# Patient Record
Sex: MALE | Race: WHITE | NOT HISPANIC OR LATINO | Employment: OTHER | URBAN - METROPOLITAN AREA
[De-identification: names, ages, dates, MRNs, and addresses within clinical notes are randomized per-mention and may not be internally consistent; named-entity substitution may affect disease eponyms.]

---

## 2017-03-17 ENCOUNTER — TELEPHONE (OUTPATIENT)
Dept: NEUROLOGY | Facility: CLINIC | Age: 82
End: 2017-03-17

## 2017-03-17 NOTE — TELEPHONE ENCOUNTER
----- Message from Herminio Moreno sent at 3/17/2017  1:33 PM CDT -----  Contact: Deirdre estrada'sammie estrada's called regarding with questions regarding how many milligrams of Aricept should that patient be taking daily? Please call back to advise at [phone]. Thanks,

## 2017-03-17 NOTE — TELEPHONE ENCOUNTER
Spoke with pharmacist. She wanted to clarify which mg the patient was taking on the Aricept. Informed her that it is Aricept 10mg PO QD.

## 2017-04-03 ENCOUNTER — OFFICE VISIT (OUTPATIENT)
Dept: PODIATRY | Facility: CLINIC | Age: 82
End: 2017-04-03
Payer: MEDICARE

## 2017-04-03 VITALS — BODY MASS INDEX: 21.68 KG/M2 | WEIGHT: 160.06 LBS | HEIGHT: 72 IN

## 2017-04-03 DIAGNOSIS — G60.9 IDIOPATHIC PERIPHERAL NEUROPATHY: Primary | ICD-10-CM

## 2017-04-03 DIAGNOSIS — G89.29 OTHER CHRONIC PAIN: ICD-10-CM

## 2017-04-03 PROCEDURE — 1157F ADVNC CARE PLAN IN RCRD: CPT | Mod: S$GLB,,, | Performed by: PODIATRIST

## 2017-04-03 PROCEDURE — 1159F MED LIST DOCD IN RCRD: CPT | Mod: S$GLB,,, | Performed by: PODIATRIST

## 2017-04-03 PROCEDURE — 99203 OFFICE O/P NEW LOW 30 MIN: CPT | Mod: S$GLB,,, | Performed by: PODIATRIST

## 2017-04-03 PROCEDURE — 99999 PR PBB SHADOW E&M-EST. PATIENT-LVL II: CPT | Mod: PBBFAC,,, | Performed by: PODIATRIST

## 2017-04-03 PROCEDURE — 1160F RVW MEDS BY RX/DR IN RCRD: CPT | Mod: S$GLB,,, | Performed by: PODIATRIST

## 2017-04-03 PROCEDURE — 1125F AMNT PAIN NOTED PAIN PRSNT: CPT | Mod: S$GLB,,, | Performed by: PODIATRIST

## 2017-04-03 RX ORDER — LISINOPRIL 10 MG/1
TABLET ORAL
Refills: 3 | Status: ON HOLD | COMMUNITY
Start: 2016-12-26 | End: 2017-05-09

## 2017-04-03 RX ORDER — LIDOCAINE HYDROCHLORIDE 20 MG/ML
JELLY TOPICAL
Qty: 30 ML | Refills: 2 | Status: ON HOLD | OUTPATIENT
Start: 2017-04-03 | End: 2017-05-09

## 2017-04-03 NOTE — MR AVS SNAPSHOT
[facility]  [address] [place] [address]  Phone: [phone]                  Halley Rodrigues   4/3/2017 10:00 AM   Office Visit    Description:  Male : 1930   Provider:  Hernandez Hanks DPM   Department:  [facility]           Reason for Visit     Foot Pain           Diagnoses this Visit        Comments    Idiopathic peripheral neuropathy    -  Primary     Other chronic pain                To Do List           Future Appointments        Provider Department Dept Phone    2017 9:20 AM Shawn Elizabeth MD [facility] [phone]      Goals (5 Years of Data)     None      Follow-Up and Disposition     Return if symptoms worsen or fail to improve.    Follow-up and Disposition History       These Medications        Disp Refills Start End    lidocaine HCL 2% (XYLOCAINE) 2 % jelly 30 mL 2 4/3/2017     Apply topically as needed. Apply topically to affected portion right and left foot once nightly. - Topical (Top)    Pharmacy: [facility] [address] [place] [address] [address] AT [facility] Ph #: [phone]         [facility] On Call     [facility] On Call Nurse Care Line -  Assistance  Unless otherwise directed by your provider, please contact [facility], our nurse care line that is available for  assistance.     Registered nurses in the [facility] provide: appointment scheduling, clinical advisement, health education, and other advisory services.  Call: [phone] (toll free)               Medications           Message regarding Medications     Verify the changes and/or additions to your medication regime listed below are the same as discussed with your clinician today.  If any of these changes or additions are incorrect, please notify your healthcare provider.        START taking these NEW medications        Refills    lidocaine HCL 2% (XYLOCAINE) 2 % jelly 2    Sig: Apply topically as needed.  Apply topically to affected portion right and left foot once nightly.    Class: Normal    Route: Topical (Top)           Verify that the below list of medications is an accurate representation of the medications you are currently taking.  If none reported, the list may be blank. If incorrect, please contact your healthcare provider. Carry this list with you in case of emergency.           Current Medications     amlodipine (NORVASC) 10 MG tablet Take 10 mg by mouth once daily.    aspirin (ECOTRIN) 81 MG EC tablet Take 81 mg by mouth once daily.    atorvastatin (LIPITOR) 20 MG tablet Take 20 mg by mouth once daily.    clopidogrel (PLAVIX) 75 mg tablet Take 75 mg by mouth once daily.    donepezil (ARICEPT) 10 MG tablet Take 1 tablet (10 mg total) by mouth once daily.    furosemide (LASIX) 20 MG tablet Take 20 mg by mouth once daily.    hydrochlorothiazide (HYDRODIURIL) 25 MG tablet 25 mg.    hydrochlorothiazide (MICROZIDE) 12.5 mg capsule Take 12.5 mg by mouth once daily.    lisinopril 10 MG tablet TK 1 T PO D    metoprolol tartrate (LOPRESSOR) 25 MG tablet 25 mg.    nicotine (NICODERM CQ) 21 mg/24 hr Place 1 patch onto the skin every 24 hours.    omeprazole (PRILOSEC) 20 MG capsule 20 mg.    trazodone (DESYREL) 50 MG tablet 50 mg.    lidocaine HCL 2% (XYLOCAINE) 2 % jelly Apply topically as needed. Apply topically to affected portion right and left foot once nightly.           Clinical Reference Information           Your Vitals Were     Height Weight BMI          6' (1.829 m) 72.6 kg (160 lb 0.9 oz) 21.71 kg/m2        Allergies as of 4/3/2017     Eucalyptus Containing Products      Immunizations Administered on Date of Encounter - 4/3/2017     None      [facility] Sign-Up     Activating your [facility] account is as easy as 1-2-3!     1) Visit [email or web address], select Sign Up Now, enter this activation code and your date of birth, then select Next.  [email or web address]  Expires: 5/13/2017  2:20 PM      2) Create a  username and password to use when you visit [facility] in the future and select a security question in case you lose your password and select Next.    3) Enter your e-mail address and click Sign Up!    Additional Information  If you have questions, please e-mail [email or web address] or call [phone] to talk to our [facility] staff. Remember, [facility] is NOT to be used for urgent needs. For medical emergencies, dial 911.         Smoking Cessation     If you would like to quit smoking:   You may be eligible for free services if you are a [place] resident and started smoking cigarettes before September 1, 1988.  Call the Smoking Cessation Trust ([facility]) toll free at [phone] or [phone].   Call 8-355-QUIT-NOW if you do not meet the above criteria.   Contact us via email: [email or web address]   View our website for more information: [email or web address]        Language Assistance Services     ATTENTION: Language assistance services are available, free of charge. Please call [phone].      ATENCIÓN: Si habla español, tiene a valdez disposición servicios gratuitos de asistencia lingüística. Llame al [phone].     CHÚ Ý: N?u b?n nói Ti?ng Vi?t, có các d?ch v? h? tr? ngôn ng? mi?n phí dành cho b?n. G?i s? [phone].         [facility] complies with applicable Federal civil rights laws and does not discriminate on the basis of race, color, national origin, age, disability, or sex.

## 2017-04-03 NOTE — PROGRESS NOTES
Subjective:      Patient ID: Halley Rodrigues is a 86 y.o. male.    Chief Complaint: Foot Pain (bilateral)    Burning tingling pain both feet.  Gradual onset, worsening over past several months - year, aggravated by increased weight bearing, shoe gear, pressure.  Several medicines tried without relief - doesn't remember names.  OTC pain med not helping.      Review of Systems   Constitution: Negative for chills, diaphoresis, fever, malaise/fatigue and night sweats.   Cardiovascular: Negative for claudication, cyanosis, leg swelling and syncope.   Skin: Negative for color change, dry skin, rash, suspicious lesions and unusual hair distribution.   Musculoskeletal: Negative for falls, joint pain, joint swelling, muscle cramps, muscle weakness and stiffness.   Gastrointestinal: Negative for constipation, diarrhea, nausea and vomiting.   Neurological: Positive for paresthesias and sensory change. Negative for brief paralysis, disturbances in coordination, focal weakness, numbness and tremors.           Objective:      Physical Exam   Constitutional: He is oriented to person, place, and time. He appears well-developed and well-nourished. He is cooperative.   Oriented to time, place, and person.   Cardiovascular:   Pulses:       Dorsalis pedis pulses are 1+ on the right side, and 1+ on the left side.        Posterior tibial pulses are 1+ on the right side, and 1+ on the left side.   Capillary fill time 3-5 seconds.  All toes warm to touch.      Negative lower extremity edema bilateral.    Negative elevational pallor and dependent rubor bilateral.     Musculoskeletal:   Normal angle, base, station of gait. Decreased stride length, early heel off, moderately propulsive toe off bilateral.    All ten toes without clubbing, cyanosis, or signs of ischemia.      No pain to palpation bilateral lower extremities.      Range of motion, stability, muscle strength, and muscle tone are age and health appropriate normal  bilateral feet and legs.       Lymphadenopathy:   Negative lymphadenopathy bilateral popliteal fossa and tarsal tunnel.     Neurological: He is alert and oriented to person, place, and time. He has normal strength. He is not disoriented. He displays no atrophy and no tremor. A sensory deficit is present. He exhibits normal muscle tone.   Reflex Scores:       Patellar reflexes are 2+ on the right side and 2+ on the left side.       Achilles reflexes are 2+ on the right side and 2+ on the left side.  Paresthesias, and burning bilateral feet with no clearly identified trigger or source.       Skin: Skin is warm, dry and intact. No abrasion, no bruising, no burn, no ecchymosis, no laceration, no lesion, no petechiae and no rash noted. He is not diaphoretic. No cyanosis or erythema. No pallor. Nails show no clubbing.   Skin is normal age and health appropriate color, turgor, texture, and temperature bilateral lower extremities without ulceration, hyperpigmentation, discoloration, masses nodules or cords palpated.  No ecchymosis, erythema, edema, or cardinal signs of infection bilateral lower extremities.                     Assessment:       Encounter Diagnoses   Name Primary?    Idiopathic peripheral neuropathy Yes    Other chronic pain          Plan:       Halley was seen today for foot pain.    Diagnoses and all orders for this visit:    Idiopathic peripheral neuropathy    Other chronic pain    Other orders  -     lidocaine HCL 2% (XYLOCAINE) 2 % jelly; Apply topically as needed. Apply topically to affected portion right and left foot once nightly.      I counseled the patient on his conditions, their implications and medical management.        Rx lidocaine gel.    Consult pain management    Discussed conservative treatment with shoes of adequate dimensions, material, and style to alleviate symptoms and delay or prevent surgical intervention.           Return if symptoms worsen or fail to improve.

## 2017-04-26 ENCOUNTER — OFFICE VISIT (OUTPATIENT)
Dept: PAIN MEDICINE | Facility: CLINIC | Age: 82
End: 2017-04-26
Payer: MEDICARE

## 2017-04-26 VITALS
SYSTOLIC BLOOD PRESSURE: 102 MMHG | DIASTOLIC BLOOD PRESSURE: 54 MMHG | HEIGHT: 72 IN | BODY MASS INDEX: 22.21 KG/M2 | HEART RATE: 59 BPM | WEIGHT: 164 LBS

## 2017-04-26 DIAGNOSIS — M53.3 SACROILIAC JOINT PAIN: ICD-10-CM

## 2017-04-26 DIAGNOSIS — G62.9 PERIPHERAL POLYNEUROPATHY: Primary | ICD-10-CM

## 2017-04-26 DIAGNOSIS — M47.819 FACET ARTHROPATHY: ICD-10-CM

## 2017-04-26 PROCEDURE — 99204 OFFICE O/P NEW MOD 45 MIN: CPT | Mod: S$GLB,,, | Performed by: ANESTHESIOLOGY

## 2017-04-26 PROCEDURE — 1125F AMNT PAIN NOTED PAIN PRSNT: CPT | Mod: S$GLB,,, | Performed by: ANESTHESIOLOGY

## 2017-04-26 PROCEDURE — 1160F RVW MEDS BY RX/DR IN RCRD: CPT | Mod: S$GLB,,, | Performed by: ANESTHESIOLOGY

## 2017-04-26 PROCEDURE — 99999 PR PBB SHADOW E&M-EST. PATIENT-LVL III: CPT | Mod: PBBFAC,,, | Performed by: ANESTHESIOLOGY

## 2017-04-26 PROCEDURE — 1159F MED LIST DOCD IN RCRD: CPT | Mod: S$GLB,,, | Performed by: ANESTHESIOLOGY

## 2017-04-26 RX ORDER — IPRATROPIUM BROMIDE 42 UG/1
SPRAY, METERED NASAL
Status: ON HOLD | COMMUNITY
Start: 2017-04-25 | End: 2017-12-05

## 2017-04-26 RX ORDER — METOPROLOL SUCCINATE 25 MG/1
TABLET, EXTENDED RELEASE ORAL
Status: ON HOLD | COMMUNITY
Start: 2017-03-09 | End: 2017-05-09

## 2017-04-26 RX ORDER — HYDROCODONE BITARTRATE AND ACETAMINOPHEN 7.5; 325 MG/1; MG/1
TABLET ORAL
COMMUNITY
Start: 2017-02-01 | End: 2017-04-26

## 2017-04-26 RX ORDER — ATORVASTATIN CALCIUM 40 MG/1
TABLET, FILM COATED ORAL
COMMUNITY
Start: 2017-04-15 | End: 2019-08-26 | Stop reason: ALTCHOICE

## 2017-04-26 RX ORDER — TRAMADOL HYDROCHLORIDE 50 MG/1
TABLET ORAL
Status: ON HOLD | COMMUNITY
Start: 2017-01-29 | End: 2017-05-09

## 2017-04-26 RX ORDER — HYDROCODONE BITARTRATE AND ACETAMINOPHEN 10; 325 MG/1; MG/1
1 TABLET ORAL EVERY 12 HOURS PRN
Qty: 60 TABLET | Refills: 0 | Status: SHIPPED | OUTPATIENT
Start: 2017-04-26 | End: 2017-05-31 | Stop reason: SDUPTHER

## 2017-04-26 RX ORDER — PREGABALIN 75 MG/1
CAPSULE ORAL
COMMUNITY
Start: 2017-04-10 | End: 2018-07-12

## 2017-04-26 RX ORDER — MIRTAZAPINE 7.5 MG/1
TABLET, FILM COATED ORAL
Status: ON HOLD | COMMUNITY
Start: 2017-02-17 | End: 2017-05-09

## 2017-04-26 RX ORDER — MUPIROCIN CALCIUM 20 MG/G
CREAM TOPICAL
Status: ON HOLD | COMMUNITY
Start: 2017-02-01 | End: 2019-12-03 | Stop reason: HOSPADM

## 2017-04-26 RX ORDER — MIRTAZAPINE 15 MG/1
TABLET, FILM COATED ORAL
Status: ON HOLD | COMMUNITY
Start: 2017-04-05 | End: 2017-05-09

## 2017-04-26 RX ORDER — LISINOPRIL 5 MG/1
TABLET ORAL
Status: ON HOLD | COMMUNITY
Start: 2017-04-16 | End: 2017-12-05

## 2017-04-26 RX ORDER — SIMVASTATIN 40 MG/1
40 TABLET, FILM COATED ORAL NIGHTLY
COMMUNITY
Start: 2017-02-01

## 2017-04-26 RX ORDER — FUROSEMIDE 40 MG/1
TABLET ORAL
COMMUNITY
Start: 2017-02-07 | End: 2019-08-26 | Stop reason: ALTCHOICE

## 2017-04-26 RX ORDER — SERTRALINE HYDROCHLORIDE 25 MG/1
TABLET, FILM COATED ORAL
Status: ON HOLD | COMMUNITY
Start: 2017-04-25 | End: 2017-05-09

## 2017-04-26 RX ORDER — PANTOPRAZOLE SODIUM 40 MG/1
40 TABLET, DELAYED RELEASE ORAL DAILY
COMMUNITY
Start: 2017-03-30

## 2017-04-26 RX ORDER — SERTRALINE HYDROCHLORIDE 50 MG/1
TABLET, FILM COATED ORAL
Status: ON HOLD | COMMUNITY
Start: 2017-04-10 | End: 2017-12-05

## 2017-04-26 RX ORDER — DONEPEZIL HYDROCHLORIDE 5 MG/1
TABLET, FILM COATED ORAL
Status: ON HOLD | COMMUNITY
Start: 2017-04-19 | End: 2017-12-05

## 2017-04-26 NOTE — PROGRESS NOTES
"This note was completed with dictation software and grammatical errors may exist.    Referring Physician: Hernandez Hanks DPM    PCP: Scott Payne MD      CC: bilateral foot pain and lower back pain    HPI:   Halley Rodrigues is a 86 y.o.  male with PMHx significant for HLD, HTN, CAD on Plavix, peripheral neuropathy who presents with CC: painful burning sensation in B feet x 5y+ duration. The pain is constant and awakes him in his sleep and makes the evenings the most difficult, pain- wise. It is worse with lying flat and improved with walking. He has taken Lyrica x 5y without any appreciable pain relief. He has tried 2%lidocaine cream, which completely "numbs his feet" and inhibits him from ambulation. He has taken Hydrocodone 7.5 mg 2-3 times QD which helps with his pain significantly. He also reports cLBP without preceding injury/trauma, specifically in the area of B SI joints (R worse than L), aching and stabbing in nature, without radiation. His back pain is worsened with prolonged standing and walking, which limits his activity and ultimately makes his foot pain worse 2/2 being sedentary. He has been having intermittent falls, which he attributes to generalized weakness versus foot numbness or back pain. He denies saddle anesthesia or B/B changes.     ROS:  CONSTITUTIONAL: No fevers, chills, night sweats, wt. loss, appetite changes  SKIN: no rashes or itching  ENT: No headaches, head trauma, vision changes, or eye pain  LYMPH NODES: None noticed   CV: No chest pain, palpitations.   RESP: No shortness of breath, dyspnea on exertion, cough, wheezing, or hemoptysis  GI: No nausea, emesis, diarrhea, constipation, melena, hematochezia, pain.    : No dysuria, hematuria, urgency, or frequency   HEME: No easy bruising, bleeding problems  PSYCHIATRIC: No depression, anxiety, psychosis, hallucinations.  NEURO: No seizures, memory loss, dizziness or difficulty sleeping  MSK back pain, foot " pain      Past Medical History:   Diagnosis Date    GERD (gastroesophageal reflux disease)     Heart disease     Hypertension     Neuropathy      Past Surgical History:   Procedure Laterality Date    CORONARY ARTERY BYPASS GRAFT       Family History   Problem Relation Age of Onset    Dementia Father      Social History     Social History    Marital status:      Spouse name: N/A    Number of children: N/A    Years of education: N/A     Social History Main Topics    Smoking status: Current Every Day Smoker     Packs/day: 1.00     Years: 70.00    Smokeless tobacco: None    Alcohol use No    Drug use: No    Sexual activity: No     Other Topics Concern    None     Social History Narrative         Medications/Allergies: See med card    Vitals:    04/26/17 0930   BP: (!) 102/54   Pulse: (!) 59   Weight: 74.4 kg (164 lb)   Height: 6' (1.829 m)   PainSc: 10-Worst pain ever         Physical exam:    GENERAL: A and O x3, the patient appears well groomed and is in no acute distress.  Skin: No rashes or obvious lesions  HEENT: normocephalic, atraumatic  CARDIOVASCULAR:  Palpable peripheral pulses  LUNGS: easy work of breathing  ABDOMEN: soft, nontender   UPPER EXTREMITIES: Normal alignment, normal range of motion, no atrophy, no skin changes,  hair growth and nail growth normal and equal bilaterally. No swelling, no tenderness.    LOWER EXTREMITIES:  Normal alignment, normal range of motion, no atrophy, dry skin, onchyomycosis in B feet. No swelling, no tenderness.    LUMBAR SPINE  Lumbar spine: ROM is moderately decreased l with flexion extension and oblique extension   Michel's test causes increased pain on both sides (R>>L)   Supine straight leg raise is negative bilaterally.    Internal and external rotation of the hip causes no increased pain on either side.  Myofascial exam:SI joint tenderness B      MENTAL STATUS: normal orientation, speech, language, and fund of knowledge for social situation.   Emotional state appropriate.    CRANIAL NERVES:  II:  PERRL bilaterally,   III,IV,VI: EOMI.    V:  Facial sensation equal bilaterally  VII:  Facial motor function normal.  VIII:  Hearing equal to finger rub bilaterally  IX/X: Gag normal, palate symmetric  XI:  Shoulder shrug equal, head turn equal  XII:  Tongue midline without fasciculations      MOTOR: Tone and bulk: normal bilateral upper and lower Strength: normal   Delt Bi Tri WE WF     R 5 5 5 5 5 5   L 5 5 5 5 5 5     IP ADD ABD Quad TA Gas HAM  R 5 5 5 5 5 5 5  L 5 5 5 5 5 5 5    SENSATION: Light touch and pinprick intact bilaterally  REFLEXES: normal, symmetric, nonbrisk.  Toes down, no clonus. No hoffmans.  GAIT: normal rise, base, steps, and arm swing.              Assessment   1. Peripheral polyneuropathy    2. Sacroiliac joint pain    3. Facet arthropathy        Plan:  - I have stressed the importance of physical activity and exercise to improve overall health. Order given for formal PT  -We have discussed the difficulty in treating peripheral neuropathy and the underlying contributory pathology. As the 2% lidocaine cream left his feet overly numb, we will try a compound cream with anti-neuropathic and anti-inflammatory components to see if this better helps. Instructed to apply up to QID PRN.  -We have dicussed the judicious use of oral Hydrocodone, especially 2/2 pt's age and to be aware of the potential SE: constipation, nausea, itching, respiratory depression and death. Will increase his dose to 10 mg, and not to exceed two doses QD maximum. Given quantity #60. The patient's son and daughter-in-law have been counseled on the risks and proper use of opioid medications.   -Scheduled a B lumbar sympathetic block to see if this helps with his peripheral neuropathy. We have discussed realistic expectations for this nerve block.   - We have discussed addressing his SI arthritis in the future to see if we can obtain some relief from B SI joint  injections  -Pt can continue Lyrica 75 TID  - Follow up in one month       Thank you for referring this interesting patient, and I look forward to continuing to collaborate in his care.

## 2017-05-02 DIAGNOSIS — G60.9 IDIOPATHIC PERIPHERAL NEUROPATHY: Primary | ICD-10-CM

## 2017-05-09 ENCOUNTER — HOSPITAL ENCOUNTER (OUTPATIENT)
Facility: AMBULARY SURGERY CENTER | Age: 82
Discharge: HOME | End: 2017-05-09
Attending: ANESTHESIOLOGY | Admitting: ANESTHESIOLOGY
Payer: MEDICARE

## 2017-05-09 ENCOUNTER — SURGERY (OUTPATIENT)
Age: 82
End: 2017-05-09

## 2017-05-09 DIAGNOSIS — M79.606 LEG PAIN: ICD-10-CM

## 2017-05-09 DIAGNOSIS — M79.606 PAIN OF LOWER EXTREMITY, UNSPECIFIED LATERALITY: Primary | ICD-10-CM

## 2017-05-09 PROCEDURE — 99152 MOD SED SAME PHYS/QHP 5/>YRS: CPT | Mod: ,,, | Performed by: ANESTHESIOLOGY

## 2017-05-09 PROCEDURE — 77003 FLUOROGUIDE FOR SPINE INJECT: CPT | Performed by: ANESTHESIOLOGY

## 2017-05-09 PROCEDURE — 64520 N BLOCK LUMBAR/THORACIC: CPT | Mod: RT | Performed by: ANESTHESIOLOGY

## 2017-05-09 PROCEDURE — 64520 N BLOCK LUMBAR/THORACIC: CPT | Mod: 50,,, | Performed by: ANESTHESIOLOGY

## 2017-05-09 RX ORDER — LIDOCAINE HYDROCHLORIDE 10 MG/ML
INJECTION, SOLUTION EPIDURAL; INFILTRATION; INTRACAUDAL; PERINEURAL
Status: COMPLETED
Start: 2017-05-09 | End: 2017-05-09

## 2017-05-09 RX ORDER — FENTANYL CITRATE 50 UG/ML
INJECTION, SOLUTION INTRAMUSCULAR; INTRAVENOUS
Status: DISCONTINUED
Start: 2017-05-09 | End: 2017-05-09 | Stop reason: HOSPADM

## 2017-05-09 RX ORDER — BUPIVACAINE HYDROCHLORIDE AND EPINEPHRINE 2.5; 5 MG/ML; UG/ML
INJECTION, SOLUTION EPIDURAL; INFILTRATION; INTRACAUDAL; PERINEURAL
Status: DISCONTINUED | OUTPATIENT
Start: 2017-05-09 | End: 2017-05-09 | Stop reason: HOSPADM

## 2017-05-09 RX ORDER — LIDOCAINE HYDROCHLORIDE 10 MG/ML
0.2 INJECTION, SOLUTION EPIDURAL; INFILTRATION; INTRACAUDAL; PERINEURAL ONCE AS NEEDED
Status: COMPLETED | OUTPATIENT
Start: 2017-05-09 | End: 2017-05-09

## 2017-05-09 RX ORDER — DEXAMETHASONE SODIUM PHOSPHATE 10 MG/ML
INJECTION INTRAMUSCULAR; INTRAVENOUS
Status: DISCONTINUED | OUTPATIENT
Start: 2017-05-09 | End: 2017-05-09 | Stop reason: HOSPADM

## 2017-05-09 RX ORDER — MIDAZOLAM HYDROCHLORIDE 1 MG/ML
INJECTION INTRAMUSCULAR; INTRAVENOUS
Status: DISCONTINUED | OUTPATIENT
Start: 2017-05-09 | End: 2017-05-09 | Stop reason: HOSPADM

## 2017-05-09 RX ORDER — ALPRAZOLAM 1 MG/1
1 TABLET, ORALLY DISINTEGRATING ORAL
Status: DISCONTINUED | OUTPATIENT
Start: 2017-05-09 | End: 2017-05-09 | Stop reason: HOSPADM

## 2017-05-09 RX ORDER — MIDAZOLAM HYDROCHLORIDE 1 MG/ML
INJECTION INTRAMUSCULAR; INTRAVENOUS
Status: DISCONTINUED
Start: 2017-05-09 | End: 2017-05-09 | Stop reason: HOSPADM

## 2017-05-09 RX ORDER — FENTANYL CITRATE 50 UG/ML
INJECTION, SOLUTION INTRAMUSCULAR; INTRAVENOUS
Status: DISCONTINUED | OUTPATIENT
Start: 2017-05-09 | End: 2017-05-09 | Stop reason: HOSPADM

## 2017-05-09 RX ORDER — SODIUM CHLORIDE, SODIUM LACTATE, POTASSIUM CHLORIDE, CALCIUM CHLORIDE 600; 310; 30; 20 MG/100ML; MG/100ML; MG/100ML; MG/100ML
INJECTION, SOLUTION INTRAVENOUS ONCE AS NEEDED
Status: COMPLETED | OUTPATIENT
Start: 2017-05-09 | End: 2017-05-09

## 2017-05-09 RX ORDER — LIDOCAINE HYDROCHLORIDE 10 MG/ML
INJECTION, SOLUTION EPIDURAL; INFILTRATION; INTRACAUDAL; PERINEURAL
Status: DISCONTINUED | OUTPATIENT
Start: 2017-05-09 | End: 2017-05-09 | Stop reason: HOSPADM

## 2017-05-09 RX ORDER — BUPIVACAINE HYDROCHLORIDE AND EPINEPHRINE 2.5; 5 MG/ML; UG/ML
INJECTION, SOLUTION EPIDURAL; INFILTRATION; INTRACAUDAL; PERINEURAL
Status: DISCONTINUED
Start: 2017-05-09 | End: 2017-05-09 | Stop reason: HOSPADM

## 2017-05-09 RX ORDER — DEXAMETHASONE SODIUM PHOSPHATE 10 MG/ML
INJECTION INTRAMUSCULAR; INTRAVENOUS
Status: DISCONTINUED
Start: 2017-05-09 | End: 2017-05-09 | Stop reason: HOSPADM

## 2017-05-09 RX ADMIN — BUPIVACAINE HYDROCHLORIDE AND EPINEPHRINE 19 ML: 2.5; 5 INJECTION, SOLUTION EPIDURAL; INFILTRATION; INTRACAUDAL; PERINEURAL at 01:05

## 2017-05-09 RX ADMIN — FENTANYL CITRATE 25 MCG: 50 INJECTION, SOLUTION INTRAMUSCULAR; INTRAVENOUS at 01:05

## 2017-05-09 RX ADMIN — FENTANYL CITRATE 50 MCG: 50 INJECTION, SOLUTION INTRAMUSCULAR; INTRAVENOUS at 01:05

## 2017-05-09 RX ADMIN — MIDAZOLAM HYDROCHLORIDE 2 MG: 1 INJECTION INTRAMUSCULAR; INTRAVENOUS at 01:05

## 2017-05-09 RX ADMIN — DEXAMETHASONE SODIUM PHOSPHATE 10 MG: 10 INJECTION INTRAMUSCULAR; INTRAVENOUS at 01:05

## 2017-05-09 RX ADMIN — SODIUM CHLORIDE, SODIUM LACTATE, POTASSIUM CHLORIDE, CALCIUM CHLORIDE: 600; 310; 30; 20 INJECTION, SOLUTION INTRAVENOUS at 12:05

## 2017-05-09 RX ADMIN — LIDOCAINE HYDROCHLORIDE 10 ML: 10 INJECTION, SOLUTION EPIDURAL; INFILTRATION; INTRACAUDAL; PERINEURAL at 01:05

## 2017-05-09 RX ADMIN — LIDOCAINE HYDROCHLORIDE 0.2 ML: 10 INJECTION, SOLUTION EPIDURAL; INFILTRATION; INTRACAUDAL; PERINEURAL at 12:05

## 2017-05-09 NOTE — DISCHARGE INSTRUCTIONS
Procedural Sedation  Procedural sedation is medicine to ease discomfort, pain, and anxiety during a procedure. The medicine is often given through an intravenous (IV) line in your arm or hand. In some cases, the medicine may be taken by mouth or inhaled. While you are under sedation, you will likely be awake. But you may not remember anything afterward.  Why procedural sedation is used  Sedation is used for many types of procedures. The goal is to reduce pain, anxiety, and stressful memories of a procedure. It can also help your health care provider treat you. For example, having a broken bone fixed may be easier if you feel relaxed.  Procedural sedation is used only for short, basic procedures. It is not used for complex surgeries. Some procedures that use this type of sedation include:  · Dental surgery  · Breast biopsy, to take a sample of breast tissue  · Endoscopy, to look at gastrointestinal problems  · Bronchoscopy, to check for lung problems  · Bone or joint realignment, to fix a broken bone or dislocated joint  · Minor foot or skin surgery  · Electrical cardioversion, to restore a normal heart rhythm  · Lumbar puncture, to assess neurological disease  Risks of procedural sedation  Procedural sedation has some risks and possible side effects, such as:  · Headache  · Nausea and vomiting  · Unpleasant memory of the procedure  · Lowered rate of breathing  · Changes in heart rate and blood pressure (rare)  · Inhalation of stomach contents into your lungs (rare)  Side effects will likely go away shortly after the procedure. Your health care team will watch your heart rate and breathing during your sedation. This is to help prevent problems.  Your own risks may vary based on your age and your overall health. They also depend on the type of sedation you are given. Talk with your health care provider about the risks that apply most to you.  Getting ready for procedural sedation  Talk with your health care provider  how to get ready for your procedure. Tell him or her about all the medicines you take. This includes over-the-counter medicines such as ibuprofen. It also includes vitamins, herbs, and other supplements. You may need to stop taking some medicines before the procedure, such as blood thinners and aspirin. If you smoke, you may need to stop. Talk with your health care provider if you need help to stop smoking.  Tell your health care provider if you:  · Have had any problems in the past with sedation or anesthesia  · Have had any recent changes in your health, such as an infection or fever  · Are pregnant or think you may be pregnant  Also, make sure to:  · Ask a family member or friend to take you home after the procedure. You cannot drive on the day you receive sedation.  · Not eat or drink after midnight the night before your procedure, if advised.  · Follow all other instructions from your health care provider.  During your procedural sedation  You may have your procedure in a hospital or a medical clinic. Sedation is done by a trained health care provider. In general, you can expect the following:  · You will be given medicine through an IV line in your arm or hand. Or you may receive a shot. The medicine may also be given by mouth. Or you may inhale it through a mask.  · If you receive medicine through an IV, you may feel the effects very quickly. You will start to feel relaxed and drowsy.  · During the procedure, your heart rate, breathing, and blood pressure will be closely watched. Your breathing and blood pressure may decrease a little. But you will likely not need help with your breathing. You may receive a little extra oxygen through a mask.  · You will probably be awake the entire time. If you do fall asleep, you should be easy to wake up, if needed. You should feel little or no pain.  · When your procedure is over, the sedative medicine will be stopped.  After your procedural sedation  You will begin to  feel more awake and aware. But you will likely be drowsy for a while afterward. You will be closely watched as you become more alert. You may have a faint memory of the procedure. Or you may not remember it at all.  You should be able to return home within an hour or two after your procedure. Plan to have someone stay with you for a few hours. Side effects such as headache and nausea may go away quickly. Tell your health care provider if they continue.  Dont drive or make any important decisions for at least 24 hours. Be sure to follow all after-care instructions.     When to call your health care provider  Have someone call your health care provider right away if you have any of these:  · Drowsiness that gets worse  · Weakness or dizziness that gets worse  · Repeated vomiting  · You cant be awakened   Date Last Reviewed: 2/6/2015  © 3754-3447 [employer]. [address] [place] [address]. All rights reserved. This information is not intended as a substitute for professional medical care. Always follow your healthcare professional's instructions.        Sympathetic Nerve Block    A sympathetic nerve block helps your doctor find the cause of the burning, pain, or tingling in your arms and hands or legs and feet. During the test, medication is injected near your spine. This blocks the sympathetic nerves in that region. If these nerves are causing your problem, the injection will relieve your symptoms for an hour up to a few days, or more permanently. It is used for both diagnosis and treatment.    What are the sympathetic nerves?  The sympathetic nervous system is a network of nerves throughout your body. The nerves branch from your spine. They control some body functions, such as the closing of blood vessels and opening the sweat glands. A problem with these nerves can affect blood flow. Symptoms are often felt in the hands or feet. They may hurt, burn, feel cold, or be tender to the  touch.  Sympathetic ganglions  The sympathetic nervous system is controlled by bunches of nerves called ganglions. One large ganglion, called the stellate ganglion, helps control nerves in the upper body. In the lower body, nerves are controlled by several ganglions that make up the sympathetic chain.  Risks and complications  Risks and complications are rare, but can include:  · Bleeding or fluid leakage in the spinal cord  · Puncture of a blood vessel  · Infection  · Lung puncture (pneumothorax)  · Nerve injury   Date Last Reviewed: 10/20/2015  © 3720-9390 [employer]. [address] [place], [place] [id number]. All rights reserved. This information is not intended as a substitute for professional medical care. Always follow your healthcare professional's instructions.

## 2017-05-09 NOTE — PLAN OF CARE
Patient stable says ready to go home. Patient's son Cali chairside and says ready to take patient home. Both verbalize understanding of all discharge instructions. Patient discharged via ambulatory accompanied by nurse and patient's son Cali who is driving patient home.

## 2017-05-09 NOTE — IP AVS SNAPSHOT
[facility] [place] Location  [address] [address]           Patient Discharge Instructions   Our goal is to set you up for success. This packet includes information on your condition, medications, and your home care.  It will help you care for yourself to prevent having to return to the hospital.     Please ask your nurse if you have any questions.      There are many details to remember when preparing to leave the hospital. Here is what you will need to do:    1. Take your medicine. If you are prescribed medications, review your Medication List on the following pages. You may have new medications to  at the pharmacy and others that you'll need to stop taking. Review the instructions for how and when to take your medications. Talk with your doctor or nurses if you are unsure of what to do.     2. Go to your follow-up appointments. Specific follow-up information is listed in the following pages. Your may be contacted by a nurse or clinical provider about future appointments. Be sure we have all of the phone numbers to reach you. Please contact your provider's office if you are unable to make an appointment.     3. Watch for warning signs. Your doctor or nurse will give you detailed warning signs to watch for and when to call for assistance. These instructions may also include educational information about your condition. If you experience any of warning signs to your health, call your doctor.           [facility]  Unless otherwise directed by your provider, please   contact [facility], our nurse care line   that is available for 24/7 assistance.     [phone] (toll-free)     Registered nurses in the [facility]   provide: appointment scheduling, clinical advisement, health education, and other advisory services.                  ** Verify the list of medication(s) below is accurate and up to date. Carry this with you in case of  emergency. If your medications have changed, please notify your healthcare provider.             Medication List      CHANGE how you take these medications        Additional Info                      atorvastatin 40 MG tablet   Commonly known as:  LIPITOR   Refills:  0   What changed:  Another medication with the same name was removed. Continue taking this medication, and follow the directions you see here.      Begin Date    AM    Noon    PM    Bedtime       donepezil 5 MG tablet   Commonly known as:  ARICEPT   Refills:  0   What changed:  Another medication with the same name was removed. Continue taking this medication, and follow the directions you see here.      Begin Date    AM    Noon    PM    Bedtime       furosemide 40 MG tablet   Commonly known as:  LASIX   Refills:  0   What changed:  Another medication with the same name was removed. Continue taking this medication, and follow the directions you see here.      Begin Date    AM    Noon    PM    Bedtime       hydrochlorothiazide 12.5 mg capsule   Commonly known as:  MICROZIDE   Refills:  6   Dose:  12.5 mg   What changed:  Another medication with the same name was removed. Continue taking this medication, and follow the directions you see here.    Instructions:  Take 12.5 mg by mouth once daily.     Begin Date    AM    Noon    PM    Bedtime       lisinopril 5 MG tablet   Commonly known as:  PRINIVIL,ZESTRIL   Refills:  0   What changed:  Another medication with the same name was removed. Continue taking this medication, and follow the directions you see here.      Begin Date    AM    Noon    PM    Bedtime       sertraline 50 MG tablet   Commonly known as:  ZOLOFT   Refills:  0   What changed:  Another medication with the same name was removed. Continue taking this medication, and follow the directions you see here.      Begin Date    AM    Noon    PM    Bedtime         CONTINUE taking these medications        Additional Info                      amlodipine  10 MG tablet   Commonly known as:  NORVASC   Refills:  2   Dose:  10 mg    Instructions:  Take 10 mg by mouth once daily.     Begin Date    AM    Noon    PM    Bedtime       aspirin 81 MG EC tablet   Commonly known as:  ECOTRIN   Refills:  0   Dose:  81 mg    Instructions:  Take 81 mg by mouth once daily.     Begin Date    AM    Noon    PM    Bedtime       clopidogrel 75 mg tablet   Commonly known as:  PLAVIX   Refills:  0   Dose:  75 mg    Instructions:  Take 75 mg by mouth once daily.     Begin Date    AM    Noon    PM    Bedtime       hydrocodone-acetaminophen 10-325mg  mg Tab   Commonly known as:  NORCO   Quantity:  60 tablet   Refills:  0   Dose:  1 tablet    Instructions:  Take 1 tablet by mouth every 12 (twelve) hours as needed (pain).     Begin Date    AM    Noon    PM    Bedtime       ipratropium 0.06 % nasal spray   Commonly known as:  ATROVENT   Refills:  0      Begin Date    AM    Noon    PM    Bedtime       LYRICA 75 MG capsule   Refills:  0   Generic drug:  pregabalin      Begin Date    AM    Noon    PM    Bedtime       metoprolol tartrate 25 MG tablet   Commonly known as:  LOPRESSOR   Refills:  0   Dose:  25 mg    Instructions:  25 mg.     Begin Date    AM    Noon    PM    Bedtime       mupirocin calcium 2% 2 % cream   Commonly known as:  BACTROBAN   Refills:  0      Begin Date    AM    Noon    PM    Bedtime       omeprazole 20 MG capsule   Commonly known as:  PRILOSEC   Refills:  0   Dose:  20 mg    Instructions:  20 mg.     Begin Date    AM    Noon    PM    Bedtime       pantoprazole 40 MG tablet   Commonly known as:  PROTONIX   Refills:  0      Begin Date    AM    Noon    PM    Bedtime       simvastatin 40 MG tablet   Commonly known as:  ZOCOR   Refills:  0      Begin Date    AM    Noon    PM    Bedtime       trazodone 50 MG tablet   Commonly known as:  DESYREL   Refills:  3   Dose:  50 mg    Instructions:  50 mg.     Begin Date    AM    Noon    PM    Bedtime         STOP taking these  medications     lidocaine HCL 2% 2 % jelly   Commonly known as:  XYLOCAINE       metoprolol succinate 25 MG 24 hr tablet   Commonly known as:  TOPROL-XL       mirtazapine 15 MG tablet   Commonly known as:  REMERON       mirtazapine 7.5 MG Tab   Commonly known as:  REMERON       nicotine 21 mg/24 hr   Commonly known as:  NICODERM CQ       tramadol 50 mg tablet   Commonly known as:  ULTRAM                  Please bring to all follow up appointments:    1. A copy of your discharge instructions.  2. All medicines you are currently taking in their original bottles.  3. Identification and insurance card.    Please arrive 15 minutes ahead of scheduled appointment time.    Please call 24 hours in advance if you must reschedule your appointment and/or time.        Your Scheduled Appointments     May 31, 2017  3:00 PM CDT   Established Patient Visit with COLEEN Villanueva - Pain Management ([facility])    [address] [place] [address] [address]  [place] [id number] [phone]              Follow-up Information     Schedule an appointment as soon as possible for a visit with Shawn Elizabeth MD.    Specialties:  Pain Medicine, Anesthesiology    Why:  Call as soon as possible for follow up appt to koby MAURER in 3 weeks    Contact information:    [address] [place] [address] [address]  [place] [address]  [phone]          Discharge Instructions     Future Orders    Activity as tolerated     Call MD for:  difficulty breathing or increased cough     Call MD for:  persistent nausea and vomiting or diarrhea     Call MD for:  redness, tenderness, or signs of infection (pain, swelling, redness, odor or green/yellow discharge around incision site)     Call MD for:  severe persistent headache     Call MD for:  severe uncontrolled pain     Call MD for:  temperature >100.4     Diet general     Questions:    Total calories:      Fat restriction, if any:      Protein restriction, if any:      Na restriction, if  any:      Fluid restriction:      Additional restrictions:          Discharge Instructions         Procedural Sedation  Procedural sedation is medicine to ease discomfort, pain, and anxiety during a procedure. The medicine is often given through an intravenous (IV) line in your arm or hand. In some cases, the medicine may be taken by mouth or inhaled. While you are under sedation, you will likely be awake. But you may not remember anything afterward.  Why procedural sedation is used  Sedation is used for many types of procedures. The goal is to reduce pain, anxiety, and stressful memories of a procedure. It can also help your health care provider treat you. For example, having a broken bone fixed may be easier if you feel relaxed.  Procedural sedation is used only for short, basic procedures. It is not used for complex surgeries. Some procedures that use this type of sedation include:  · Dental surgery  · Breast biopsy, to take a sample of breast tissue  · Endoscopy, to look at gastrointestinal problems  · Bronchoscopy, to check for lung problems  · Bone or joint realignment, to fix a broken bone or dislocated joint  · Minor foot or skin surgery  · Electrical cardioversion, to restore a normal heart rhythm  · Lumbar puncture, to assess neurological disease  Risks of procedural sedation  Procedural sedation has some risks and possible side effects, such as:  · Headache  · Nausea and vomiting  · Unpleasant memory of the procedure  · Lowered rate of breathing  · Changes in heart rate and blood pressure (rare)  · Inhalation of stomach contents into your lungs (rare)  Side effects will likely go away shortly after the procedure. Your health care team will watch your heart rate and breathing during your sedation. This is to help prevent problems.  Your own risks may vary based on your age and your overall health. They also depend on the type of sedation you are given. Talk with your health care provider about the risks  that apply most to you.  Getting ready for procedural sedation  Talk with your health care provider how to get ready for your procedure. Tell him or her about all the medicines you take. This includes over-the-counter medicines such as ibuprofen. It also includes vitamins, herbs, and other supplements. You may need to stop taking some medicines before the procedure, such as blood thinners and aspirin. If you smoke, you may need to stop. Talk with your health care provider if you need help to stop smoking.  Tell your health care provider if you:  · Have had any problems in the past with sedation or anesthesia  · Have had any recent changes in your health, such as an infection or fever  · Are pregnant or think you may be pregnant  Also, make sure to:  · Ask a family member or friend to take you home after the procedure. You cannot drive on the day you receive sedation.  · Not eat or drink after midnight the night before your procedure, if advised.  · Follow all other instructions from your health care provider.  During your procedural sedation  You may have your procedure in a hospital or a medical clinic. Sedation is done by a trained health care provider. In general, you can expect the following:  · You will be given medicine through an IV line in your arm or hand. Or you may receive a shot. The medicine may also be given by mouth. Or you may inhale it through a mask.  · If you receive medicine through an IV, you may feel the effects very quickly. You will start to feel relaxed and drowsy.  · During the procedure, your heart rate, breathing, and blood pressure will be closely watched. Your breathing and blood pressure may decrease a little. But you will likely not need help with your breathing. You may receive a little extra oxygen through a mask.  · You will probably be awake the entire time. If you do fall asleep, you should be easy to wake up, if needed. You should feel little or no pain.  · When your procedure  is over, the sedative medicine will be stopped.  After your procedural sedation  You will begin to feel more awake and aware. But you will likely be drowsy for a while afterward. You will be closely watched as you become more alert. You may have a faint memory of the procedure. Or you may not remember it at all.  You should be able to return home within an hour or two after your procedure. Plan to have someone stay with you for a few hours. Side effects such as headache and nausea may go away quickly. Tell your health care provider if they continue.  Dont drive or make any important decisions for at least 24 hours. Be sure to follow all after-care instructions.     When to call your health care provider  Have someone call your health care provider right away if you have any of these:  · Drowsiness that gets worse  · Weakness or dizziness that gets worse  · Repeated vomiting  · You cant be awakened   Date Last Reviewed: 2/6/2015  © 6214-2394 [employer]. [address] [place] [address]. All rights reserved. This information is not intended as a substitute for professional medical care. Always follow your healthcare professional's instructions.        Sympathetic Nerve Block    A sympathetic nerve block helps your doctor find the cause of the burning, pain, or tingling in your arms and hands or legs and feet. During the test, medication is injected near your spine. This blocks the sympathetic nerves in that region. If these nerves are causing your problem, the injection will relieve your symptoms for an hour up to a few days, or more permanently. It is used for both diagnosis and treatment.    What are the sympathetic nerves?  The sympathetic nervous system is a network of nerves throughout your body. The nerves branch from your spine. They control some body functions, such as the closing of blood vessels and opening the sweat glands. A problem with these nerves can affect blood flow.  Symptoms are often felt in the hands or feet. They may hurt, burn, feel cold, or be tender to the touch.  Sympathetic ganglions  The sympathetic nervous system is controlled by bunches of nerves called ganglions. One large ganglion, called the stellate ganglion, helps control nerves in the upper body. In the lower body, nerves are controlled by several ganglions that make up the sympathetic chain.  Risks and complications  Risks and complications are rare, but can include:  · Bleeding or fluid leakage in the spinal cord  · Puncture of a blood vessel  · Infection  · Lung puncture (pneumothorax)  · Nerve injury   Date Last Reviewed: 10/20/2015  © 6433-8468 [employer]. [address] [place] [address]. All rights reserved. This information is not intended as a substitute for professional medical care. Always follow your healthcare professional's instructions.            Primary Diagnosis     Your primary diagnosis was:  Leg Pain      Admission Information     Date & Time Provider Department CSN    5/9/2017 12:09 PM Shawn Elizabeth MD [facility] [id number]      Care Providers     Provider Role Specialty Primary office phone    Shawn Elizabeth MD Attending Provider Pain Medicine [phone]    Shawn Elizabeth MD Surgeon  Pain Medicine [phone]      Your Vitals Were     BP Pulse Temp Resp Height Weight    108/50 (BP Location: Right arm, Patient Position: Lying, BP Method: Automatic) 66 97.6 °F (36.4 °C) (Oral) 16 6' (1.829 m) 74.4 kg (164 lb)    SpO2 BMI             99% 22.24 kg/m2         Recent Lab Values     No lab values to display.      Allergies as of 5/9/2017        Reactions    Eucalyptus Containing Products Palpitations      Smoking Cessation     If you would like to quit smoking:   You may be eligible for free services if you are a [place] resident and started smoking cigarettes before September 1, 1988.  Call the Smoking Cessation Trust (SCT) toll free at (517) 352-6983 or  (938) 860-8042.   Call 1-800-QUIT-NOW if you do not meet the above criteria.   Contact us via email: [email or web address]   View our website for more information: [email or web address]        Language Assistance Services     ATTENTION: Language assistance services are available, free of charge. Please call [phone].      ATENCIÓN: Si habla español, tiene a valdez disposición servicios gratuitos de asistencia lingüística. Llame al [phone].     [facility] Ý: N?u b?n nói Ti?ng Vi?t, có các d?ch v? h? tr? ngôn ng? mi?n phí dành cho b?n. G?i s? [phone].        [facility] Sign-Up     Activating your [facility] account is as easy as 1-2-3!     1) Visit [email or web address], select Sign Up Now, enter this activation code and your date of birth, then select Next.  [email or web address]  Expires: 5/13/2017  2:20 PM      2) Create a username and password to use when you visit [facility] in the future and select a security question in case you lose your password and select Next.    3) Enter your e-mail address and click Sign Up!    Additional Information  If you have questions, please e-mail [email or web address] or call [phone] to talk to our [facility] staff. Remember, [facility] is NOT to be used for urgent needs. For medical emergencies, dial 911.          [facility] complies with applicable Federal civil rights laws and does not discriminate on the basis of race, color, national origin, age, disability, or sex.

## 2017-05-09 NOTE — DISCHARGE SUMMARY
[facility]  Discharge Note  Short Stay    Admit Date: 5/9/2017    Discharge Date and Time: 5/9/2017    Attending Physician: Shawn Elizabeth MD     Discharge Provider: Shawn Elizabeth    Diagnoses:  Active Hospital Problems    Diagnosis  POA    *Leg pain [M79.606]  Yes      Resolved Hospital Problems    Diagnosis Date Resolved POA   No resolved problems to display.       Hospital Course: LSB  Discharged Condition: Good    Final Diagnoses:   Active Hospital Problems    Diagnosis  POA    *Leg pain [M79.606]  Yes      Resolved Hospital Problems    Diagnosis Date Resolved POA   No resolved problems to display.       Disposition: Home or Self Care    Follow up/Patient Instructions:    Medications:  Reconciled Home Medications:   Current Discharge Medication List      CONTINUE these medications which have NOT CHANGED    Details   metoprolol tartrate (LOPRESSOR) 25 MG tablet 25 mg.  Refills: 0      amlodipine (NORVASC) 10 MG tablet Take 10 mg by mouth once daily.  Refills: 2      aspirin (ECOTRIN) 81 MG EC tablet Take 81 mg by mouth once daily.      atorvastatin (LIPITOR) 40 MG tablet       clopidogrel (PLAVIX) 75 mg tablet Take 75 mg by mouth once daily.      donepezil (ARICEPT) 5 MG tablet       furosemide (LASIX) 40 MG tablet       hydrochlorothiazide (MICROZIDE) 12.5 mg capsule Take 12.5 mg by mouth once daily.  Refills: 6      hydrocodone-acetaminophen 10-325mg (NORCO)  mg Tab Take 1 tablet by mouth every 12 (twelve) hours as needed (pain).  Qty: 60 tablet, Refills: 0      ipratropium (ATROVENT) 0.06 % nasal spray       lisinopril (PRINIVIL,ZESTRIL) 5 MG tablet       LYRICA 75 mg capsule       mupirocin calcium 2% (BACTROBAN) 2 % cream       omeprazole (PRILOSEC) 20 MG capsule 20 mg.  Refills: 0      pantoprazole (PROTONIX) 40 MG tablet       sertraline (ZOLOFT) 50 MG tablet       simvastatin (ZOCOR) 40 MG tablet       trazodone (DESYREL) 50 MG tablet 50 mg.  Refills: 3         STOP taking these medications        hydrochlorothiazide (HYDRODIURIL) 25 MG tablet Comments:   Reason for Stopping:         lidocaine HCL 2% (XYLOCAINE) 2 % jelly Comments:   Reason for Stopping:         metoprolol succinate (TOPROL-XL) 25 MG 24 hr tablet Comments:   Reason for Stopping:         mirtazapine (REMERON) 15 MG tablet Comments:   Reason for Stopping:         mirtazapine (REMERON) 7.5 MG Tab Comments:   Reason for Stopping:         nicotine (NICODERM CQ) 21 mg/24 hr Comments:   Reason for Stopping:         tramadol (ULTRAM) 50 mg tablet Comments:   Reason for Stopping:               Discharge Procedure Orders  Diet general     Activity as tolerated     Call MD for:  temperature >100.4     Call MD for:  persistent nausea and vomiting or diarrhea     Call MD for:  severe uncontrolled pain     Call MD for:  redness, tenderness, or signs of infection (pain, swelling, redness, odor or green/yellow discharge around incision site)     Call MD for:  difficulty breathing or increased cough     Call MD for:  severe persistent headache          Follow up with MD in 2-3 weeks    Discharge Procedure Orders (must include Diet, Follow-up, Activity):    Discharge Procedure Orders (must include Diet, Follow-up, Activity)  Diet general     Activity as tolerated     Call MD for:  temperature >100.4     Call MD for:  persistent nausea and vomiting or diarrhea     Call MD for:  severe uncontrolled pain     Call MD for:  redness, tenderness, or signs of infection (pain, swelling, redness, odor or green/yellow discharge around incision site)     Call MD for:  difficulty breathing or increased cough     Call MD for:  severe persistent headache

## 2017-05-09 NOTE — OP NOTE
PROCEDURE DATE: 5/9/2017    PROCEDURE: Bilateral side L3 lumbar sympathetic block utilizing fluoroscopy    DIAGNOSIS: Leg pain  Post Op diagnosis: Same    PHYSICIAN: Shawn Elizabeth MD    MEDICATIONS INJECTED:      Bupivicaine with 1:200,000 epinephrine, 9.5 ml total with 5mg of methylprednisolone at each site    LOCAL ANESTHETIC INJECTED:  Lidocaine 1%. 2ml per site.    SEDATION MEDICATIONS: RN IV Sedation    ESTIMATED BLOOD LOSS:  None    COMPLICATIONS:  None    TECHNIQUE:   A time-out taken to identify patient and procedure side prior to starting the procedure. The patient was placed in a prone position, prepped and draped in the usual sterile fashion using ChloraPrep and sterile towels.  The area to be injected was determined under fluoroscopic guidance in AP and oblique view. The fluoroscope was rotated to a right-side oblique view at which point the tip of the L3 transverse process was noted to be parallel to the L3 vertebral body.  Local anesthetic was given by raising a wheel and going down to the hub of a 25-gauge 1.5 inch needle.  In oblique view, a 5 inch 22-gauge bent-tip spinal needle was introduced and followed just anterior to the transverse process until it made contact with the vertebral body. After negative aspiration for blood or air, 1ml contrast dye was injected to confirm appropriate placement and that there was no vascular uptake, and this was also performed under live digital subtraction. The test dose as noted above was given over 4 minutes and there were no signs of HR or BP changes, no tinnitus or circumoral numbness. Again, aspiration was negative for blood or air and the treatment medication was then given slowly over 5 minutes. Procedure was then repeated on left side.  The patient tolerated the procedure well.    The patient was monitored after the procedure. The patient was given post procedure and discharge instructions to follow at home. The patient was discharged in a stable  condition.

## 2017-05-11 VITALS
TEMPERATURE: 98 F | HEART RATE: 60 BPM | HEIGHT: 72 IN | RESPIRATION RATE: 12 BRPM | OXYGEN SATURATION: 96 % | WEIGHT: 164 LBS | BODY MASS INDEX: 22.21 KG/M2 | SYSTOLIC BLOOD PRESSURE: 141 MMHG | DIASTOLIC BLOOD PRESSURE: 63 MMHG

## 2017-05-31 ENCOUNTER — OFFICE VISIT (OUTPATIENT)
Dept: PAIN MEDICINE | Facility: CLINIC | Age: 82
End: 2017-05-31
Payer: MEDICARE

## 2017-05-31 VITALS
HEIGHT: 73 IN | BODY MASS INDEX: 21.74 KG/M2 | WEIGHT: 164 LBS | SYSTOLIC BLOOD PRESSURE: 148 MMHG | DIASTOLIC BLOOD PRESSURE: 66 MMHG

## 2017-05-31 DIAGNOSIS — G60.9 IDIOPATHIC PERIPHERAL NEUROPATHY: Primary | ICD-10-CM

## 2017-05-31 DIAGNOSIS — G62.9 PERIPHERAL POLYNEUROPATHY: ICD-10-CM

## 2017-05-31 PROCEDURE — 1159F MED LIST DOCD IN RCRD: CPT | Mod: S$GLB,,, | Performed by: PHYSICIAN ASSISTANT

## 2017-05-31 PROCEDURE — 99999 PR PBB SHADOW E&M-EST. PATIENT-LVL IV: CPT | Mod: PBBFAC,,, | Performed by: PHYSICIAN ASSISTANT

## 2017-05-31 PROCEDURE — 99214 OFFICE O/P EST MOD 30 MIN: CPT | Mod: S$GLB,,, | Performed by: PHYSICIAN ASSISTANT

## 2017-05-31 PROCEDURE — 1125F AMNT PAIN NOTED PAIN PRSNT: CPT | Mod: S$GLB,,, | Performed by: PHYSICIAN ASSISTANT

## 2017-05-31 RX ORDER — SERTRALINE HYDROCHLORIDE 25 MG/1
TABLET, FILM COATED ORAL
Status: ON HOLD | COMMUNITY
Start: 2017-05-22 | End: 2017-12-05

## 2017-05-31 RX ORDER — HYDROCODONE BITARTRATE AND ACETAMINOPHEN 10; 325 MG/1; MG/1
1 TABLET ORAL EVERY 12 HOURS PRN
Qty: 60 TABLET | Refills: 0 | Status: SHIPPED | OUTPATIENT
Start: 2017-06-30 | End: 2017-07-27 | Stop reason: SDUPTHER

## 2017-05-31 RX ORDER — HYDROCODONE BITARTRATE AND ACETAMINOPHEN 10; 325 MG/1; MG/1
1 TABLET ORAL EVERY 12 HOURS PRN
Qty: 60 TABLET | Refills: 0 | Status: SHIPPED | OUTPATIENT
Start: 2017-05-31 | End: 2017-06-30

## 2017-05-31 RX ORDER — FUROSEMIDE 20 MG/1
TABLET ORAL
COMMUNITY
Start: 2017-05-15 | End: 2017-12-14

## 2017-05-31 RX ORDER — DONEPEZIL HYDROCHLORIDE 10 MG/1
TABLET, FILM COATED ORAL
Status: ON HOLD | COMMUNITY
Start: 2017-05-15 | End: 2017-12-05

## 2017-05-31 NOTE — PROGRESS NOTES
"Referring Physician: No ref. provider found    PCP: Scott Payne MD      CC: bilateral foot pain and lower back pain  Interval History:  Mr. Rodrigues is a 86 y.o. male with peripheral neuropathy who presents today for f/u s/p bilateral sympathetic nerve block. Reports no improvement in pain. Pain is unchanged in quality or location. He has not tried compounding cream or physical therapy ordered last clinic visit. Hydrocodone 10 mg BID is helpful for his pain. He continues to take Lyrica with mild benefits without SEs. Pain today is rated 6/10.  Pt has been seen in the clinic before, however pt is new to me.     History below per Dr. Elizabeth    HPI:   Halley Rodrigues is a 86 y.o.  male with PMHx significant for HLD, HTN, CAD on Plavix, peripheral neuropathy who presents with CC: painful burning sensation in B feet x 5y+ duration. The pain is constant and awakes him in his sleep and makes the evenings the most difficult, pain- wise. It is worse with lying flat and improved with walking. He has taken Lyrica x 5y without any appreciable pain relief. He has tried 2%lidocaine cream, which completely "numbs his feet" and inhibits him from ambulation. He has taken Hydrocodone 7.5 mg 2-3 times QD which helps with his pain significantly. He also reports cLBP without preceding injury/trauma, specifically in the area of B SI joints (R worse than L), aching and stabbing in nature, without radiation. His back pain is worsened with prolonged standing and walking, which limits his activity and ultimately makes his foot pain worse 2/2 being sedentary. He has been having intermittent falls, which he attributes to generalized weakness versus foot numbness or back pain. He denies saddle anesthesia or B/B changes.     ROS:  CONSTITUTIONAL: No fevers, chills, night sweats, wt. loss, appetite changes  SKIN: no rashes or itching  ENT: No headaches, head trauma, vision changes, or eye pain  LYMPH NODES: None noticed   CV: " "No chest pain, palpitations.   RESP: No shortness of breath, dyspnea on exertion, cough, wheezing, or hemoptysis  GI: No nausea, emesis, diarrhea, constipation, melena, hematochezia, pain.    : No dysuria, hematuria, urgency, or frequency   HEME: No easy bruising, bleeding problems  PSYCHIATRIC: No depression, anxiety, psychosis, hallucinations.  NEURO: No seizures, memory loss, dizziness or difficulty sleeping  MSK back pain, foot pain      Past Medical History:   Diagnosis Date    Anemia     GERD (gastroesophageal reflux disease)     Heart disease     Hypertension     Neuropathy      Past Surgical History:   Procedure Laterality Date    CORONARY ARTERY BYPASS GRAFT       Family History   Problem Relation Age of Onset    Dementia Father      Social History     Social History    Marital status:      Spouse name: N/A    Number of children: N/A    Years of education: N/A     Social History Main Topics    Smoking status: None    Smokeless tobacco: None    Alcohol use None    Drug use: Unknown    Sexual activity: Not Asked     Other Topics Concern    None     Social History Narrative    None         Medications/Allergies: See med card    Vitals:    05/31/17 1447   BP: (!) 148/66   Weight: 74.4 kg (164 lb)   Height: 6' 1" (1.854 m)   PainSc:   6         Physical exam:    GENERAL: A and O x3, the patient appears well groomed and is in no acute distress.  Skin: No rashes or obvious lesions  HEENT: normocephalic, atraumatic  CARDIOVASCULAR:  Palpable peripheral pulses  LUNGS: easy work of breathing  ABDOMEN: soft, nontender   UPPER EXTREMITIES: Normal alignment, normal range of motion, no atrophy, no skin changes,  hair growth and nail growth normal and equal bilaterally. No swelling, no tenderness.    LOWER EXTREMITIES:  Normal alignment, normal range of motion, no atrophy, dry skin, onchyomycosis in B feet. No swelling, no tenderness.    LUMBAR SPINE  Lumbar spine: ROM is moderately decreased l " with flexion extension and oblique extension   Michel's test causes increased pain on both sides (R>>L)   Supine straight leg raise is negative bilaterally.    Internal and external rotation of the hip causes no increased pain on either side.  Myofascial exam:SI joint tenderness B      MENTAL STATUS: normal orientation, speech, language, and fund of knowledge for social situation.  Emotional state appropriate.    CRANIAL NERVES:  II:  PERRL bilaterally,   III,IV,VI: EOMI.    V:  Facial sensation equal bilaterally  VII:  Facial motor function normal.  VIII:  Hearing equal to finger rub bilaterally  IX/X: Gag normal, palate symmetric  XI:  Shoulder shrug equal, head turn equal  XII:  Tongue midline without fasciculations      MOTOR: Tone and bulk: normal bilateral upper and lower Strength: normal   Delt Bi Tri WE WF     R 5 5 5 5 5 5   L 5 5 5 5 5 5     IP ADD ABD Quad TA Gas HAM  R 5 5 5 5 5 5 5  L 5 5 5 5 5 5 5    SENSATION: Light touch and pinprick intact bilaterally  REFLEXES: normal, symmetric, nonbrisk.  Toes down, no clonus. No hoffmans.  GAIT: normal rise, base, steps, and arm swing.              Assessment   Mr. Rodrigues is a 86 y.o. male with   1. Idiopathic peripheral neuropathy    2. Peripheral polyneuropathy        Plan:  1.  I have stressed the importance of physical activity and exercise to improve overall health. Recommend PT. He will call for orders when ready.   2. Try compound cream with anti-neuropathic and anti-inflammatory components to see if this better helps. Instructed to apply up to QID PRN.  3. Hydrocodone 10 mg BID prn  reviewed. 2 months  4. May benefit from bilateral SI joint injections in the future  5. ontinue Lyrica 75 TID  6.  Follow up in 2 months    All medication management was performed by Dr. Shawn Elizabeth

## 2017-06-26 ENCOUNTER — TELEPHONE (OUTPATIENT)
Dept: PAIN MEDICINE | Facility: CLINIC | Age: 82
End: 2017-06-26

## 2017-06-26 NOTE — TELEPHONE ENCOUNTER
----- Message from Ciera Marie sent at 6/26/2017 11:48 AM CDT -----  Contact: jose a robles   daughter in law    Regarding PT    Call back

## 2017-06-28 ENCOUNTER — TELEPHONE (OUTPATIENT)
Dept: PAIN MEDICINE | Facility: CLINIC | Age: 82
End: 2017-06-28

## 2017-06-28 NOTE — TELEPHONE ENCOUNTER
Called and LVM with pt daughter regarding PT. [facility] is not in network. They will need to call [phone] and ask for Aleida. She will give patient a list of servicing providers.

## 2017-07-05 ENCOUNTER — TELEPHONE (OUTPATIENT)
Dept: PAIN MEDICINE | Facility: CLINIC | Age: 82
End: 2017-07-05

## 2017-07-05 NOTE — TELEPHONE ENCOUNTER
----- Message from Suzan Parsons sent at 7/5/2017 12:03 PM CDT -----  Contact: daughter in law Erica  [phone]  Patient's daughter in Law Erica called and asked for the status of the Physical therapy for [place] please call back [phone]

## 2017-07-07 ENCOUNTER — TELEPHONE (OUTPATIENT)
Dept: PAIN MEDICINE | Facility: CLINIC | Age: 82
End: 2017-07-07

## 2017-07-07 NOTE — TELEPHONE ENCOUNTER
----- Message from Cuca Davis sent at 7/7/2017 10:52 AM CDT -----  Patients son, Cali, [facility] patient missed a call from office please call [phone] (home)

## 2017-07-10 ENCOUNTER — TELEPHONE (OUTPATIENT)
Dept: PAIN MEDICINE | Facility: CLINIC | Age: 82
End: 2017-07-10

## 2017-07-10 NOTE — TELEPHONE ENCOUNTER
----- Message from Cuca Rubin sent at 7/7/2017  3:53 PM CDT -----  Contact: self  Patient called regarding missed call about physical therapy. Please contact [phone] (rzrj)

## 2017-07-10 NOTE — TELEPHONE ENCOUNTER
----- Message from Polly Pride sent at 7/10/2017 10:54 AM CDT -----  Contact: Patient's Son, Moises  Patient's Son, Moises, called advising that he is returning Mandie's call in Dr. Elizabeth's office. Please call son back at [phone].

## 2017-07-13 ENCOUNTER — CLINICAL SUPPORT (OUTPATIENT)
Dept: REHABILITATION | Facility: HOSPITAL | Age: 82
End: 2017-07-13
Attending: ANESTHESIOLOGY
Payer: MEDICARE

## 2017-07-13 DIAGNOSIS — M53.3 SACROILIAC JOINT PAIN: ICD-10-CM

## 2017-07-13 DIAGNOSIS — M47.819 FACET ARTHROPATHY: ICD-10-CM

## 2017-07-13 PROCEDURE — 97110 THERAPEUTIC EXERCISES: CPT | Mod: PN

## 2017-07-13 PROCEDURE — G8978 MOBILITY CURRENT STATUS: HCPCS | Mod: CL,PN

## 2017-07-13 PROCEDURE — G8979 MOBILITY GOAL STATUS: HCPCS | Mod: CK,PN

## 2017-07-13 PROCEDURE — 97161 PT EVAL LOW COMPLEX 20 MIN: CPT | Mod: PN

## 2017-07-13 NOTE — PLAN OF CARE
"  TIME RECORD    Date: 07/13/2017    Start Time:  1410  Stop Time:  1500    PROCEDURES:    TIMED  Procedure Time Min.   There ex Start:1445  Stop:1500 15    Start:  Stop:     Start:  Stop:     Start:  Stop:          UNTIMED  Procedure Time Min.   assessment Start:1410  Stop:1444 34    Start:  Stop:      Total Timed Minutes:  15  Total Timed Units:  1  Total Untimed Units:  1  Charges Billed/# of units:  2    OUTPATIENT PHYSICAL THERAPY   PATIENT EVALUATION  Onset Date: over the past several months  Primary Diagnosis:   1. Sacroiliac joint pain     2. Facet arthropathy       Treatment Diagnosis: LE weakness, back pain  Past Medical History:   Diagnosis Date    Anemia     GERD (gastroesophageal reflux disease)     Heart disease     Hypertension     Neuropathy      Precautions: neuropathy  Prior Therapy: about 2 years ago  Medications: Halley Rodrigues has a current medication list which includes the following prescription(s): amlodipine, aspirin, atorvastatin, clopidogrel, donepezil, donepezil, furosemide, furosemide, hydrochlorothiazide, hydrocodone-acetaminophen 10-325mg, ipratropium, lisinopril, lyrica, metoprolol tartrate, mupirocin calcium 2%, omeprazole, pantoprazole, sertraline, sertraline, simvastatin, and trazodone.  Nutrition:  Thin  History of Present Illness: PMHx significant for HLD, HTN, CAD on Plavix, peripheral neuropathy, LBP with R SI joint pain more than L . Hx of intermittent falls, which he attributes to generalized weakness versus foot numbness or back pain. Lumbar nerve block 5/9/17  Prior Level of Function: Independent  Social History: retired from the [employer] business, enjoys bowling on Mondays  Place of Residence (Steps/Adaptations): house trailer w 3 steps to entry, family lives across the street  Functional Deficits Leading to Referral/Nature of Injury: has difficulty bowling (ball wt 13.5#)due to legs being weak  Patient Therapy Goals:  "Get my legs stronger"    Subjective " "    Halley Rodrigues states he has B Foot pain at night.He is aware of swelling in B ankles.He would really like to get his legs stronger and doesn't want to do "the other kinds of exercises he did in the past".    Pain:  Location: low back discomfort, B foot pain  Description: stiffness, pins and needle sensation in feet  Activities Which Increase Pain: reposition, meds for foot pain  Activities Which Decrease Pain: reposition  Pain Scale: 1/10 at best 3/10 now  8/10 at worst    Objective     Posture: forward head, rounded shoudlers, fwd flexion at hips when standing, edema B ankles  Palpation: tightness B HS's HC's  Sensation: decreased dorsal surface B feet    Strength:     right left    MMT MMT   Hip flexion 4+ 4+   Hip ext 5 5   Hip IR/  Hip ER 4+ 4+   Knee flex 4+ 4+   Knee ext 5 5   Ankle DF 5 5   Ankle PF 4+ 4+            Flexibility: popliteal angle R:-45 L:-50  Passive ankle DF R:17* L:15*  Gait: Without AD  Bed Mobility: independent but guarded  Transfers: Assistance - seeks supports  Special Tests:    Lower Extremity Functional scale:29/80=63.75% impairment  G code modifier current:CL  G code modifier goal:CK  Other:   5 x sit to stand no hands 14 sec seat ht at 23"  5 x sit to stand no hands 18.3 sec seat ht at 17"  Treatment: Educated pt on importance of stretching and proper body mechanics. Pt in agreement w proposed goals and POC. NuStep x 6' w UE portion w resistance of 3,  10 reps B knee ext/flexion 22#, R/L flexion/ext w 11#    Assessment       Initial Assessment (Pertinent finding, problem list and factors affecting outcome): Pt presents with decreased LE ROM and strength. He would benefit from skilled PT to address concerns related to posture, decreased functional mobility and endurance. Concerns with B ankle swelling. Suggested he talk w PCP or cardiologist re: this. Will include monitoring vitals as part of PT treatment sessions.   Rehab Potiential: good    Short Term Goals (3 " "Weeks):   1. Instruct in HEP  2. Pt will have improved B HS length by 2*  3. 22# resistance easily managed w R/L knee flexion and extension  4. Pt performs 10 lunges R/L in // bars w 1 HHA w good control  Long Term Goals (6 Weeks):   1. Pt I w HEP  2. 5 x sit to stand from 17" ht chair in 14 sec or less  3. LEFS score 38/80 or better    Plan     Certification Period: 7/13/17 to 8/30/17  Recommended Treatment Plan: 2 times per week for 6 weeks: Cervical/Lumbar Traction, Electrical Stimulation prn, Gait Training, Manual Therapy, Moist Heat/ Ice, Neuromuscular Re-ed, Patient Education, Therapeutic Activites, Therapeutic Exercise and Ultrasound/Phonophoresis      Therapist: Alejandrina Eastman, PT    I CERTIFY THE NEED FOR THESE SERVICES FURNISHED UNDER THIS PLAN OF TREATMENT AND WHILE UNDER MY CARE    Physician's comments: ________________________________________________________________________________________________________________________________________________      Physician's Name: ___________________________________  "

## 2017-07-19 ENCOUNTER — CLINICAL SUPPORT (OUTPATIENT)
Dept: REHABILITATION | Facility: HOSPITAL | Age: 82
End: 2017-07-19
Attending: ANESTHESIOLOGY
Payer: MEDICARE

## 2017-07-19 DIAGNOSIS — M53.3 SACROILIAC JOINT PAIN: ICD-10-CM

## 2017-07-19 DIAGNOSIS — M47.819 FACET ARTHROPATHY: ICD-10-CM

## 2017-07-19 PROCEDURE — 97110 THERAPEUTIC EXERCISES: CPT | Mod: PN

## 2017-07-19 NOTE — PROGRESS NOTES
"Name: Halley Rodrigues  [facility] Number: [id number]  Date of Treatment: 07/19/2017   Diagnosis:   Encounter Diagnoses   Name Primary?    Sacroiliac joint pain     Facet arthropathy        Time in: 1605  Time Out: 1700  Total Treatment Time: 53 (2 min BR break)    Subjective:    Halley reports sacral to coccygeal discomfort.  Patient reports their pain to be 7 or an 8 or a 6, "I don't know"/10 on a 0-10 scale with 0 being no pain and 10 being the worst pain imaginable.    Objective:    BP before session 135/59, HR 66  BP after NuStep 124/66, HR 58    Patient received individual therapy to increase strength, endurance, ROM, flexibility, posture and core stabilization with activities as follows:     Halley received therapeutic exercises to develop strength, endurance, ROM, flexibility, posture and core stabilization for 53 minutes including:     NuStep L2 10'  LE's only  Seated hamstring stretches 3/30s L/R  Supine PPT 10/2  Supine hooklying hip aBd with strap isometric 10/2  Supine hooklying hip aDD with ball 10/2  Supine manual stretches SIJ, gastroc, soleus  Seated LAQ 10/2 2# L/R    Continue HEP daily.    Pt demo good understanding of the education provided. Halley demonstrated good return demonstration of activities. Good response to NuStep.     Assessment:     Mm fasciculations R>L anterior lower legs and VMO areas, shown to Alejandrina, PT. Discomfort reported LB/post hips after seated hamstring stretches.     Pt will continue to benefit from skilled PT intervention. Medical Necessity is demonstrated by:  Requires skilled supervision to complete and progress HEP and Weakness.    Patient is making good progress towards established goals.    Plan:    Continue with established Plan of Care towards PT goals.   "

## 2017-07-21 ENCOUNTER — CLINICAL SUPPORT (OUTPATIENT)
Dept: REHABILITATION | Facility: HOSPITAL | Age: 82
End: 2017-07-21
Attending: ANESTHESIOLOGY
Payer: MEDICARE

## 2017-07-21 DIAGNOSIS — M47.819 FACET ARTHROPATHY: ICD-10-CM

## 2017-07-21 DIAGNOSIS — M53.3 SACROILIAC JOINT PAIN: ICD-10-CM

## 2017-07-21 PROCEDURE — 97110 THERAPEUTIC EXERCISES: CPT | Mod: PN

## 2017-07-21 NOTE — PATIENT INSTRUCTIONS
Pelvic Tilt        Flatten back by tightening stomach muscles and buttocks.  Repeat __10_ times per set. Do _2___ sets per session. Do _1-2___ sessions per day.     https://[employer].Fnbox/134     Copyright © [employer]. All rights reserved.   Strengthening: Hip Abductor - Resisted        With band looped around both legs above knees, push thighs apart.  Repeat _10___ times per set. Do _2__ sets per session. Do _1-2___ sessions per day.     https://[employer].Fnbox/688     Copyright © [employer]. All rights reserved.   Strengthening: Hip Adduction - Isometric        With ball or folded pillow between knees, squeeze knees together. Hold __5__ seconds.  Repeat __10__ times per set. Do __2__ sets per session. Do __1-2__ sessions per day.     https://[employer].Fnbox/612     Copyright © [employer]. All rights reserved.

## 2017-07-21 NOTE — PROGRESS NOTES
Name: Halley Rodrigues  [facility] Number: [id number]  Date of Treatment: 07/21/2017   Diagnosis:   Encounter Diagnoses   Name Primary?    Sacroiliac joint pain     Facet arthropathy        Time in: 1610  Time Out: 1700  Total Treatment Time: 50    Subjective:    Halley reports improvement of symptoms.  Patient denies pain or soreness since last visit.     Objective:     BP before session 116/62, HR 59    Patient received individual therapy to increase strength, endurance, ROM, flexibility, posture and core stabilization with activities as follows:     Halley received therapeutic exercises to develop strength, endurance, ROM, flexibility, posture and core stabilization for 50 minutes including:     NuStep L3 10' LE's only   Lunges L/R in // bars without UE support min A 10/10  Seated hamstring stretches 3/30s L/R  Supine PPT 10/2  Supine hooklying hip aBd with RTB 10/2  Supine hooklying hip aDD with ball 10/2  Standing gastroc stretches 3/30s B over 1/2 foam roll in // bars  DF-100 knee flexion and extn 11# 10/2 B    Continue HEP daily. Written and pictorial HEP of ex's in EPIC and stretches: hamstring and gastroc reviewed and issued to pt. Pt instructed to perform HEP daily and stop if symptoms increase.     Pt demo good understanding of the education provided. Halley demonstrated good return demonstration of activities.     Assessment:     Progressing well with increased challenges. Slight decrease in mm fasciculations today vs last session.     Pt will continue to benefit from skilled PT intervention. Medical Necessity is demonstrated by:  Requires skilled supervision to complete and progress HEP and Weakness.    Patient is making good progress towards established goals.    Plan:    Continue with established Plan of Care towards PT goals.

## 2017-07-25 ENCOUNTER — CLINICAL SUPPORT (OUTPATIENT)
Dept: REHABILITATION | Facility: HOSPITAL | Age: 82
End: 2017-07-25
Attending: ANESTHESIOLOGY
Payer: MEDICARE

## 2017-07-25 DIAGNOSIS — M53.3 SACROILIAC JOINT PAIN: ICD-10-CM

## 2017-07-25 DIAGNOSIS — M47.819 FACET ARTHROPATHY: ICD-10-CM

## 2017-07-25 PROCEDURE — 97110 THERAPEUTIC EXERCISES: CPT | Mod: PN

## 2017-07-25 NOTE — PROGRESS NOTES
"Name: Halley Rodrigues  [facility] Number: [id number]  Date of Treatment: 07/25/2017   Diagnosis:   Encounter Diagnoses   Name Primary?    Sacroiliac joint pain     Facet arthropathy        Time in: 1257  Time Out: 1400  Total Treatment Time: 60    Subjective:    Halley reports no pain or soreness since last visit "but the exercises didn't help me with my bowling".  Patient states he went bowling yesterday and didn't feel any stronger.     Objective:    Patient received individual therapy to increase strength, endurance, ROM, flexibility, posture and core stabilization with activities as follows:     Halley received therapeutic exercises to develop strength, endurance, ROM, flexibility, posture and core stabilization for 60 minutes including:     NuStep L4 10' LE's only   Lunges L/R in // bars without UE support min A 10/10  Seated hamstring stretches 3/30s L/R  Supine PPT 10/3  Supine hooklying hip aBd with RTB 10/2  Supine hooklying hip aDD with ball 10/2  Standing gastroc stretches 3/30s B over 1/2 foam roll in // bars  DF-100 knee flexion and extn 11# 10/2 B  Shuttle B leg press 50# 10/3    Continue HEP daily. Edu to pt that it takes time to build strength and balance.     Pt demo good understanding of the education provided. Halley demonstrated good return demonstration of activities.     Assessment:     Progressing well with ex's but strength and endurance deficits notable. Cont to exhibit mm fasciculations in LE's with fatigue with ex's.    Pt will continue to benefit from skilled PT intervention. Medical Necessity is demonstrated by:  Requires skilled supervision to complete and progress HEP and Weakness.    Patient is making good progress towards established goals.    Plan:    Continue with established Plan of Care towards PT goals.   "

## 2017-07-27 ENCOUNTER — OFFICE VISIT (OUTPATIENT)
Dept: PAIN MEDICINE | Facility: CLINIC | Age: 82
End: 2017-07-27
Payer: MEDICARE

## 2017-07-27 VITALS
WEIGHT: 164 LBS | HEIGHT: 73 IN | DIASTOLIC BLOOD PRESSURE: 61 MMHG | HEART RATE: 60 BPM | SYSTOLIC BLOOD PRESSURE: 147 MMHG | BODY MASS INDEX: 21.74 KG/M2

## 2017-07-27 DIAGNOSIS — M47.819 FACET ARTHROPATHY: ICD-10-CM

## 2017-07-27 DIAGNOSIS — M53.3 SACROILIAC JOINT PAIN: ICD-10-CM

## 2017-07-27 DIAGNOSIS — G60.9 IDIOPATHIC PERIPHERAL NEUROPATHY: Primary | ICD-10-CM

## 2017-07-27 DIAGNOSIS — G62.9 PERIPHERAL POLYNEUROPATHY: ICD-10-CM

## 2017-07-27 PROCEDURE — 99214 OFFICE O/P EST MOD 30 MIN: CPT | Mod: S$GLB,,, | Performed by: PHYSICIAN ASSISTANT

## 2017-07-27 PROCEDURE — 1159F MED LIST DOCD IN RCRD: CPT | Mod: S$GLB,,, | Performed by: PHYSICIAN ASSISTANT

## 2017-07-27 PROCEDURE — 99999 PR PBB SHADOW E&M-EST. PATIENT-LVL III: CPT | Mod: PBBFAC,,, | Performed by: PHYSICIAN ASSISTANT

## 2017-07-27 PROCEDURE — 1125F AMNT PAIN NOTED PAIN PRSNT: CPT | Mod: S$GLB,,, | Performed by: PHYSICIAN ASSISTANT

## 2017-07-27 RX ORDER — HYDROCODONE BITARTRATE AND ACETAMINOPHEN 10; 325 MG/1; MG/1
1 TABLET ORAL EVERY 12 HOURS PRN
Qty: 60 TABLET | Refills: 0 | Status: SHIPPED | OUTPATIENT
Start: 2017-09-05 | End: 2017-10-05

## 2017-07-27 RX ORDER — LISINOPRIL 10 MG/1
TABLET ORAL
Status: ON HOLD | COMMUNITY
Start: 2017-06-29 | End: 2017-12-05

## 2017-07-27 RX ORDER — HYDROCODONE BITARTRATE AND ACETAMINOPHEN 10; 325 MG/1; MG/1
1 TABLET ORAL EVERY 12 HOURS PRN
Qty: 60 TABLET | Refills: 0 | Status: SHIPPED | OUTPATIENT
Start: 2017-08-06 | End: 2017-09-05

## 2017-07-27 RX ORDER — SERTRALINE HYDROCHLORIDE 100 MG/1
100 TABLET, FILM COATED ORAL NIGHTLY
COMMUNITY
Start: 2017-07-11

## 2017-07-27 RX ORDER — HYDROCODONE BITARTRATE AND ACETAMINOPHEN 10; 325 MG/1; MG/1
1 TABLET ORAL EVERY 12 HOURS PRN
Qty: 60 TABLET | Refills: 0 | Status: SHIPPED | OUTPATIENT
Start: 2017-10-05 | End: 2017-10-11

## 2017-07-27 RX ORDER — MIRTAZAPINE 15 MG/1
TABLET, FILM COATED ORAL
Status: ON HOLD | COMMUNITY
Start: 2017-07-18 | End: 2017-12-05

## 2017-07-27 RX ORDER — METOPROLOL SUCCINATE 25 MG/1
TABLET, EXTENDED RELEASE ORAL
COMMUNITY
Start: 2017-06-09 | End: 2019-08-26 | Stop reason: SDUPTHER

## 2017-07-27 NOTE — PROGRESS NOTES
"Referring Physician: No ref. provider found    PCP: Scott Payne MD      CC: bilateral foot pain and lower back pain  Interval History:  Mr. Rodrigues is a 86 y.o. male with peripheral neuropathy who presents today for f/u and medication refill. He is s/p bilateral sympathetic nerve block that provided no improvement in pain. Pain is unchanged in quality or location. Compounding cream was not helpful. Hydrocodone 10 mg BID is only minimally helpful for his pain. He continues to take Lyrica 75 mg TID with only mild benefits without SEs. He is currently in PT. Pain today is rated 6/10.    HPI:   Halley Rodrigues is a 86 y.o.  male with PMHx significant for HLD, HTN, CAD on Plavix, peripheral neuropathy who presents with CC: painful burning sensation in B feet x 5y+ duration. The pain is constant and awakes him in his sleep and makes the evenings the most difficult, pain- wise. It is worse with lying flat and improved with walking. He has taken Lyrica x 5y without any appreciable pain relief. He has tried 2%lidocaine cream, which completely "numbs his feet" and inhibits him from ambulation. He has taken Hydrocodone 7.5 mg 2-3 times QD which helps with his pain significantly. He also reports cLBP without preceding injury/trauma, specifically in the area of B SI joints (R worse than L), aching and stabbing in nature, without radiation. His back pain is worsened with prolonged standing and walking, which limits his activity and ultimately makes his foot pain worse 2/2 being sedentary. He has been having intermittent falls, which he attributes to generalized weakness versus foot numbness or back pain. He denies saddle anesthesia or B/B changes.     ROS:  CONSTITUTIONAL: No fevers, chills, night sweats, wt. loss, appetite changes  SKIN: no rashes or itching  ENT: No headaches, head trauma, vision changes, or eye pain  LYMPH NODES: None noticed   CV: No chest pain, palpitations.   RESP: No shortness of " "breath, dyspnea on exertion, cough, wheezing, or hemoptysis  GI: No nausea, emesis, diarrhea, constipation, melena, hematochezia, pain.    : No dysuria, hematuria, urgency, or frequency   HEME: No easy bruising, bleeding problems  PSYCHIATRIC: No depression, anxiety, psychosis, hallucinations.  NEURO: No seizures, memory loss, dizziness or difficulty sleeping  MSK back pain, foot pain      Past Medical History:   Diagnosis Date    Anemia     GERD (gastroesophageal reflux disease)     Heart disease     Hypertension     Neuropathy      Past Surgical History:   Procedure Laterality Date    CORONARY ARTERY BYPASS GRAFT       Family History   Problem Relation Age of Onset    Dementia Father      Social History     Social History    Marital status:      Spouse name: N/A    Number of children: N/A    Years of education: N/A     Social History Main Topics    Smoking status: Current Every Day Smoker     Types: Cigarettes    Smokeless tobacco: Never Used    Alcohol use No    Drug use: No    Sexual activity: Not Asked     Other Topics Concern    None     Social History Narrative    None         Medications/Allergies: See med card    Vitals:    07/27/17 1447   BP: (!) 147/61   Pulse: 60   Weight: 74.4 kg (164 lb 0.4 oz)   Height: 6' 1" (1.854 m)   PainSc:   6   PainLoc: Foot         Physical exam:    GENERAL: A and O x3, the patient appears well groomed and is in no acute distress.  Skin: No rashes or obvious lesions  HEENT: normocephalic, atraumatic  CARDIOVASCULAR:  Palpable peripheral pulses  LUNGS: easy work of breathing  ABDOMEN: soft, nontender   UPPER EXTREMITIES: Normal alignment, normal range of motion, no atrophy, no skin changes,  hair growth and nail growth normal and equal bilaterally. No swelling, no tenderness.    LOWER EXTREMITIES:  Normal alignment, normal range of motion, no atrophy, dry skin, onchyomycosis in B feet. No swelling, no tenderness.    LUMBAR SPINE  Lumbar spine: ROM is " moderately decreased l with flexion extension and oblique extension   Michel's test causes increased pain on both sides (R>>L)   Supine straight leg raise is negative bilaterally.    Internal and external rotation of the hip causes no increased pain on either side.  Myofascial exam:SI joint tenderness B      MENTAL STATUS: normal orientation, speech, language, and fund of knowledge for social situation.  Emotional state appropriate.    CRANIAL NERVES:  II:  PERRL bilaterally,   III,IV,VI: EOMI.    V:  Facial sensation equal bilaterally  VII:  Facial motor function normal.  VIII:  Hearing equal to finger rub bilaterally  IX/X: Gag normal, palate symmetric  XI:  Shoulder shrug equal, head turn equal  XII:  Tongue midline without fasciculations      MOTOR: Tone and bulk: normal bilateral upper and lower Strength: normal   Delt Bi Tri WE WF     R 5 5 5 5 5 5   L 5 5 5 5 5 5     IP ADD ABD Quad TA Gas HAM  R 5 5 5 5 5 5 5  L 5 5 5 5 5 5 5    SENSATION: Light touch and pinprick intact bilaterally  REFLEXES: normal, symmetric, nonbrisk.  Toes down, no clonus. No hoffmans.  GAIT: normal rise, base, steps, and arm swing.      Assessment   Mr. Rodrigues is a 86 y.o. male with   1. Idiopathic peripheral neuropathy    2. Peripheral polyneuropathy    3. Sacroiliac joint pain    4. Facet arthropathy      Plan:  1.  I have stressed the importance of physical activity and exercise to improve overall health. Continue PT  2. Hydrocodone 10 mg BID prn  reviewed. 3 months. If no further improvement will change to Oxycodone 7.5 mg next clinic visit  3. Ordered a muscle stimulator to prevent further disuse atrophy and help manage chronic pain. Also ordered a lower back garment for patient due to inaccessible treatment area without assistance from others   4. May benefit from bilateral SI joint injections in the future  5. May benefit from an increased dose of Lyrica TID. Will discuss with Dr. Payne  6.  Follow up in 3  months    All medication management was performed by Dr. Shawn Elizabeth

## 2017-08-01 ENCOUNTER — CLINICAL SUPPORT (OUTPATIENT)
Dept: REHABILITATION | Facility: HOSPITAL | Age: 82
End: 2017-08-01
Attending: ANESTHESIOLOGY
Payer: MEDICARE

## 2017-08-01 DIAGNOSIS — M53.3 SACROILIAC JOINT PAIN: ICD-10-CM

## 2017-08-01 DIAGNOSIS — M47.819 FACET ARTHROPATHY: ICD-10-CM

## 2017-08-01 PROCEDURE — 97110 THERAPEUTIC EXERCISES: CPT | Mod: PN | Performed by: PHYSICAL THERAPIST

## 2017-08-01 NOTE — PROGRESS NOTES
"Name: Halley Rodrigues  [facility] Number: [id number]  Date of Treatment: 08/01/2017   Diagnosis:   Encounter Diagnoses   Name Primary?    Sacroiliac joint pain     Facet arthropathy        Time in: 305  Time Out: 355  Total Treatment Time: 50  Group Time: 0      Subjective:    Halley reports he is feeling good today, no pain, but his legs are "wobbly". He does feel that he is getting stronger with PT.  Patient reports their pain to be 0/10 on a 0-10 scale with 0 being no pain and 10 being the worst pain imaginable.    Objective    Patient received individual therapy to increase strength, endurance and flexibility with 0 patients for 30 min with activities as follows:     Halley received therapeutic exercises to develop strength, endurance and flexibility for 50 minutes including:     BP: 128/61    Nusteps, L4, 10 min  HSS, 3x30s  Gastroc st, 3x30s  HR, 3x10  : flex and ext, 33#, 3x10  Shuttle: B 50#, 3x10  Clamshells, BLue TB< 3x10  Ball squeeze, 3x10      Assessment:   Pt tolerated progression of exercises well without c/o fatigue or demonstrating signs of loss of balance.    Pt will continue to benefit from skilled PT intervention. Medical Necessity is demonstrated by:  Fall Risk and Weakness.    Patient is making good progress towards established goals.    New/Revised goals: NA      Plan:  Continue with established Plan of Care towards PT goals.   "

## 2017-08-03 ENCOUNTER — CLINICAL SUPPORT (OUTPATIENT)
Dept: REHABILITATION | Facility: HOSPITAL | Age: 82
End: 2017-08-03
Attending: ANESTHESIOLOGY
Payer: MEDICARE

## 2017-08-03 DIAGNOSIS — M47.819 FACET ARTHROPATHY: ICD-10-CM

## 2017-08-03 DIAGNOSIS — M53.3 SACROILIAC JOINT PAIN: ICD-10-CM

## 2017-08-03 PROCEDURE — 97110 THERAPEUTIC EXERCISES: CPT | Mod: PN

## 2017-08-03 NOTE — PROGRESS NOTES
Name: Halley Rodrigues  [facility] Number: [id number]  Date of Treatment: 08/03/2017   Diagnosis:   Encounter Diagnoses   Name Primary?    Sacroiliac joint pain     Facet arthropathy        Time in: 1556  Time Out: 1450  Total Treatment Time: 54      Subjective:    Halley reports pain is always there.  Patient reports their pain to be 3/10 on a 0-10 scale with 0 being no pain and 10 being the worst pain imaginable.    Objective    Patient received individual therapy to increase strength, endurance, ROM, flexibility, posture and core stabilization with activities as follows:     Halley received therapeutic exercises to develop strength, endurance, ROM, flexibility, posture and core stabilization for 54 minutes including:     Nustep level 4 x 10 minutes  Standing gastroc stretch 3 x 30 sec B  Standing hip abduction x 30 B  HR x 30 B  Shuttle: 50# B x 30  SportsArt 22# flexion, extension x 30 each  SLR 2 x 15 B  Bridges x 30  DKC with ball x 30  Ball squeeze x 30  Clamshells BTB x 30    Written Home Exercises Provided: cont HEP  Pt demo good understanding of the education provided. Halley demonstrated good return demonstration of activities.     Assessment:     Good tolerance for activity.  VC to stand straight during ambulation and therex.  Forward head posture, trunk flexion with ambulation.  Pt will continue to benefit from skilled PT intervention. Medical Necessity is demonstrated by:  Fall Risk, Pain limits function of effected part for some activities, Unable to participate fully in daily activities, Requires skilled supervision to complete and progress HEP and Weakness.    Patient is making good progress towards established goals.      Plan:  Continue with established Plan of Care towards PT goals.

## 2017-08-08 ENCOUNTER — CLINICAL SUPPORT (OUTPATIENT)
Dept: REHABILITATION | Facility: HOSPITAL | Age: 82
End: 2017-08-08
Attending: ANESTHESIOLOGY
Payer: MEDICARE

## 2017-08-08 DIAGNOSIS — M53.3 SACROILIAC JOINT PAIN: ICD-10-CM

## 2017-08-08 DIAGNOSIS — M47.819 FACET ARTHROPATHY: ICD-10-CM

## 2017-08-08 PROCEDURE — 97110 THERAPEUTIC EXERCISES: CPT | Mod: PN

## 2017-08-08 NOTE — PROGRESS NOTES
"Name: Halley Rodrigues  [facility] Number: [id number]  Date of Treatment: 08/08/2017   Diagnosis:   Encounter Diagnoses   Name Primary?    Sacroiliac joint pain     Facet arthropathy        Time in: 1458  Time Out: 1558  Total Treatment Time: 60      Subjective:    Halley reports "My legs still feel weak and shakey and my low back is bothering me a bit."  Patient reports their pain to be 6/10 on a 0-10 scale with 0 being no pain and 10 being the worst pain imaginable.    Objective  Halley received therapeutic exercises to develop strength, endurance, ROM, flexibility, posture and core stabilization for 60 minutes including:    Nustep level 5 x 10 minutes   Hamstring stretch 3 x 30 sec R/L   Standing gastroc stretch 3 x 30 sec B   Standing hip abduction x 30 B   HR x 30 B   Shuttle: 62# B x 30   SportsArt 33# flexion, extension x 30 each   SLR 2 x 15 B   Bridges x 30   Ball squeeze x 30   Clamshells BTB x 30    Written Home Exercises Provided: Cont with HEP  Pt demo good understanding of the education provided. Halley demonstrated good return demonstration of activities.     Assessment:   Patient with cramping in hamstrings with SLR.  Tolerated increase in weight with shuttle and leg ext/flexion.    Pt will continue to benefit from skilled PT intervention. Medical Necessity is demonstrated by:  Fall Risk, Continued inability to participate in vocational pursuits, Pain limits function of effected part for some activities, Unable to participate fully in daily activities, Requires skilled supervision to complete and progress HEP and Weakness.    Patient is making good progress towards established goals.    Plan:  Continue with established Plan of Care towards PT goals.   "

## 2017-08-10 ENCOUNTER — CLINICAL SUPPORT (OUTPATIENT)
Dept: REHABILITATION | Facility: HOSPITAL | Age: 82
End: 2017-08-10
Attending: ANESTHESIOLOGY
Payer: MEDICARE

## 2017-08-10 DIAGNOSIS — M47.819 FACET ARTHROPATHY: ICD-10-CM

## 2017-08-10 DIAGNOSIS — M53.3 SACROILIAC JOINT PAIN: ICD-10-CM

## 2017-08-10 PROCEDURE — 97110 THERAPEUTIC EXERCISES: CPT | Mod: PN

## 2017-08-10 NOTE — PROGRESS NOTES
"Name: Halley Rodrigues  [facility] Number: [id number]  Date of Treatment: 08/10/2017   Diagnosis:   Encounter Diagnoses   Name Primary?    Sacroiliac joint pain     Facet arthropathy        Time in: 1300  Time Out: 1400  Total Treatment Time: 45    Subjective:    Halley reports improvement of symptoms.  "A little better" since last session, pointing to sacral area of discomfort. Patient reports their pain to be 5/10 on a 0-10 scale with 0 being no pain and 10 being the worst pain imaginable.    Objective    Patient received individual therapy to increase strength, endurance, ROM, flexibility, posture and core stabilization with activities as follows:     Halley received therapeutic exercises to develop strength, endurance, ROM, flexibility, posture and core stabilization for 45 minutes including:     NuStep L4 10' LE's only  Seated hamstring stretches 3/30s L/R  Supine PPT 10/3  Supine bridges 10/3 B  Supine hooklying ballsqueeze 10 reps-cramping both hamstrings at insertion  Manual stretches L/R hamstrings in supine   DF-100 knee flexion 22# 10/3 B  Shuttle B leg press 62# 10/3 B    Continue HEP daily.    Pt demo good understanding of the education provided. Halley demonstrated good return demonstration of activities.     Assessment:     Time of session limited 2* BR break and rests 2* cramping.     Pt will continue to benefit from skilled PT intervention. Medical Necessity is demonstrated by:  Requires skilled supervision to complete and progress HEP and Weakness.    Patient is making good progress towards established goals.    Plan:    Continue with established Plan of Care towards PT goals.   "

## 2017-08-15 ENCOUNTER — CLINICAL SUPPORT (OUTPATIENT)
Dept: REHABILITATION | Facility: HOSPITAL | Age: 82
End: 2017-08-15
Attending: ANESTHESIOLOGY
Payer: MEDICARE

## 2017-08-15 DIAGNOSIS — M53.3 SACROILIAC JOINT PAIN: ICD-10-CM

## 2017-08-15 DIAGNOSIS — M47.819 FACET ARTHROPATHY: ICD-10-CM

## 2017-08-15 PROCEDURE — 97110 THERAPEUTIC EXERCISES: CPT | Mod: PN

## 2017-08-15 NOTE — PROGRESS NOTES
"Name: Halley Rodrigues  [facility] Number: [id number]  Date of Treatment: 08/15/2017   Diagnosis:   Encounter Diagnoses   Name Primary?    Sacroiliac joint pain     Facet arthropathy        Time in: 1255  Time Out: 1355  Total Treatment Time: 60    Subjective:    Halley reports "my legs are still weak but my back is not hurting". Later reports did not go bowling yesterday 2* "not feeling well", describing overall mm fatigue and states B LE "twitching" has increased. Discussed with Alejandrina, PT.  Pt states he has an appt with Dr. Payne tomorrow. Has a cut on lower lip R from shaving and has been slightly bleeding for an hour per pt, which stopped after a few minutes in gym with constant pressure and paper towel pt brought from home.     Objective:    /67, HR 56 before session  /50, HR 52 after NuStep    Patient received individual therapy to increase strength, endurance, ROM, flexibility, posture and core stabilization with activities as follows:     Halley received therapeutic exercises to develop strength, endurance, ROM, flexibility, posture and core stabilization for 60 minutes including:     NuStep L6 10' LE's only  Seated hamstring stretches 3/30s L/R  Standing gastroc stretches 3/30s B over 1/2 foam roll in // bars  Supine PPT 10/3  Supine bridges 10/3 B  Supine hooklying clamshell hip aBd with BTB 10/3 B  At this point, pt began having noticeable voice change which he attributed to recently drinking water. No coughing or distress.    Completion of session in sitting/standing for safety  Lunges in // bars 3/3 L/R  Seated ballsqueeze hip aDd 10/3  DF-100 knee flexion and extn 22# 10/3 B    Continue HEP daily. Instructed pt to inform MD of new issues: mm fasciculations in LE's, fatigue, and vocal change in supine.     Pt demo good understanding of the education provided. Halley demonstrated good return demonstration of activities.     Assessment:     Increased lower leg mm fasciculations today. " No reports of leg cramping. Pt appears fatigued today, slightly sluggish. Discussed pt's condition today with PERFECTO Tinoco.     Pt will continue to benefit from skilled PT intervention. Medical Necessity is demonstrated by:  Requires skilled supervision to complete and progress HEP and Weakness.    Patient is making good progress towards established goals.    Plan:    Continue with established Plan of Care towards PT goals.

## 2017-08-17 ENCOUNTER — CLINICAL SUPPORT (OUTPATIENT)
Dept: REHABILITATION | Facility: HOSPITAL | Age: 82
End: 2017-08-17
Attending: ANESTHESIOLOGY
Payer: MEDICARE

## 2017-08-17 DIAGNOSIS — M47.819 FACET ARTHROPATHY: ICD-10-CM

## 2017-08-17 DIAGNOSIS — M53.3 SACROILIAC JOINT PAIN: ICD-10-CM

## 2017-08-17 PROCEDURE — 97110 THERAPEUTIC EXERCISES: CPT | Mod: PN

## 2017-08-17 PROCEDURE — G8980 MOBILITY D/C STATUS: HCPCS | Mod: CJ,PN

## 2017-08-17 PROCEDURE — G8979 MOBILITY GOAL STATUS: HCPCS | Mod: CJ,PN

## 2017-08-17 PROCEDURE — G8978 MOBILITY CURRENT STATUS: HCPCS | Mod: CJ,PN

## 2017-08-17 NOTE — PROGRESS NOTES
Name: Halley Rodrigues  [facility] Number: [id number]  Date of Treatment: 08/17/2017   Diagnosis:   Encounter Diagnoses   Name Primary?    Sacroiliac joint pain     Facet arthropathy        Time in: 1401  Time Out: 1459  Total Treatment Time: 58        Subjective:    Halley reports he is having a little bit of back pain.  Patient reports their pain to be n/a/10 on a 0-10 scale with 0 being no pain and 10 being the worst pain imaginable.    Objective    Patient received individual therapy to increase strength, endurance, ROM and flexibility with 0 patients with activities as follows:     Halley received therapeutic exercises to develop strength, endurance, ROM and flexibility for 58 minutes including:     Vitals during PT session: 145/67, 66 bpm      nustep x 10 min L-6  gastroc stretch 3 x 30 sec B LE  PPT x 30 reps  bridging x 30 reps  Ball squeezes x 30 reps  Hip abd BTB x 30 reps  LTR with SB x 30 reps  DKTC x 30 reps with SB  SLR 3 x 10 reps B LE    Pt demo good understanding of the education provided. Halley demonstrated good return demonstration of activities.     Assessment:       Pt will continue to benefit from skilled PT intervention. Medical Necessity is demonstrated by:  Fall Risk, Unable to participate fully in daily activities, Requires skilled supervision to complete and progress HEP and Weakness.    Patient is making good progress towards established goals.          Plan:  Continue with established Plan of Care towards PT goals.

## 2017-08-17 NOTE — PLAN OF CARE
Lower Extremity Functional Scale: 53/80=33.75% impairment (initial score 29/80)  G code modifier current:DIMPLE  G code modifier goal:CK

## 2017-08-22 ENCOUNTER — DOCUMENTATION ONLY (OUTPATIENT)
Dept: REHABILITATION | Facility: HOSPITAL | Age: 82
End: 2017-08-22

## 2017-08-22 PROBLEM — M47.819 FACET ARTHROPATHY: Status: RESOLVED | Noted: 2017-07-13 | Resolved: 2017-08-22

## 2017-08-22 PROBLEM — M53.3 SACROILIAC JOINT PAIN: Status: RESOLVED | Noted: 2017-07-13 | Resolved: 2017-08-22

## 2017-08-22 NOTE — PROGRESS NOTES
REHAB SERVICES OUTPATIENT DISCHARGE SUMMARY  Physical Therapy      Name:  Halley Rodrigues  Date:  8/22/17  Date of Evaluation:  7/13/17  Physician:  CRISTAL Elizabeth MD  Total # Of Visits:  10    Diagnosis:  SI joint pain, facet arthropathy    Physical/Functional Status: see EMR  The patient is to be discharged from our Therapy service for the following reason(s):  Other:  Pt did not show for PT session this date. I called pt's phone w son answering, stating he forgot to call and cancel. Son states pt is aware this was the last scheduled visit and was not interested in continuing as he didn't think it was making much of a difference. I suggested pt make a return visit to referring MD and discuss concern w mm fasciculations w resistance exercises.    Degree of Goal Achievement:  Patient has partially met goals    Patient Education:  provided    Discharge Plan:  Other:  F/u w referring MD

## 2017-10-11 ENCOUNTER — OFFICE VISIT (OUTPATIENT)
Dept: PAIN MEDICINE | Facility: CLINIC | Age: 82
End: 2017-10-11
Payer: MEDICARE

## 2017-10-11 ENCOUNTER — APPOINTMENT (OUTPATIENT)
Dept: LAB | Facility: HOSPITAL | Age: 82
End: 2017-10-11
Attending: ANESTHESIOLOGY
Payer: MEDICARE

## 2017-10-11 VITALS
HEART RATE: 60 BPM | WEIGHT: 164 LBS | DIASTOLIC BLOOD PRESSURE: 69 MMHG | HEIGHT: 73 IN | SYSTOLIC BLOOD PRESSURE: 133 MMHG | BODY MASS INDEX: 21.74 KG/M2

## 2017-10-11 DIAGNOSIS — Z79.891 CHRONIC USE OF OPIATE FOR THERAPEUTIC PURPOSE: ICD-10-CM

## 2017-10-11 DIAGNOSIS — G60.9 IDIOPATHIC PERIPHERAL NEUROPATHY: Primary | ICD-10-CM

## 2017-10-11 DIAGNOSIS — M48.8X6 OTHER SPECIFIED SPONDYLOPATHIES, LUMBAR REGION: ICD-10-CM

## 2017-10-11 LAB
AMPHET+METHAMPHET UR QL: NEGATIVE
BARBITURATES UR QL SCN>200 NG/ML: NEGATIVE
BENZODIAZ UR QL SCN>200 NG/ML: NEGATIVE
BZE UR QL SCN: NEGATIVE
CANNABINOIDS UR QL SCN: NEGATIVE
CREAT UR-MCNC: 77.9 MG/DL
METHADONE UR QL SCN>300 NG/ML: NEGATIVE
OPIATES UR QL SCN: NORMAL
PCP UR QL SCN>25 NG/ML: NEGATIVE
TOXICOLOGY INFORMATION: NORMAL

## 2017-10-11 PROCEDURE — 80361 OPIATES 1 OR MORE: CPT

## 2017-10-11 PROCEDURE — 99214 OFFICE O/P EST MOD 30 MIN: CPT | Mod: S$GLB,,, | Performed by: ANESTHESIOLOGY

## 2017-10-11 PROCEDURE — 80307 DRUG TEST PRSMV CHEM ANLYZR: CPT

## 2017-10-11 PROCEDURE — 99999 PR PBB SHADOW E&M-EST. PATIENT-LVL V: CPT | Mod: PBBFAC,,, | Performed by: ANESTHESIOLOGY

## 2017-10-11 RX ORDER — HYDROCODONE BITARTRATE AND ACETAMINOPHEN 10; 325 MG/1; MG/1
1 TABLET ORAL EVERY 12 HOURS PRN
Qty: 60 TABLET | Refills: 0 | Status: SHIPPED | OUTPATIENT
Start: 2017-11-30 | End: 2017-12-21

## 2017-10-11 RX ORDER — HYDROCODONE BITARTRATE AND ACETAMINOPHEN 10; 325 MG/1; MG/1
1 TABLET ORAL EVERY 12 HOURS PRN
Qty: 60 TABLET | Refills: 0 | Status: SHIPPED | OUTPATIENT
Start: 2017-11-02 | End: 2017-11-06 | Stop reason: SDUPTHER

## 2017-10-11 RX ORDER — PREGABALIN 150 MG/1
150 CAPSULE ORAL 2 TIMES DAILY
COMMUNITY
Start: 2017-10-05

## 2017-10-11 RX ORDER — POTASSIUM CHLORIDE 20 MEQ/1
TABLET, EXTENDED RELEASE ORAL
Status: ON HOLD | COMMUNITY
Start: 2017-10-09 | End: 2017-12-05

## 2017-10-11 NOTE — PROGRESS NOTES
"Referring Physician: No ref. provider found    PCP: Scott Payne MD      CC: bilateral foot pain and lower back pain  Interval History:  Mr. Rodrigues is a 87 y.o. male with peripheral neuropathy who presents today for f/u and medication refill. He is s/p bilateral sympathetic nerve block that provided no improvement in pain. Pain is unchanged in quality or location. Compounding cream was not helpful. Hydrocodone 10 mg BID is only minimally helpful for his pain. He continues to take Lyrica 75 mg TID with only mild benefits without SEs. He is currently in PT. Pain today is rated 6/10.    HPI:   Halley Rodrigues is a 87 y.o.  male with PMHx significant for HLD, HTN, CAD on Plavix, peripheral neuropathy who presents with CC: painful burning sensation in B feet x 5y+ duration. The pain is constant and awakes him in his sleep and makes the evenings the most difficult, pain- wise. It is worse with lying flat and improved with walking. He has taken Lyrica x 5y without any appreciable pain relief. He has tried 2%lidocaine cream, which completely "numbs his feet" and inhibits him from ambulation. He has taken Hydrocodone 7.5 mg 2-3 times QD which helps with his pain significantly. He also reports cLBP without preceding injury/trauma, specifically in the area of B SI joints (R worse than L), aching and stabbing in nature, without radiation. His back pain is worsened with prolonged standing and walking, which limits his activity and ultimately makes his foot pain worse 2/2 being sedentary. He has been having intermittent falls, which he attributes to generalized weakness versus foot numbness or back pain. He denies saddle anesthesia or B/B changes.     ROS:  CONSTITUTIONAL: No fevers, chills, night sweats, wt. loss, appetite changes  SKIN: no rashes or itching  ENT: No headaches, head trauma, vision changes, or eye pain  LYMPH NODES: None noticed   CV: No chest pain, palpitations.   RESP: No shortness of " "breath, dyspnea on exertion, cough, wheezing, or hemoptysis  GI: No nausea, emesis, diarrhea, constipation, melena, hematochezia, pain.    : No dysuria, hematuria, urgency, or frequency   HEME: No easy bruising, bleeding problems  PSYCHIATRIC: No depression, anxiety, psychosis, hallucinations.  NEURO: No seizures, memory loss, dizziness or difficulty sleeping  MSK back pain, foot pain      Past Medical History:   Diagnosis Date    Anemia     GERD (gastroesophageal reflux disease)     Heart disease     Hypertension     Neuropathy      Past Surgical History:   Procedure Laterality Date    CORONARY ARTERY BYPASS GRAFT       Family History   Problem Relation Age of Onset    Dementia Father      Social History     Social History    Marital status:      Spouse name: N/A    Number of children: N/A    Years of education: N/A     Social History Main Topics    Smoking status: Current Every Day Smoker     Types: Cigarettes    Smokeless tobacco: Never Used    Alcohol use No    Drug use: No    Sexual activity: Not Asked     Other Topics Concern    None     Social History Narrative    None         Medications/Allergies: See med card    Vitals:    10/11/17 1328   BP: 133/69   Pulse: 60   Weight: 74.4 kg (164 lb)   Height: 6' 1" (1.854 m)   PainSc:   6   PainLoc: Neck         Physical exam:    GENERAL: A and O x3, the patient appears well groomed and is in no acute distress.  Skin: No rashes or obvious lesions  HEENT: normocephalic, atraumatic  CARDIOVASCULAR:  Palpable peripheral pulses  LUNGS: easy work of breathing  ABDOMEN: soft, nontender   UPPER EXTREMITIES: Normal alignment, normal range of motion, no atrophy, no skin changes,  hair growth and nail growth normal and equal bilaterally. No swelling, no tenderness.    LOWER EXTREMITIES:  Normal alignment, normal range of motion, no atrophy, dry skin, onchyomycosis in B feet. No swelling, no tenderness.    LUMBAR SPINE  Lumbar spine: ROM is moderately " decreased l with flexion extension and oblique extension   Michel's test causes increased pain on both sides (R>>L)   Supine straight leg raise is negative bilaterally.    Internal and external rotation of the hip causes no increased pain on either side.  Myofascial exam:SI joint tenderness B      MENTAL STATUS: normal orientation, speech, language, and fund of knowledge for social situation.  Emotional state appropriate.    CRANIAL NERVES:  II:  PERRL bilaterally,   III,IV,VI: EOMI.    V:  Facial sensation equal bilaterally  VII:  Facial motor function normal.  VIII:  Hearing equal to finger rub bilaterally  IX/X: Gag normal, palate symmetric  XI:  Shoulder shrug equal, head turn equal  XII:  Tongue midline without fasciculations      MOTOR: Tone and bulk: normal bilateral upper and lower Strength: normal   Delt Bi Tri WE WF     R 5 5 5 5 5 5   L 5 5 5 5 5 5     IP ADD ABD Quad TA Gas HAM  R 5 5 5 5 5 5 5  L 5 5 5 5 5 5 5    SENSATION: Light touch and pinprick intact bilaterally  REFLEXES: normal, symmetric, nonbrisk.  Toes down, no clonus. No hoffmans.  GAIT: normal rise, base, steps, and arm swing.      Assessment   Mr. Rodrigues is a 87 y.o. male with   1. Idiopathic peripheral neuropathy    2. Other specified spondylopathies, lumbar region    3. 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  I have stressed the importance of physical activity and exercise to improve overall health. Continue PT  2. Hydrocodone 10 mg BID prn  reviewed. 2 months.UDS today  3. Continue use of muscle stimulator to prevent further disuse atrophy and help manage chronic pain. Also ordered a lower back garment for patient due to inaccessible treatment area without assistance from others   4. I believe his low back pain maybe due to facet arthropathy and have recommended lumbar medial branch blocks as a diagnostic procedure.  If successful, would proceed with radiofrequency ablation. He will consider this  option  5. Follow up in 10 weeks

## 2017-10-16 LAB
CODEINE UR-MCNC: 91 NG/ML
CODEINE UR-MCNC: NEGATIVE NG/ML
HYDROCODONE UR-MCNC: 369 NG/ML
HYDROMORPHONE UR-MCNC: 70 NG/ML
MORPHINE UR-MCNC: NEGATIVE NG/ML
NALOXONE BY LS MS/MS: NEGATIVE NG/ML
NORHYDROCODONE BY LC MS/MS: 573 NG/ML
NOROXYCODONE BY LC-MS/MS: NEGATIVE NG/ML
NOROXYMORPHONE BY LC-MS/MS: NEGATIVE NG/ML
OXYCODONE UR-MCNC: NEGATIVE NG/ML
OXYCODONE UR-MCNC: NEGATIVE NG/ML
TOXICOLOGIST REVIEW: NORMAL

## 2017-11-06 ENCOUNTER — TELEPHONE (OUTPATIENT)
Dept: PAIN MEDICINE | Facility: CLINIC | Age: 82
End: 2017-11-06

## 2017-11-06 RX ORDER — HYDROCODONE BITARTRATE AND ACETAMINOPHEN 10; 325 MG/1; MG/1
1 TABLET ORAL EVERY 12 HOURS PRN
Qty: 60 TABLET | Refills: 0 | Status: SHIPPED | OUTPATIENT
Start: 2017-11-06 | End: 2017-12-06

## 2017-11-07 NOTE — TELEPHONE ENCOUNTER
Pt daughter stated patient is having increased pain in his feet at night. He is currently taking lyrica 150mg 2 x daily. She is asking if Dr. Elizabeth has any other recommendations for pain.

## 2017-11-10 RX ORDER — DONEPEZIL HYDROCHLORIDE 10 MG/1
TABLET, FILM COATED ORAL
Qty: 90 TABLET | Refills: 1 | Status: SHIPPED | OUTPATIENT
Start: 2017-11-10

## 2017-11-14 DIAGNOSIS — M79.671 FOOT PAIN, BILATERAL: Primary | ICD-10-CM

## 2017-11-14 DIAGNOSIS — G60.9 IDIOPATHIC PERIPHERAL NEUROPATHY: ICD-10-CM

## 2017-11-14 DIAGNOSIS — M79.672 FOOT PAIN, BILATERAL: Primary | ICD-10-CM

## 2017-11-17 DIAGNOSIS — M79.671 FOOT PAIN, BILATERAL: Primary | ICD-10-CM

## 2017-11-17 DIAGNOSIS — G60.9 PERIPHERAL NEUROPATHY, IDIOPATHIC: ICD-10-CM

## 2017-11-17 DIAGNOSIS — M79.672 FOOT PAIN, BILATERAL: Primary | ICD-10-CM

## 2017-12-05 ENCOUNTER — SURGERY (OUTPATIENT)
Age: 82
End: 2017-12-05

## 2017-12-05 ENCOUNTER — HOSPITAL ENCOUNTER (OUTPATIENT)
Facility: AMBULARY SURGERY CENTER | Age: 82
Discharge: HOME | End: 2017-12-05
Attending: ANESTHESIOLOGY | Admitting: ANESTHESIOLOGY
Payer: MEDICARE

## 2017-12-05 DIAGNOSIS — M79.606 PAIN OF LOWER EXTREMITY, UNSPECIFIED LATERALITY: Primary | ICD-10-CM

## 2017-12-05 DIAGNOSIS — M79.606 LEG PAIN: ICD-10-CM

## 2017-12-05 PROCEDURE — 77003 FLUOROGUIDE FOR SPINE INJECT: CPT | Performed by: ANESTHESIOLOGY

## 2017-12-05 PROCEDURE — 64520 N BLOCK LUMBAR/THORACIC: CPT | Mod: 50,,, | Performed by: ANESTHESIOLOGY

## 2017-12-05 PROCEDURE — 64520 N BLOCK LUMBAR/THORACIC: CPT | Mod: LT | Performed by: ANESTHESIOLOGY

## 2017-12-05 PROCEDURE — 99152 MOD SED SAME PHYS/QHP 5/>YRS: CPT | Mod: ,,, | Performed by: ANESTHESIOLOGY

## 2017-12-05 RX ORDER — FENTANYL CITRATE 50 UG/ML
INJECTION, SOLUTION INTRAMUSCULAR; INTRAVENOUS
Status: DISCONTINUED | OUTPATIENT
Start: 2017-12-05 | End: 2017-12-05 | Stop reason: HOSPADM

## 2017-12-05 RX ORDER — DEXAMETHASONE SODIUM PHOSPHATE 100 MG/10ML
INJECTION INTRAMUSCULAR; INTRAVENOUS
Status: DISCONTINUED | OUTPATIENT
Start: 2017-12-05 | End: 2017-12-05 | Stop reason: HOSPADM

## 2017-12-05 RX ORDER — DEXAMETHASONE SODIUM PHOSPHATE 10 MG/ML
INJECTION INTRAMUSCULAR; INTRAVENOUS
Status: DISPENSED
Start: 2017-12-05 | End: 2017-12-06

## 2017-12-05 RX ORDER — LIDOCAINE HYDROCHLORIDE 10 MG/ML
INJECTION, SOLUTION EPIDURAL; INFILTRATION; INTRACAUDAL; PERINEURAL
Status: DISCONTINUED | OUTPATIENT
Start: 2017-12-05 | End: 2017-12-05 | Stop reason: HOSPADM

## 2017-12-05 RX ORDER — LIDOCAINE HYDROCHLORIDE 10 MG/ML
INJECTION, SOLUTION EPIDURAL; INFILTRATION; INTRACAUDAL; PERINEURAL
Status: DISPENSED
Start: 2017-12-05 | End: 2017-12-06

## 2017-12-05 RX ORDER — BUPIVACAINE HYDROCHLORIDE AND EPINEPHRINE 2.5; 5 MG/ML; UG/ML
INJECTION, SOLUTION EPIDURAL; INFILTRATION; INTRACAUDAL; PERINEURAL
Status: DISCONTINUED | OUTPATIENT
Start: 2017-12-05 | End: 2017-12-05 | Stop reason: HOSPADM

## 2017-12-05 RX ORDER — MIDAZOLAM HYDROCHLORIDE 1 MG/ML
INJECTION, SOLUTION INTRAMUSCULAR; INTRAVENOUS
Status: DISCONTINUED | OUTPATIENT
Start: 2017-12-05 | End: 2017-12-05 | Stop reason: HOSPADM

## 2017-12-05 RX ORDER — SODIUM CHLORIDE, SODIUM LACTATE, POTASSIUM CHLORIDE, CALCIUM CHLORIDE 600; 310; 30; 20 MG/100ML; MG/100ML; MG/100ML; MG/100ML
INJECTION, SOLUTION INTRAVENOUS ONCE AS NEEDED
Status: COMPLETED | OUTPATIENT
Start: 2017-12-05 | End: 2017-12-05

## 2017-12-05 RX ORDER — FENTANYL CITRATE 50 UG/ML
INJECTION, SOLUTION INTRAMUSCULAR; INTRAVENOUS
Status: DISCONTINUED
Start: 2017-12-05 | End: 2017-12-05 | Stop reason: HOSPADM

## 2017-12-05 RX ORDER — BUPIVACAINE HYDROCHLORIDE AND EPINEPHRINE 2.5; 5 MG/ML; UG/ML
INJECTION, SOLUTION EPIDURAL; INFILTRATION; INTRACAUDAL; PERINEURAL
Status: DISPENSED
Start: 2017-12-05 | End: 2017-12-06

## 2017-12-05 RX ORDER — MIDAZOLAM HYDROCHLORIDE 1 MG/ML
INJECTION INTRAMUSCULAR; INTRAVENOUS
Status: DISCONTINUED
Start: 2017-12-05 | End: 2017-12-05 | Stop reason: HOSPADM

## 2017-12-05 RX ADMIN — BUPIVACAINE HYDROCHLORIDE AND EPINEPHRINE 19 ML: 2.5; 5 INJECTION, SOLUTION EPIDURAL; INFILTRATION; INTRACAUDAL; PERINEURAL at 12:12

## 2017-12-05 RX ADMIN — LIDOCAINE HYDROCHLORIDE 5 ML: 10 INJECTION, SOLUTION EPIDURAL; INFILTRATION; INTRACAUDAL; PERINEURAL at 12:12

## 2017-12-05 RX ADMIN — DEXAMETHASONE SODIUM PHOSPHATE 10 MG: 100 INJECTION INTRAMUSCULAR; INTRAVENOUS at 12:12

## 2017-12-05 RX ADMIN — FENTANYL CITRATE 50 MCG: 50 INJECTION, SOLUTION INTRAMUSCULAR; INTRAVENOUS at 12:12

## 2017-12-05 RX ADMIN — MIDAZOLAM HYDROCHLORIDE 1 MG: 1 INJECTION, SOLUTION INTRAMUSCULAR; INTRAVENOUS at 12:12

## 2017-12-05 RX ADMIN — SODIUM CHLORIDE, SODIUM LACTATE, POTASSIUM CHLORIDE, CALCIUM CHLORIDE: 600; 310; 30; 20 INJECTION, SOLUTION INTRAVENOUS at 11:12

## 2017-12-05 NOTE — DISCHARGE INSTRUCTIONS
Recovery After Procedural Sedation (Adult)  You have been given medicine by vein to make you sleep during your surgery. This may have included both a pain medicine and sleeping medicine. Most of the effects have worn off. But you may still have some drowsiness for the next 6 to 8 hours.  Home care  Follow these guidelines when you get home:  · For the next 8 hours, you should be watched by a responsible adult. This person should make sure your condition is not getting worse.  · Don't drink any alcohol for the next 24 hours.  · Don't drive, operate dangerous machinery, or make important business or personal decisions during the next 24 hours.  Note: Your healthcare provider may tell you not to take any medicine by mouth for pain or sleep in the next 4 hours. These medicines may react with the medicines you were given in the hospital. This could cause a much stronger response than usual.  Follow-up care  Follow up with your healthcare provider if you are not alert and back to your usual level of activity within 12 hours.  When to seek medical advice  Call your healthcare provider right away if any of these occur:  · Drowsiness gets worse  · Weakness or dizziness gets worse  · Repeated vomiting  · You can't be awakened   Date Last Reviewed: 10/18/2016  © 5321-8994 [employer]. [address] [place] [address], [place] [address]. All rights reserved. This information is not intended as a substitute for professional medical care. Always follow your healthcare professional's instructions.      Nerve Block    Home care instructions  You may apply ice pack to the injection site for 2 days , NO HEAT for 2 days  You may take a shower but no soaking above level of injection in tub or pool for 2 days  Resume Aspirin, Plavix, or Coumadin the day after the procedure unless otherwise instructed.  Gradually increase activity  Do not drive for 24 hr  If diabetic monitor your glucose carefully. Follow up with your primary MD if  this is a problem    SEEK  IMMEDIATE MEDICAL HELP FOR:  Severe increase in your usual pain or appearance of new pain  Prolonged or increasing weakness or numbness in the legs or arms  Drainage, redness, active bleeding, or increased swelling at the injection site  Temperature over 100.0 degrees F.  Headache that increases when your head is upright and decreases when you lie flat  Shortness of breath, chest pain, or problems breathing

## 2017-12-05 NOTE — H&P
"CC: leg pain    HPI: The patient is a 87 y.o. male with a history of bilateral leg pain here for LSB. There are no major changes in history and physical from 10/11/17 by myself.    Past Medical History:   Diagnosis Date    Anemia     GERD (gastroesophageal reflux disease)     Heart disease     Hypertension     Neuropathy        Past Surgical History:   Procedure Laterality Date    CORONARY ARTERY BYPASS GRAFT         Family History   Problem Relation Age of Onset    Dementia Father        Social History     Social History    Marital status:      Spouse name: N/A    Number of children: N/A    Years of education: N/A     Social History Main Topics    Smoking status: Current Every Day Smoker     Types: Cigarettes    Smokeless tobacco: Never Used    Alcohol use No    Drug use: No    Sexual activity: Not Asked     Other Topics Concern    None     Social History Narrative    None       Current Facility-Administered Medications   Medication Dose Route Frequency Provider Last Rate Last Dose    bupivacaine-EPINEPHrine (PF) 0.25%-1:200,000 injection    PRN Shawn Elizabeth MD   19 mL at 12/05/17 1151    dexamethasone injection    PRN Shawn Elizabeth MD   10 mg at 12/05/17 1151    lidocaine (PF) 10 mg/ml (1%) injection    PRN Shawn Elizabeth MD   5 mL at 12/05/17 1151    omnipaque 300 iohexol    PRN Shawn Elizabeth MD   5 mL at 12/05/17 1151       Review of patient's allergies indicates:   Allergen Reactions    Eucalyptus containing products Palpitations       Vitals:    11/28/17 0935 12/05/17 1134   BP:  (!) 167/65   Pulse:  72   Resp:  18   Temp:  98 °F (36.7 °C)   TempSrc:  Oral   SpO2:  95%   Weight: 74.4 kg (164 lb 0.4 oz)    Height: 6' 1" (1.854 m)        REVIEW OF SYSTEMS:     GENERAL: No weight loss, malaise or fevers.  HEENT:  No recent changes in vision or hearing  NECK: Negative for lumps, no difficulty with swallowing.  RESPIRATORY: Negative for cough, wheezing or shortness of breath, patient denies any " recent URI.  CARDIOVASCULAR: Negative for chest pain, leg swelling or palpitations.  GI: Negative for abdominal discomfort, blood in stools or black stools or change in bowel habits.  MUSCULOSKELETAL: See HPI.  SKIN: Negative for lesions, rash, and itching.  PSYCH: No suicidal or homicidal ideations, no current mood disturbances.  HEMATOLOGY/LYMPHOLOGY: Negative for prolonged bleeding, bruising easily or swollen nodes. Patient is not currently taking any anti-coagulants  ENDO: No history of diabetes or thyroid dysfunction  NEURO: No history of syncope, paralysis, seizures or tremors.All other reviewed and negative other than HPI.    Physical exam:  Gen: A and O x3, pleasant, well-groomed  Skin: No rashes or obvious lesions  HEENT: PERRLA, no obvious deformities on ears or in canals. No thyroid masses, trachea midline, no palpable lymph nodes in neck, axilla.  CVS: Regular rate and rhythm, normal S1 and S2, no murmurs.  Resp: Clear to auscultation bilaterally.  Abdomen: Soft, NT/ND, normal bowel sounds present.  Musculoskeletal/Neuro: Moving all extremities    Assessment:  Pain of lower extremity, unspecified laterality  -     Place in Outpatient; Standing  -     Vital signs; Standing  -     Activity as tolerated; Standing  -     Insert peripheral IV; Standing  -     Verify informed consent; Standing  -     Notify physician ; Standing  -     Notify physician ; Standing  -     Notify physician (specify); Standing  -     Diet NPO; Standing    Leg pain    Other orders  -     lactated ringers infusion; Inject into the vein once as needed (IV Sedation).  -     Low Risk of VTE; Standing  -     bupivacaine-EPINEPHrine (PF) 0.25%-1:200,000 injection; as needed.  -     dexamethasone injection; as needed.  -     lidocaine (PF) 10 mg/ml (1%) injection; as needed.  -     omnipaque 300 iohexol; as needed.          PLAN: LSB  This patient has been cleared for surgery in an ambulatory surgical facility    ASA 3,  MP3  No history of  anesthetic complications  Plan for RN IV sedation

## 2017-12-05 NOTE — OP NOTE
PROCEDURE DATE: 12/5/2017    PROCEDURE: Bilateral side L3 lumbar sympathetic block utilizing fluoroscopy    DIAGNOSIS: Bilateral leg pain  Post Op diagnosis: Same    PHYSICIAN: Shawn Elizabeth MD    MEDICATIONS INJECTED:      Bupivicaine with 1:200,000 epinephrine, 9.5 ml total with 5mg of dexamethasone at each level    LOCAL ANESTHETIC INJECTED:  Lidocaine 1%. 2ml per site.    SEDATION MEDICATIONS: RN IV sedation    ESTIMATED BLOOD LOSS:  None    COMPLICATIONS:  NOne    TECHNIQUE:   A time-out taken to identify patient and procedure side prior to starting the procedure. The patient was placed in a prone position, prepped and draped in the usual sterile fashion using ChloraPrep and sterile towels.  The area to be injected was determined under fluoroscopic guidance in AP and oblique view. The fluoroscope was rotated to a right-side oblique view at which point the tip of the L3 transverse process was noted to be parallel to the L3 vertebral body.  Local anesthetic was given by raising a wheel and going down to the hub of a 25-gauge 1.5 inch needle.  In oblique view, a 5 inch 22-gauge bent-tip spinal needle was introduced and followed just anterior to the transverse process until it made contact with the vertebral body. After negative aspiration for blood or air, 2ml contrast dye was injected to confirm appropriate placement and that there was no vascular uptake, and this was also performed under live digital subtraction. The test dose as noted above was given over 4 minutes and there were no signs of HR or BP changes, no tinnitus or circumoral numbness. Again, aspiration was negative for blood or air and the treatment medication was then given slowly over 5 minutes. This was then repeated over the left side in similar fashion. The patient tolerated the procedure well.    The patient was monitored after the procedure. The patient was given post procedure and discharge instructions to follow at home. The patient was discharged  in a stable condition.

## 2017-12-05 NOTE — DISCHARGE SUMMARY
[facility]  Discharge Note  Short Stay    Admit Date: 12/5/2017    Discharge Date and Time: 12/5/2017    Attending Physician: Shawn Elizabeth MD     Discharge Provider: Shawn Elizabeth    Diagnoses:  Active Hospital Problems    Diagnosis  POA    *Leg pain [M79.606]  Yes      Resolved Hospital Problems    Diagnosis Date Resolved POA   No resolved problems to display.       Hospital Course: LSB  Discharged Condition: Good    Final Diagnoses:   Active Hospital Problems    Diagnosis  POA    *Leg pain [M79.606]  Yes      Resolved Hospital Problems    Diagnosis Date Resolved POA   No resolved problems to display.       Disposition: Home or Self Care    Follow up/Patient Instructions:    Medications:  Reconciled Home Medications:   Current Discharge Medication List      CONTINUE these medications which have NOT CHANGED    Details   hydrochlorothiazide (MICROZIDE) 12.5 mg capsule Take 12.5 mg by mouth once daily.  Refills: 6      !! LYRICA 150 mg capsule       metoprolol succinate (TOPROL-XL) 25 MG 24 hr tablet       metoprolol tartrate (LOPRESSOR) 25 MG tablet 25 mg.  Refills: 0      pantoprazole (PROTONIX) 40 MG tablet       sertraline (ZOLOFT) 100 MG tablet       aspirin (ECOTRIN) 81 MG EC tablet Take 81 mg by mouth once daily.      atorvastatin (LIPITOR) 40 MG tablet       clopidogrel (PLAVIX) 75 mg tablet Take 75 mg by mouth once daily.      donepezil (ARICEPT) 10 MG tablet TAKE 1 TABLET BY MOUTH ONCE DAILY  Qty: 90 tablet, Refills: 1    Comments: **Patient requests 90 days supply**      !! furosemide (LASIX) 20 MG tablet       !! furosemide (LASIX) 40 MG tablet       !! hydrocodone-acetaminophen 10-325mg (NORCO)  mg Tab Take 1 tablet by mouth every 12 (twelve) hours as needed (pain).  Qty: 60 tablet, Refills: 0      !! hydrocodone-acetaminophen 10-325mg (NORCO)  mg Tab Take 1 tablet by mouth every 12 (twelve) hours as needed (pain).  Qty: 60 tablet, Refills: 0      !! LYRICA 75 mg capsule        mupirocin calcium 2% (BACTROBAN) 2 % cream       omeprazole (PRILOSEC) 20 MG capsule 20 mg.  Refills: 0      simvastatin (ZOCOR) 40 MG tablet       trazodone (DESYREL) 50 MG tablet 50 mg.  Refills: 3       !! - Potential duplicate medications found. Please discuss with provider.      STOP taking these medications       amlodipine (NORVASC) 10 MG tablet Comments:   Reason for Stopping:         ipratropium (ATROVENT) 0.06 % nasal spray Comments:   Reason for Stopping:         lisinopril (PRINIVIL,ZESTRIL) 5 MG tablet Comments:   Reason for Stopping:         lisinopril 10 MG tablet Comments:   Reason for Stopping:         mirtazapine (REMERON) 15 MG tablet Comments:   Reason for Stopping:         potassium chloride SA (K-DUR,KLOR-CON) 20 MEQ tablet Comments:   Reason for Stopping:               Discharge Procedure Orders  Diet general     Activity as tolerated     Call MD for:  temperature >100.4     Call MD for:  persistent nausea and vomiting or diarrhea     Call MD for:  severe uncontrolled pain     Call MD for:  redness, tenderness, or signs of infection (pain, swelling, redness, odor or green/yellow discharge around incision site)     Call MD for:  difficulty breathing or increased cough     Call MD for:  severe persistent headache          Follow up with MD in 2-3 weeks    Discharge Procedure Orders (must include Diet, Follow-up, Activity):    Discharge Procedure Orders (must include Diet, Follow-up, Activity)  Diet general     Activity as tolerated     Call MD for:  temperature >100.4     Call MD for:  persistent nausea and vomiting or diarrhea     Call MD for:  severe uncontrolled pain     Call MD for:  redness, tenderness, or signs of infection (pain, swelling, redness, odor or green/yellow discharge around incision site)     Call MD for:  difficulty breathing or increased cough     Call MD for:  severe persistent headache

## 2017-12-08 VITALS
HEIGHT: 73 IN | SYSTOLIC BLOOD PRESSURE: 118 MMHG | WEIGHT: 164 LBS | TEMPERATURE: 97 F | RESPIRATION RATE: 18 BRPM | OXYGEN SATURATION: 97 % | BODY MASS INDEX: 21.74 KG/M2 | DIASTOLIC BLOOD PRESSURE: 55 MMHG | HEART RATE: 54 BPM

## 2017-12-14 ENCOUNTER — OFFICE VISIT (OUTPATIENT)
Dept: HEMATOLOGY/ONCOLOGY | Facility: CLINIC | Age: 82
End: 2017-12-14
Payer: MEDICARE

## 2017-12-14 VITALS
HEIGHT: 72 IN | SYSTOLIC BLOOD PRESSURE: 135 MMHG | DIASTOLIC BLOOD PRESSURE: 52 MMHG | BODY MASS INDEX: 22.67 KG/M2 | WEIGHT: 167.38 LBS | HEART RATE: 58 BPM | TEMPERATURE: 98 F

## 2017-12-14 DIAGNOSIS — D69.59 THROMBOCYTOPENIA, SECONDARY: ICD-10-CM

## 2017-12-14 DIAGNOSIS — E55.9 VITAMIN D DEFICIENCY: ICD-10-CM

## 2017-12-14 DIAGNOSIS — E55.9 VITAMIN D DEFICIENCY: Primary | ICD-10-CM

## 2017-12-14 PROCEDURE — 99203 OFFICE O/P NEW LOW 30 MIN: CPT | Mod: ,,, | Performed by: INTERNAL MEDICINE

## 2017-12-14 RX ORDER — ERGOCALCIFEROL 1.25 MG/1
CAPSULE ORAL
Qty: 12 CAPSULE | Refills: 0 | Status: SHIPPED | OUTPATIENT
Start: 2017-12-14

## 2017-12-14 RX ORDER — QUETIAPINE FUMARATE 25 MG/1
TABLET, FILM COATED ORAL
COMMUNITY
Start: 2017-11-28 | End: 2019-08-26 | Stop reason: ALTCHOICE

## 2017-12-14 RX ORDER — ERGOCALCIFEROL 1.25 MG/1
50000 CAPSULE ORAL
Qty: 4 CAPSULE | Refills: 0 | Status: SHIPPED | OUTPATIENT
Start: 2017-12-14 | End: 2017-12-14 | Stop reason: SDUPTHER

## 2017-12-14 NOTE — LETTER
December 14, 2017      Scott Payne MD  [address] [place] [address] [address]  [place] [address]           [facility]  [address] [facility]  [address]  [facility] [address]  Phone: [phone]  Fax: [phone]          Patient: Halley Rodrigues   MR Number: [id number]   YOB: 1930   Date of Visit: 12/14/2017       Dear Dr. Scott Payne:    Thank you for referring Halley Rodrigues to me for evaluation. Attached you will find relevant portions of my assessment and plan of care.    If you have questions, please do not hesitate to call me. I look forward to following Halley Rodrigues along with you.    Sincerely,    Alvarez Augustine MD    Enclosure  CC:  No Recipients    If you would like to receive this communication electronically, please contact [email or web address] or [phone] to request more information on [employer] access.    For providers and/or their staff who would like to refer a patient to [facility], please contact us through our one-stop-shop provider referral line, [facility], at [phone].    If you feel you have received this communication in error or would no longer like to receive these types of communications, please e-mail [email or web address]

## 2017-12-14 NOTE — PROGRESS NOTES
[facility]  History & Physical    Subjective:      Patient ID:   NAME: Halley Rodrigues : 1930     87 y.o. male    Referring Doc: Elmer  Other Physicians: Chito, Daniel Oseguera John Vu        Chief Complaint: tcp evaluation      HPI:  87 y.o. male with diagnosis of chronic thrombocytopenia who has been referred by Dr Payne for evaluation by medical hematology/oncology. He was last previously seen in hematology clinic in Dec 2015. He reports that he has been doing ok. No excessive bleeding or bruising. He had GI bleeding and was hospitalized a year ago. He is under the care or Dr Dale with GI now.               ROS:   GEN: normal without any fever, night sweats or weight loss  HEENT: normal with no HA's, sore throat, stiff neck, changes in vision  CV: normal with no CP, SOB, PND, NIETO or orthopnea  PULM: normal with no SOB, cough, hemoptysis, sputum or pleuritic pain  GI: normal with no abdominal pain, nausea, vomiting, constipation, diarrhea, melanotic stools, BRBPR, or hematemesis  : normal with no hematuria, dysuria  BREAST: normal with no mass, discharge, pain  SKIN: normal with no rash, erythema, bruising, or swelling       Past Medical/Surgical History:  Past Medical History:   Diagnosis Date    Anemia     GERD (gastroesophageal reflux disease)     Heart disease     Hypertension     Neuropathy      Past Surgical History:   Procedure Laterality Date    CORONARY ARTERY BYPASS GRAFT           Allergies:  Review of patient's allergies indicates:   Allergen Reactions    Eucalyptus containing products Palpitations       Social/Family History:  Social History     Social History    Marital status:      Spouse name: N/A    Number of children: N/A    Years of education: N/A     Occupational History    Not on file.     Social History Main Topics    Smoking status: Current Every Day Smoker     Types: Cigarettes    Smokeless tobacco: Never Used    Alcohol use No     Drug use: No    Sexual activity: Not on file     Other Topics Concern    Not on file     Social History Narrative    No narrative on file     Family History   Problem Relation Age of Onset    Dementia Father          Medications:    Current Outpatient Prescriptions:     aspirin (ECOTRIN) 81 MG EC tablet, Take 81 mg by mouth once daily., Disp: , Rfl:     atorvastatin (LIPITOR) 40 MG tablet, , Disp: , Rfl:     clopidogrel (PLAVIX) 75 mg tablet, Take 75 mg by mouth once daily., Disp: , Rfl:     donepezil (ARICEPT) 10 MG tablet, TAKE 1 TABLET BY MOUTH ONCE DAILY, Disp: 90 tablet, Rfl: 1    furosemide (LASIX) 40 MG tablet, , Disp: , Rfl:     hydrochlorothiazide (MICROZIDE) 12.5 mg capsule, Take 12.5 mg by mouth once daily., Disp: , Rfl: 6    hydrocodone-acetaminophen 10-325mg (NORCO)  mg Tab, Take 1 tablet by mouth every 12 (twelve) hours as needed (pain)., Disp: 60 tablet, Rfl: 0    LYRICA 150 mg capsule, , Disp: , Rfl:     LYRICA 75 mg capsule, , Disp: , Rfl:     metoprolol succinate (TOPROL-XL) 25 MG 24 hr tablet, , Disp: , Rfl:     metoprolol tartrate (LOPRESSOR) 25 MG tablet, 25 mg., Disp: , Rfl: 0    mupirocin calcium 2% (BACTROBAN) 2 % cream, , Disp: , Rfl:     omeprazole (PRILOSEC) 20 MG capsule, 20 mg., Disp: , Rfl: 0    pantoprazole (PROTONIX) 40 MG tablet, , Disp: , Rfl:     QUEtiapine (SEROQUEL) 25 MG Tab, , Disp: , Rfl:     sertraline (ZOLOFT) 100 MG tablet, , Disp: , Rfl:     simvastatin (ZOCOR) 40 MG tablet, , Disp: , Rfl:     trazodone (DESYREL) 50 MG tablet, 50 mg., Disp: , Rfl: 3      Pathology:  No matching staging information was found for the patient.      Objective:   Vitals:  Blood pressure (!) 135/52, pulse (!) 58, temperature 98 °F (36.7 °C), height 6' (1.829 m), weight 75.9 kg (167 lb 6.4 oz).    Physical Examination:   GEN: no apparent distress, comfortable; AAOx3  HEAD: atraumatic and normocephalic  EYES: no pallor, no icterus, PERRLA  ENT: OMM, no pharyngeal  erythema, external ears WNL; no nasal discharge; no thrush  NECK: no masses, thyroid normal, trachea midline, no LAD/LN's, supple  CV: RRR with no murmur; normal pulse; normal S1 and S2; no pedal edema  CHEST: Normal respiratory effort; CTAB; normal breath sounds; no wheeze or crackles  ABDOM: nontender and nondistended; soft; normal bowel sounds; no rebound/guarding  MUSC/Skeletal: ROM normal; no crepitus; joints normal; no deformities or arthropathy  EXTREM: no clubbing, cyanosis, inflammation or swelling  SKIN: no rashes, lesions, ulcers, petechiae or subcutaneous nodules  : no dickey  NEURO: grossly intact; motor/sensory WNL; AAOx3; no tremors  PSYCH: normal mood, affect and behavior  LYMPH: normal cervical, supraclavicular, axillary and groin LN's      Labs:       Radiology/Diagnostic Studies:          All lab results and imaging results have been reviewed and discussed with the patient    Assessment:   (1) 87 y.o. male with diagnosis of chronic thrombocytopenia who was referred by Dr Payne for hematologic evaluation and recommendation.   - patient was last previously seen in Dec 2015 after which he changed to Dr PANKAJ Junior  - prior suspected platelet clumping issues  - latest platelets in Oct 2017 was 88,000 and not much different than when I last saw him in the past    (2) Chronic tobacco use    (3) Chronic anemia - NCNC parameters; mild; possible alpha-thalassemia  - history of gastric ulcers in past due to consumption of NSAIDs and subsequent GIB  - hgb at 11.7 and adequate    (4) Lower extremity/pedal neuropathy - previously followed by Dr Leiva with neurology and Dr Elizabeth with pain management    (5) Prior Garcia's esophagus - followed by Dr Hawkins with GI in past and now sees Dr Dale with GI    (6) Dementia    (7) CRI - followed by Dr Sanchez with neph    (8) Vit D deficiency with low level at 18            Plan:       Thrombocytopenia, secondary            PLAN:  1. Check up to date labs  monthly  2. Add Vit D 50,000 units po weekly x 4  3. F/u with PCP, Neph, cards etc  4. RTC in  3 months  Fax note to Scott Payne MD, Oumar Sanchez and Chito        I have explained and the patient understands all of  the current recommendation(s). I have answered all of their questions to the best of my ability and to their complete satisfaction.             Thank you for allowing me to participate in this patient's care. Please call with any questions or concerns.    Electronically signed Alvarez Augustine MD

## 2017-12-21 ENCOUNTER — OFFICE VISIT (OUTPATIENT)
Dept: PAIN MEDICINE | Facility: CLINIC | Age: 82
End: 2017-12-21
Payer: MEDICARE

## 2017-12-21 VITALS
WEIGHT: 167.31 LBS | HEIGHT: 72 IN | SYSTOLIC BLOOD PRESSURE: 124 MMHG | DIASTOLIC BLOOD PRESSURE: 65 MMHG | HEART RATE: 46 BPM | BODY MASS INDEX: 22.66 KG/M2

## 2017-12-21 DIAGNOSIS — M47.896 OTHER SPONDYLOSIS, LUMBAR REGION: ICD-10-CM

## 2017-12-21 DIAGNOSIS — G60.9 PERIPHERAL NEUROPATHY, IDIOPATHIC: Primary | ICD-10-CM

## 2017-12-21 DIAGNOSIS — M51.36 DDD (DEGENERATIVE DISC DISEASE), LUMBAR: ICD-10-CM

## 2017-12-21 PROCEDURE — 99999 PR PBB SHADOW E&M-EST. PATIENT-LVL IV: CPT | Mod: PBBFAC,,, | Performed by: ANESTHESIOLOGY

## 2017-12-21 PROCEDURE — 99214 OFFICE O/P EST MOD 30 MIN: CPT | Mod: S$GLB,,, | Performed by: ANESTHESIOLOGY

## 2017-12-21 RX ORDER — HYDROCODONE BITARTRATE AND ACETAMINOPHEN 10; 325 MG/1; MG/1
1 TABLET ORAL EVERY 8 HOURS PRN
Qty: 90 TABLET | Refills: 0 | Status: SHIPPED | OUTPATIENT
Start: 2018-01-19 | End: 2018-02-14

## 2017-12-21 RX ORDER — HYDROCODONE BITARTRATE AND ACETAMINOPHEN 10; 325 MG/1; MG/1
1 TABLET ORAL EVERY 8 HOURS PRN
Qty: 90 TABLET | Refills: 0 | Status: SHIPPED | OUTPATIENT
Start: 2017-12-21 | End: 2018-01-20

## 2017-12-21 NOTE — PROGRESS NOTES
"Referring Physician: No ref. provider found    PCP: Scott Payne MD      CC: bilateral foot pain and lower back pain    Interval History:  Mr. Rodrigues is a 87 y.o. male with peripheral neuropathy who presents s/p bilateral lumbar sympathetic nerve blocks.  He did not receive signficant benefit of his bilateral foot pain from the procedure.   Compounding cream was not helpful. Hydrocodone 10 mg BID is only minimally helpful for his pain and he states needing to take it more recently.   He continues to take Lyrica 75 mg TID with only mild benefits without SEs. Pain today is rated 6/10.  Prior HPI:   Halley Rodrigues is a 87 y.o.  male with PMHx significant for HLD, HTN, CAD on Plavix, peripheral neuropathy who presents with CC: painful burning sensation in B feet x 5y+ duration. The pain is constant and awakes him in his sleep and makes the evenings the most difficult, pain- wise. It is worse with lying flat and improved with walking. He has taken Lyrica x 5y without any appreciable pain relief. He has tried 2%lidocaine cream, which completely "numbs his feet" and inhibits him from ambulation. He has taken Hydrocodone 7.5 mg 2-3 times QD which helps with his pain significantly. He also reports cLBP without preceding injury/trauma, specifically in the area of B SI joints (R worse than L), aching and stabbing in nature, without radiation. His back pain is worsened with prolonged standing and walking, which limits his activity and ultimately makes his foot pain worse 2/2 being sedentary. He has been having intermittent falls, which he attributes to generalized weakness versus foot numbness or back pain. He denies saddle anesthesia or B/B changes.     ROS:  CONSTITUTIONAL: No fevers, chills, night sweats, wt. loss, appetite changes  SKIN: no rashes or itching  ENT: No headaches, head trauma, vision changes, or eye pain  LYMPH NODES: None noticed   CV: No chest pain, palpitations.   RESP: No " shortness of breath, dyspnea on exertion, cough, wheezing, or hemoptysis  GI: No nausea, emesis, diarrhea, constipation, melena, hematochezia, pain.    : No dysuria, hematuria, urgency, or frequency   HEME: No easy bruising, bleeding problems  PSYCHIATRIC: No depression, anxiety, psychosis, hallucinations.  NEURO: No seizures, memory loss, dizziness or difficulty sleeping  MSK back pain, foot pain      Past Medical History:   Diagnosis Date    Anemia     GERD (gastroesophageal reflux disease)     Heart disease     Hypertension     Neuropathy      Past Surgical History:   Procedure Laterality Date    CORONARY ARTERY BYPASS GRAFT       Family History   Problem Relation Age of Onset    Dementia Father      Social History     Social History    Marital status:      Spouse name: N/A    Number of children: N/A    Years of education: N/A     Social History Main Topics    Smoking status: Current Every Day Smoker     Types: Cigarettes    Smokeless tobacco: Never Used    Alcohol use No    Drug use: No    Sexual activity: Not Asked     Other Topics Concern    None     Social History Narrative    None         Medications/Allergies: See med card    Vitals:    12/21/17 1053   BP: 124/65   Pulse: (!) 46   Weight: 75.9 kg (167 lb 5.3 oz)   Height: 6' (1.829 m)   PainSc:   8   PainLoc: Foot         Physical exam:    GENERAL: A and O x3, the patient appears well groomed and is in no acute distress.  Skin: No rashes or obvious lesions  HEENT: normocephalic, atraumatic  CARDIOVASCULAR:  Palpable peripheral pulses  LUNGS: easy work of breathing  ABDOMEN: soft, nontender   UPPER EXTREMITIES: Normal alignment, normal range of motion, no atrophy, no skin changes,  hair growth and nail growth normal and equal bilaterally. No swelling, no tenderness.    LOWER EXTREMITIES:  Normal alignment, normal range of motion, no atrophy, dry skin, onchyomycosis in B feet. No swelling, no tenderness.    LUMBAR SPINE  Lumbar  spine: ROM is moderately decreased l with flexion extension and oblique extension   Michel's test causes increased pain on both sides (R>>L)   Supine straight leg raise is negative bilaterally.    Internal and external rotation of the hip causes no increased pain on either side.  Myofascial exam:SI joint tenderness B      MENTAL STATUS: normal orientation, speech, language, and fund of knowledge for social situation.  Emotional state appropriate.    CRANIAL NERVES:  II:  PERRL bilaterally,   III,IV,VI: EOMI.    V:  Facial sensation equal bilaterally  VII:  Facial motor function normal.  VIII:  Hearing equal to finger rub bilaterally  IX/X: Gag normal, palate symmetric  XI:  Shoulder shrug equal, head turn equal  XII:  Tongue midline without fasciculations      MOTOR: Tone and bulk: normal bilateral upper and lower Strength: normal   Delt Bi Tri WE WF     R 5 5 5 5 5 5   L 5 5 5 5 5 5     IP ADD ABD Quad TA Gas HAM  R 5 5 5 5 5 5 5  L 5 5 5 5 5 5 5    SENSATION: Light touch and pinprick intact bilaterally  REFLEXES: normal, symmetric, nonbrisk.  Toes down, no clonus. No hoffmans.  GAIT: normal rise, base, steps, and arm swing.      Assessment   Mr. Rodrigues is a 87 y.o. male with   1. Peripheral neuropathy, idiopathic    2. Other spondylosis, lumbar region    3. DDD (degenerative disc disease), lumbar      Plan:  1.  I have stressed the importance of physical activity and exercise to improve overall health. Continue PT  2. He can Hydrocodone 10 mg q8hrs as needed.  reviewed. 2 months.UDS last visit consistent with use  3. Trial of ketamine compound ointment to see if it will help with peripheral neuropathy   4. I believe his low back pain maybe due to facet arthropathy and have recommended lumbar medial branch blocks as a diagnostic procedure.  If successful, would proceed with radiofrequency ablation. He will consider this option  5. Follow up in 2 months

## 2018-02-14 ENCOUNTER — OFFICE VISIT (OUTPATIENT)
Dept: PAIN MEDICINE | Facility: CLINIC | Age: 83
End: 2018-02-14
Payer: MEDICARE

## 2018-02-14 VITALS
DIASTOLIC BLOOD PRESSURE: 69 MMHG | BODY MASS INDEX: 22.13 KG/M2 | WEIGHT: 167 LBS | SYSTOLIC BLOOD PRESSURE: 128 MMHG | HEART RATE: 58 BPM | HEIGHT: 73 IN

## 2018-02-14 DIAGNOSIS — G60.9 PERIPHERAL NEUROPATHY, IDIOPATHIC: Primary | ICD-10-CM

## 2018-02-14 DIAGNOSIS — M51.36 DDD (DEGENERATIVE DISC DISEASE), LUMBAR: ICD-10-CM

## 2018-02-14 DIAGNOSIS — M47.896 OTHER SPONDYLOSIS, LUMBAR REGION: ICD-10-CM

## 2018-02-14 PROCEDURE — 1159F MED LIST DOCD IN RCRD: CPT | Mod: S$GLB,,, | Performed by: ANESTHESIOLOGY

## 2018-02-14 PROCEDURE — 1125F AMNT PAIN NOTED PAIN PRSNT: CPT | Mod: S$GLB,,, | Performed by: ANESTHESIOLOGY

## 2018-02-14 PROCEDURE — 3008F BODY MASS INDEX DOCD: CPT | Mod: S$GLB,,, | Performed by: ANESTHESIOLOGY

## 2018-02-14 PROCEDURE — 99999 PR PBB SHADOW E&M-EST. PATIENT-LVL III: CPT | Mod: PBBFAC,,, | Performed by: ANESTHESIOLOGY

## 2018-02-14 PROCEDURE — 99214 OFFICE O/P EST MOD 30 MIN: CPT | Mod: S$GLB,,, | Performed by: ANESTHESIOLOGY

## 2018-02-14 RX ORDER — HYDROCODONE BITARTRATE AND ACETAMINOPHEN 10; 325 MG/1; MG/1
1 TABLET ORAL EVERY 8 HOURS PRN
Qty: 90 TABLET | Refills: 0 | Status: SHIPPED | OUTPATIENT
Start: 2018-03-15 | End: 2018-04-11 | Stop reason: SDUPTHER

## 2018-02-14 RX ORDER — HYDROCODONE BITARTRATE AND ACETAMINOPHEN 10; 325 MG/1; MG/1
1 TABLET ORAL EVERY 8 HOURS PRN
Qty: 90 TABLET | Refills: 0 | Status: SHIPPED | OUTPATIENT
Start: 2018-02-14 | End: 2018-03-16

## 2018-02-14 NOTE — PROGRESS NOTES
"Referring Physician: No ref. provider found    PCP: Scott Payne MD      CC: bilateral foot pain and lower back pain    Interval History:  Mr. Rodrigues is 86 yo male with continued bilateral peripheral neuropathy.  Sympathetic blocks previously did not improve his pain.  Ketamine compounding cream also has not been helpful.  His back pain is not currently of major concern.  Hydrocodone was increased to tid prn at last visit; family states that this has waned in effectiveness as well.  His lyrica regimen remains unchanged.  Pain today is scaled 8/10.      Prior HPI:   Halley Rodrigues is a 87 y.o.  male with PMHx significant for HLD, HTN, CAD on Plavix, peripheral neuropathy who presents with CC: painful burning sensation in B feet x 5y+ duration. The pain is constant and awakes him in his sleep and makes the evenings the most difficult, pain- wise. It is worse with lying flat and improved with walking. He has taken Lyrica x 5y without any appreciable pain relief. He has tried 2%lidocaine cream, which completely "numbs his feet" and inhibits him from ambulation. He has taken Hydrocodone 7.5 mg 2-3 times QD which helps with his pain significantly. He also reports cLBP without preceding injury/trauma, specifically in the area of B SI joints (R worse than L), aching and stabbing in nature, without radiation. His back pain is worsened with prolonged standing and walking, which limits his activity and ultimately makes his foot pain worse 2/2 being sedentary. He has been having intermittent falls, which he attributes to generalized weakness versus foot numbness or back pain. He denies saddle anesthesia or B/B changes.     ROS:  CONSTITUTIONAL: No fevers, chills, night sweats, wt. loss, appetite changes  SKIN: no rashes or itching  ENT: No headaches, head trauma, vision changes, or eye pain  LYMPH NODES: None noticed   CV: No chest pain, palpitations.   RESP: No shortness of breath, dyspnea on " "exertion, cough, wheezing, or hemoptysis  GI: No nausea, emesis, diarrhea, constipation, melena, hematochezia, pain.    : No dysuria, hematuria, urgency, or frequency   HEME: No easy bruising, bleeding problems  PSYCHIATRIC: No depression, anxiety, psychosis, hallucinations.  NEURO: No seizures, memory loss, dizziness or difficulty sleeping  MSK back pain, foot pain      Past Medical History:   Diagnosis Date    Anemia     GERD (gastroesophageal reflux disease)     Heart disease     Hypertension     Neuropathy      Past Surgical History:   Procedure Laterality Date    CORONARY ARTERY BYPASS GRAFT       Family History   Problem Relation Age of Onset    Dementia Father      Social History     Social History    Marital status:      Spouse name: N/A    Number of children: N/A    Years of education: N/A     Social History Main Topics    Smoking status: Current Every Day Smoker     Types: Cigarettes    Smokeless tobacco: Never Used    Alcohol use No    Drug use: No    Sexual activity: Not Asked     Other Topics Concern    None     Social History Narrative    None         Medications/Allergies: See med card    Vitals:    02/14/18 1047   BP: 128/69   Pulse: (!) 58   Weight: 75.8 kg (167 lb)   Height: 6' 1" (1.854 m)   PainSc:   8   PainLoc: Back         Physical exam:    GENERAL: A and O x3, the patient appears well groomed and is in no acute distress.  Skin: No rashes or obvious lesions  HEENT: normocephalic, atraumatic  CARDIOVASCULAR:  Palpable peripheral pulses  LUNGS: easy work of breathing  ABDOMEN: soft, nontender   UPPER EXTREMITIES: Normal alignment, normal range of motion, no atrophy, no skin changes,  hair growth and nail growth normal and equal bilaterally. No swelling, no tenderness.    LOWER EXTREMITIES:  Normal alignment, normal range of motion, no atrophy, dry skin, onchyomycosis in B feet. No swelling, no tenderness.    LUMBAR SPINE  Lumbar spine: ROM is moderately decreased l with " flexion extension and oblique extension   Michel's test causes increased pain on both sides (R>>L)   Supine straight leg raise is negative bilaterally.    Internal and external rotation of the hip causes no increased pain on either side.  Myofascial exam:SI joint tenderness B      MENTAL STATUS: normal orientation, speech, language, and fund of knowledge for social situation.  Emotional state appropriate.    CRANIAL NERVES:  II:  PERRL bilaterally,   III,IV,VI: EOMI.    V:  Facial sensation equal bilaterally  VII:  Facial motor function normal.  VIII:  Hearing equal to finger rub bilaterally  IX/X: Gag normal, palate symmetric  XI:  Shoulder shrug equal, head turn equal  XII:  Tongue midline without fasciculations      MOTOR: Tone and bulk: normal bilateral upper and lower Strength: normal   Delt Bi Tri WE WF     R 5 5 5 5 5 5   L 5 5 5 5 5 5     IP ADD ABD Quad TA Gas HAM  R 5 5 5 5 5 5 5  L 5 5 5 5 5 5 5    SENSATION: Light touch and pinprick intact bilaterally  REFLEXES: normal, symmetric, nonbrisk.  Toes down, no clonus. No hoffmans.  GAIT: normal rise, base, steps, and arm swing.      Assessment   Mr. Rodrigues is a 87 y.o. male with   1. Peripheral neuropathy, idiopathic    2. Other spondylosis, lumbar region    3. DDD (degenerative disc disease), lumbar      Plan:  1.  I have stressed the importance of physical activity and exercise to improve overall health. Continue PT  2. He can continue Hydrocodone 10 mg q8hrs as needed.  reviewed. 2 months.  3.  May consider SCS trial, but patient is currently hesitant to proceed    4.  Continue lyrica as prescribed  5.  Follow up in 2 months

## 2018-03-14 ENCOUNTER — OFFICE VISIT (OUTPATIENT)
Dept: HEMATOLOGY/ONCOLOGY | Facility: CLINIC | Age: 83
End: 2018-03-14
Payer: MEDICARE

## 2018-03-14 VITALS
HEART RATE: 60 BPM | RESPIRATION RATE: 18 BRPM | HEIGHT: 72 IN | TEMPERATURE: 98 F | SYSTOLIC BLOOD PRESSURE: 118 MMHG | DIASTOLIC BLOOD PRESSURE: 65 MMHG | BODY MASS INDEX: 21.86 KG/M2 | WEIGHT: 161.38 LBS

## 2018-03-14 DIAGNOSIS — D69.59 THROMBOCYTOPENIA, SECONDARY: Primary | ICD-10-CM

## 2018-03-14 PROCEDURE — 99213 OFFICE O/P EST LOW 20 MIN: CPT | Mod: ,,, | Performed by: INTERNAL MEDICINE

## 2018-03-14 NOTE — PROGRESS NOTES
"[facility] Hematology/Oncology  PROGRESS NOTE       Subjective:       Patient ID:   NAME: Halley Rodrigues : 1930     87 y.o. male    Referring Doc: Elmer  Other Physicians: Chito, Daniel Oseguera John Vu    Chief Complaint:  tcp f/u    History of Present Illness:     Patient returns today for a  regularly scheduled follow-up visit.  The patient is here today to go over the results of the recently ordered labs, tests and studies. He was supposed to have labs but there are none that are available. He was supposed to be getting labs monthly for me. He is here with his son. He has been feeling "pretty good"; chronic neuropathy in feet; he denies any CP, SOB, HA's or N/V.             ROS:   GEN: normal without any fever, night sweats or weight loss  HEENT: normal with no HA's, sore throat, stiff neck, changes in vision  CV: normal with no CP, SOB, PND, NIETO or orthopnea  PULM: normal with no SOB, cough, hemoptysis, sputum or pleuritic pain  GI: normal with no abdominal pain, nausea, vomiting, constipation, diarrhea, melanotic stools, BRBPR, or hematemesis  : normal with no hematuria, dysuria  BREAST: normal with no mass, discharge, pain  SKIN: normal with no rash, erythema, bruising, or swelling    Allergies:  Review of patient's allergies indicates:   Allergen Reactions    Eucalyptus containing products Palpitations       Medications:    Current Outpatient Prescriptions:     aspirin (ECOTRIN) 81 MG EC tablet, Take 81 mg by mouth once daily., Disp: , Rfl:     atorvastatin (LIPITOR) 40 MG tablet, , Disp: , Rfl:     clopidogrel (PLAVIX) 75 mg tablet, Take 75 mg by mouth once daily., Disp: , Rfl:     donepezil (ARICEPT) 10 MG tablet, TAKE 1 TABLET BY MOUTH ONCE DAILY, Disp: 90 tablet, Rfl: 1    ergocalciferol (ERGOCALCIFEROL) 50,000 unit Cap, TAKE ONE CAPSULE BY MOUTH EVERY WEEK, Disp: 12 capsule, Rfl: 0    furosemide (LASIX) 40 MG tablet, , Disp: , Rfl:     hydrochlorothiazide (MICROZIDE) 12.5 mg " capsule, Take 12.5 mg by mouth once daily., Disp: , Rfl: 6    hydrocodone-acetaminophen 10-325mg (NORCO)  mg Tab, Take 1 tablet by mouth every 8 (eight) hours as needed (pain)., Disp: 90 tablet, Rfl: 0    [START ON 3/15/2018] hydrocodone-acetaminophen 10-325mg (NORCO)  mg Tab, Take 1 tablet by mouth every 8 (eight) hours as needed (pain)., Disp: 90 tablet, Rfl: 0    LYRICA 150 mg capsule, , Disp: , Rfl:     LYRICA 75 mg capsule, , Disp: , Rfl:     metoprolol succinate (TOPROL-XL) 25 MG 24 hr tablet, , Disp: , Rfl:     metoprolol tartrate (LOPRESSOR) 25 MG tablet, 25 mg., Disp: , Rfl: 0    mupirocin calcium 2% (BACTROBAN) 2 % cream, , Disp: , Rfl:     omeprazole (PRILOSEC) 20 MG capsule, 20 mg., Disp: , Rfl: 0    pantoprazole (PROTONIX) 40 MG tablet, , Disp: , Rfl:     QUEtiapine (SEROQUEL) 25 MG Tab, , Disp: , Rfl:     sertraline (ZOLOFT) 100 MG tablet, , Disp: , Rfl:     simvastatin (ZOCOR) 40 MG tablet, , Disp: , Rfl:     trazodone (DESYREL) 50 MG tablet, 50 mg., Disp: , Rfl: 3    PMHx/PSHx Updates:  See patient's last visit with me on 12/14/2017.  See H&P on 12/14/2017        Pathology:  Cancer Staging  No matching staging information was found for the patient.          Objective:     Vitals:  Blood pressure 118/65, pulse 60, temperature 97.8 °F (36.6 °C), resp. rate 18, height 6' (1.829 m), weight 73.2 kg (161 lb 6.4 oz).    Physical Examination:   GEN: no apparent distress, comfortable; AAOx3  HEAD: atraumatic and normocephalic  EYES: no pallor, no icterus, PERRLA  ENT: OMM, no pharyngeal erythema, external ears WNL; no nasal discharge; no thrush  NECK: no masses, thyroid normal, trachea midline, no LAD/LN's, supple  CV: RRR with no murmur; normal pulse; normal S1 and S2; no pedal edema  CHEST: Normal respiratory effort; CTAB; normal breath sounds; no wheeze or crackles  ABDOM: nontender and nondistended; soft; normal bowel sounds; no rebound/guarding  MUSC/Skeletal: ROM normal; no  crepitus; joints normal; no deformities or arthropathy  EXTREM: no clubbing, cyanosis, inflammation or swelling  SKIN: no rashes, lesions, ulcers, petechiae or subcutaneous nodules  : no dickey  NEURO: grossly intact; motor/sensory WNL; AAOx3; no tremors  PSYCH: normal mood, affect and behavior  LYMPH: normal cervical, supraclavicular, axillary and groin LN's            Labs:     None since Oct 2017      Radiology/Diagnostic Studies:    No results found.    I have reviewed all available lab results and radiology reports.    Assessment/Plan:     (1) 87 y.o. male with diagnosis of chronic thrombocytopenia who was referred by Dr Payne for hematologic evaluation and recommendation.   - patient was last previously seen in Dec 2015 after which he changed to Dr PANKAJ Junior  - prior suspected platelet clumping issues  - latest platelets in Oct 2017 was 88,000 and not much different than when I last saw him in the past     (2) Chronic tobacco use     (3) Chronic anemia - NCNC parameters; mild; possible alpha-thalassemia  - history of gastric ulcers in past due to consumption of NSAIDs and subsequent GIB  - hgb at 11.7 and adequate as of Oct 2017     (4) Lower extremity/pedal neuropathy - previously followed by Dr Leiva with neurology and Dr Elizabeth with pain management     (5) Prior Garcia's esophagus - followed by Dr Hawkins with GI in past and now sees Dr Dale with GI     (6) Dementia     (7) CRI - followed by Dr Sanchez with neph     (8) Vit D deficiency with low level at 18    (9) Noncompliance with requested blood work which hinders my ability to provide him with hematologic care      PLAN:  1. Check up to date labs and check labs monthly  2. Encouraged compliance  3. F/u with PCP, GI, Neph, Neuro etc  4. RTC in 4 months  Fax note to Scott Payne MD, Chito, Daniel    Discussion:       I spent over 25 mins of time with the patient. Reviewed results of the recently ordered labs, tests and studies; made  directives with regards to the results. Over half of this time was spent couseling and coordinating care.    I have explained all of the above in detail and the patient understands all of the current recommendation(s). I have answered all of their questions to the best of my ability and to their complete satisfaction.   The patient is to continue with the current management plan.            Electronically signed by Alvarez Augustine MD

## 2018-03-14 NOTE — LETTER
March 14, 2018      Scott Payne MD  [address] [place] [address] [address]  [facility] [address]           [facility]ly [facility] Hematology Oncology  [address]  [address]  [facility] [address]  Phone: [phone]  Fax: [phone]          Patient: Halley Rodrigues   MR Number: [id number]   YOB: 1930   Date of Visit: 3/14/2018       Dear Dr. Scott Payne:    Thank you for referring Halley Rodrigues to me for evaluation. Attached you will find relevant portions of my assessment and plan of care.    If you have questions, please do not hesitate to call me. I look forward to following Halley Rodrigues along with you.    Sincerely,    Alvarez Augustine MD    Enclosure  CC:  No Recipients    If you would like to receive this communication electronically, please contact [email or web address] or [phone] to request more information on [facility] access.    For providers and/or their staff who would like to refer a patient to [facility], please contact us through our one-stop-shop provider referral line, [facility] [place], at [phone].    If you feel you have received this communication in error or would no longer like to receive these types of communications, please e-mail [email or web address]

## 2018-04-11 ENCOUNTER — OFFICE VISIT (OUTPATIENT)
Dept: PAIN MEDICINE | Facility: CLINIC | Age: 83
End: 2018-04-11
Payer: MEDICARE

## 2018-04-11 VITALS
DIASTOLIC BLOOD PRESSURE: 63 MMHG | BODY MASS INDEX: 21.34 KG/M2 | HEIGHT: 73 IN | SYSTOLIC BLOOD PRESSURE: 111 MMHG | WEIGHT: 161 LBS | HEART RATE: 53 BPM

## 2018-04-11 DIAGNOSIS — M79.671 FOOT PAIN, BILATERAL: ICD-10-CM

## 2018-04-11 DIAGNOSIS — M79.672 FOOT PAIN, BILATERAL: ICD-10-CM

## 2018-04-11 DIAGNOSIS — M51.36 DDD (DEGENERATIVE DISC DISEASE), LUMBAR: ICD-10-CM

## 2018-04-11 DIAGNOSIS — M47.819 FACET ARTHROPATHY: ICD-10-CM

## 2018-04-11 DIAGNOSIS — G60.9 PERIPHERAL NEUROPATHY, IDIOPATHIC: Primary | ICD-10-CM

## 2018-04-11 PROCEDURE — 99999 PR PBB SHADOW E&M-EST. PATIENT-LVL V: CPT | Mod: PBBFAC,,, | Performed by: PHYSICIAN ASSISTANT

## 2018-04-11 PROCEDURE — 99214 OFFICE O/P EST MOD 30 MIN: CPT | Mod: S$GLB,,, | Performed by: PHYSICIAN ASSISTANT

## 2018-04-11 RX ORDER — HYDROCODONE BITARTRATE AND ACETAMINOPHEN 10; 325 MG/1; MG/1
1 TABLET ORAL EVERY 8 HOURS PRN
Qty: 90 TABLET | Refills: 0 | Status: SHIPPED | OUTPATIENT
Start: 2018-06-16 | End: 2018-07-12 | Stop reason: SDUPTHER

## 2018-04-11 RX ORDER — HYDROCODONE BITARTRATE AND ACETAMINOPHEN 10; 325 MG/1; MG/1
1 TABLET ORAL EVERY 8 HOURS PRN
Qty: 90 TABLET | Refills: 0 | Status: SHIPPED | OUTPATIENT
Start: 2018-04-19 | End: 2018-05-18

## 2018-04-11 RX ORDER — IPRATROPIUM BROMIDE AND ALBUTEROL SULFATE 2.5; .5 MG/3ML; MG/3ML
SOLUTION RESPIRATORY (INHALATION)
Status: ON HOLD | COMMUNITY
Start: 2018-03-15 | End: 2019-12-03 | Stop reason: SDUPTHER

## 2018-04-11 RX ORDER — HYDROCODONE BITARTRATE AND ACETAMINOPHEN 10; 325 MG/1; MG/1
1 TABLET ORAL EVERY 8 HOURS PRN
Qty: 90 TABLET | Refills: 0 | Status: SHIPPED | OUTPATIENT
Start: 2018-05-18 | End: 2018-06-16

## 2018-04-11 NOTE — PROGRESS NOTES
"Referring Physician: No ref. provider found    PCP: Scott Payne MD      CC: bilateral foot pain and lower back pain    Interval History:  Mr. Rodrigues is 86 yo male with continued bilateral peripheral neuropathy.  Sympathetic blocks previously did not improve his pain.  Ketamine compounding cream also has not been helpful.  His back pain is not currently of major concern.  Hydrocodone  mg q 8 h provides some mild benefit.  His lyrica regimen remains unchanged.  Pain today is scaled 8/10.      Prior HPI:   Halley Rodrigues is a 87 y.o.  male with PMHx significant for HLD, HTN, CAD on Plavix, peripheral neuropathy who presents with CC: painful burning sensation in B feet x 5y+ duration. The pain is constant and awakes him in his sleep and makes the evenings the most difficult, pain- wise. It is worse with lying flat and improved with walking. He has taken Lyrica x 5y without any appreciable pain relief. He has tried 2%lidocaine cream, which completely "numbs his feet" and inhibits him from ambulation. He has taken Hydrocodone 7.5 mg 2-3 times QD which helps with his pain significantly. He also reports cLBP without preceding injury/trauma, specifically in the area of B SI joints (R worse than L), aching and stabbing in nature, without radiation. His back pain is worsened with prolonged standing and walking, which limits his activity and ultimately makes his foot pain worse 2/2 being sedentary. He has been having intermittent falls, which he attributes to generalized weakness versus foot numbness or back pain. He denies saddle anesthesia or B/B changes.     ROS:  CONSTITUTIONAL: No fevers, chills, night sweats, wt. loss, appetite changes  SKIN: no rashes or itching  ENT: No headaches, head trauma, vision changes, or eye pain  LYMPH NODES: None noticed   CV: No chest pain, palpitations.   RESP: No shortness of breath, dyspnea on exertion, cough, wheezing, or hemoptysis  GI: No nausea, " "emesis, diarrhea, constipation, melena, hematochezia, pain.    : No dysuria, hematuria, urgency, or frequency   HEME: No easy bruising, bleeding problems  PSYCHIATRIC: No depression, anxiety, psychosis, hallucinations.  NEURO: No seizures, memory loss, dizziness or difficulty sleeping  MSK back pain, foot pain      Past Medical History:   Diagnosis Date    Anemia     GERD (gastroesophageal reflux disease)     Heart disease     Hypertension     Neuropathy      Past Surgical History:   Procedure Laterality Date    CORONARY ARTERY BYPASS GRAFT       Family History   Problem Relation Age of Onset    Dementia Father      Social History     Social History    Marital status:      Spouse name: N/A    Number of children: N/A    Years of education: N/A     Social History Main Topics    Smoking status: Current Every Day Smoker     Types: Cigarettes    Smokeless tobacco: Never Used    Alcohol use No    Drug use: No    Sexual activity: Not Asked     Other Topics Concern    None     Social History Narrative    None         Medications/Allergies: See med card    Vitals:    04/11/18 1115   BP: 111/63   Pulse: (!) 53   Weight: 73 kg (161 lb)   Height: 6' 1" (1.854 m)   PainSc:   4   PainLoc: Back         Physical exam:    GENERAL: A and O x3, the patient appears well groomed and is in no acute distress.  Skin: No rashes or obvious lesions  HEENT: normocephalic, atraumatic  CARDIOVASCULAR:  Bradycardia  LUNGS: non labored breathing  ABDOMEN: soft, nontender   UPPER EXTREMITIES: Normal alignment, normal range of motion, no atrophy, no skin changes,  hair growth and nail growth normal and equal bilaterally. No swelling, no tenderness.    LOWER EXTREMITIES:  Normal alignment, normal range of motion, no atrophy, dry skin, onchyomycosis in B feet. No swelling, no tenderness.    LUMBAR SPINE  Lumbar spine: ROM is moderately decreased l with flexion extension and oblique extension   Michel's test causes increased " pain on both sides (R>>L)   Supine straight leg raise is negative bilaterally.    Internal and external rotation of the hip causes no increased pain on either side.  Myofascial exam:SI joint tenderness B      MENTAL STATUS: normal orientation, speech, language, and fund of knowledge for social situation.  Emotional state appropriate.    CRANIAL NERVES:  II:  PERRL bilaterally,   III,IV,VI: EOMI.    V:  Facial sensation equal bilaterally  VII:  Facial motor function normal.  VIII:  Hearing equal to finger rub bilaterally  IX/X: Gag normal, palate symmetric  XI:  Shoulder shrug equal, head turn equal  XII:  Tongue midline without fasciculations    MOTOR: Tone and bulk: normal bilateral upper and lower Strength: normal   Delt Bi Tri WE WF     R 5 5 5 5 5 5   L 5 5 5 5 5 5     IP ADD ABD Quad TA Gas HAM  R 5 5 5 5 5 5 5  L 5 5 5 5 5 5 5    SENSATION: Light touch and pinprick intact bilaterally  REFLEXES: normal, symmetric, nonbrisk.  Toes down, no clonus. No hoffmans.  GAIT: normal rise, base, steps, and arm swing.      Assessment   Mr. Rodrigues is a 87 y.o. male with   1. Peripheral neuropathy, idiopathic    2. DDD (degenerative disc disease), lumbar    3. Foot pain, bilateral    4. Facet arthropathy      Plan:  1.  I have stressed the importance of physical activity and exercise to improve overall health. Continue PT  2.  He can continue Hydrocodone 10 mg q8hrs as needed.  reviewed. 3 months.  3.  May consider SCS trial, but patient is currently hesitant to proceed    4.  Continue lyrica as prescribed  5.  Follow up in 3 months

## 2018-04-11 NOTE — PROGRESS NOTES
"Referring Physician: No ref. provider found    PCP: Scott Payne MD      CC: bilateral foot pain and lower back pain    Interval History:  Mr. Rodrigues is 88 yo male with continued bilateral peripheral neuropathy.  Sympathetic blocks previously did not improve his pain.  Ketamine compounding cream also has not been helpful.  His back pain is not currently of major concern.  Hydrocodone was increased to tid prn at last visit; family states that this has waned in effectiveness as well.  His lyrica regimen remains unchanged.  Pain today is scaled 8/10.      Prior HPI:   Halley Rodrigues is a 87 y.o.  male with PMHx significant for HLD, HTN, CAD on Plavix, peripheral neuropathy who presents with CC: painful burning sensation in B feet x 5y+ duration. The pain is constant and awakes him in his sleep and makes the evenings the most difficult, pain- wise. It is worse with lying flat and improved with walking. He has taken Lyrica x 5y without any appreciable pain relief. He has tried 2%lidocaine cream, which completely "numbs his feet" and inhibits him from ambulation. He has taken Hydrocodone 7.5 mg 2-3 times QD which helps with his pain significantly. He also reports cLBP without preceding injury/trauma, specifically in the area of B SI joints (R worse than L), aching and stabbing in nature, without radiation. His back pain is worsened with prolonged standing and walking, which limits his activity and ultimately makes his foot pain worse 2/2 being sedentary. He has been having intermittent falls, which he attributes to generalized weakness versus foot numbness or back pain. He denies saddle anesthesia or B/B changes.     ROS:  CONSTITUTIONAL: No fevers, chills, night sweats, wt. loss, appetite changes  SKIN: no rashes or itching  ENT: No headaches, head trauma, vision changes, or eye pain  LYMPH NODES: None noticed   CV: No chest pain, palpitations.   RESP: No shortness of breath, dyspnea on " exertion, cough, wheezing, or hemoptysis  GI: No nausea, emesis, diarrhea, constipation, melena, hematochezia, pain.    : No dysuria, hematuria, urgency, or frequency   HEME: No easy bruising, bleeding problems  PSYCHIATRIC: No depression, anxiety, psychosis, hallucinations.  NEURO: No seizures, memory loss, dizziness or difficulty sleeping  MSK back pain, foot pain      Past Medical History:   Diagnosis Date    Anemia     GERD (gastroesophageal reflux disease)     Heart disease     Hypertension     Neuropathy      Past Surgical History:   Procedure Laterality Date    CORONARY ARTERY BYPASS GRAFT       Family History   Problem Relation Age of Onset    Dementia Father      Social History     Social History    Marital status:      Spouse name: N/A    Number of children: N/A    Years of education: N/A     Social History Main Topics    Smoking status: Current Every Day Smoker     Types: Cigarettes    Smokeless tobacco: Never Used    Alcohol use No    Drug use: No    Sexual activity: Not on file     Other Topics Concern    Not on file     Social History Narrative    No narrative on file         Medications/Allergies: See med card    There were no vitals filed for this visit.      Physical exam:    GENERAL: A and O x3, the patient appears well groomed and is in no acute distress.  Skin: No rashes or obvious lesions  HEENT: normocephalic, atraumatic  CARDIOVASCULAR:  Palpable peripheral pulses  LUNGS: easy work of breathing  ABDOMEN: soft, nontender   UPPER EXTREMITIES: Normal alignment, normal range of motion, no atrophy, no skin changes,  hair growth and nail growth normal and equal bilaterally. No swelling, no tenderness.    LOWER EXTREMITIES:  Normal alignment, normal range of motion, no atrophy, dry skin, onchyomycosis in B feet. No swelling, no tenderness.    LUMBAR SPINE  Lumbar spine: ROM is moderately decreased l with flexion extension and oblique extension   Michel's test causes  increased pain on both sides (R>>L)   Supine straight leg raise is negative bilaterally.    Internal and external rotation of the hip causes no increased pain on either side.  Myofascial exam:SI joint tenderness B      MENTAL STATUS: normal orientation, speech, language, and fund of knowledge for social situation.  Emotional state appropriate.    CRANIAL NERVES:  II:  PERRL bilaterally,   III,IV,VI: EOMI.    V:  Facial sensation equal bilaterally  VII:  Facial motor function normal.  VIII:  Hearing equal to finger rub bilaterally  IX/X: Gag normal, palate symmetric  XI:  Shoulder shrug equal, head turn equal  XII:  Tongue midline without fasciculations      MOTOR: Tone and bulk: normal bilateral upper and lower Strength: normal   Delt Bi Tri WE WF     R 5 5 5 5 5 5   L 5 5 5 5 5 5     IP ADD ABD Quad TA Gas HAM  R 5 5 5 5 5 5 5  L 5 5 5 5 5 5 5    SENSATION: Light touch and pinprick intact bilaterally  REFLEXES: normal, symmetric, nonbrisk.  Toes down, no clonus. No hoffmans.  GAIT: normal rise, base, steps, and arm swing.      Assessment   Mr. Rodrigues is a 87 y.o. male with   No diagnosis found.  Plan:  1.  I have stressed the importance of physical activity and exercise to improve overall health. Continue PT  2. He can continue Hydrocodone 10 mg q8hrs as needed.  reviewed. 2 months.  3.  May consider SCS trial, but patient is currently hesitant to proceed    4.  Continue lyrica as prescribed  5.  Follow up in 2 months

## 2018-04-12 LAB
ALBUMIN SERPL-MCNC: 4.3 G/DL (ref 3.6–5.1)
ALBUMIN/GLOB SERPL: 2 (CALC) (ref 1–2.5)
ALP SERPL-CCNC: 92 U/L (ref 40–115)
ALT SERPL-CCNC: 15 U/L (ref 9–46)
AST SERPL-CCNC: 19 U/L (ref 10–35)
BASOPHILS # BLD AUTO: 40 CELLS/UL (ref 0–200)
BASOPHILS NFR BLD AUTO: 0.7 %
BILIRUB SERPL-MCNC: 0.8 MG/DL (ref 0.2–1.2)
BUN SERPL-MCNC: 24 MG/DL (ref 7–25)
BUN/CREAT SERPL: 15 (CALC) (ref 6–22)
CALCIUM SERPL-MCNC: 9.4 MG/DL (ref 8.6–10.3)
CHLORIDE SERPL-SCNC: 103 MMOL/L (ref 98–110)
CO2 SERPL-SCNC: 30 MMOL/L (ref 20–31)
CREAT SERPL-MCNC: 1.62 MG/DL (ref 0.7–1.11)
EOSINOPHIL # BLD AUTO: 160 CELLS/UL (ref 15–500)
EOSINOPHIL NFR BLD AUTO: 2.8 %
ERYTHROCYTE [DISTWIDTH] IN BLOOD BY AUTOMATED COUNT: 13.3 % (ref 11–15)
GFR SERPL CREATININE-BSD FRML MDRD: 38 ML/MIN/1.73M2
GLOBULIN SER CALC-MCNC: 2.2 G/DL (CALC) (ref 1.9–3.7)
GLUCOSE SERPL-MCNC: 105 MG/DL (ref 65–99)
HCT VFR BLD AUTO: 37.6 % (ref 38.5–50)
HGB BLD-MCNC: 12.9 G/DL (ref 13.2–17.1)
LYMPHOCYTES # BLD AUTO: 1379 CELLS/UL (ref 850–3900)
LYMPHOCYTES NFR BLD AUTO: 24.2 %
MCH RBC QN AUTO: 32.7 PG (ref 27–33)
MCHC RBC AUTO-ENTMCNC: 34.3 G/DL (ref 32–36)
MCV RBC AUTO: 95.2 FL (ref 80–100)
MONOCYTES # BLD AUTO: 490 CELLS/UL (ref 200–950)
MONOCYTES NFR BLD AUTO: 8.6 %
NEUTROPHILS # BLD AUTO: 3631 CELLS/UL (ref 1500–7800)
NEUTROPHILS NFR BLD AUTO: 63.7 %
PLATELET # BLD AUTO: 114 THOUSAND/UL (ref 140–400)
PLATELET BLD QL SMEAR: ABNORMAL
PMV BLD REES-ECKER: 11.1 FL (ref 7.5–12.5)
POTASSIUM SERPL-SCNC: 4.1 MMOL/L (ref 3.5–5.3)
PROT SERPL-MCNC: 6.5 G/DL (ref 6.1–8.1)
RBC # BLD AUTO: 3.95 MILLION/UL (ref 4.2–5.8)
SODIUM SERPL-SCNC: 142 MMOL/L (ref 135–146)
WBC # BLD AUTO: 5.7 THOUSAND/UL (ref 3.8–10.8)

## 2018-06-05 ENCOUNTER — TELEPHONE (OUTPATIENT)
Dept: HEMATOLOGY/ONCOLOGY | Facility: CLINIC | Age: 83
End: 2018-06-05

## 2018-06-05 LAB
ALBUMIN SERPL-MCNC: 4.5 G/DL (ref 3.6–5.1)
ALBUMIN/GLOB SERPL: 2 (CALC) (ref 1–2.5)
ALP SERPL-CCNC: 91 U/L (ref 40–115)
ALT SERPL-CCNC: 18 U/L (ref 9–46)
AST SERPL-CCNC: 24 U/L (ref 10–35)
BASOPHILS # BLD AUTO: 40 CELLS/UL (ref 0–200)
BASOPHILS NFR BLD AUTO: 0.5 %
BILIRUB SERPL-MCNC: 0.8 MG/DL (ref 0.2–1.2)
BUN SERPL-MCNC: 46 MG/DL (ref 7–25)
BUN/CREAT SERPL: 21 (CALC) (ref 6–22)
CALCIUM SERPL-MCNC: 9.3 MG/DL (ref 8.6–10.3)
CHLORIDE SERPL-SCNC: 99 MMOL/L (ref 98–110)
CO2 SERPL-SCNC: 30 MMOL/L (ref 20–31)
CREAT SERPL-MCNC: 2.15 MG/DL (ref 0.7–1.11)
EOSINOPHIL # BLD AUTO: 150 CELLS/UL (ref 15–500)
EOSINOPHIL NFR BLD AUTO: 1.9 %
ERYTHROCYTE [DISTWIDTH] IN BLOOD BY AUTOMATED COUNT: 13.4 % (ref 11–15)
GFR SERPL CREATININE-BSD FRML MDRD: 27 ML/MIN/1.73M2
GLOBULIN SER CALC-MCNC: 2.3 G/DL (CALC) (ref 1.9–3.7)
GLUCOSE SERPL-MCNC: 99 MG/DL (ref 65–99)
HCT VFR BLD AUTO: 36.9 % (ref 38.5–50)
HGB BLD-MCNC: 12.9 G/DL (ref 13.2–17.1)
LYMPHOCYTES # BLD AUTO: 1762 CELLS/UL (ref 850–3900)
LYMPHOCYTES NFR BLD AUTO: 22.3 %
MCH RBC QN AUTO: 33.9 PG (ref 27–33)
MCHC RBC AUTO-ENTMCNC: 35 G/DL (ref 32–36)
MCV RBC AUTO: 97.1 FL (ref 80–100)
MONOCYTES # BLD AUTO: 585 CELLS/UL (ref 200–950)
MONOCYTES NFR BLD AUTO: 7.4 %
NEUTROPHILS # BLD AUTO: 5364 CELLS/UL (ref 1500–7800)
NEUTROPHILS NFR BLD AUTO: 67.9 %
PLATELET # BLD AUTO: 124 THOUSAND/UL (ref 140–400)
PMV BLD REES-ECKER: 12 FL (ref 7.5–12.5)
POTASSIUM SERPL-SCNC: 3.1 MMOL/L (ref 3.5–5.3)
PROT SERPL-MCNC: 6.8 G/DL (ref 6.1–8.1)
RBC # BLD AUTO: 3.8 MILLION/UL (ref 4.2–5.8)
SODIUM SERPL-SCNC: 141 MMOL/L (ref 135–146)
WBC # BLD AUTO: 7.9 THOUSAND/UL (ref 3.8–10.8)

## 2018-07-12 ENCOUNTER — OFFICE VISIT (OUTPATIENT)
Dept: PAIN MEDICINE | Facility: CLINIC | Age: 83
End: 2018-07-12
Payer: MEDICARE

## 2018-07-12 VITALS
HEIGHT: 73 IN | DIASTOLIC BLOOD PRESSURE: 60 MMHG | BODY MASS INDEX: 20.41 KG/M2 | WEIGHT: 154 LBS | HEART RATE: 51 BPM | SYSTOLIC BLOOD PRESSURE: 125 MMHG

## 2018-07-12 DIAGNOSIS — M47.819 FACET ARTHROPATHY: ICD-10-CM

## 2018-07-12 DIAGNOSIS — M51.36 DDD (DEGENERATIVE DISC DISEASE), LUMBAR: ICD-10-CM

## 2018-07-12 DIAGNOSIS — G60.9 PERIPHERAL NEUROPATHY, IDIOPATHIC: Primary | ICD-10-CM

## 2018-07-12 PROCEDURE — 99999 PR PBB SHADOW E&M-EST. PATIENT-LVL V: CPT | Mod: PBBFAC,,, | Performed by: PHYSICIAN ASSISTANT

## 2018-07-12 PROCEDURE — 99214 OFFICE O/P EST MOD 30 MIN: CPT | Mod: S$GLB,,, | Performed by: PHYSICIAN ASSISTANT

## 2018-07-12 RX ORDER — AMLODIPINE BESYLATE 10 MG/1
TABLET ORAL
COMMUNITY
Start: 2018-05-14 | End: 2019-07-30 | Stop reason: SDUPTHER

## 2018-07-12 RX ORDER — IPRATROPIUM BROMIDE 42 UG/1
1 SPRAY, METERED NASAL 2 TIMES DAILY
Status: ON HOLD | COMMUNITY
Start: 2018-07-02 | End: 2019-12-03 | Stop reason: SDUPTHER

## 2018-07-12 RX ORDER — HYDROCODONE BITARTRATE AND ACETAMINOPHEN 10; 325 MG/1; MG/1
1 TABLET ORAL EVERY 8 HOURS PRN
Qty: 90 TABLET | Refills: 0 | Status: SHIPPED | OUTPATIENT
Start: 2018-09-11 | End: 2018-10-09

## 2018-07-12 RX ORDER — HYDROCODONE BITARTRATE AND ACETAMINOPHEN 10; 325 MG/1; MG/1
1 TABLET ORAL EVERY 8 HOURS PRN
Qty: 90 TABLET | Refills: 0 | Status: SHIPPED | OUTPATIENT
Start: 2018-08-13 | End: 2018-09-11

## 2018-07-12 RX ORDER — HYDROCODONE BITARTRATE AND ACETAMINOPHEN 10; 325 MG/1; MG/1
1 TABLET ORAL EVERY 8 HOURS PRN
Qty: 90 TABLET | Refills: 0 | Status: SHIPPED | OUTPATIENT
Start: 2018-07-15 | End: 2018-08-13

## 2018-07-12 RX ORDER — LISINOPRIL 10 MG/1
TABLET ORAL
Status: ON HOLD | COMMUNITY
Start: 2018-05-14 | End: 2019-11-24 | Stop reason: HOSPADM

## 2018-07-12 NOTE — PROGRESS NOTES
"Referring Physician: No ref. provider found    PCP: Scott Payne MD      CC: bilateral foot pain and lower back pain    Interval History:  Mr. Rodrigues is 88 yo male with continued bilateral peripheral neuropathy.  Sympathetic blocks previously did not improve his pain.  Ketamine compounding cream was not helpful.  His back pain is not currently of major concern.  Hydrocodone  mg q 8 h provides some mild benefit.  His lyrica regimen remains unchanged.  Pain today is scaled 4/10.      Prior HPI:   Halley Rodrigues is a 87 y.o.  male with PMHx significant for HLD, HTN, CAD on Plavix, peripheral neuropathy who presents with CC: painful burning sensation in B feet x 5y+ duration. The pain is constant and awakes him in his sleep and makes the evenings the most difficult, pain- wise. It is worse with lying flat and improved with walking. He has taken Lyrica x 5y without any appreciable pain relief. He has tried 2%lidocaine cream, which completely "numbs his feet" and inhibits him from ambulation. He has taken Hydrocodone 7.5 mg 2-3 times QD which helps with his pain significantly. He also reports cLBP without preceding injury/trauma, specifically in the area of B SI joints (R worse than L), aching and stabbing in nature, without radiation. His back pain is worsened with prolonged standing and walking, which limits his activity and ultimately makes his foot pain worse 2/2 being sedentary. He has been having intermittent falls, which he attributes to generalized weakness versus foot numbness or back pain. He denies saddle anesthesia or B/B changes.     ROS:  CONSTITUTIONAL: No fevers, chills, night sweats, wt. loss, appetite changes  SKIN: no rashes or itching  ENT: No headaches, head trauma, vision changes, or eye pain  LYMPH NODES: None noticed   CV: No chest pain, palpitations.   RESP: No shortness of breath, dyspnea on exertion, cough, wheezing, or hemoptysis  GI: No nausea, emesis, " "diarrhea, constipation, melena, hematochezia, pain.    : No dysuria, hematuria, urgency, or frequency   HEME: No easy bruising, bleeding problems  PSYCHIATRIC: No depression, anxiety, psychosis, hallucinations.  NEURO: No seizures, memory loss, dizziness or difficulty sleeping  MSK back pain, foot pain      Past Medical History:   Diagnosis Date    Anemia     GERD (gastroesophageal reflux disease)     Heart disease     Hypertension     Neuropathy      Past Surgical History:   Procedure Laterality Date    CORONARY ARTERY BYPASS GRAFT       Family History   Problem Relation Age of Onset    Dementia Father      Social History     Social History    Marital status:      Spouse name: N/A    Number of children: N/A    Years of education: N/A     Social History Main Topics    Smoking status: Current Every Day Smoker     Types: Cigarettes    Smokeless tobacco: Never Used    Alcohol use No    Drug use: No    Sexual activity: Not Asked     Other Topics Concern    None     Social History Narrative    None         Medications/Allergies: See med card    Vitals:    07/12/18 1427   Weight: 73 kg (161 lb)   Height: 6' 1" (1.854 m)   PainSc:   4   PainLoc: Back         Physical exam:    GENERAL: A and O x3, the patient appears well groomed and is in no acute distress.  Skin: No rashes or obvious lesions  HEENT: normocephalic, atraumatic  CARDIOVASCULAR:  Bradycardia  LUNGS: non labored breathing  ABDOMEN: soft, nontender   UPPER EXTREMITIES: Normal alignment, normal range of motion, no atrophy, no skin changes,  hair growth and nail growth normal and equal bilaterally. No swelling, no tenderness.    LOWER EXTREMITIES:  Normal alignment, normal range of motion, no atrophy, dry skin, onchyomycosis in B feet. No swelling, no tenderness.    LUMBAR SPINE  Lumbar spine: ROM is moderately decreased l with flexion extension and oblique extension   Michel's test causes increased pain on both sides (R>>L)   Supine " straight leg raise is negative bilaterally.    Internal and external rotation of the hip causes no increased pain on either side.  Myofascial exam:SI joint tenderness B      MENTAL STATUS: normal orientation, speech, language, and fund of knowledge for social situation.  Emotional state appropriate.    CRANIAL NERVES:  II:  PERRL bilaterally,   III,IV,VI: EOMI.    V:  Facial sensation equal bilaterally  VII:  Facial motor function normal.  VIII:  Hearing equal to finger rub bilaterally  IX/X: Gag normal, palate symmetric  XI:  Shoulder shrug equal, head turn equal  XII:  Tongue midline without fasciculations    MOTOR: Tone and bulk: normal bilateral upper and lower Strength: normal   Delt Bi Tri WE WF     R 5 5 5 5 5 5   L 5 5 5 5 5 5     IP ADD ABD Quad TA Gas HAM  R 5 5 5 5 5 5 5  L 5 5 5 5 5 5 5    SENSATION: Light touch and pinprick intact bilaterally  REFLEXES: normal, symmetric, nonbrisk.  Toes down, no clonus. No hoffmans.  GAIT: normal rise, base, steps, and arm swing.      Assessment   Mr. Rodrigues is a 87 y.o. male with   1. Peripheral neuropathy, idiopathic    2. DDD (degenerative disc disease), lumbar    3. Facet arthropathy      Plan:  1.  I have stressed the importance of physical activity and exercise to improve overall health. Continue PT  2.  He can continue Hydrocodone 10 mg q8hrs as needed.  reviewed. 3 months.  3.  May consider SCS trial, but patient is currently hesitant to proceed    4.  Continue lyrica as prescribed  5.  Follow up in 3 months  All medication management was performed by Dr. Shawn Elizabeth

## 2018-07-25 PROBLEM — D50.0 ANEMIA DUE TO CHRONIC BLOOD LOSS: Status: ACTIVE | Noted: 2018-07-25

## 2018-07-25 PROBLEM — D63.8 ANEMIA, CHRONIC DISEASE: Status: ACTIVE | Noted: 2018-07-25

## 2018-08-27 ENCOUNTER — TELEPHONE (OUTPATIENT)
Dept: HEMATOLOGY/ONCOLOGY | Facility: CLINIC | Age: 83
End: 2018-08-27

## 2018-08-27 NOTE — TELEPHONE ENCOUNTER
08/27/2018- Spoke w/ pt's son this morning about rescheduling the missed apt from 07/2018, he asked me to mail him an apt. I mailed the missed apt letter with the new apt date attached. AE

## 2018-09-26 ENCOUNTER — TELEPHONE (OUTPATIENT)
Dept: HEMATOLOGY/ONCOLOGY | Facility: CLINIC | Age: 83
End: 2018-09-26

## 2018-09-26 DIAGNOSIS — D63.8 ANEMIA, CHRONIC DISEASE: ICD-10-CM

## 2018-09-26 DIAGNOSIS — D50.0 ANEMIA DUE TO CHRONIC BLOOD LOSS: ICD-10-CM

## 2018-09-26 DIAGNOSIS — D69.59 THROMBOCYTOPENIA, SECONDARY: Primary | ICD-10-CM

## 2018-09-26 NOTE — TELEPHONE ENCOUNTER
Called the pt to see if he had any recent labs done that we could use for the upcoming office visit.  Spoke w/ marcia and r/s the pt appt and will send over new lab order to [facility].

## 2018-10-09 ENCOUNTER — OFFICE VISIT (OUTPATIENT)
Dept: PAIN MEDICINE | Facility: CLINIC | Age: 83
End: 2018-10-09
Payer: MEDICARE

## 2018-10-09 VITALS
DIASTOLIC BLOOD PRESSURE: 72 MMHG | WEIGHT: 154 LBS | HEART RATE: 48 BPM | HEIGHT: 73 IN | SYSTOLIC BLOOD PRESSURE: 151 MMHG | BODY MASS INDEX: 20.41 KG/M2

## 2018-10-09 DIAGNOSIS — M47.896 OTHER SPONDYLOSIS, LUMBAR REGION: ICD-10-CM

## 2018-10-09 DIAGNOSIS — Z79.891 CHRONIC USE OF OPIATE DRUGS THERAPEUTIC PURPOSES: Primary | ICD-10-CM

## 2018-10-09 DIAGNOSIS — G60.9 PERIPHERAL NEUROPATHY, IDIOPATHIC: ICD-10-CM

## 2018-10-09 PROCEDURE — 99214 OFFICE O/P EST MOD 30 MIN: CPT | Mod: S$PBB,,, | Performed by: PHYSICIAN ASSISTANT

## 2018-10-09 PROCEDURE — 80307 DRUG TEST PRSMV CHEM ANLYZR: CPT

## 2018-10-09 PROCEDURE — 99999 PR PBB SHADOW E&M-EST. PATIENT-LVL IV: CPT | Mod: PBBFAC,,, | Performed by: PHYSICIAN ASSISTANT

## 2018-10-09 PROCEDURE — 1101F PT FALLS ASSESS-DOCD LE1/YR: CPT | Mod: CPTII,,, | Performed by: PHYSICIAN ASSISTANT

## 2018-10-09 PROCEDURE — 99214 OFFICE O/P EST MOD 30 MIN: CPT | Mod: PBBFAC,PN | Performed by: PHYSICIAN ASSISTANT

## 2018-10-09 RX ORDER — HYDROCODONE BITARTRATE AND ACETAMINOPHEN 10; 325 MG/1; MG/1
1 TABLET ORAL EVERY 8 HOURS PRN
Qty: 90 TABLET | Refills: 0 | Status: SHIPPED | OUTPATIENT
Start: 2018-11-08 | End: 2018-10-09

## 2018-10-09 RX ORDER — HYDROCODONE BITARTRATE AND ACETAMINOPHEN 10; 325 MG/1; MG/1
1 TABLET ORAL EVERY 8 HOURS PRN
Qty: 90 TABLET | Refills: 0 | Status: SHIPPED | OUTPATIENT
Start: 2018-12-07 | End: 2018-10-09

## 2018-10-09 RX ORDER — HYDROCODONE BITARTRATE AND ACETAMINOPHEN 10; 325 MG/1; MG/1
1 TABLET ORAL EVERY 6 HOURS PRN
Qty: 120 TABLET | Refills: 0 | Status: SHIPPED | OUTPATIENT
Start: 2018-12-06 | End: 2019-01-04

## 2018-10-09 RX ORDER — HYDROCODONE BITARTRATE AND ACETAMINOPHEN 10; 325 MG/1; MG/1
1 TABLET ORAL EVERY 6 HOURS PRN
Qty: 120 TABLET | Refills: 0 | Status: SHIPPED | OUTPATIENT
Start: 2018-11-07 | End: 2018-12-06

## 2018-10-09 RX ORDER — HYDROCODONE BITARTRATE AND ACETAMINOPHEN 10; 325 MG/1; MG/1
1 TABLET ORAL EVERY 6 HOURS PRN
Qty: 120 TABLET | Refills: 0 | Status: SHIPPED | OUTPATIENT
Start: 2018-10-09 | End: 2018-11-07

## 2018-10-09 RX ORDER — HYDROCODONE BITARTRATE AND ACETAMINOPHEN 10; 325 MG/1; MG/1
1 TABLET ORAL EVERY 8 HOURS PRN
Qty: 90 TABLET | Refills: 0 | Status: SHIPPED | OUTPATIENT
Start: 2018-10-10 | End: 2018-10-09

## 2018-10-09 NOTE — PROGRESS NOTES
"Referring Physician: No ref. provider found    PCP: Scott Payne MD      CC: bilateral foot pain and lower back pain    Interval History:  Mr. Rodrigues is 86 yo male with continued bilateral peripheral neuropathy.  Sympathetic blocks previously did not improve his pain.  Ketamine compounding cream was not helpful.  His back pain is not currently of major concern.  Hydrocodone  mg q 8 h provides some mild benefit. He does request an increase to 4 x daily.  His lyrica regimen remains unchanged.  Pain today is  6/10.      Prior HPI:   Halley Rodrigues is a 88 y.o.  male with PMHx significant for HLD, HTN, CAD on Plavix, peripheral neuropathy who presents with CC: painful burning sensation in B feet x 5y+ duration. The pain is constant and awakes him in his sleep and makes the evenings the most difficult, pain- wise. It is worse with lying flat and improved with walking. He has taken Lyrica x 5y without any appreciable pain relief. He has tried 2%lidocaine cream, which completely "numbs his feet" and inhibits him from ambulation. He has taken Hydrocodone 7.5 mg 2-3 times QD which helps with his pain significantly. He also reports cLBP without preceding injury/trauma, specifically in the area of B SI joints (R worse than L), aching and stabbing in nature, without radiation. His back pain is worsened with prolonged standing and walking, which limits his activity and ultimately makes his foot pain worse 2/2 being sedentary. He has been having intermittent falls, which he attributes to generalized weakness versus foot numbness or back pain. He denies saddle anesthesia or B/B changes.     ROS:  CONSTITUTIONAL: No fevers, chills, night sweats, wt. loss, appetite changes  SKIN: no rashes or itching  ENT: No headaches, head trauma, vision changes, or eye pain  LYMPH NODES: None noticed   CV: No chest pain, palpitations.   RESP: No shortness of breath, dyspnea on exertion, cough, wheezing, or " "hemoptysis  GI: No nausea, emesis, diarrhea, constipation, melena, hematochezia, pain.    : No dysuria, hematuria, urgency, or frequency   HEME: No easy bruising, bleeding problems  PSYCHIATRIC: No depression, anxiety, psychosis, hallucinations.  NEURO: No seizures, memory loss, dizziness or difficulty sleeping  MSK back pain, foot pain      Past Medical History:   Diagnosis Date    Anemia     Anemia due to chronic blood loss 7/25/2018    Anemia, chronic disease 7/25/2018    GERD (gastroesophageal reflux disease)     Heart disease     Hypertension     Neuropathy      Past Surgical History:   Procedure Laterality Date    BLOCK-LUMBAR-SYMPATHETIC N/A 12/5/2017    [facility]formed [facility]    BLOCK-LUMBAR-SYMPATHETIC Bilateral 5/9/2017    [facility] at [facility]    CORONARY ARTERY BYPASS GRAFT       Family History   Problem Relation Age of Onset    Dementia Father      Social History     Socioeconomic History    Marital status:      Spouse name: None    Number of children: None    Years of education: None    Highest education level: None   Social Needs    Financial resource strain: None    Food insecurity - worry: None    Food insecurity - inability: None    Transportation needs - medical: None    Transportation needs - non-medical: None   Occupational History    None   Tobacco Use    Smoking status: Current Every Day Smoker     Types: Cigarettes    Smokeless tobacco: Never Used   Substance and Sexual Activity    Alcohol use: No    Drug use: No    Sexual activity: None   Other Topics Concern    None   Social History Narrative    None         Medications/Allergies: See med card    Vitals:    10/09/18 1059   BP: (!) 151/72   Pulse: (!) 48   Weight: 69.9 kg (154 lb)   Height: 6' 1" (1.854 m)   PainSc:   6   PainLoc: Foot         Physical exam:    GENERAL: A and O x3, the patient appears well groomed and is in no acute distress.  Skin: No rashes or obvious " lesions  HEENT: normocephalic, atraumatic  CARDIOVASCULAR:  Bradycardia  LUNGS: non labored breathing  ABDOMEN: soft, nontender   UPPER EXTREMITIES: Normal alignment, normal range of motion, no atrophy, no skin changes,  hair growth and nail growth normal and equal bilaterally. No swelling, no tenderness.    LOWER EXTREMITIES:  Normal alignment, normal range of motion, no atrophy, dry skin, onchyomycosis in B feet. No swelling, no tenderness.    LUMBAR SPINE  Lumbar spine: ROM is moderately decreased l with flexion extension and oblique extension   Michel's test causes increased pain on both sides (R>>L)   Supine straight leg raise is negative bilaterally.    Internal and external rotation of the hip causes no increased pain on either side.  Myofascial exam:SI joint tenderness B      MENTAL STATUS: normal orientation, speech, language, and fund of knowledge for social situation.  Emotional state appropriate.    CRANIAL NERVES:  II:  PERRL bilaterally,   III,IV,VI: EOMI.    V:  Facial sensation equal bilaterally  VII:  Facial motor function normal.  VIII:  Hearing equal to finger rub bilaterally  IX/X: Gag normal, palate symmetric  XI:  Shoulder shrug equal, head turn equal  XII:  Tongue midline without fasciculations    MOTOR: Tone and bulk: normal bilateral upper and lower Strength: normal   Delt Bi Tri WE WF     R 5 5 5 5 5 5   L 5 5 5 5 5 5     IP ADD ABD Quad TA Gas HAM  R 5 5 5 5 5 5 5  L 5 5 5 5 5 5 5    SENSATION: Light touch and pinprick intact bilaterally  REFLEXES: normal, symmetric, nonbrisk.  Toes down, no clonus. No hoffmans.  GAIT: normal rise, base, steps, and arm swing.      Assessment   Mr. Rodrigues is a 88 y.o. male with   1. 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 Peripheral neuropathy, idiopathic    3. Other spondylosis, lumbar region      Plan:  1.  I have stressed the importance of physical activity and exercise to improve overall health. Continue PT  2.  Increase  Hydrocodone 10 mg to q6hrs as needed.  reviewed. 3 months. UDS today  3.  May consider SCS trial, but patient is currently hesitant to proceed    4.  Continue lyrica as prescribed  5.  Follow up in 3 months  All medication management was performed by Dr. Shawn Elizabeth

## 2018-10-10 LAB
ALBUMIN SERPL-MCNC: 4.2 G/DL (ref 3.6–5.1)
ALBUMIN/GLOB SERPL: 1.8 (CALC) (ref 1–2.5)
ALP SERPL-CCNC: 74 U/L (ref 40–115)
ALT SERPL-CCNC: 13 U/L (ref 9–46)
AST SERPL-CCNC: 17 U/L (ref 10–35)
BASOPHILS # BLD AUTO: 39 CELLS/UL (ref 0–200)
BASOPHILS NFR BLD AUTO: 0.6 %
BILIRUB SERPL-MCNC: 0.6 MG/DL (ref 0.2–1.2)
BUN SERPL-MCNC: 25 MG/DL (ref 7–25)
BUN/CREAT SERPL: 16 (CALC) (ref 6–22)
CALCIUM SERPL-MCNC: 9 MG/DL (ref 8.6–10.3)
CHLORIDE SERPL-SCNC: 100 MMOL/L (ref 98–110)
CO2 SERPL-SCNC: 29 MMOL/L (ref 20–32)
CREAT SERPL-MCNC: 1.53 MG/DL (ref 0.7–1.11)
EOSINOPHIL # BLD AUTO: 117 CELLS/UL (ref 15–500)
EOSINOPHIL NFR BLD AUTO: 1.8 %
ERYTHROCYTE [DISTWIDTH] IN BLOOD BY AUTOMATED COUNT: 12.3 % (ref 11–15)
FERRITIN SERPL-MCNC: 129 NG/ML (ref 20–380)
GFR SERPL CREATININE-BSD FRML MDRD: 40 ML/MIN/1.73M2
GLOBULIN SER CALC-MCNC: 2.4 G/DL (CALC) (ref 1.9–3.7)
GLUCOSE SERPL-MCNC: 124 MG/DL (ref 65–99)
HCT VFR BLD AUTO: 37 % (ref 38.5–50)
HGB BLD-MCNC: 12.6 G/DL (ref 13.2–17.1)
IRON SATN MFR SERPL: 37 % (CALC) (ref 15–60)
IRON SERPL-MCNC: 99 MCG/DL (ref 50–180)
LYMPHOCYTES # BLD AUTO: 1365 CELLS/UL (ref 850–3900)
LYMPHOCYTES NFR BLD AUTO: 21 %
MCH RBC QN AUTO: 32.6 PG (ref 27–33)
MCHC RBC AUTO-ENTMCNC: 34.1 G/DL (ref 32–36)
MCV RBC AUTO: 95.6 FL (ref 80–100)
MONOCYTES # BLD AUTO: 429 CELLS/UL (ref 200–950)
MONOCYTES NFR BLD AUTO: 6.6 %
NEUTROPHILS # BLD AUTO: 4550 CELLS/UL (ref 1500–7800)
NEUTROPHILS NFR BLD AUTO: 70 %
PLATELET # BLD AUTO: 92 THOUSAND/UL (ref 140–400)
PMV BLD REES-ECKER: 11.8 FL (ref 7.5–12.5)
POTASSIUM SERPL-SCNC: 3.8 MMOL/L (ref 3.5–5.3)
PROT SERPL-MCNC: 6.6 G/DL (ref 6.1–8.1)
RBC # BLD AUTO: 3.87 MILLION/UL (ref 4.2–5.8)
SODIUM SERPL-SCNC: 138 MMOL/L (ref 135–146)
TIBC SERPL-MCNC: 267 MCG/DL (CALC) (ref 250–425)
WBC # BLD AUTO: 6.5 THOUSAND/UL (ref 3.8–10.8)

## 2018-10-14 LAB
6MAM UR QL: NOT DETECTED
7AMINOCLONAZEPAM UR QL: NOT DETECTED
A-OH ALPRAZ UR QL: NOT DETECTED
ALPRAZ UR QL: NOT DETECTED
AMPHET UR QL SCN: NOT DETECTED
ANNOTATION COMMENT IMP: NORMAL
ANNOTATION COMMENT IMP: NORMAL
BARBITURATES UR QL: NOT DETECTED
BUPRENORPHINE UR QL: NOT DETECTED
BZE UR QL: NOT DETECTED
CARBOXYTHC UR QL: NOT DETECTED
CARISOPRODOL UR QL: NOT DETECTED
CLONAZEPAM UR QL: NOT DETECTED
CODEINE UR QL: NOT DETECTED
CREAT UR-MCNC: 157.9 MG/DL (ref 20–400)
DIAZEPAM UR QL: NOT DETECTED
ETHYL GLUCURONIDE UR QL: NOT DETECTED
FENTANYL UR QL: NOT DETECTED
HYDROCODONE UR QL: PRESENT
HYDROMORPHONE UR QL: NOT DETECTED
LORAZEPAM UR QL: NOT DETECTED
MDA UR QL: NOT DETECTED
MDEA UR QL: NOT DETECTED
MDMA UR QL: NOT DETECTED
ME-PHENIDATE UR QL: NOT DETECTED
MEPERIDINE UR QL: NOT DETECTED
METHADONE UR QL: NOT DETECTED
METHAMPHET UR QL: NOT DETECTED
MIDAZOLAM UR QL SCN: NOT DETECTED
MORPHINE UR QL: NOT DETECTED
NORBUPRENORPHINE UR QL CFM: NOT DETECTED
NORDIAZEPAM UR QL: NOT DETECTED
NORFENTANYL UR QL: NOT DETECTED
NORHYDROCODONE UR QL CFM: PRESENT
NOROXYCODONE UR QL CFM: NOT DETECTED
NOROXYMORPHONE: NOT DETECTED
OXAZEPAM UR QL: NOT DETECTED
OXYCODONE UR QL: NOT DETECTED
OXYMORPHONE UR QL: NOT DETECTED
PATHOLOGY STUDY: NORMAL
PCP UR QL: NOT DETECTED
PHENTERMINE UR QL: NOT DETECTED
PROPOXYPH UR QL: NOT DETECTED
SERVICE CMNT-IMP: NORMAL
TAPENTADOL UR QL SCN: NOT DETECTED
TAPENTADOL-O-SULF: NOT DETECTED
TEMAZEPAM UR QL: NOT DETECTED
TRAMADOL UR QL: NOT DETECTED
ZOLPIDEM UR QL: NOT DETECTED

## 2018-10-17 NOTE — PROGRESS NOTES
"[facility]/On[facility]  PROGRESS NOTE       Subjective:       Patient ID:   NAME: Halley Rodrigues : 1930     88 y.o. male    Referring Doc: Elmer  Other Physicians: Chito, Daniel Oseguera John Vu    Chief Complaint:  tcp f/u    History of Present Illness:     Patient returns today for a  regularly scheduled follow-up visit.  The patient is here today to go over the results of the recently ordered labs, tests and studies. He was supposed to be getting labs monthly for me. He is here with his son. He has been feeling "pretty good"; chronic neuropathy in feet but stable; he denies any CP, SOB, HA's or N/V.             ROS:   GEN: normal without any fever, night sweats or weight loss  HEENT: normal with no HA's, sore throat, stiff neck, changes in vision  CV: normal with no CP, SOB, PND, NIETO or orthopnea  PULM: normal with no SOB, cough, hemoptysis, sputum or pleuritic pain  GI: normal with no abdominal pain, nausea, vomiting, constipation, diarrhea, melanotic stools, BRBPR, or hematemesis  : normal with no hematuria, dysuria  BREAST: normal with no mass, discharge, pain  SKIN: normal with no rash, erythema, bruising, or swelling    Allergies:  Review of patient's allergies indicates:   Allergen Reactions    Eucalyptus containing products Palpitations       Medications:    Current Outpatient Medications:     albuterol-ipratropium 2.5mg-0.5mg/3mL (DUO-NEB) 0.5 mg-3 mg(2.5 mg base)/3 mL nebulizer solution, , Disp: , Rfl:     amLODIPine (NORVASC) 10 MG tablet, , Disp: , Rfl:     aspirin (ECOTRIN) 81 MG EC tablet, Take 81 mg by mouth once daily., Disp: , Rfl:     atorvastatin (LIPITOR) 40 MG tablet, , Disp: , Rfl:     clopidogrel (PLAVIX) 75 mg tablet, Take 75 mg by mouth once daily., Disp: , Rfl:     donepezil (ARICEPT) 10 MG tablet, TAKE 1 TABLET BY MOUTH ONCE DAILY, Disp: 90 tablet, Rfl: 1    ergocalciferol (ERGOCALCIFEROL) 50,000 unit Cap, TAKE ONE CAPSULE BY MOUTH EVERY WEEK, Disp: 12 " capsule, Rfl: 0    furosemide (LASIX) 40 MG tablet, , Disp: , Rfl:     hydrochlorothiazide (MICROZIDE) 12.5 mg capsule, Take 12.5 mg by mouth once daily., Disp: , Rfl: 6    HYDROcodone-acetaminophen (NORCO)  mg per tablet, Take 1 tablet by mouth every 6 (six) hours as needed for Pain., Disp: 120 tablet, Rfl: 0    [START ON 11/7/2018] HYDROcodone-acetaminophen (NORCO)  mg per tablet, Take 1 tablet by mouth every 6 (six) hours as needed for Pain., Disp: 120 tablet, Rfl: 0    [START ON 12/6/2018] HYDROcodone-acetaminophen (NORCO)  mg per tablet, Take 1 tablet by mouth every 6 (six) hours as needed for Pain., Disp: 120 tablet, Rfl: 0    ipratropium (ATROVENT) 42 mcg (0.06 %) nasal spray, , Disp: , Rfl:     lisinopril 10 MG tablet, , Disp: , Rfl:     LYRICA 150 mg capsule, , Disp: , Rfl:     metoprolol succinate (TOPROL-XL) 25 MG 24 hr tablet, , Disp: , Rfl:     metoprolol tartrate (LOPRESSOR) 25 MG tablet, 25 mg., Disp: , Rfl: 0    mupirocin calcium 2% (BACTROBAN) 2 % cream, , Disp: , Rfl:     omeprazole (PRILOSEC) 20 MG capsule, 20 mg., Disp: , Rfl: 0    pantoprazole (PROTONIX) 40 MG tablet, , Disp: , Rfl:     QUEtiapine (SEROQUEL) 25 MG Tab, , Disp: , Rfl:     sertraline (ZOLOFT) 100 MG tablet, , Disp: , Rfl:     simvastatin (ZOCOR) 40 MG tablet, , Disp: , Rfl:     trazodone (DESYREL) 50 MG tablet, 50 mg., Disp: , Rfl: 3    PMHx/PSHx Updates:  See patient's last visit with me on 3/14/2018  See H&P on 12/14/2017        Pathology:  Cancer Staging  No matching staging information was found for the patient.          Objective:     Vitals:  Blood pressure (!) 131/58, pulse (!) 53, temperature 97.5 °F (36.4 °C), resp. rate 20, weight 71 kg (156 lb 9.6 oz).    Physical Examination:   GEN: no apparent distress, comfortable; AAOx3  HEAD: atraumatic and normocephalic  EYES: no pallor, no icterus, PERRLA  ENT: OMM, no pharyngeal erythema, external ears WNL; no nasal discharge; no thrush  NECK:  no masses, thyroid normal, trachea midline, no LAD/LN's, supple  CV: RRR with no murmur; normal pulse; normal S1 and S2; no pedal edema  CHEST: Normal respiratory effort; CTAB; normal breath sounds; no wheeze or crackles  ABDOM: nontender and nondistended; soft; normal bowel sounds; no rebound/guarding  MUSC/Skeletal: ROM normal; no crepitus; joints normal; no deformities or arthropathy  EXTREM: no clubbing, cyanosis, inflammation or swelling  SKIN: no rashes, lesions, ulcers, petechiae or subcutaneous nodules  : no dickey  NEURO: grossly intact; motor/sensory WNL; AAOx3; no tremors  PSYCH: normal mood, affect and behavior  LYMPH: normal cervical, supraclavicular, axillary and groin LN's            Labs:     10/9/2018  Lab Results   Component Value Date    WBC 6.5 10/09/2018    HGB 12.6 (L) 10/09/2018    HCT 37.0 (L) 10/09/2018    MCV 95.6 10/09/2018    PLT 92 (L) 10/09/2018     BMP  Lab Results   Component Value Date     10/09/2018    K 3.8 10/09/2018     10/09/2018    CO2 29 10/09/2018    BUN 25 10/09/2018    CREATININE 1.53 (H) 10/09/2018    CALCIUM 9.0 10/09/2018    ANIONGAP 16 04/16/2018    ESTGFRAFRICA 46 (L) 10/09/2018    EGFRNONAA 40 (L) 10/09/2018     Lab Results   Component Value Date    ALT 13 10/09/2018    AST 17 10/09/2018    ALKPHOS 74 10/09/2018    BILITOT 0.6 10/09/2018     Lab Results   Component Value Date    IRON 99 10/09/2018    TIBC 267 10/09/2018    FERRITIN 129 10/09/2018           Radiology/Diagnostic Studies:    No results found.    I have reviewed all available lab results and radiology reports.    Assessment/Plan:     (1) 88 y.o. male with diagnosis of chronic thrombocytopenia who was referred by Dr Payne for hematologic evaluation and recommendation.   - patient was last previously seen in Dec 2015 after which he changed to Dr PANKAJ Junior  - prior suspected platelet clumping issues  - latest platelets at 92,000        (2) Chronic tobacco use     (3) Chronic anemia - [facility]  parameters; mild; possible alpha-thalassemia  - history of gastric ulcers in past due to consumption of NSAIDs and subsequent GIB  - hgb at 12.6      (4) Lower extremity/pedal neuropathy - previously followed by Dr Leiva with neurology and Dr Elizabeth with pain management     (5) Prior Garcia's esophagus - followed by Dr Hawkins with GI in past and now sees Dr Dale with GI     (6) Dementia     (7) CRI - followed by Dr Sanchez with neph     (8) Vit D deficiency - on supplements in past    (9) Noncompliance with requested blood work which hinders my ability to provide him with hematologic care      1. Thrombocytopenia, secondary  Comprehensive metabolic panel    CBC auto differential    Iron, TIBC and Ferritin Panel    Comprehensive metabolic panel    CBC auto differential    Iron, TIBC and Ferritin Panel   2. Anemia, chronic disease  Comprehensive metabolic panel    CBC auto differential    Iron, TIBC and Ferritin Panel    Comprehensive metabolic panel    CBC auto differential    Iron, TIBC and Ferritin Panel   3. Anemia due to chronic blood loss  Comprehensive metabolic panel    CBC auto differential    Iron, TIBC and Ferritin Panel    Comprehensive metabolic panel    CBC auto differential    Iron, TIBC and Ferritin Panel         PLAN:  1. Check up to date labs and check labs every 3 months  2. Encouraged compliance  3. F/u with PCP, GI, Neph, Neuro etc  4. RTC in 6 months  Fax note to Scott Payne MD, Chito, Daniel    Discussion:       I spent over 25 mins of time with the patient. Reviewed results of the recently ordered labs, tests and studies; made directives with regards to the results. Over half of this time was spent couseling and coordinating care.    I have explained all of the above in detail and the patient understands all of the current recommendation(s). I have answered all of their questions to the best of my ability and to their complete satisfaction.   The patient is to continue with the  current management plan.            Electronically signed by Alvarez Augustine MD

## 2018-10-18 ENCOUNTER — OFFICE VISIT (OUTPATIENT)
Dept: HEMATOLOGY/ONCOLOGY | Facility: CLINIC | Age: 83
End: 2018-10-18
Payer: MEDICARE

## 2018-10-18 VITALS
TEMPERATURE: 98 F | DIASTOLIC BLOOD PRESSURE: 58 MMHG | HEART RATE: 53 BPM | SYSTOLIC BLOOD PRESSURE: 131 MMHG | BODY MASS INDEX: 20.66 KG/M2 | RESPIRATION RATE: 20 BRPM | WEIGHT: 156.63 LBS

## 2018-10-18 DIAGNOSIS — D63.8 ANEMIA, CHRONIC DISEASE: ICD-10-CM

## 2018-10-18 DIAGNOSIS — D69.59 THROMBOCYTOPENIA, SECONDARY: Primary | ICD-10-CM

## 2018-10-18 DIAGNOSIS — D50.0 ANEMIA DUE TO CHRONIC BLOOD LOSS: ICD-10-CM

## 2018-10-18 PROCEDURE — 99213 OFFICE O/P EST LOW 20 MIN: CPT | Mod: ,,, | Performed by: INTERNAL MEDICINE

## 2018-10-18 PROCEDURE — 1101F PT FALLS ASSESS-DOCD LE1/YR: CPT | Mod: ,,, | Performed by: INTERNAL MEDICINE

## 2018-10-18 NOTE — LETTER
October 18, 2018      Scott Payne MD  [address] [place] [address] [address]  [place] [address]           [facility]ematology [facility]  [address] [facility]  [address]  [place] [address]  Phone: [phone]  Fax: [phone]          Patient: Halley Rodrigues   MR Number: [id number]   YOB: 1930   Date of Visit: 10/18/2018       Dear Dr. Scott Payne:    Thank you for referring Halley Rodrigues to me for evaluation. Attached you will find relevant portions of my assessment and plan of care.    If you have questions, please do not hesitate to call me. I look forward to following Halley Rodrigues along with you.    Sincerely,    Alvarez Augustine MD    Enclosure  CC:  No Recipients    If you would like to receive this communication electronically, please contact [email or web address] or [phone] to request more information on [employer] access.    For providers and/or their staff who would like to refer a patient to [facility], please contact us through our one-stop-shop provider referral line, [facility], at [phone].    If you feel you have received this communication in error or would no longer like to receive these types of communications, please e-mail [email or web address]

## 2018-12-05 NOTE — TELEPHONE ENCOUNTER
Pt will call back.   Telephone Encounter by Marry Joshi at 02/14/18 11:11 AM     Author:  Marry Joshi Service:  (none) Author Type:       Filed:  02/14/18 11:19 AM Encounter Date:  2/14/2018 Status:  Signed     :  Marry Joshi ()            Spoke with Mom regarding note.  She is requesting patient to return to school on 2/19/18 as Minerva only has 1/2 day of school on 2/16/18. Informed her note note would created for patient to return to school on 2/19/18.  Note would be left at Reception Desk. She verbalized understanding[TH1.1M]       Revision History        User Key Date/Time User Provider Type Action    > TH1.1 02/14/18 11:19 AM Marry Joshi  Sign    M - Manual

## 2019-01-31 ENCOUNTER — OFFICE VISIT (OUTPATIENT)
Dept: PAIN MEDICINE | Facility: CLINIC | Age: 84
End: 2019-01-31
Payer: MEDICARE

## 2019-01-31 VITALS
WEIGHT: 156 LBS | DIASTOLIC BLOOD PRESSURE: 64 MMHG | HEIGHT: 73 IN | SYSTOLIC BLOOD PRESSURE: 131 MMHG | HEART RATE: 53 BPM | BODY MASS INDEX: 20.67 KG/M2

## 2019-01-31 DIAGNOSIS — G60.9 PERIPHERAL NEUROPATHY, IDIOPATHIC: Primary | ICD-10-CM

## 2019-01-31 DIAGNOSIS — M51.36 DDD (DEGENERATIVE DISC DISEASE), LUMBAR: ICD-10-CM

## 2019-01-31 DIAGNOSIS — M47.896 OTHER SPONDYLOSIS, LUMBAR REGION: ICD-10-CM

## 2019-01-31 PROCEDURE — 1101F PR PT FALLS ASSESS DOC 0-1 FALLS W/OUT INJ PAST YR: ICD-10-PCS | Mod: CPTII,S$GLB,, | Performed by: PHYSICIAN ASSISTANT

## 2019-01-31 PROCEDURE — 1101F PT FALLS ASSESS-DOCD LE1/YR: CPT | Mod: CPTII,S$GLB,, | Performed by: PHYSICIAN ASSISTANT

## 2019-01-31 PROCEDURE — 99999 PR PBB SHADOW E&M-EST. PATIENT-LVL V: ICD-10-PCS | Mod: PBBFAC,,, | Performed by: PHYSICIAN ASSISTANT

## 2019-01-31 PROCEDURE — 99214 OFFICE O/P EST MOD 30 MIN: CPT | Mod: S$GLB,,, | Performed by: PHYSICIAN ASSISTANT

## 2019-01-31 PROCEDURE — 99214 PR OFFICE/OUTPT VISIT, EST, LEVL IV, 30-39 MIN: ICD-10-PCS | Mod: S$GLB,,, | Performed by: PHYSICIAN ASSISTANT

## 2019-01-31 PROCEDURE — 99999 PR PBB SHADOW E&M-EST. PATIENT-LVL V: CPT | Mod: PBBFAC,,, | Performed by: PHYSICIAN ASSISTANT

## 2019-01-31 RX ORDER — HYDROCODONE BITARTRATE AND ACETAMINOPHEN 10; 325 MG/1; MG/1
1 TABLET ORAL EVERY 6 HOURS PRN
COMMUNITY
End: 2019-08-26 | Stop reason: SDUPTHER

## 2019-01-31 RX ORDER — HYDROCODONE BITARTRATE AND ACETAMINOPHEN 10; 325 MG/1; MG/1
1 TABLET ORAL EVERY 6 HOURS PRN
Qty: 120 TABLET | Refills: 0 | Status: SHIPPED | OUTPATIENT
Start: 2019-01-31 | End: 2019-03-01

## 2019-01-31 RX ORDER — HYDROCODONE BITARTRATE AND ACETAMINOPHEN 10; 325 MG/1; MG/1
1 TABLET ORAL EVERY 6 HOURS PRN
Qty: 120 TABLET | Refills: 0 | Status: SHIPPED | OUTPATIENT
Start: 2019-03-01 | End: 2019-03-30

## 2019-01-31 RX ORDER — HYDROCODONE BITARTRATE AND ACETAMINOPHEN 10; 325 MG/1; MG/1
1 TABLET ORAL EVERY 6 HOURS PRN
Qty: 120 TABLET | Refills: 0 | Status: SHIPPED | OUTPATIENT
Start: 2019-03-30 | End: 2019-04-28

## 2019-01-31 NOTE — PROGRESS NOTES
"Referring Physician: No ref. provider found    PCP: Scott Payne MD      CC: bilateral foot pain and lower back pain    Interval History:  Mr. Rodrigues is 86 yo male with continued bilateral peripheral neuropathy.  Sympathetic blocks previously did not improve his pain.  Ketamine compounding cream was not helpful.  His back pain is not currently of major concern.  Hydrocodone  mg q 6 h provides some benefit.  His lyrica regimen remains unchanged.  Pain today is  0/10.      Prior HPI:   Halley Rodrigues is a 88 y.o.  male with PMHx significant for HLD, HTN, CAD on Plavix, peripheral neuropathy who presents with CC: painful burning sensation in B feet x 5y+ duration. The pain is constant and awakes him in his sleep and makes the evenings the most difficult, pain- wise. It is worse with lying flat and improved with walking. He has taken Lyrica x 5y without any appreciable pain relief. He has tried 2%lidocaine cream, which completely "numbs his feet" and inhibits him from ambulation. He has taken Hydrocodone 7.5 mg 2-3 times QD which helps with his pain significantly. He also reports cLBP without preceding injury/trauma, specifically in the area of B SI joints (R worse than L), aching and stabbing in nature, without radiation. His back pain is worsened with prolonged standing and walking, which limits his activity and ultimately makes his foot pain worse 2/2 being sedentary. He has been having intermittent falls, which he attributes to generalized weakness versus foot numbness or back pain. He denies saddle anesthesia or B/B changes.     ROS:  CONSTITUTIONAL: No fevers, chills, night sweats, wt. loss, appetite changes  SKIN: no rashes or itching  ENT: No headaches, head trauma, vision changes, or eye pain  LYMPH NODES: None noticed   CV: No chest pain, palpitations.   RESP: No shortness of breath, dyspnea on exertion, cough, wheezing, or hemoptysis  GI: No nausea, emesis, diarrhea, " "constipation, melena, hematochezia, pain.    : No dysuria, hematuria, urgency, or frequency   HEME: No easy bruising, bleeding problems  PSYCHIATRIC: No depression, anxiety, psychosis, hallucinations.  NEURO: No seizures, memory loss, dizziness or difficulty sleeping  MSK back pain, foot pain      Past Medical History:   Diagnosis Date    Anemia     Anemia due to chronic blood loss 7/25/2018    Anemia, chronic disease 7/25/2018    GERD (gastroesophageal reflux disease)     Heart disease     Hypertension     Neuropathy      Past Surgical History:   Procedure Laterality Date    BLOCK-LUMBAR-SYMPATHETIC N/A 12/5/2017    [facility]    BLOCK-LUMBAR-SYMPATHETIC Bilateral 5/9/2017    [facility]    CORONARY ARTERY BYPASS GRAFT       Family History   Problem Relation Age of Onset    Dementia Father      Social History     Socioeconomic History    Marital status:      Spouse name: None    Number of children: None    Years of education: None    Highest education level: None   Social Needs    Financial resource strain: None    Food insecurity - worry: None    Food insecurity - inability: None    Transportation needs - medical: None    Transportation needs - non-medical: None   Occupational History    None   Tobacco Use    Smoking status: Current Every Day Smoker     Types: Cigarettes    Smokeless tobacco: Never Used   Substance and Sexual Activity    Alcohol use: No    Drug use: No    Sexual activity: None   Other Topics Concern    None   Social History Narrative    None         Medications/Allergies: See med card    Vitals:    01/31/19 1123   BP: 131/64   Pulse: (!) 53   Weight: 70.8 kg (156 lb)   Height: 6' 1" (1.854 m)   PainSc: 0-No pain   PainLoc: Back         Physical exam:    GENERAL: A and O x3, the patient appears well groomed and is in no acute distress.  Skin: No rashes or obvious lesions  HEENT: normocephalic, " atraumatic  CARDIOVASCULAR:  Bradycardia  LUNGS: non labored breathing  ABDOMEN: soft, nontender   UPPER EXTREMITIES: Normal alignment, normal range of motion, no atrophy, no skin changes,  hair growth and nail growth normal and equal bilaterally. No swelling, no tenderness.    LOWER EXTREMITIES:  Normal alignment, normal range of motion, no atrophy, dry skin, onchyomycosis in B feet. No swelling, no tenderness.    LUMBAR SPINE  Lumbar spine: ROM is moderately decreased l with flexion extension and oblique extension   Michel's test causes increased pain on both sides (R>>L)   Supine straight leg raise is negative bilaterally.    Internal and external rotation of the hip causes no increased pain on either side.  Myofascial exam:SI joint tenderness B      MENTAL STATUS: normal orientation, speech, language, and fund of knowledge for social situation.  Emotional state appropriate.    CRANIAL NERVES:  II:  PERRL bilaterally,   III,IV,VI: EOMI.    V:  Facial sensation equal bilaterally  VII:  Facial motor function normal.  VIII:  Hearing equal to finger rub bilaterally  IX/X: Gag normal, palate symmetric  XI:  Shoulder shrug equal, head turn equal  XII:  Tongue midline without fasciculations    MOTOR: Tone and bulk: normal bilateral upper and lower Strength: normal   Delt Bi Tri WE WF     R 5 5 5 5 5 5   L 5 5 5 5 5 5     IP ADD ABD Quad TA Gas HAM  R 5 5 5 5 5 5 5  L 5 5 5 5 5 5 5    SENSATION: Light touch and pinprick intact bilaterally  REFLEXES: normal, symmetric, nonbrisk.  Toes down, no clonus. No hoffmans.  GAIT: normal rise, base, steps, and arm swing.      Assessment   Mr. Rodrigues is a 88 y.o. male with   1. Peripheral neuropathy, idiopathic    2. Other spondylosis, lumbar region    3. DDD (degenerative disc disease), lumbar      Plan:  1.  I have stressed the importance of physical activity and exercise to improve overall health. Continue PT  2.  Hydrocodone 10 mg to q 6hrs as needed.  reviewed. 3  months. Previous UDS consistent  3.  May consider SCS trial, but patient is currently hesitant to proceed    4.  Continue lyrica as prescribed  5.  Follow up in 3 months  All medication management was performed by Dr. Shawn Elizabeth

## 2019-04-25 ENCOUNTER — OFFICE VISIT (OUTPATIENT)
Dept: PAIN MEDICINE | Facility: CLINIC | Age: 84
End: 2019-04-25
Payer: MEDICARE

## 2019-04-25 VITALS
BODY MASS INDEX: 20.67 KG/M2 | HEIGHT: 73 IN | HEART RATE: 46 BPM | WEIGHT: 156 LBS | SYSTOLIC BLOOD PRESSURE: 124 MMHG | DIASTOLIC BLOOD PRESSURE: 65 MMHG

## 2019-04-25 DIAGNOSIS — M47.896 OTHER SPONDYLOSIS, LUMBAR REGION: ICD-10-CM

## 2019-04-25 DIAGNOSIS — M51.36 DDD (DEGENERATIVE DISC DISEASE), LUMBAR: ICD-10-CM

## 2019-04-25 DIAGNOSIS — G60.9 PERIPHERAL NEUROPATHY, IDIOPATHIC: Primary | ICD-10-CM

## 2019-04-25 DIAGNOSIS — M53.3 SACROILIAC JOINT PAIN: ICD-10-CM

## 2019-04-25 PROCEDURE — 99999 PR PBB SHADOW E&M-EST. PATIENT-LVL IV: ICD-10-PCS | Mod: PBBFAC,,, | Performed by: PHYSICIAN ASSISTANT

## 2019-04-25 PROCEDURE — 1101F PT FALLS ASSESS-DOCD LE1/YR: CPT | Mod: CPTII,S$GLB,, | Performed by: PHYSICIAN ASSISTANT

## 2019-04-25 PROCEDURE — 99999 PR PBB SHADOW E&M-EST. PATIENT-LVL IV: CPT | Mod: PBBFAC,,, | Performed by: PHYSICIAN ASSISTANT

## 2019-04-25 PROCEDURE — 99214 PR OFFICE/OUTPT VISIT, EST, LEVL IV, 30-39 MIN: ICD-10-PCS | Mod: S$GLB,,, | Performed by: PHYSICIAN ASSISTANT

## 2019-04-25 PROCEDURE — 99214 OFFICE O/P EST MOD 30 MIN: CPT | Mod: S$GLB,,, | Performed by: PHYSICIAN ASSISTANT

## 2019-04-25 PROCEDURE — 1101F PR PT FALLS ASSESS DOC 0-1 FALLS W/OUT INJ PAST YR: ICD-10-PCS | Mod: CPTII,S$GLB,, | Performed by: PHYSICIAN ASSISTANT

## 2019-04-25 RX ORDER — HYDROCODONE BITARTRATE AND ACETAMINOPHEN 10; 325 MG/1; MG/1
1 TABLET ORAL EVERY 6 HOURS PRN
Qty: 120 TABLET | Refills: 0 | Status: SHIPPED | OUTPATIENT
Start: 2019-05-27 | End: 2019-06-25

## 2019-04-25 RX ORDER — HYDROCODONE BITARTRATE AND ACETAMINOPHEN 10; 325 MG/1; MG/1
1 TABLET ORAL EVERY 6 HOURS PRN
Qty: 120 TABLET | Refills: 0 | Status: SHIPPED | OUTPATIENT
Start: 2019-07-24 | End: 2019-08-22

## 2019-04-25 RX ORDER — HYDROCODONE BITARTRATE AND ACETAMINOPHEN 10; 325 MG/1; MG/1
1 TABLET ORAL EVERY 6 HOURS PRN
Qty: 120 TABLET | Refills: 0 | Status: SHIPPED | OUTPATIENT
Start: 2019-06-25 | End: 2019-07-24

## 2019-04-25 NOTE — PROGRESS NOTES
"Referring Physician: No ref. provider found    PCP: Scott Payne MD      CC: bilateral foot pain and lower back pain    Interval History:  Mr. Rodrigues is 88 yo male with continued bilateral peripheral neuropathy.  Sympathetic blocks previously did not improve his pain.  Ketamine compounding cream was not helpful.  His back pain is not currently of major concern.  Hydrocodone  mg q 6 h provides some benefit.  His lyrica regimen remains unchanged. He has not had any falls. Pain today is 5/10.      Prior HPI:   Halley Rodrigues is a 88 y.o.  male with PMHx significant for HLD, HTN, CAD on Plavix, peripheral neuropathy who presents with CC: painful burning sensation in B feet x 5y+ duration. The pain is constant and awakes him in his sleep and makes the evenings the most difficult, pain- wise. It is worse with lying flat and improved with walking. He has taken Lyrica x 5y without any appreciable pain relief. He has tried 2%lidocaine cream, which completely "numbs his feet" and inhibits him from ambulation. He has taken Hydrocodone 7.5 mg 2-3 times QD which helps with his pain significantly. He also reports cLBP without preceding injury/trauma, specifically in the area of B SI joints (R worse than L), aching and stabbing in nature, without radiation. His back pain is worsened with prolonged standing and walking, which limits his activity and ultimately makes his foot pain worse 2/2 being sedentary. He has been having intermittent falls, which he attributes to generalized weakness versus foot numbness or back pain. He denies saddle anesthesia or B/B changes.     ROS:  CONSTITUTIONAL: No fevers, chills, night sweats, wt. loss, appetite changes  SKIN: no rashes or itching  ENT: No headaches, head trauma, vision changes, or eye pain  LYMPH NODES: None noticed   CV: No chest pain, palpitations.   RESP: No shortness of breath, dyspnea on exertion, cough, wheezing, or hemoptysis  GI: No nausea, " emesis, diarrhea, constipation, melena, hematochezia, pain.    : No dysuria, hematuria, urgency, or frequency   HEME: No easy bruising, bleeding problems  PSYCHIATRIC: No depression, anxiety, psychosis, hallucinations.  NEURO: No seizures, memory loss, dizziness or difficulty sleeping  MSK back pain, foot pain      Past Medical History:   Diagnosis Date    Anemia     Anemia due to chronic blood loss 7/25/2018    Anemia, chronic disease 7/25/2018    GERD (gastroesophageal reflux disease)     Heart disease     Hypertension     Neuropathy      Past Surgical History:   Procedure Laterality Date    BLOCK-LUMBAR-SYMPATHETIC N/A 12/5/2017    [facility]    BLOCK-LUMBAR-SYMPATHETIC Bilateral 5/9/2017    [facility]    CORONARY ARTERY BYPASS GRAFT       Family History   Problem Relation Age of Onset    Dementia Father      Social History     Socioeconomic History    Marital status:      Spouse name: Not on file    Number of children: Not on file    Years of education: Not on file    Highest education level: Not on file   Occupational History    Not on file   Social Needs    Financial resource strain: Not on file    Food insecurity:     Worry: Not on file     Inability: Not on file    Transportation needs:     Medical: Not on file     Non-medical: Not on file   Tobacco Use    Smoking status: Current Every Day Smoker     Types: Cigarettes    Smokeless tobacco: Never Used   Substance and Sexual Activity    Alcohol use: No    Drug use: No    Sexual activity: Not on file   Lifestyle    Physical activity:     Days per week: Not on file     Minutes per session: Not on file    Stress: Not on file   Relationships    Social connections:     Talks on phone: Not on file     Gets together: Not on file     Attends Bahai service: Not on file     Active member of club or organization: Not on file     Attends meetings of clubs or organizations: Not  "on file     Relationship status: Not on file   Other Topics Concern    Not on file   Social History Narrative    Not on file         Medications/Allergies: See med card    Vitals:    04/25/19 1109   BP: 124/65   Pulse: (!) 46   Weight: 70.8 kg (156 lb)   Height: 6' 1" (1.854 m)   PainSc:   5   PainLoc: Back         Physical exam:    GENERAL: A and O x3, the patient appears well groomed and is in no acute distress.  Skin: No rashes or obvious lesions  HEENT: normocephalic, atraumatic  CARDIOVASCULAR:  Bradycardia  LUNGS: non labored breathing  ABDOMEN: soft, nontender   UPPER EXTREMITIES: Normal alignment, normal range of motion, no atrophy, no skin changes,  hair growth and nail growth normal and equal bilaterally. No swelling, no tenderness.    LOWER EXTREMITIES:  Normal alignment, normal range of motion, no atrophy, dry skin, onchyomycosis in B feet. No swelling, no tenderness.    LUMBAR SPINE  Lumbar spine: ROM is moderately decreased l with flexion extension and oblique extension   Michel's test causes increased pain on both sides (R>>L)   Supine straight leg raise is negative bilaterally.    Internal and external rotation of the hip causes no increased pain on either side.  Myofascial exam:SI joint tenderness B      MENTAL STATUS: normal orientation, speech, language, and fund of knowledge for social situation.  Emotional state appropriate.    CRANIAL NERVES:  II:  PERRL bilaterally,   III,IV,VI: EOMI.    V:  Facial sensation equal bilaterally  VII:  Facial motor function normal.  VIII:  Hearing equal to finger rub bilaterally  IX/X: Gag normal, palate symmetric  XI:  Shoulder shrug equal, head turn equal  XII:  Tongue midline without fasciculations    MOTOR: Tone and bulk: normal bilateral upper and lower Strength: normal   Delt Bi Tri WE WF     R 5 5 5 5 5 5   L 5 5 5 5 5 5     IP ADD ABD Quad TA Gas HAM  R 5 5 5 5 5 5 5  L 5 5 5 5 5 5 5    SENSATION: Light touch and pinprick intact " bilaterally  REFLEXES: normal, symmetric, nonbrisk.  Toes down, no clonus. No hoffmans.  GAIT: normal rise, base, steps, and arm swing.      Assessment   Mr. Rodrigues is a 88 y.o. male with   1. Peripheral neuropathy, idiopathic    2. Other spondylosis, lumbar region    3. DDD (degenerative disc disease), lumbar    4. Sacroiliac joint pain      Plan:  1.  I have stressed the importance of physical activity and exercise to improve overall health. Continue PT  2.  Hydrocodone 10 mg to q 6hrs as needed.  reviewed. 3 months. Previous UDS consistent  3.  May consider SCS trial, but patient is currently hesitant to proceed    4.  Continue lyrica as prescribed  5.  Follow up in 3 months  All medication management was performed by Dr. Shawn Elizabeth

## 2019-05-01 NOTE — PROGRESS NOTES
"[facility] Hematology/Oncology  PROGRESS NOTE       Subjective:       Patient ID:   NAME: Halley Rodrigues : 1930     88 y.o. male    Referring Doc: Elmer  Other Physicians: Chito, Daniel Oseguera John Vu    Chief Complaint:  tcp f/u    History of Present Illness:     Patient returns today for a  regularly scheduled follow-up visit.  The patient is here today to go over the results of the recently ordered labs, tests and studies. He had some recent labs on 2019. He is here with his son. He has been feeling "pretty good"; chronic neuropathy in feet but stable; he denies any CP, SOB, HA's or N/V. He had skin cancer removed with Dr Garcia on right forearm.             ROS:   GEN: normal without any fever, night sweats or weight loss  HEENT: normal with no HA's, sore throat, stiff neck, changes in vision  CV: normal with no CP, SOB, PND, NIETO or orthopnea  PULM: normal with no SOB, cough, hemoptysis, sputum or pleuritic pain  GI: normal with no abdominal pain, nausea, vomiting, constipation, diarrhea, melanotic stools, BRBPR, or hematemesis  : normal with no hematuria, dysuria  BREAST: normal with no mass, discharge, pain  SKIN: normal with no rash, erythema, bruising, or swelling    Allergies:  Review of patient's allergies indicates:   Allergen Reactions    Eucalyptus containing products Palpitations       Medications:    Current Outpatient Medications:     albuterol-ipratropium 2.5mg-0.5mg/3mL (DUO-NEB) 0.5 mg-3 mg(2.5 mg base)/3 mL nebulizer solution, , Disp: , Rfl:     amLODIPine (NORVASC) 10 MG tablet, , Disp: , Rfl:     aspirin (ECOTRIN) 81 MG EC tablet, Take 81 mg by mouth once daily., Disp: , Rfl:     atorvastatin (LIPITOR) 40 MG tablet, , Disp: , Rfl:     clopidogrel (PLAVIX) 75 mg tablet, Take 75 mg by mouth once daily., Disp: , Rfl:     donepezil (ARICEPT) 10 MG tablet, TAKE 1 TABLET BY MOUTH ONCE DAILY, Disp: 90 tablet, Rfl: 1    ergocalciferol (ERGOCALCIFEROL) 50,000 unit Cap, " "TAKE ONE CAPSULE BY MOUTH EVERY WEEK, Disp: 12 capsule, Rfl: 0    furosemide (LASIX) 40 MG tablet, , Disp: , Rfl:     hydrochlorothiazide (MICROZIDE) 12.5 mg capsule, Take 12.5 mg by mouth once daily., Disp: , Rfl: 6    HYDROcodone-acetaminophen (NORCO)  mg per tablet, Take 1 tablet by mouth every 6 (six) hours as needed for Pain., Disp: , Rfl:     [START ON 5/27/2019] HYDROcodone-acetaminophen (NORCO)  mg per tablet, Take 1 tablet by mouth every 6 (six) hours as needed for Pain., Disp: 120 tablet, Rfl: 0    [START ON 6/25/2019] HYDROcodone-acetaminophen (NORCO)  mg per tablet, Take 1 tablet by mouth every 6 (six) hours as needed for Pain., Disp: 120 tablet, Rfl: 0    [START ON 7/24/2019] HYDROcodone-acetaminophen (NORCO)  mg per tablet, Take 1 tablet by mouth every 6 (six) hours as needed for Pain., Disp: 120 tablet, Rfl: 0    ipratropium (ATROVENT) 42 mcg (0.06 %) nasal spray, , Disp: , Rfl:     lisinopril 10 MG tablet, , Disp: , Rfl:     LYRICA 150 mg capsule, , Disp: , Rfl:     metoprolol succinate (TOPROL-XL) 25 MG 24 hr tablet, , Disp: , Rfl:     metoprolol tartrate (LOPRESSOR) 25 MG tablet, 25 mg., Disp: , Rfl: 0    mupirocin calcium 2% (BACTROBAN) 2 % cream, , Disp: , Rfl:     omeprazole (PRILOSEC) 20 MG capsule, 20 mg., Disp: , Rfl: 0    pantoprazole (PROTONIX) 40 MG tablet, , Disp: , Rfl:     QUEtiapine (SEROQUEL) 25 MG Tab, , Disp: , Rfl:     sertraline (ZOLOFT) 100 MG tablet, , Disp: , Rfl:     simvastatin (ZOCOR) 40 MG tablet, , Disp: , Rfl:     trazodone (DESYREL) 50 MG tablet, 50 mg., Disp: , Rfl: 3    PMHx/PSHx Updates:  See patient's last visit with me on 10/18/2018  See H&P on 12/14/2017        Pathology:  Cancer Staging  No matching staging information was found for the patient.          Objective:     Vitals:  Blood pressure 126/80, pulse (!) 58, temperature 97.5 °F (36.4 °C), temperature source Oral, resp. rate 20, height 5' 10" (1.778 m), weight 73.8 kg " (162 lb 9.6 oz).    Physical Examination:   GEN: no apparent distress, comfortable; AAOx3  HEAD: atraumatic and normocephalic  EYES: no pallor, no icterus, PERRLA  ENT: OMM, no pharyngeal erythema, external ears WNL; no nasal discharge; no thrush  NECK: no masses, thyroid normal, trachea midline, no LAD/LN's, supple  CV: RRR with no murmur; normal pulse; normal S1 and S2; no pedal edema  CHEST: Normal respiratory effort; CTAB; normal breath sounds; no wheeze or crackles  ABDOM: nontender and nondistended; soft; normal bowel sounds; no rebound/guarding  MUSC/Skeletal: ROM normal; no crepitus; joints normal; no deformities or arthropathy  EXTREM: no clubbing, cyanosis, inflammation or swelling  SKIN: no rashes, lesions, ulcers, petechiae or subcutaneous nodules; 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  : no dickey  NEURO: grossly intact; motor/sensory WNL; AAOx3; no tremors  PSYCH: normal mood, affect and behavior  LYMPH: normal cervical, supraclavicular, axillary and groin LN's            Labs:   4/25/2019 on chart from Dr Payne        10/9/2018  Lab Results   Component Value Date    WBC 6.5 10/09/2018    HGB 12.6 (L) 10/09/2018    HCT 37.0 (L) 10/09/2018    MCV 95.6 10/09/2018    PLT 92 (L) 10/09/2018     BMP  Lab Results   Component Value Date     10/09/2018    K 3.8 10/09/2018     10/09/2018    CO2 29 10/09/2018    BUN 25 10/09/2018    CREATININE 1.53 (H) 10/09/2018    CALCIUM 9.0 10/09/2018    ANIONGAP 16 04/16/2018    ESTGFRAFRICA 46 (L) 10/09/2018    EGFRNONAA 40 (L) 10/09/2018     Lab Results   Component Value Date    ALT 13 10/09/2018    AST 17 10/09/2018    ALKPHOS 74 10/09/2018    BILITOT 0.6 10/09/2018     Lab Results   Component Value Date    IRON 96 04/30/2019    TIBC 313 04/30/2019    FERRITIN 49 04/30/2019           Radiology/Diagnostic Studies:    No results found.    I have reviewed all available lab results and radiology reports.    Assessment/Plan:     (1) 88 y.o. male with diagnosis  of chronic thrombocytopenia who was referred by Dr Payne for hematologic evaluation and recommendation.   - patient was last previously seen in Dec 2015 after which he changed to Dr PANKAJ Junior  - prior suspected platelet clumping issues  - latest platelets at 92,000 and stable       (2) Chronic tobacco use     (3) Chronic anemia - NCNC parameters; mild; possible alpha-thalassemia  - history of gastric ulcers in past due to consumption of NSAIDs and subsequent GIB  - hgb at 12.7 and adequate     (4) Lower extremity/pedal neuropathy - previously followed by Dr Leiva with neurology and Dr Elizabeth with pain management     (5) Prior Garcia's esophagus - followed by Dr Hawkins with GI in past and now sees Dr Dale with GI     (6) Dementia     (7) CRI - followed by Dr Sanchez with neph     (8) Vit D deficiency - on supplements in past    (9) Skin cancer (SCCA) 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 - re-excision on 2/28/2019 negative for residual cancer    (10) Noncompliance with requested blood work which hinders my ability to provide him with hematologic care      1. Thrombocytopenia, secondary     2. Anemia, chronic disease     3. Anemia due to chronic blood loss           PLAN:  1. Check labs every 3 months (encouraged compliance)  2. Encouraged compliance  3. F/u with PCP, GI, Neph, Neuro etc  4. RTC in 6 months  Fax note to Ciarra Payneor, Ligia Garcia    Discussion:       I spent over 25 mins of time with the patient. Reviewed results of the recently ordered labs, tests and studies; made directives with regards to the results. Over half of this time was spent couseling and coordinating care.    I have explained all of the above in detail and the patient understands all of the current recommendation(s). I have answered all of their questions to the best of my ability and to their complete satisfaction.   The patient is to continue with the current management plan.            Electronically signed by Alvarez Augustine MD

## 2019-05-02 ENCOUNTER — OFFICE VISIT (OUTPATIENT)
Dept: HEMATOLOGY/ONCOLOGY | Facility: CLINIC | Age: 84
End: 2019-05-02
Payer: MEDICARE

## 2019-05-02 VITALS
RESPIRATION RATE: 20 BRPM | HEART RATE: 58 BPM | WEIGHT: 162.63 LBS | BODY MASS INDEX: 23.28 KG/M2 | HEIGHT: 70 IN | TEMPERATURE: 98 F | SYSTOLIC BLOOD PRESSURE: 126 MMHG | DIASTOLIC BLOOD PRESSURE: 80 MMHG

## 2019-05-02 DIAGNOSIS — D69.59 THROMBOCYTOPENIA, SECONDARY: Primary | ICD-10-CM

## 2019-05-02 DIAGNOSIS — D63.8 ANEMIA, CHRONIC DISEASE: ICD-10-CM

## 2019-05-02 DIAGNOSIS — D50.0 ANEMIA DUE TO CHRONIC BLOOD LOSS: ICD-10-CM

## 2019-05-02 PROCEDURE — 1101F PR PT FALLS ASSESS DOC 0-1 FALLS W/OUT INJ PAST YR: ICD-10-PCS | Mod: ,,, | Performed by: INTERNAL MEDICINE

## 2019-05-02 PROCEDURE — 99213 OFFICE O/P EST LOW 20 MIN: CPT | Mod: ,,, | Performed by: INTERNAL MEDICINE

## 2019-05-02 PROCEDURE — 99213 PR OFFICE/OUTPT VISIT, EST, LEVL III, 20-29 MIN: ICD-10-PCS | Mod: ,,, | Performed by: INTERNAL MEDICINE

## 2019-05-02 PROCEDURE — 1101F PT FALLS ASSESS-DOCD LE1/YR: CPT | Mod: ,,, | Performed by: INTERNAL MEDICINE

## 2019-05-02 NOTE — LETTER
May 2, 2019      Scott Payne MD  [address] [place] [address] [address]  [place] [address]           [facility] - Hematology Oncology  [address]  [address]  [place] [address]  Phone: [phone]  Fax: [phone]          Patient: Halley Rodrigues   MR Number: [id number]   YOB: 1930   Date of Visit: 5/2/2019       Dear Dr. Scott Payne:    Thank you for referring Halley Rodrigues to me for evaluation. Attached you will find relevant portions of my assessment and plan of care.    If you have questions, please do not hesitate to call me. I look forward to following Halley Rodrigues along with you.    Sincerely,    Alvarez Augustine MD    Enclosure  CC:  No Recipients    If you would like to receive this communication electronically, please contact [email or web address] or [phone] to request more information on Mobile Pulse Link access.    For providers and/or their staff who would like to refer a patient to [facility], please contact us through our one-stop-shop provider referral line, [facility], at [phone].    If you feel you have received this communication in error or would no longer like to receive these types of communications, please e-mail [email or web address]

## 2019-05-28 NOTE — TELEPHONE ENCOUNTER
----- Message from Dora Estes sent at 5/28/2019 12:43 PM CDT -----  Contact: Shawn from [facility]  Calling in with questions about the start date on a prescription    [facility]- [place], [place] - [address]  [address] [place] [address]  Phone: [phone] Fax: [phone]    Please contact to advise

## 2019-06-06 DIAGNOSIS — M54.50 LUMBAGO: Primary | ICD-10-CM

## 2019-07-26 NOTE — TELEPHONE ENCOUNTER
----- Message from Hernandez Muller sent at 7/26/2019  3:46 PM CDT -----  Type:  RX Refill Request    Who Called:  Erica Rodrigues (Relative)  Refill or New Rx:  Refill  RX Name and Strength:  HYDROcodone-acetaminophen (NORCO)  mg per tablet  How is the patient currently taking it? (ex. 1XDay):  4XDay  Is this a 30 day or 90 day RX:  30    Preferred Pharmacy with phone number:    [facility]- [place] - [place] - [address]  [address] [place] [address]  Phone: [phone] Fax: [phone]      Local or Mail Order:  Local  Ordering Provider:  Glenn Meeks Call Back Number:  [phone]  Additional Information:

## 2019-07-29 RX ORDER — HYDROCODONE BITARTRATE AND ACETAMINOPHEN 10; 325 MG/1; MG/1
1 TABLET ORAL EVERY 6 HOURS PRN
Qty: 120 TABLET | Refills: 0 | OUTPATIENT
Start: 2019-07-29 | End: 2019-08-27

## 2019-07-30 ENCOUNTER — OFFICE VISIT (OUTPATIENT)
Dept: PAIN MEDICINE | Facility: CLINIC | Age: 84
End: 2019-07-30
Payer: MEDICARE

## 2019-07-30 VITALS
SYSTOLIC BLOOD PRESSURE: 148 MMHG | HEIGHT: 70 IN | DIASTOLIC BLOOD PRESSURE: 68 MMHG | WEIGHT: 162 LBS | BODY MASS INDEX: 23.19 KG/M2 | HEART RATE: 48 BPM

## 2019-07-30 DIAGNOSIS — G60.9 PERIPHERAL NEUROPATHY, IDIOPATHIC: Primary | ICD-10-CM

## 2019-07-30 DIAGNOSIS — M47.896 OTHER SPONDYLOSIS, LUMBAR REGION: ICD-10-CM

## 2019-07-30 DIAGNOSIS — M53.3 SACROILIAC JOINT PAIN: ICD-10-CM

## 2019-07-30 DIAGNOSIS — M51.36 DDD (DEGENERATIVE DISC DISEASE), LUMBAR: ICD-10-CM

## 2019-07-30 PROCEDURE — 1101F PR PT FALLS ASSESS DOC 0-1 FALLS W/OUT INJ PAST YR: ICD-10-PCS | Mod: CPTII,S$GLB,, | Performed by: PHYSICIAN ASSISTANT

## 2019-07-30 PROCEDURE — 1101F PT FALLS ASSESS-DOCD LE1/YR: CPT | Mod: CPTII,S$GLB,, | Performed by: PHYSICIAN ASSISTANT

## 2019-07-30 PROCEDURE — 99999 PR PBB SHADOW E&M-EST. PATIENT-LVL IV: ICD-10-PCS | Mod: PBBFAC,,, | Performed by: PHYSICIAN ASSISTANT

## 2019-07-30 PROCEDURE — 99999 PR PBB SHADOW E&M-EST. PATIENT-LVL IV: CPT | Mod: PBBFAC,,, | Performed by: PHYSICIAN ASSISTANT

## 2019-07-30 PROCEDURE — 99214 OFFICE O/P EST MOD 30 MIN: CPT | Mod: S$GLB,,, | Performed by: PHYSICIAN ASSISTANT

## 2019-07-30 PROCEDURE — 99214 PR OFFICE/OUTPT VISIT, EST, LEVL IV, 30-39 MIN: ICD-10-PCS | Mod: S$GLB,,, | Performed by: PHYSICIAN ASSISTANT

## 2019-07-30 RX ORDER — HYDROCODONE BITARTRATE AND ACETAMINOPHEN 10; 325 MG/1; MG/1
1 TABLET ORAL EVERY 6 HOURS PRN
Qty: 120 TABLET | Refills: 0 | Status: SHIPPED | OUTPATIENT
Start: 2019-09-26 | End: 2019-10-21

## 2019-07-30 RX ORDER — HYDROCODONE BITARTRATE AND ACETAMINOPHEN 10; 325 MG/1; MG/1
1 TABLET ORAL EVERY 6 HOURS PRN
Qty: 120 TABLET | Refills: 0 | Status: SHIPPED | OUTPATIENT
Start: 2019-07-30 | End: 2019-08-28

## 2019-07-30 RX ORDER — HYDROCODONE BITARTRATE AND ACETAMINOPHEN 10; 325 MG/1; MG/1
1 TABLET ORAL EVERY 6 HOURS PRN
Qty: 120 TABLET | Refills: 0 | Status: SHIPPED | OUTPATIENT
Start: 2019-08-28 | End: 2019-09-26

## 2019-07-30 NOTE — TELEPHONE ENCOUNTER
Attempted to contact patient. Left message with call back number to schedule appointment to discuss refills per Dr. Elizabeth.

## 2019-08-26 ENCOUNTER — OFFICE VISIT (OUTPATIENT)
Dept: PODIATRY | Facility: CLINIC | Age: 84
End: 2019-08-26
Payer: MEDICARE

## 2019-08-26 VITALS
HEART RATE: 50 BPM | WEIGHT: 162 LBS | SYSTOLIC BLOOD PRESSURE: 152 MMHG | BODY MASS INDEX: 23.19 KG/M2 | DIASTOLIC BLOOD PRESSURE: 69 MMHG | HEIGHT: 70 IN

## 2019-08-26 DIAGNOSIS — B35.1 ONYCHOMYCOSIS DUE TO DERMATOPHYTE: ICD-10-CM

## 2019-08-26 DIAGNOSIS — G60.9 IDIOPATHIC PERIPHERAL NEUROPATHY: Primary | ICD-10-CM

## 2019-08-26 PROCEDURE — 1101F PT FALLS ASSESS-DOCD LE1/YR: CPT | Mod: CPTII,,, | Performed by: PODIATRIST

## 2019-08-26 PROCEDURE — 99213 PR OFFICE/OUTPT VISIT, EST, LEVL III, 20-29 MIN: ICD-10-PCS | Mod: ,,, | Performed by: PODIATRIST

## 2019-08-26 PROCEDURE — 99999 PR PBB SHADOW E&M-EST. PATIENT-LVL III: ICD-10-PCS | Mod: PBBFAC,,, | Performed by: PODIATRIST

## 2019-08-26 PROCEDURE — 99999 PR PBB SHADOW E&M-EST. PATIENT-LVL III: CPT | Mod: PBBFAC,,, | Performed by: PODIATRIST

## 2019-08-26 PROCEDURE — 17999 PR NON-COVERED FOOT CARE: ICD-10-PCS | Mod: CSM,S$GLB,, | Performed by: PODIATRIST

## 2019-08-26 PROCEDURE — 17999 UNLISTD PX SKN MUC MEMB SUBQ: CPT | Mod: CSM,S$GLB,, | Performed by: PODIATRIST

## 2019-08-26 PROCEDURE — 1101F PR PT FALLS ASSESS DOC 0-1 FALLS W/OUT INJ PAST YR: ICD-10-PCS | Mod: CPTII,,, | Performed by: PODIATRIST

## 2019-08-26 PROCEDURE — 99213 OFFICE O/P EST LOW 20 MIN: CPT | Mod: ,,, | Performed by: PODIATRIST

## 2019-08-26 RX ORDER — FUROSEMIDE 20 MG/1
20 TABLET ORAL DAILY
COMMUNITY
Start: 2019-08-19

## 2019-08-26 RX ORDER — CICLOPIROX 80 MG/ML
SOLUTION TOPICAL NIGHTLY
Qty: 6.6 ML | Refills: 11 | Status: ON HOLD | OUTPATIENT
Start: 2019-08-26 | End: 2019-12-03 | Stop reason: HOSPADM

## 2019-08-26 NOTE — PROGRESS NOTES
Subjective:      Patient ID: Halley Rodrigues is a 88 y.o. male.    Chief Complaint: Routine Foot Care and Foot Pain    Burning tingling pain both feet.  Gradual onset, worsening over past several months - year, aggravated by increased weight bearing, shoe gear, pressure.  Several medicines tried without relief - doesn't remember names.  OTC pain med not helping.    CC2 thick discolored misshapen toeanils all toes.  Review of Systems   Constitution: Negative for chills, diaphoresis, fever, malaise/fatigue and night sweats.   Cardiovascular: Negative for claudication, cyanosis, leg swelling and syncope.   Skin: Positive for nail changes. Negative for color change, dry skin, rash, suspicious lesions and unusual hair distribution.   Musculoskeletal: Negative for falls, joint pain, joint swelling, muscle cramps, muscle weakness and stiffness.   Gastrointestinal: Negative for constipation, diarrhea, nausea and vomiting.   Neurological: Positive for paresthesias and sensory change. Negative for brief paralysis, disturbances in coordination, focal weakness, numbness and tremors.           Objective:      Physical Exam   Constitutional: He is oriented to person, place, and time. He appears well-developed and well-nourished. He is cooperative.   Oriented to time, place, and person.   Cardiovascular:   Pulses:       Dorsalis pedis pulses are 1+ on the right side, and 1+ on the left side.        Posterior tibial pulses are 1+ on the right side, and 1+ on the left side.   Capillary fill time 3-5 seconds.  All toes warm to touch.      Negative lower extremity edema bilateral.    Negative elevational pallor and dependent rubor bilateral.     Musculoskeletal:   Normal angle, base, station of gait. Decreased stride length, early heel off, moderately propulsive toe off bilateral.    All ten toes without clubbing, cyanosis, or signs of ischemia.      No pain to palpation bilateral lower extremities.      Range of motion,  stability, muscle strength, and muscle tone are age and health appropriate normal bilateral feet and legs.       Lymphadenopathy:   Negative lymphadenopathy bilateral popliteal fossa and tarsal tunnel.     Neurological: He is alert and oriented to person, place, and time. He has normal strength. He is not disoriented. He displays no atrophy and no tremor. A sensory deficit is present. He exhibits normal muscle tone.   Reflex Scores:       Patellar reflexes are 2+ on the right side and 2+ on the left side.       Achilles reflexes are 2+ on the right side and 2+ on the left side.  Paresthesias, and burning bilateral feet with no clearly identified trigger or source.       Skin: Skin is warm, dry and intact. No abrasion, no bruising, no burn, no ecchymosis, no laceration, no lesion, no petechiae and no rash noted. He is not diaphoretic. No cyanosis or erythema. No pallor. Nails show no clubbing.   Skin is normal age and health appropriate color, turgor, texture, and temperature bilateral lower extremities without ulceration, hyperpigmentation, discoloration, masses nodules or cords palpated.  No ecchymosis, erythema, edema, or cardinal signs of infection bilateral lower extremities.      Toenails 1st, 2nd, 3rd, 4th, 5th  bilateral are hypertrophic thickened 2-3 mm, dystrophic, discolored tanish brown with tan, gray crumbly subungual debris.  Tender to distal nail plate pressure, without periungual skin abnormality of each.                 Assessment:       Encounter Diagnoses   Name Primary?    Idiopathic peripheral neuropathy Yes    Onychomycosis due to dermatophyte          Plan:       Halley was seen today for routine foot care and foot pain.    Diagnoses and all orders for this visit:    Idiopathic peripheral neuropathy    Onychomycosis due to dermatophyte    Other orders  -     ciclopirox (PENLAC) 8 % Soln; Apply topically nightly.      I counseled the patient on his conditions, their implications and medical  management.      Consult pain management/pcp for chronic neuropathic pain management.    Discussed conservative treatment with shoes of adequate dimensions, material, and style to alleviate symptoms and delay or prevent surgical intervention.       non covered foot care:    With the patient's permission, I debrided all ten toenails with a sterile nipper and curette, removing all offending nail and debris.  Patient tolerated the procedure well and related significant relief.     Rx penlac    Follow up if symptoms worsen or fail to improve.

## 2019-08-26 NOTE — LETTER
August 26, 2019      Scott Payne MD  [address] [place] [address] [address]  [place] [address]           [place] - Podiatry  [address]  [place] [address]  Phone: [phone]          Patient: Halley Rodrigues   MR Number: [id number]   YOB: 1930   Date of Visit: 8/26/2019       Dear Dr. Scott Payne:    Thank you for referring Halley Rodrigues to me for evaluation. Attached you will find relevant portions of my assessment and plan of care.    If you have questions, please do not hesitate to call me. I look forward to following Halley Rodrigues along with you.    Sincerely,    Hernandez Hanks, ELOY    Enclosure  CC:  No Recipients    If you would like to receive this communication electronically, please contact [email or web address] or [phone] to request more information on Appticles Link access.    For providers and/or their staff who would like to refer a patient to [facility], please contact us through our one-stop-shop provider referral line, [facility] , at [phone].    If you feel you have received this communication in error or would no longer like to receive these types of communications, please e-mail [email or web address]

## 2019-10-21 ENCOUNTER — OFFICE VISIT (OUTPATIENT)
Dept: PAIN MEDICINE | Facility: CLINIC | Age: 84
End: 2019-10-21
Payer: MEDICARE

## 2019-10-21 VITALS
DIASTOLIC BLOOD PRESSURE: 78 MMHG | SYSTOLIC BLOOD PRESSURE: 154 MMHG | BODY MASS INDEX: 23.19 KG/M2 | HEIGHT: 70 IN | WEIGHT: 162 LBS | HEART RATE: 54 BPM

## 2019-10-21 DIAGNOSIS — Z79.891 CHRONIC USE OF OPIATE DRUG FOR THERAPEUTIC PURPOSE: Primary | ICD-10-CM

## 2019-10-21 DIAGNOSIS — M51.36 DDD (DEGENERATIVE DISC DISEASE), LUMBAR: ICD-10-CM

## 2019-10-21 DIAGNOSIS — M47.896 OTHER SPONDYLOSIS, LUMBAR REGION: ICD-10-CM

## 2019-10-21 DIAGNOSIS — G60.9 IDIOPATHIC PERIPHERAL NEUROPATHY: ICD-10-CM

## 2019-10-21 PROCEDURE — 1101F PR PT FALLS ASSESS DOC 0-1 FALLS W/OUT INJ PAST YR: ICD-10-PCS | Mod: CPTII,S$GLB,, | Performed by: ANESTHESIOLOGY

## 2019-10-21 PROCEDURE — 99999 PR PBB SHADOW E&M-EST. PATIENT-LVL IV: CPT | Mod: PBBFAC,,, | Performed by: ANESTHESIOLOGY

## 2019-10-21 PROCEDURE — 99999 PR PBB SHADOW E&M-EST. PATIENT-LVL IV: ICD-10-PCS | Mod: PBBFAC,,, | Performed by: ANESTHESIOLOGY

## 2019-10-21 PROCEDURE — 99213 PR OFFICE/OUTPT VISIT, EST, LEVL III, 20-29 MIN: ICD-10-PCS | Mod: S$GLB,,, | Performed by: ANESTHESIOLOGY

## 2019-10-21 PROCEDURE — 1101F PT FALLS ASSESS-DOCD LE1/YR: CPT | Mod: CPTII,S$GLB,, | Performed by: ANESTHESIOLOGY

## 2019-10-21 PROCEDURE — 99213 OFFICE O/P EST LOW 20 MIN: CPT | Mod: S$GLB,,, | Performed by: ANESTHESIOLOGY

## 2019-10-21 RX ORDER — HYDROCODONE BITARTRATE AND ACETAMINOPHEN 10; 325 MG/1; MG/1
1 TABLET ORAL EVERY 8 HOURS PRN
Qty: 90 TABLET | Refills: 0 | Status: ON HOLD | OUTPATIENT
Start: 2019-10-21 | End: 2019-11-24 | Stop reason: HOSPADM

## 2019-10-21 RX ORDER — HYDROCODONE BITARTRATE AND ACETAMINOPHEN 10; 325 MG/1; MG/1
1 TABLET ORAL EVERY 8 HOURS PRN
Qty: 90 TABLET | Refills: 0 | Status: SHIPPED | OUTPATIENT
Start: 2019-12-18 | End: 2020-01-20 | Stop reason: SDUPTHER

## 2019-10-21 RX ORDER — HYDROCODONE BITARTRATE AND ACETAMINOPHEN 10; 325 MG/1; MG/1
1 TABLET ORAL EVERY 8 HOURS PRN
Qty: 90 TABLET | Refills: 0 | Status: SHIPPED | OUTPATIENT
Start: 2019-11-19 | End: 2019-12-19

## 2019-10-21 NOTE — PROGRESS NOTES
"Referring Physician: No ref. provider found    PCP: Scott Payne MD      CC: bilateral foot pain and lower back pain    Interval History:  Mr. Rodrigues is 89 y.o. malewith continued bilateral peripheral neuropathy.  Sympathetic blocks previously did not improve his pain.  Ketamine compounding cream was not helpful.  His back pain is not currently of major concern.  Hydrocodone  mg q 6 h provides some benefit.  His lyrica regimen remains unchanged.  He has noticed decrease in activity becoming more sedentary recently.  Does not desire spinal cord stimulation this time. Denies any worsening weakness.  No bowel bladder changes.    Prior HPI:   Halley Rodrigues is a 89 y.o.  male with PMHx significant for HLD, HTN, CAD on Plavix, peripheral neuropathy who presents with CC: painful burning sensation in B feet x 5y+ duration. The pain is constant and awakes him in his sleep and makes the evenings the most difficult, pain- wise. It is worse with lying flat and improved with walking. He has taken Lyrica x 5y without any appreciable pain relief. He has tried 2%lidocaine cream, which completely "numbs his feet" and inhibits him from ambulation. He has taken Hydrocodone 7.5 mg 2-3 times QD which helps with his pain significantly. He also reports cLBP without preceding injury/trauma, specifically in the area of B SI joints (R worse than L), aching and stabbing in nature, without radiation. His back pain is worsened with prolonged standing and walking, which limits his activity and ultimately makes his foot pain worse 2/2 being sedentary. He has been having intermittent falls, which he attributes to generalized weakness versus foot numbness or back pain. He denies saddle anesthesia or B/B changes.     ROS:  CONSTITUTIONAL: No fevers, chills, night sweats, wt. loss, appetite changes  SKIN: no rashes or itching  ENT: No headaches, head trauma, vision changes, or eye pain  LYMPH NODES: None noticed "   CV: No chest pain, palpitations.   RESP: No shortness of breath, dyspnea on exertion, cough, wheezing, or hemoptysis  GI: No nausea, emesis, diarrhea, constipation, melena, hematochezia, pain.    : No dysuria, hematuria, urgency, or frequency   HEME: No easy bruising, bleeding problems  PSYCHIATRIC: No depression, anxiety, psychosis, hallucinations.  NEURO: No seizures, memory loss, dizziness or difficulty sleeping  MSK back pain, foot pain      Past Medical History:   Diagnosis Date    Anemia     Anemia due to chronic blood loss 7/25/2018    Anemia, chronic disease 7/25/2018    GERD (gastroesophageal reflux disease)     Heart disease     Hypertension     Neuropathy      Past Surgical History:   Procedure Laterality Date    CORONARY ARTERY BYPASS GRAFT       Family History   Problem Relation Age of Onset    Dementia Father      Social History     Socioeconomic History    Marital status:      Spouse name: Not on file    Number of children: Not on file    Years of education: Not on file    Highest education level: Not on file   Occupational History    Not on file   Social Needs    Financial resource strain: Not on file    Food insecurity:     Worry: Not on file     Inability: Not on file    Transportation needs:     Medical: Not on file     Non-medical: Not on file   Tobacco Use    Smoking status: Current Every Day Smoker     Types: Cigarettes, Vaping with nicotine    Smokeless tobacco: Never Used   Substance and Sexual Activity    Alcohol use: No    Drug use: No    Sexual activity: Not on file   Lifestyle    Physical activity:     Days per week: Not on file     Minutes per session: Not on file    Stress: Not on file   Relationships    Social connections:     Talks on phone: Not on file     Gets together: Not on file     Attends Faith service: Not on file     Active member of club or organization: Not on file     Attends meetings of clubs or organizations: Not on file      "Relationship status: Not on file   Other Topics Concern    Not on file   Social History Narrative    Not on file         Medications/Allergies: See med card    Vitals:    10/21/19 1016   BP: (!) 154/78   Pulse: (!) 54   Weight: 73.5 kg (162 lb)   Height: 5' 10" (1.778 m)   PainSc:   6   PainLoc: Back         Physical exam:    GENERAL: A and O x3, the patient appears well groomed and is in no acute distress.  Skin: No rashes or obvious lesions  HEENT: normocephalic, atraumatic  CARDIOVASCULAR:  Bradycardia  LUNGS: non labored breathing  ABDOMEN: soft, nontender   UPPER EXTREMITIES: Normal alignment, normal range of motion, no atrophy, no skin changes,  hair growth and nail growth normal and equal bilaterally. No swelling, no tenderness.    LOWER EXTREMITIES:  Normal alignment, normal range of motion, no atrophy, dry skin, onchyomycosis in B feet. No swelling, no tenderness.    LUMBAR SPINE  Lumbar spine: ROM is moderately decreased l with flexion extension and oblique extension   Michel's test causes increased pain on both sides (R>>L)   Supine straight leg raise is negative bilaterally.    Internal and external rotation of the hip causes no increased pain on either side.  Myofascial exam:SI joint tenderness B      MENTAL STATUS: normal orientation, speech, language, and fund of knowledge for social situation.  Emotional state appropriate.    CRANIAL NERVES:  II:  PERRL bilaterally,   III,IV,VI: EOMI.    V:  Facial sensation equal bilaterally  VII:  Facial motor function normal.  VIII:  Hearing equal to finger rub bilaterally  IX/X: Gag normal, palate symmetric  XI:  Shoulder shrug equal, head turn equal  XII:  Tongue midline without fasciculations    MOTOR: Tone and bulk: normal bilateral upper and lower Strength: normal   Delt Bi Tri WE WF     R 5 5 5 5 5 5   L 5 5 5 5 5 5     IP ADD ABD Quad TA Gas HAM  R 5 5 5 5 5 5 5  L 5 5 5 5 5 5 5    SENSATION: Light touch and pinprick intact bilaterally  REFLEXES: " normal, symmetric, nonbrisk.  Toes down, no clonus. No hoffmans.  GAIT: normal rise, base, steps, and arm swing.      Assessment   Mr. Rodrigues is a 89 y.o. male with   1. Idiopathic peripheral neuropathy    2. DDD (degenerative disc disease), lumbar    3. Other spondylosis, lumbar region      Plan:  1.  I have stressed the importance of physical activity and exercise to improve overall health. Continue PT exercises  2.  Will decrease his hydrocodone to 10 mg q.8 hours given his decrease in activity and concern with over medication.  Patient expressed understanding.  Script provided for 3 months.  UDS today.  3.  May consider SCS trial, but patient is currently hesitant to proceed    4.  Continue lyrica as prescribed  5.  Follow up in 3 months

## 2019-10-30 ENCOUNTER — TELEPHONE (OUTPATIENT)
Dept: HEMATOLOGY/ONCOLOGY | Facility: CLINIC | Age: 84
End: 2019-10-30

## 2019-10-30 DIAGNOSIS — D50.0 ANEMIA DUE TO CHRONIC BLOOD LOSS: ICD-10-CM

## 2019-10-30 DIAGNOSIS — D69.59 THROMBOCYTOPENIA, SECONDARY: Primary | ICD-10-CM

## 2019-10-30 DIAGNOSIS — D63.8 ANEMIA, CHRONIC DISEASE: ICD-10-CM

## 2019-10-30 DIAGNOSIS — E55.9 VITAMIN D DEFICIENCY: ICD-10-CM

## 2019-10-30 NOTE — TELEPHONE ENCOUNTER
Called to see if the pt had any labs done prior to f/u appt.  LM for the labs to be done @ [facility].

## 2019-11-21 ENCOUNTER — HOSPITAL ENCOUNTER (OUTPATIENT)
Facility: HOSPITAL | Age: 84
Discharge: NURSING, REHAB OR LONG-TERM CARE | End: 2019-12-03
Attending: EMERGENCY MEDICINE | Admitting: INTERNAL MEDICINE
Payer: MEDICARE

## 2019-11-21 DIAGNOSIS — S09.90XA CLOSED HEAD INJURY, INITIAL ENCOUNTER: Primary | ICD-10-CM

## 2019-11-21 DIAGNOSIS — R53.81 PHYSICAL DECONDITIONING: ICD-10-CM

## 2019-11-21 DIAGNOSIS — E43 SEVERE PROTEIN-ENERGY MALNUTRITION: ICD-10-CM

## 2019-11-21 DIAGNOSIS — R94.4 RENAL FUNCTION TEST ABNORMAL: ICD-10-CM

## 2019-11-21 DIAGNOSIS — R06.02 SHORTNESS OF BREATH: ICD-10-CM

## 2019-11-21 DIAGNOSIS — I65.29 STENOSIS OF CAROTID ARTERY, UNSPECIFIED LATERALITY: ICD-10-CM

## 2019-11-21 DIAGNOSIS — I50.9 HF (HEART FAILURE): ICD-10-CM

## 2019-11-21 DIAGNOSIS — R79.89 ELEVATED TROPONIN: ICD-10-CM

## 2019-11-21 DIAGNOSIS — I50.9 CONGESTIVE HEART FAILURE, UNSPECIFIED HF CHRONICITY, UNSPECIFIED HEART FAILURE TYPE: ICD-10-CM

## 2019-11-21 DIAGNOSIS — R55 SYNCOPE AND COLLAPSE: ICD-10-CM

## 2019-11-21 DIAGNOSIS — R54 ADVANCED AGE: ICD-10-CM

## 2019-11-21 PROBLEM — F03.90 DEMENTIA: Status: ACTIVE | Noted: 2019-11-21

## 2019-11-21 PROBLEM — D69.6 THROMBOCYTOPENIA: Status: ACTIVE | Noted: 2019-11-21

## 2019-11-21 PROBLEM — E87.6 HYPOKALEMIA: Status: ACTIVE | Noted: 2019-11-21

## 2019-11-21 PROBLEM — Z72.0 NICOTINE ABUSE: Status: ACTIVE | Noted: 2019-11-21

## 2019-11-21 PROBLEM — D50.0 ANEMIA DUE TO CHRONIC BLOOD LOSS: Status: RESOLVED | Noted: 2018-07-25 | Resolved: 2019-11-21

## 2019-11-21 LAB
ALBUMIN SERPL BCP-MCNC: 4 G/DL (ref 3.5–5.2)
ALP SERPL-CCNC: 73 U/L (ref 55–135)
ALT SERPL W/O P-5'-P-CCNC: 16 U/L (ref 10–44)
ANION GAP SERPL CALC-SCNC: 8 MMOL/L (ref 8–16)
AST SERPL-CCNC: 24 U/L (ref 10–40)
BASOPHILS # BLD AUTO: 0.02 K/UL (ref 0–0.2)
BASOPHILS NFR BLD: 0.3 % (ref 0–1.9)
BILIRUB SERPL-MCNC: 1.7 MG/DL (ref 0.1–1)
BNP SERPL-MCNC: 844 PG/ML (ref 0–99)
BUN SERPL-MCNC: 17 MG/DL (ref 8–23)
CALCIUM SERPL-MCNC: 8.7 MG/DL (ref 8.7–10.5)
CHLORIDE SERPL-SCNC: 105 MMOL/L (ref 95–110)
CO2 SERPL-SCNC: 28 MMOL/L (ref 23–29)
CREAT SERPL-MCNC: 1.4 MG/DL (ref 0.5–1.4)
DIFFERENTIAL METHOD: ABNORMAL
EOSINOPHIL # BLD AUTO: 0.1 K/UL (ref 0–0.5)
EOSINOPHIL NFR BLD: 1.8 % (ref 0–8)
ERYTHROCYTE [DISTWIDTH] IN BLOOD BY AUTOMATED COUNT: 14.6 % (ref 11.5–14.5)
EST. GFR  (AFRICAN AMERICAN): 51.1 ML/MIN/1.73 M^2
EST. GFR  (NON AFRICAN AMERICAN): 44.2 ML/MIN/1.73 M^2
GLUCOSE SERPL-MCNC: 121 MG/DL (ref 70–110)
HCT VFR BLD AUTO: 38.1 % (ref 40–54)
HGB BLD-MCNC: 12.6 G/DL (ref 14–18)
IMM GRANULOCYTES # BLD AUTO: 0.02 K/UL (ref 0–0.04)
IMM GRANULOCYTES NFR BLD AUTO: 0.3 % (ref 0–0.5)
INR PPP: 1.2
LYMPHOCYTES # BLD AUTO: 1.1 K/UL (ref 1–4.8)
LYMPHOCYTES NFR BLD: 16.3 % (ref 18–48)
MCH RBC QN AUTO: 31.8 PG (ref 27–31)
MCHC RBC AUTO-ENTMCNC: 33.1 G/DL (ref 32–36)
MCV RBC AUTO: 96 FL (ref 82–98)
MONOCYTES # BLD AUTO: 0.5 K/UL (ref 0.3–1)
MONOCYTES NFR BLD: 7 % (ref 4–15)
NEUTROPHILS # BLD AUTO: 4.9 K/UL (ref 1.8–7.7)
NEUTROPHILS NFR BLD: 74.3 % (ref 38–73)
NRBC BLD-RTO: 0 /100 WBC
PLATELET # BLD AUTO: 77 K/UL (ref 150–350)
PMV BLD AUTO: 13.2 FL (ref 9.2–12.9)
POTASSIUM SERPL-SCNC: 3 MMOL/L (ref 3.5–5.1)
PROT SERPL-MCNC: 6.8 G/DL (ref 6–8.4)
PROTHROMBIN TIME: 14.7 SEC (ref 10.6–14.8)
RBC # BLD AUTO: 3.96 M/UL (ref 4.6–6.2)
SODIUM SERPL-SCNC: 141 MMOL/L (ref 136–145)
TROPONIN I SERPL DL<=0.01 NG/ML-MCNC: 0.22 NG/ML (ref 0.02–0.04)
WBC # BLD AUTO: 6.55 K/UL (ref 3.9–12.7)

## 2019-11-21 PROCEDURE — G0378 HOSPITAL OBSERVATION PER HR: HCPCS

## 2019-11-21 PROCEDURE — 85610 PROTHROMBIN TIME: CPT

## 2019-11-21 PROCEDURE — 85025 COMPLETE CBC W/AUTO DIFF WBC: CPT

## 2019-11-21 PROCEDURE — 96374 THER/PROPH/DIAG INJ IV PUSH: CPT

## 2019-11-21 PROCEDURE — 25000003 PHARM REV CODE 250: Performed by: EMERGENCY MEDICINE

## 2019-11-21 PROCEDURE — 25000003 PHARM REV CODE 250: Performed by: NURSE PRACTITIONER

## 2019-11-21 PROCEDURE — 94761 N-INVAS EAR/PLS OXIMETRY MLT: CPT

## 2019-11-21 PROCEDURE — 80053 COMPREHEN METABOLIC PANEL: CPT

## 2019-11-21 PROCEDURE — 63600175 PHARM REV CODE 636 W HCPCS: Performed by: EMERGENCY MEDICINE

## 2019-11-21 PROCEDURE — 96376 TX/PRO/DX INJ SAME DRUG ADON: CPT

## 2019-11-21 PROCEDURE — 83880 ASSAY OF NATRIURETIC PEPTIDE: CPT

## 2019-11-21 PROCEDURE — 84484 ASSAY OF TROPONIN QUANT: CPT

## 2019-11-21 PROCEDURE — 99285 EMERGENCY DEPT VISIT HI MDM: CPT | Mod: 25

## 2019-11-21 PROCEDURE — 93005 ELECTROCARDIOGRAM TRACING: CPT

## 2019-11-21 PROCEDURE — 99900035 HC TECH TIME PER 15 MIN (STAT)

## 2019-11-21 RX ORDER — FUROSEMIDE 10 MG/ML
20 INJECTION INTRAMUSCULAR; INTRAVENOUS
Status: COMPLETED | OUTPATIENT
Start: 2019-11-21 | End: 2019-11-21

## 2019-11-21 RX ORDER — FUROSEMIDE 10 MG/ML
40 INJECTION INTRAMUSCULAR; INTRAVENOUS DAILY
Status: DISCONTINUED | OUTPATIENT
Start: 2019-11-22 | End: 2019-11-23

## 2019-11-21 RX ORDER — ALBUTEROL SULFATE 0.83 MG/ML
2.5 SOLUTION RESPIRATORY (INHALATION) EVERY 4 HOURS PRN
Status: DISCONTINUED | OUTPATIENT
Start: 2019-11-21 | End: 2019-12-03 | Stop reason: HOSPADM

## 2019-11-21 RX ORDER — POTASSIUM CHLORIDE 20 MEQ/1
40 TABLET, EXTENDED RELEASE ORAL
Status: COMPLETED | OUTPATIENT
Start: 2019-11-21 | End: 2019-11-21

## 2019-11-21 RX ORDER — POTASSIUM CHLORIDE 20 MEQ/15ML
40 SOLUTION ORAL
Status: DISCONTINUED | OUTPATIENT
Start: 2019-11-21 | End: 2019-12-03 | Stop reason: HOSPADM

## 2019-11-21 RX ORDER — ENOXAPARIN SODIUM 100 MG/ML
40 INJECTION SUBCUTANEOUS EVERY 24 HOURS
Status: DISCONTINUED | OUTPATIENT
Start: 2019-11-21 | End: 2019-11-21

## 2019-11-21 RX ORDER — LANOLIN ALCOHOL/MO/W.PET/CERES
800 CREAM (GRAM) TOPICAL
Status: DISCONTINUED | OUTPATIENT
Start: 2019-11-21 | End: 2019-12-03 | Stop reason: HOSPADM

## 2019-11-21 RX ORDER — PANTOPRAZOLE SODIUM 40 MG/1
40 TABLET, DELAYED RELEASE ORAL DAILY
Status: DISCONTINUED | OUTPATIENT
Start: 2019-11-22 | End: 2019-12-03 | Stop reason: HOSPADM

## 2019-11-21 RX ORDER — PREGABALIN 75 MG/1
150 CAPSULE ORAL 2 TIMES DAILY
Status: DISCONTINUED | OUTPATIENT
Start: 2019-11-21 | End: 2019-12-03 | Stop reason: HOSPADM

## 2019-11-21 RX ORDER — ASPIRIN 325 MG
325 TABLET ORAL
Status: COMPLETED | OUTPATIENT
Start: 2019-11-21 | End: 2019-11-21

## 2019-11-21 RX ORDER — ALBUTEROL SULFATE 0.83 MG/ML
2.5 SOLUTION RESPIRATORY (INHALATION) EVERY 4 HOURS PRN
Status: DISCONTINUED | OUTPATIENT
Start: 2019-11-21 | End: 2019-11-21

## 2019-11-21 RX ORDER — ACETAMINOPHEN 325 MG/1
650 TABLET ORAL EVERY 4 HOURS PRN
Status: DISCONTINUED | OUTPATIENT
Start: 2019-11-21 | End: 2019-12-03 | Stop reason: HOSPADM

## 2019-11-21 RX ORDER — METOPROLOL TARTRATE 25 MG/1
25 TABLET, FILM COATED ORAL 2 TIMES DAILY
Status: DISCONTINUED | OUTPATIENT
Start: 2019-11-21 | End: 2019-11-23

## 2019-11-21 RX ORDER — HYDROCODONE BITARTRATE AND ACETAMINOPHEN 10; 325 MG/1; MG/1
1 TABLET ORAL EVERY 8 HOURS PRN
Status: DISCONTINUED | OUTPATIENT
Start: 2019-11-21 | End: 2019-12-03 | Stop reason: HOSPADM

## 2019-11-21 RX ORDER — NAPROXEN SODIUM 220 MG/1
81 TABLET, FILM COATED ORAL DAILY
Status: DISCONTINUED | OUTPATIENT
Start: 2019-11-22 | End: 2019-12-03 | Stop reason: HOSPADM

## 2019-11-21 RX ORDER — SIMVASTATIN 40 MG/1
40 TABLET, FILM COATED ORAL NIGHTLY
Status: DISCONTINUED | OUTPATIENT
Start: 2019-11-21 | End: 2019-12-03 | Stop reason: HOSPADM

## 2019-11-21 RX ORDER — DONEPEZIL HYDROCHLORIDE 5 MG/1
10 TABLET, FILM COATED ORAL DAILY
Status: DISCONTINUED | OUTPATIENT
Start: 2019-11-22 | End: 2019-12-03 | Stop reason: HOSPADM

## 2019-11-21 RX ORDER — FUROSEMIDE 10 MG/ML
40 INJECTION INTRAMUSCULAR; INTRAVENOUS DAILY
Status: DISCONTINUED | OUTPATIENT
Start: 2019-11-21 | End: 2019-11-21

## 2019-11-21 RX ORDER — SODIUM,POTASSIUM PHOSPHATES 280-250MG
2 POWDER IN PACKET (EA) ORAL
Status: DISCONTINUED | OUTPATIENT
Start: 2019-11-21 | End: 2019-12-03 | Stop reason: HOSPADM

## 2019-11-21 RX ORDER — SODIUM CHLORIDE 0.9 % (FLUSH) 0.9 %
10 SYRINGE (ML) INJECTION
Status: DISCONTINUED | OUTPATIENT
Start: 2019-11-21 | End: 2019-12-03 | Stop reason: HOSPADM

## 2019-11-21 RX ORDER — NAPROXEN SODIUM 220 MG/1
81 TABLET, FILM COATED ORAL DAILY
Status: DISCONTINUED | OUTPATIENT
Start: 2019-11-21 | End: 2019-11-21

## 2019-11-21 RX ORDER — SERTRALINE HYDROCHLORIDE 50 MG/1
100 TABLET, FILM COATED ORAL NIGHTLY
Status: DISCONTINUED | OUTPATIENT
Start: 2019-11-21 | End: 2019-12-03 | Stop reason: HOSPADM

## 2019-11-21 RX ORDER — BISACODYL 10 MG
10 SUPPOSITORY, RECTAL RECTAL DAILY PRN
Status: DISCONTINUED | OUTPATIENT
Start: 2019-11-21 | End: 2019-12-03 | Stop reason: HOSPADM

## 2019-11-21 RX ADMIN — NITROGLYCERIN 0.5 INCH: 20 OINTMENT TOPICAL at 06:11

## 2019-11-21 RX ADMIN — POTASSIUM CHLORIDE 40 MEQ: 20 TABLET, EXTENDED RELEASE ORAL at 08:11

## 2019-11-21 RX ADMIN — ASPIRIN 325 MG ORAL TABLET 325 MG: 325 PILL ORAL at 06:11

## 2019-11-21 RX ADMIN — METOPROLOL TARTRATE 25 MG: 25 TABLET ORAL at 09:11

## 2019-11-21 RX ADMIN — FUROSEMIDE 20 MG: 10 INJECTION, SOLUTION INTRAMUSCULAR; INTRAVENOUS at 08:11

## 2019-11-21 RX ADMIN — FUROSEMIDE 20 MG: 10 INJECTION, SOLUTION INTRAMUSCULAR; INTRAVENOUS at 05:11

## 2019-11-21 RX ADMIN — PREGABALIN 150 MG: 75 CAPSULE ORAL at 09:11

## 2019-11-21 RX ADMIN — SIMVASTATIN 40 MG: 40 TABLET, FILM COATED ORAL at 09:11

## 2019-11-21 RX ADMIN — HYDROCODONE BITARTRATE AND ACETAMINOPHEN 1 TABLET: 10; 325 TABLET ORAL at 08:11

## 2019-11-21 RX ADMIN — SERTRALINE HYDROCHLORIDE 100 MG: 50 TABLET ORAL at 09:11

## 2019-11-22 ENCOUNTER — CLINICAL SUPPORT (OUTPATIENT)
Dept: CARDIOLOGY | Facility: HOSPITAL | Age: 84
End: 2019-11-22
Payer: MEDICARE

## 2019-11-22 PROBLEM — E43 SEVERE PROTEIN-ENERGY MALNUTRITION: Status: ACTIVE | Noted: 2019-11-22

## 2019-11-22 LAB
ALBUMIN SERPL BCP-MCNC: 3.8 G/DL (ref 3.5–5.2)
ALP SERPL-CCNC: 69 U/L (ref 55–135)
ALT SERPL W/O P-5'-P-CCNC: 17 U/L (ref 10–44)
ANION GAP SERPL CALC-SCNC: 11 MMOL/L (ref 8–16)
ANION GAP SERPL CALC-SCNC: 8 MMOL/L (ref 8–16)
AST SERPL-CCNC: 24 U/L (ref 10–40)
BASOPHILS # BLD AUTO: 0.02 K/UL (ref 0–0.2)
BASOPHILS NFR BLD: 0.3 % (ref 0–1.9)
BILIRUB SERPL-MCNC: 1.3 MG/DL (ref 0.1–1)
BUN SERPL-MCNC: 18 MG/DL (ref 8–23)
BUN SERPL-MCNC: 20 MG/DL (ref 8–23)
CALCIUM SERPL-MCNC: 8.5 MG/DL (ref 8.7–10.5)
CALCIUM SERPL-MCNC: 8.7 MG/DL (ref 8.7–10.5)
CHLORIDE SERPL-SCNC: 103 MMOL/L (ref 95–110)
CHLORIDE SERPL-SCNC: 104 MMOL/L (ref 95–110)
CO2 SERPL-SCNC: 28 MMOL/L (ref 23–29)
CO2 SERPL-SCNC: 32 MMOL/L (ref 23–29)
CREAT SERPL-MCNC: 1.5 MG/DL (ref 0.5–1.4)
CREAT SERPL-MCNC: 1.5 MG/DL (ref 0.5–1.4)
DIFFERENTIAL METHOD: ABNORMAL
EOSINOPHIL # BLD AUTO: 0.2 K/UL (ref 0–0.5)
EOSINOPHIL NFR BLD: 2.8 % (ref 0–8)
ERYTHROCYTE [DISTWIDTH] IN BLOOD BY AUTOMATED COUNT: 14.7 % (ref 11.5–14.5)
EST. GFR  (AFRICAN AMERICAN): 47 ML/MIN/1.73 M^2
EST. GFR  (AFRICAN AMERICAN): 47 ML/MIN/1.73 M^2
EST. GFR  (NON AFRICAN AMERICAN): 40.7 ML/MIN/1.73 M^2
EST. GFR  (NON AFRICAN AMERICAN): 40.7 ML/MIN/1.73 M^2
GLUCOSE SERPL-MCNC: 105 MG/DL (ref 70–110)
GLUCOSE SERPL-MCNC: 123 MG/DL (ref 70–110)
HCT VFR BLD AUTO: 37.2 % (ref 40–54)
HGB BLD-MCNC: 12.2 G/DL (ref 14–18)
IMM GRANULOCYTES # BLD AUTO: 0.01 K/UL (ref 0–0.04)
IMM GRANULOCYTES NFR BLD AUTO: 0.2 % (ref 0–0.5)
LYMPHOCYTES # BLD AUTO: 1.6 K/UL (ref 1–4.8)
LYMPHOCYTES NFR BLD: 25.5 % (ref 18–48)
MAGNESIUM SERPL-MCNC: 1.6 MG/DL (ref 1.6–2.6)
MCH RBC QN AUTO: 31.2 PG (ref 27–31)
MCHC RBC AUTO-ENTMCNC: 32.8 G/DL (ref 32–36)
MCV RBC AUTO: 95 FL (ref 82–98)
MONOCYTES # BLD AUTO: 0.7 K/UL (ref 0.3–1)
MONOCYTES NFR BLD: 10.2 % (ref 4–15)
NEUTROPHILS # BLD AUTO: 3.9 K/UL (ref 1.8–7.7)
NEUTROPHILS NFR BLD: 61 % (ref 38–73)
NRBC BLD-RTO: 0 /100 WBC
PHOSPHATE SERPL-MCNC: 3.2 MG/DL (ref 2.7–4.5)
PLATELET # BLD AUTO: 79 K/UL (ref 150–350)
PMV BLD AUTO: 13.3 FL (ref 9.2–12.9)
POTASSIUM SERPL-SCNC: 3.5 MMOL/L (ref 3.5–5.1)
POTASSIUM SERPL-SCNC: 3.8 MMOL/L (ref 3.5–5.1)
PROT SERPL-MCNC: 6.6 G/DL (ref 6–8.4)
RBC # BLD AUTO: 3.91 M/UL (ref 4.6–6.2)
SODIUM SERPL-SCNC: 143 MMOL/L (ref 136–145)
SODIUM SERPL-SCNC: 143 MMOL/L (ref 136–145)
TROPONIN I SERPL DL<=0.01 NG/ML-MCNC: 0.23 NG/ML (ref 0.02–0.04)
TROPONIN I SERPL DL<=0.01 NG/ML-MCNC: 0.24 NG/ML (ref 0.02–0.04)
WBC # BLD AUTO: 6.35 K/UL (ref 3.9–12.7)

## 2019-11-22 PROCEDURE — 94761 N-INVAS EAR/PLS OXIMETRY MLT: CPT

## 2019-11-22 PROCEDURE — 80048 BASIC METABOLIC PNL TOTAL CA: CPT

## 2019-11-22 PROCEDURE — 84484 ASSAY OF TROPONIN QUANT: CPT

## 2019-11-22 PROCEDURE — 25000003 PHARM REV CODE 250: Performed by: INTERNAL MEDICINE

## 2019-11-22 PROCEDURE — 93306 TTE W/DOPPLER COMPLETE: CPT

## 2019-11-22 PROCEDURE — 80053 COMPREHEN METABOLIC PANEL: CPT

## 2019-11-22 PROCEDURE — 84100 ASSAY OF PHOSPHORUS: CPT

## 2019-11-22 PROCEDURE — 99900035 HC TECH TIME PER 15 MIN (STAT)

## 2019-11-22 PROCEDURE — 25000003 PHARM REV CODE 250: Performed by: FAMILY MEDICINE

## 2019-11-22 PROCEDURE — 63600175 PHARM REV CODE 636 W HCPCS: Performed by: INTERNAL MEDICINE

## 2019-11-22 PROCEDURE — 25000003 PHARM REV CODE 250: Performed by: NURSE PRACTITIONER

## 2019-11-22 PROCEDURE — 96375 TX/PRO/DX INJ NEW DRUG ADDON: CPT

## 2019-11-22 PROCEDURE — 83735 ASSAY OF MAGNESIUM: CPT

## 2019-11-22 PROCEDURE — 96376 TX/PRO/DX INJ SAME DRUG ADON: CPT

## 2019-11-22 PROCEDURE — 36415 COLL VENOUS BLD VENIPUNCTURE: CPT

## 2019-11-22 PROCEDURE — 85025 COMPLETE CBC W/AUTO DIFF WBC: CPT

## 2019-11-22 PROCEDURE — G0378 HOSPITAL OBSERVATION PER HR: HCPCS

## 2019-11-22 PROCEDURE — 84484 ASSAY OF TROPONIN QUANT: CPT | Mod: 91

## 2019-11-22 PROCEDURE — 93005 ELECTROCARDIOGRAM TRACING: CPT

## 2019-11-22 RX ORDER — HYDRALAZINE HYDROCHLORIDE 20 MG/ML
10 INJECTION INTRAMUSCULAR; INTRAVENOUS EVERY 4 HOURS PRN
Status: DISCONTINUED | OUTPATIENT
Start: 2019-11-22 | End: 2019-12-03 | Stop reason: HOSPADM

## 2019-11-22 RX ORDER — AMLODIPINE BESYLATE 5 MG/1
5 TABLET ORAL DAILY
Status: DISCONTINUED | OUTPATIENT
Start: 2019-11-22 | End: 2019-11-22

## 2019-11-22 RX ORDER — FUROSEMIDE 10 MG/ML
INJECTION INTRAMUSCULAR; INTRAVENOUS
Status: DISPENSED
Start: 2019-11-22 | End: 2019-11-22

## 2019-11-22 RX ORDER — AMLODIPINE BESYLATE 5 MG/1
10 TABLET ORAL DAILY
Status: DISCONTINUED | OUTPATIENT
Start: 2019-11-23 | End: 2019-11-23

## 2019-11-22 RX ADMIN — METOPROLOL TARTRATE 25 MG: 25 TABLET ORAL at 09:11

## 2019-11-22 RX ADMIN — PANTOPRAZOLE SODIUM 40 MG: 40 TABLET, DELAYED RELEASE ORAL at 06:11

## 2019-11-22 RX ADMIN — POTASSIUM CHLORIDE 40 MEQ: 20 SOLUTION ORAL at 06:11

## 2019-11-22 RX ADMIN — DONEPEZIL HYDROCHLORIDE 10 MG: 5 TABLET, FILM COATED ORAL at 09:11

## 2019-11-22 RX ADMIN — HYDRALAZINE HYDROCHLORIDE 10 MG: 20 INJECTION INTRAMUSCULAR; INTRAVENOUS at 04:11

## 2019-11-22 RX ADMIN — SERTRALINE HYDROCHLORIDE 100 MG: 50 TABLET ORAL at 09:11

## 2019-11-22 RX ADMIN — PREGABALIN 150 MG: 75 CAPSULE ORAL at 09:11

## 2019-11-22 RX ADMIN — MAGNESIUM OXIDE 800 MG: 400 TABLET ORAL at 10:11

## 2019-11-22 RX ADMIN — ASPIRIN 81 MG 81 MG: 81 TABLET ORAL at 09:11

## 2019-11-22 RX ADMIN — HYDROCODONE BITARTRATE AND ACETAMINOPHEN 1 TABLET: 10; 325 TABLET ORAL at 09:11

## 2019-11-22 RX ADMIN — AMLODIPINE BESYLATE 5 MG: 5 TABLET ORAL at 12:11

## 2019-11-22 RX ADMIN — SIMVASTATIN 40 MG: 40 TABLET, FILM COATED ORAL at 09:11

## 2019-11-22 RX ADMIN — FUROSEMIDE 40 MG: 10 INJECTION, SOLUTION INTRAMUSCULAR; INTRAVENOUS at 05:11

## 2019-11-22 NOTE — PLAN OF CARE
11/22/19 1515   Patient Assessment/Suction   Level of Consciousness (AVPU) alert   Respiratory Effort Normal;Unlabored   Expansion/Accessory Muscles/Retractions expansion symmetric;no use of accessory muscles   All Lung Fields Breath Sounds clear   Rhythm/Pattern, Respiratory pattern regular   PRE-TX-O2   O2 Device (Oxygen Therapy) room air   SpO2 96 %   Pulse Oximetry Type Intermittent   $ Pulse Oximetry - Multiple Charge Pulse Oximetry - Multiple   Pulse 76   Resp 20   Positioning HOB elevated 45 degrees   Aerosol Therapy   $ Aerosol Therapy Charges PRN treatment not required

## 2019-11-22 NOTE — H&P
Former[facility] Medicine  History & Physical    Patient Name: Halley Rodrigues  MRN: [id number]  Admission Date: 11/21/2019  Attending Physician: Michelle Theodore MD   Primary Care Provider: Scott Payne MD         Patient information was obtained from patient, relative(s) and ER records.     Subjective:     Principal Problem:Syncope and collapse    Chief Complaint:   Chief Complaint   Patient presents with    Head Injury     fell yesterday per family unsure if patient passed out, hematoma to forehead    Shortness of Breath     also c/o shortness of breath and swelling to feet        HPI: The patien is an 89-year-old male with history of CAD, sp CABG x 4 in 1979 and dementia. He is also very hard hearing and unable to provide much information. Most information is obtained from his son and daughter in law. He lives by himself about 200 feet from his son and daughter in law. Per son and daughter in law, the security camera at his house showed that he was in the bathroom for 2 hours and 8 minutes yesterday. They later noted that he has a small hematoma on his forehead. The patient is unable to say whether he fell or passed out in the bathroom.     They report that his health has been quickly declined over the past month. He has been smoking over 70 years, now on e cigarettes. He has lost 13 lbs over the past 4 months. He has swelling of his legs despite having his medication adjusted by his PCP. They states that he usually sleeps only 4 hours at night and spent the rest of his time on a sofa. He has been falling to sleep easily and may fall asleep while at the dinner table.  He has neuropathy in his feet and has been taking norco for pain. He has been complaining of progressive shortness of breath and requiring more breathing treatments. He called them at 2 am a few night ago that he was very short of breath and needed breathing treatments.    He was seen by his PCP earlier today and  advised to come to the ED. Currently, he denies any pain or discomfort.     CT head in the ED was unremarkble> he was noted to have elevated BNP at 844. Troponin was slightly elevated at 0.218.  EKG, personally reviewed, shows sinus rhythm with no ST elevation.      Past Medical History:   Diagnosis Date    Anemia     Anemia due to chronic blood loss 7/25/2018    Anemia, chronic disease 7/25/2018    Dementia     GERD (gastroesophageal reflux disease)     Heart disease     Hypertension     Neuropathy        Past Surgical History:   Procedure Laterality Date    CORONARY ARTERY BYPASS GRAFT         Review of patient's allergies indicates:   Allergen Reactions    Eucalyptus containing products Palpitations       No current facility-administered medications on file prior to encounter.      Current Outpatient Medications on File Prior to Encounter   Medication Sig    aspirin 81 MG Chew Take 81 mg by mouth once daily.     donepezil (ARICEPT) 10 MG tablet TAKE 1 TABLET BY MOUTH ONCE DAILY (Patient taking differently: Take 10 mg by mouth once daily. )    furosemide (LASIX) 20 MG tablet Take 20 mg by mouth once daily.     HYDROcodone-acetaminophen (NORCO)  mg per tablet Take 1 tablet by mouth every 8 (eight) hours as needed (pain). Medically necessary for greater than 7 days for chronic pain    ipratropium (ATROVENT) 42 mcg (0.06 %) nasal spray 1 spray by Nasal route 2 (two) times daily.     LYRICA 150 mg capsule Take 150 mg by mouth 2 (two) times daily.     metoprolol tartrate (LOPRESSOR) 25 MG tablet Take 25 mg by mouth 2 (two) times daily.     pantoprazole (PROTONIX) 40 MG tablet Take 40 mg by mouth once daily.     sertraline (ZOLOFT) 100 MG tablet Take 100 mg by mouth every evening.     simvastatin (ZOCOR) 40 MG tablet Take 40 mg by mouth every evening.     albuterol-ipratropium 2.5mg-0.5mg/3mL (DUO-NEB) 0.5 mg-3 mg(2.5 mg base)/3 mL nebulizer solution     ciclopirox (PENLAC) 8 % Soln Apply  topically nightly.    ergocalciferol (ERGOCALCIFEROL) 50,000 unit Cap TAKE ONE CAPSULE BY MOUTH EVERY WEEK    HYDROcodone-acetaminophen (NORCO)  mg per tablet Take 1 tablet by mouth every 8 (eight) hours as needed (pain). Medically necessary for greater than 7 days for chronic pain    [START ON 12/18/2019] HYDROcodone-acetaminophen (NORCO)  mg per tablet Take 1 tablet by mouth every 8 (eight) hours as needed (pain). Medically necessary for greater than 7 days for chronic pain    lisinopril 10 MG tablet     mupirocin calcium 2% (BACTROBAN) 2 % cream      Family History     Problem Relation (Age of Onset)    Dementia Father        Tobacco Use    Smoking status: Current Every Day Smoker     Types: Cigarettes, Vaping with nicotine    Smokeless tobacco: Never Used    Tobacco comment: e cigarettes   Substance and Sexual Activity    Alcohol use: No    Drug use: No    Sexual activity: Not on file     Review of Systems   All other systems reviewed and are negative.    Objective:     Vital Signs (Most Recent):  Temp: 97.8 °F (36.6 °C) (11/21/19 1706)  Pulse: 65 (11/21/19 1830)  Resp: (!) 22 (11/21/19 1830)  BP: (!) 189/83 (11/21/19 1830)  SpO2: 95 % (11/21/19 1830) Vital Signs (24h Range):  Temp:  [97.8 °F (36.6 °C)] 97.8 °F (36.6 °C)  Pulse:  [59-69] 65  Resp:  [18-28] 22  SpO2:  [92 %-96 %] 95 %  BP: (169-189)/(74-83) 189/83     Weight: 68 kg (150 lb)  Body mass index is 20.34 kg/m².    Physical Exam   Constitutional: He appears well-developed and well-nourished.   HENT:   Head: Normocephalic.   Small hematoma on forehead   Eyes: EOM are normal. Right eye exhibits no discharge. Left eye exhibits no discharge.   Neck: Normal range of motion. Neck supple. No JVD present.   Cardiovascular: Normal rate, regular rhythm and normal heart sounds. Exam reveals no gallop.   Pulmonary/Chest: Effort normal. Rales: At bases.   Abdominal: Soft. Bowel sounds are normal.   Genitourinary: Penis normal.    Musculoskeletal: Normal range of motion. Edema: 1+ Pitting edema LEs.   Lymphadenopathy:     He has no cervical adenopathy.   Neurological: He is alert.   Aware that he is in the hospital. Recognizes his family members, no focal deficits   Skin: Skin is warm and dry. Capillary refill takes less than 2 seconds.   Psychiatric: He has a normal mood and affect.   Vitals reviewed.        CRANIAL NERVES     CN III, IV, VI   Extraocular motions are normal.        Significant Labs:   CMP:   Recent Labs   Lab 11/21/19  1800      K 3.0*      CO2 28   *   BUN 17   CREATININE 1.4   CALCIUM 8.7   PROT 6.8   ALBUMIN 4.0   BILITOT 1.7*   ALKPHOS 73   AST 24   ALT 16   ANIONGAP 8   EGFRNONAA 44.2*     Cardiac Markers:   Recent Labs   Lab 11/21/19  1800   *     Troponin:   Recent Labs   Lab 11/21/19  1800   TROPONINI 0.218*     All pertinent labs within the past 24 hours have been reviewed.    Significant Imaging: I have reviewed all pertinent imaging results/findings within the past 24 hours.   Ct Head Without Contrast    Result Date: 11/21/2019  EXAMINATION: CT HEAD WITHOUT CONTRAST CLINICAL HISTORY: Syncope/fainting; TECHNIQUE: CMS Mandated Quality Data-CT Radiation Dose-436 All CT scans at this facility dose modulation, iterative reconstruction, and or weight-based dosing when appropriate to reduce radiation dose to as low as reasonably achievable. COMPARISON: CT head without contrast 04/07/2017 FINDINGS: Negative for acute intracranial hemorrhage, midline shift, or mass effect.  Cerebral atrophy with associated ventricular and sulcal enlargement.  Moderate confluent periventricular and deep white matter hypoattenuation consistent with microangiopathic change.  Focal ovoid hypoattenuation consistent with a lacunar infarct involving left internal capsule.  Cerebellar hemispheres are mildly atrophic.  Atherosclerotic calcification of the cavernous carotid arteries. No calvarial lesion or fracture.   Visualized paranasal sinuses and mastoid air cells are clear.     No CT evidence of acute intracranial pathology. Cerebral atrophy and white matter microangiopathic changes. Old lacunar infarct involving left internal capsule. Electronically signed by: Andrew Blankenship MD Date:    11/21/2019 Time:    17:56    X-ray Chest Ap Portable    Result Date: 11/21/2019  EXAMINATION: XR CHEST AP PORTABLE CLINICAL HISTORY: CHF; COMPARISON: 02/10/2018 FINDINGS: Cardiac silhouette size is stable from prior.  Patient is status post median sternotomy.  Atherosclerotic calcification of the aorta. No confluent airspace disease.  No large pleural effusion or pneumothorax.  No acute osseous abnormality.     Atherosclerosis and prior median sternotomy.  No acute pulmonary process. Electronically signed by: Andrew Blankenship MD Date:    11/21/2019 Time:    17:40      Assessment/Plan:     * Syncope and collapse  Highly suspicious for syncope, the event was unwitnessed   Small hematoma on forehead  Head CT negative  Cardiac monitor for arrhthymias/heart block  Echocardiogram  Consult cardiology      Heart failure  As evidenced by elevated BNP, edema in LEs/rales, shortness of breath  CXR showed no pulmonary edema  Echocardiogram  IV lasix 40 mg daily  Hold oral diuretics  Echocardiogram  Consult cardiology      Hypokalemia  Replete  Monitor  BMP daily      Elevated troponin  The patient denies chest pain.  EKG showed no ST elevation  Trend troponin  Add aspirin  Continue metoprolol      Thrombocytopenia  Results for KENNETH URBAN (MRN [id number]) as of 11/21/2019 20:35   Ref. Range 4/11/2018 12:25 4/16/2018 09:24 6/4/2018 10:49 10/9/2018 13:31 11/21/2019 18:00   Platelets Latest Ref Range: 150 - 350 K/uL 114 (L) 109 (L) 124 (L) 92 (L) 77 (L)     Chronic  Monitor  No pharmacologic VTE prophylaxis    Dementia  Resume home medication      Anemia, chronic disease  Stable  Monitor      Leg pain  Chronic  Resume medication      VTE Risk  Mitigation (From admission, onward)         Ordered     IP VTE HIGH RISK PATIENT  Once      11/21/19 1952                   SONYA Justice  Department of Hospital Medicine   [facility]

## 2019-11-22 NOTE — ED PROVIDER NOTES
Encounter Date: 11/21/2019       History     Chief Complaint   Patient presents with    Head Injury     fell yesterday per family unsure if patient passed out, hematoma to forehead    Shortness of Breath     also c/o shortness of breath and swelling to feet     Patient here with reported shortness of breath gradual onset with associated bilateral lower extremity swelling patient had a fall while in the bathroom yesterday striking top of his head and family reviewed the camera foot each from the house they found that he had been in the bathroom for approximately 2 hr they suspect he may have lost consciousness he has had at headache since the fall and he has had some nausea but was sent to the emergency department for evaluation by his primary physician for the shortness of breath and swelling patient sees Dr. Oseguera . been no recent changes to his medications patient's son does report that he has had some increasing difficulty with forgetfulness at does seem somewhat worsened he struck his head    The history is provided by the patient.     Review of patient's allergies indicates:   Allergen Reactions    Eucalyptus containing products Palpitations     Past Medical History:   Diagnosis Date    Anemia     Anemia due to chronic blood loss 7/25/2018    Anemia, chronic disease 7/25/2018    Dementia     GERD (gastroesophageal reflux disease)     Heart disease     Hypertension     Neuropathy      Past Surgical History:   Procedure Laterality Date    CORONARY ARTERY BYPASS GRAFT       Family History   Problem Relation Age of Onset    Dementia Father      Social History     Tobacco Use    Smoking status: Current Every Day Smoker     Types: Cigarettes, Vaping with nicotine    Smokeless tobacco: Never Used    Tobacco comment: e cigarettes   Substance Use Topics    Alcohol use: No    Drug use: No     Review of Systems   Constitutional: Positive for fatigue. Negative for chills, diaphoresis and fever.   HENT:  Negative.    Eyes: Negative.    Respiratory: Positive for cough and shortness of breath. Negative for chest tightness.    Cardiovascular: Positive for leg swelling. Negative for chest pain and palpitations.   Gastrointestinal: Negative.  Negative for abdominal pain, diarrhea, nausea and vomiting.   Endocrine: Negative.    Genitourinary: Negative.    Musculoskeletal: Negative.    Skin: Negative.    Allergic/Immunologic: Negative.    Neurological: Positive for dizziness, syncope and headaches.   Hematological: Negative.    Psychiatric/Behavioral: Negative.        Physical Exam     Initial Vitals [11/21/19 1706]   BP Pulse Resp Temp SpO2   (!) 176/76 69 18 97.8 °F (36.6 °C) 96 %      MAP       --         Physical Exam    Constitutional: He appears well-developed and well-nourished. He does not appear ill.   HENT:   Head: Normocephalic and atraumatic.   Mouth/Throat: Oropharynx is clear and moist.   Eyes: EOM are normal. Pupils are equal, round, and reactive to light.   Neck: Normal range of motion. Neck supple.   Cardiovascular: Normal rate, regular rhythm, normal heart sounds and intact distal pulses.   Pulmonary/Chest: He has no decreased breath sounds. He has no wheezes. He has no rhonchi. He has rales in the right middle field, the right lower field, the left middle field and the left lower field.   Abdominal: Soft. Bowel sounds are normal.   Musculoskeletal: Normal range of motion.        Right lower leg: He exhibits edema.        Left lower leg: He exhibits edema. He exhibits no tenderness.   Neurological: He is alert and oriented to person, place, and time.   Skin: Skin is warm and dry. Capillary refill takes less than 2 seconds.   Psychiatric: He has a normal mood and affect. His behavior is normal.         ED Course   Procedures  Labs Reviewed   CBC W/ AUTO DIFFERENTIAL - Abnormal; Notable for the following components:       Result Value    RBC 3.96 (*)     Hemoglobin 12.6 (*)     Hematocrit 38.1 (*)     Mean  Corpuscular Hemoglobin 31.8 (*)     RDW 14.6 (*)     Platelets 77 (*)     MPV 13.2 (*)     Gran% 74.3 (*)     Lymph% 16.3 (*)     All other components within normal limits   COMPREHENSIVE METABOLIC PANEL - Abnormal; Notable for the following components:    Potassium 3.0 (*)     Glucose 121 (*)     Total Bilirubin 1.7 (*)     eGFR if  51.1 (*)     eGFR if non  44.2 (*)     All other components within normal limits   B-TYPE NATRIURETIC PEPTIDE - Abnormal; Notable for the following components:     (*)     All other components within normal limits   PROTIME-INR   TROPONIN I          Imaging Results          CT Head Without Contrast (Final result)  Result time 11/21/19 17:56:26    Final result by Andrew Blankenship MD (11/21/19 17:56:26)                 Impression:      No CT evidence of acute intracranial pathology.    Cerebral atrophy and white matter microangiopathic changes.    Old lacunar infarct involving left internal capsule.      Electronically signed by: Andrwe Blankenship MD  Date:    11/21/2019  Time:    17:56             Narrative:    EXAMINATION:  CT HEAD WITHOUT CONTRAST    CLINICAL HISTORY:  Syncope/fainting;    TECHNIQUE:  CMS Mandated Quality Data-CT Radiation Dose-436    All CT scans at this facility dose modulation, iterative reconstruction, and or weight-based dosing when appropriate to reduce radiation dose to as low as reasonably achievable.    COMPARISON:  CT head without contrast 04/07/2017    FINDINGS:  Negative for acute intracranial hemorrhage, midline shift, or mass effect.  Cerebral atrophy with associated ventricular and sulcal enlargement.  Moderate confluent periventricular and deep white matter hypoattenuation consistent with microangiopathic change.  Focal ovoid hypoattenuation consistent with a lacunar infarct involving left internal capsule.  Cerebellar hemispheres are mildly atrophic.  Atherosclerotic calcification of the cavernous carotid  arteries.    No calvarial lesion or fracture.  Visualized paranasal sinuses and mastoid air cells are clear.                               X-Ray Chest AP Portable (Final result)  Result time 11/21/19 17:40:39    Final result by Andrew Blankenship MD (11/21/19 17:40:39)                 Impression:      Atherosclerosis and prior median sternotomy.  No acute pulmonary process.      Electronically signed by: Andrew Blankenship MD  Date:    11/21/2019  Time:    17:40             Narrative:    EXAMINATION:  XR CHEST AP PORTABLE    CLINICAL HISTORY:  CHF;    COMPARISON:  02/10/2018    FINDINGS:  Cardiac silhouette size is stable from prior.  Patient is status post median sternotomy.  Atherosclerotic calcification of the aorta.    No confluent airspace disease.  No large pleural effusion or pneumothorax.  No acute osseous abnormality.                                 Medical Decision Making:   ED Management:  Troponin is mildly elevated he also has evidence of congestive heart failure he has received Lasix nitroglycerin aspirin in the emergency department he denies any chest pain CT scan of his head was negative                                 Clinical Impression:       ICD-10-CM ICD-9-CM   1. Closed head injury, initial encounter S09.90XA 959.01   2. Shortness of breath R06.02 786.05   3. Congestive heart failure, unspecified HF chronicity, unspecified heart failure type I50.9 428.0                             Ganesh Pope MD  11/21/19 1929       Ganesh Pope MD  11/21/19 1929

## 2019-11-22 NOTE — SUBJECTIVE & OBJECTIVE
Past Medical History:   Diagnosis Date    Anemia     Anemia due to chronic blood loss 7/25/2018    Anemia, chronic disease 7/25/2018    Dementia     GERD (gastroesophageal reflux disease)     Heart disease     Hypertension     Neuropathy        Past Surgical History:   Procedure Laterality Date    CORONARY ARTERY BYPASS GRAFT         Review of patient's allergies indicates:   Allergen Reactions    Eucalyptus containing products Palpitations       No current facility-administered medications on file prior to encounter.      Current Outpatient Medications on File Prior to Encounter   Medication Sig    aspirin 81 MG Chew Take 81 mg by mouth once daily.     donepezil (ARICEPT) 10 MG tablet TAKE 1 TABLET BY MOUTH ONCE DAILY (Patient taking differently: Take 10 mg by mouth once daily. )    furosemide (LASIX) 20 MG tablet Take 20 mg by mouth once daily.     HYDROcodone-acetaminophen (NORCO)  mg per tablet Take 1 tablet by mouth every 8 (eight) hours as needed (pain). Medically necessary for greater than 7 days for chronic pain    ipratropium (ATROVENT) 42 mcg (0.06 %) nasal spray 1 spray by Nasal route 2 (two) times daily.     LYRICA 150 mg capsule Take 150 mg by mouth 2 (two) times daily.     metoprolol tartrate (LOPRESSOR) 25 MG tablet Take 25 mg by mouth 2 (two) times daily.     pantoprazole (PROTONIX) 40 MG tablet Take 40 mg by mouth once daily.     sertraline (ZOLOFT) 100 MG tablet Take 100 mg by mouth every evening.     simvastatin (ZOCOR) 40 MG tablet Take 40 mg by mouth every evening.     albuterol-ipratropium 2.5mg-0.5mg/3mL (DUO-NEB) 0.5 mg-3 mg(2.5 mg base)/3 mL nebulizer solution     ciclopirox (PENLAC) 8 % Soln Apply topically nightly.    ergocalciferol (ERGOCALCIFEROL) 50,000 unit Cap TAKE ONE CAPSULE BY MOUTH EVERY WEEK    HYDROcodone-acetaminophen (NORCO)  mg per tablet Take 1 tablet by mouth every 8 (eight) hours as needed (pain). Medically necessary for greater than 7  days for chronic pain    [START ON 12/18/2019] HYDROcodone-acetaminophen (NORCO)  mg per tablet Take 1 tablet by mouth every 8 (eight) hours as needed (pain). Medically necessary for greater than 7 days for chronic pain    lisinopril 10 MG tablet     mupirocin calcium 2% (BACTROBAN) 2 % cream      Family History     Problem Relation (Age of Onset)    Dementia Father        Tobacco Use    Smoking status: Current Every Day Smoker     Types: Cigarettes, Vaping with nicotine    Smokeless tobacco: Never Used    Tobacco comment: e cigarettes   Substance and Sexual Activity    Alcohol use: No    Drug use: No    Sexual activity: Not on file     Review of Systems   All other systems reviewed and are negative.    Objective:     Vital Signs (Most Recent):  Temp: 97.8 °F (36.6 °C) (11/21/19 1706)  Pulse: 65 (11/21/19 1830)  Resp: (!) 22 (11/21/19 1830)  BP: (!) 189/83 (11/21/19 1830)  SpO2: 95 % (11/21/19 1830) Vital Signs (24h Range):  Temp:  [97.8 °F (36.6 °C)] 97.8 °F (36.6 °C)  Pulse:  [59-69] 65  Resp:  [18-28] 22  SpO2:  [92 %-96 %] 95 %  BP: (169-189)/(74-83) 189/83     Weight: 68 kg (150 lb)  Body mass index is 20.34 kg/m².    Physical Exam   Constitutional: He appears well-developed and well-nourished.   HENT:   Head: Normocephalic.   Small hematoma on forehead   Eyes: EOM are normal. Right eye exhibits no discharge. Left eye exhibits no discharge.   Neck: Normal range of motion. Neck supple. No JVD present.   Cardiovascular: Normal rate, regular rhythm and normal heart sounds. Exam reveals no gallop.   Pulmonary/Chest: Effort normal. Rales: At bases.   Abdominal: Soft. Bowel sounds are normal.   Genitourinary: Penis normal.   Musculoskeletal: Normal range of motion. Edema: 1+ Pitting edema LEs.   Lymphadenopathy:     He has no cervical adenopathy.   Neurological: He is alert.   Aware that he is in the hospital. Recognizes his family members, no focal deficits   Skin: Skin is warm and dry. Capillary  refill takes less than 2 seconds.   Psychiatric: He has a normal mood and affect.   Vitals reviewed.        CRANIAL NERVES     CN III, IV, VI   Extraocular motions are normal.        Significant Labs:   CMP:   Recent Labs   Lab 11/21/19  1800      K 3.0*      CO2 28   *   BUN 17   CREATININE 1.4   CALCIUM 8.7   PROT 6.8   ALBUMIN 4.0   BILITOT 1.7*   ALKPHOS 73   AST 24   ALT 16   ANIONGAP 8   EGFRNONAA 44.2*     Cardiac Markers:   Recent Labs   Lab 11/21/19  1800   *     Troponin:   Recent Labs   Lab 11/21/19  1800   TROPONINI 0.218*     All pertinent labs within the past 24 hours have been reviewed.    Significant Imaging: I have reviewed all pertinent imaging results/findings within the past 24 hours.   Ct Head Without Contrast    Result Date: 11/21/2019  EXAMINATION: CT HEAD WITHOUT CONTRAST CLINICAL HISTORY: Syncope/fainting; TECHNIQUE: CMS Mandated Quality Data-CT Radiation Dose-436 All CT scans at this facility dose modulation, iterative reconstruction, and or weight-based dosing when appropriate to reduce radiation dose to as low as reasonably achievable. COMPARISON: CT head without contrast 04/07/2017 FINDINGS: Negative for acute intracranial hemorrhage, midline shift, or mass effect.  Cerebral atrophy with associated ventricular and sulcal enlargement.  Moderate confluent periventricular and deep white matter hypoattenuation consistent with microangiopathic change.  Focal ovoid hypoattenuation consistent with a lacunar infarct involving left internal capsule.  Cerebellar hemispheres are mildly atrophic.  Atherosclerotic calcification of the cavernous carotid arteries. No calvarial lesion or fracture.  Visualized paranasal sinuses and mastoid air cells are clear.     No CT evidence of acute intracranial pathology. Cerebral atrophy and white matter microangiopathic changes. Old lacunar infarct involving left internal capsule. Electronically signed by: Andrew Blankenship MD  Date:    11/21/2019 Time:    17:56    X-ray Chest Ap Portable    Result Date: 11/21/2019  EXAMINATION: XR CHEST AP PORTABLE CLINICAL HISTORY: CHF; COMPARISON: 02/10/2018 FINDINGS: Cardiac silhouette size is stable from prior.  Patient is status post median sternotomy.  Atherosclerotic calcification of the aorta. No confluent airspace disease.  No large pleural effusion or pneumothorax.  No acute osseous abnormality.     Atherosclerosis and prior median sternotomy.  No acute pulmonary process. Electronically signed by: Andrew Blankenship MD Date:    11/21/2019 Time:    17:40

## 2019-11-22 NOTE — ASSESSMENT & PLAN NOTE
The patient denies chest pain.  EKG showed no ST elevation  Trend troponin  Add aspirin  Continue metoprolol

## 2019-11-22 NOTE — PLAN OF CARE
Problem: Oral Intake Inadequate  Goal: Improved Oral Intake  Outcome: Ongoing, Progressing  Intervention: Promote and Optimize Oral Intake  Flowsheets (Taken 11/22/2019 1038)  Oral Nutrition Promotion: calorie dense liquids provided   Added Ensure Enlive TID to aid in meeting estimated needs.  Recommend adding a MVI due to suspected vitamin and mineral deficiencies seen during NFPE.

## 2019-11-22 NOTE — HPI
The patien is an 89-year-old male with history of CAD, sp CABG x 4 in 1979 and dementia. He is also very hard hearing and unable to provide much information. Most information is obtained from his son and daughter in law. He lives by himself about 200 feet from his son and daughter in law. Per son and daughter in law, the security camera at his house showed that he was in the bathroom for 2 hours and 8 minutes yesterday. They later noted that he has a small hematoma on his forehead. The patient is unable to say whether he fell or passed out in the bathroom.     They report that his health has been quickly declined over the past month. He has been smoking over 70 years, now on e cigarettes. He has lost 13 lbs over the past 4 months. He has swelling of his legs despite having his medication adjusted by his PCP. They states that he usually sleeps only 4 hours at night and spent the rest of his time on a sofa. He has been falling to sleep easily and may fall asleep while at the dinner table.  He has neuropathy in his feet and has been taking norco for pain. He has been complaining of progressive shortness of breath and requiring more breathing treatments. He called them at 2 am a few night ago that he was very short of breath and needed breathing treatments.    He was seen by his PCP earlier today and advised to come to the ED. Currently, he denies any pain or discomfort.     CT head in the ED was unremarkble> he was noted to have elevated BNP at 844. Troponin was slightly elevated at 0.218.  EKG, personally reviewed, shows sinus rhythm with no ST elevation.

## 2019-11-22 NOTE — PROGRESS NOTES
[facility]  Adult Nutrition   Progress Note (Initial Assessment)     SUMMARY     Recommendations/Interventions:  1. Continue current diet as tolerated,encourage intake.  2. Added Ensure Enlive TID to aid in meeting estimated needs.  3. Recommend adding a MVI due to suspected vitamin and mineral deficiencies seen during NFPE.  4. Recommend that a medical diagnosis of malnutrition be added to patient's problem list if physician agrees with dietitian's Nutrition Focused Physical Exam (NFPE) findings that support medical diagnosis:  · Severe Protein Energy Malnutrition RT decreased appetite AEB 14.47% weight loss in 2 months, severe subcutaneous fat depletion of triceps and thoracic and lumbar region, and severe muscle mass depletion of temples, clavicle region, scapular region and interosseous muscle.  Goals:   1. Pt to meet at least 75% of estimated needs via PO intake of meals and supplements.  2. A medical diagnosis of malnutrition will be added to patient problem list if MD agress with RD findings  Nutrition Goal Status: new    Reason for Assessment    Reason For Assessment: identified at risk by screening criteria(MST score of 2 )  Relevant Medical History: Anemia, dementia, GERD, HTN, heart disease, neuropathy  Interdisciplinary Rounds: attended    Nutrition Risk Screen    Nutrition Risk Screen: no indicators present    Nutrition/Diet History    Patient Reported Diet/Restrictions/Preferences: general  Food Allergies: NKFA  Factors Affecting Nutritional Intake: None identified at this time    Anthropometrics    Temp: 97.7 °F (36.5 °C)  Height Method: Stated  Height: 6' (182.9 cm)  Height (inches): 72 in  Weight Method: Bed Scale  Weight: 68 kg (150 lb)  Weight (lb): 150 lb  Ideal Body Weight (IBW), Male: 178 lb  % Ideal Body Weight, Male (lb): 84.27 lb  BMI (Calculated): 20.3  BMI Grade: 18.5-24.9 - normal  Weight Loss: unintentional  Usual Body Weight (UBW), k.55 kg  Weight Change Amount: 15  lb  % Usual Body Weight: 85.71  % Weight Change From Usual Weight: -14.47 %     Weight History:  Wt Readings from Last 10 Encounters:   11/22/19 68 kg (150 lb)   10/21/19 73.5 kg (162 lb)   09/09/19 74.8 kg (165 lb)   08/26/19 73.5 kg (162 lb)   07/30/19 73.5 kg (162 lb)   05/02/19 73.8 kg (162 lb 9.6 oz)   04/25/19 70.8 kg (156 lb)   01/31/19 70.8 kg (156 lb)   10/18/18 71 kg (156 lb 9.6 oz)   10/09/18 69.9 kg (154 lb)     Lab/Procedures/Meds: Pertinent Labs Reviewed  Clinical Chemistry:  Recent Labs   Lab 11/22/19  0009 11/22/19  0437    143   K 3.8 3.5    103   CO2 28 32*   * 105   BUN 18 20   CREATININE 1.5* 1.5*   CALCIUM 8.5* 8.7   PROT 6.6  --    ALBUMIN 3.8  --    BILITOT 1.3*  --    ALKPHOS 69  --    AST 24  --    ALT 17  --    ANIONGAP 11 8   ESTGFRAFRICA 47.0* 47.0*   EGFRNONAA 40.7* 40.7*   MG 1.6  --    PHOS 3.2  --      CBC:   Recent Labs   Lab 11/22/19  0437   WBC 6.35   RBC 3.91*   HGB 12.2*   HCT 37.2*   PLT 79*   MCV 95   MCH 31.2*   MCHC 32.8     Cardiac Profile:  Recent Labs   Lab 11/21/19  1800 11/22/19  0009 11/22/19  0437   *  --   --    TROPONINI 0.218* 0.245* 0.232*     Medications: Pertinent Medications reviewed  Scheduled Meds:   aspirin  81 mg Oral Daily    donepezil  10 mg Oral Daily    furosemide        furosemide  40 mg Intravenous Daily    metoprolol tartrate  25 mg Oral BID    pantoprazole  40 mg Oral Daily    pregabalin  150 mg Oral BID    sertraline  100 mg Oral QHS    simvastatin  40 mg Oral QHS     Continuous Infusions:  PRN Meds:.acetaminophen, albuterol sulfate, bisacodyl, hydrALAZINE, HYDROcodone-acetaminophen, magnesium oxide, magnesium oxide, potassium chloride 10%, potassium chloride 10%, potassium chloride 10%, potassium, sodium phosphates, potassium, sodium phosphates, potassium, sodium phosphates, sodium chloride 0.9%    Estimated/Assessed Needs    Weight Used For Calorie Calculations: 68 kg (150 lb)  Energy Calorie Requirements (kcal):  2041-2381 kcals/day (30-35 kcals/kg)  Energy Need Method: Kcal/kg  Protein Requirements: 68-95 g/day (1.0-1.4 g/kg)  Weight Used For Protein Calculations: 68 kg (150 lb)     Estimated Fluid Requirement Method: RDA Method    Nutrition Prescription Ordered    Current Diet Order: Cardiac Diet     Evaluation of Received Nutrient/Fluid Intake    Energy Calories Required: not meeting needs  Protein Required: not meeting needs  Fluid Required: meeting needs  Tolerance: tolerating  % Intake of Estimated Energy Needs: 0 - 25 %  % Meal Intake: 0 - 25 %    Intake/Output Summary (Last 24 hours) at 11/22/2019 1029  Last data filed at 11/22/2019 0711  Gross per 24 hour   Intake --   Output 2945 ml   Net -2945 ml      Nutrition Risk    Level of Risk/Frequency of Follow-up: high     Dietitian Rounds Brief:  · Patient stated decreased appetite and intake over the last few months. Lost 15 lbs.   · NFPE completed and showed severe protein energy malnutrition.   · Patient asked questions appropriately even with a medical hx of dementia.    Monitor and Evaluation    Food and Nutrient Intake: energy intake, food and beverage intake  Food and Nutrient Adminstration: diet order  Physical Activity and Function: nutrition-related ADLs and IADLs  Anthropometric Measurements: weight, weight change  Biochemical Data, Medical Tests and Procedures: lipid profile, gastrointestinal profile, glucose/endocrine profile, electrolyte and renal panel, inflammatory profile  Nutrition-Focused Physical Findings: overall appearance     Malnutrition Assessment  Clinical Characteristic:    Malnutrition in the context of acute illness of Injury   Energy Intake:    <50% of estimated energy requirement for > 1 month  Interpretation of Weight Loss:    14.47% weight loss in 2 months  Physical Findings   Body Fat Depletion:   o Severe depletion of orbitals, triceps and thoracic and lumbar region    Muscle Mass Depletion:   o severe depletion of temples,  clavicle region, scapular region and interosseous muscle    Fluid Accumulation:    o N/A   Reduced  Strength:   o N/A    Nutrition Follow-Up    RD Follow-up?: Yes  Vy Genao RD 11/22/2019 10:37 AM

## 2019-11-22 NOTE — ASSESSMENT & PLAN NOTE
As evidenced by elevated BNP, edema in LEs/rales, shortness of breath  CXR showed no pulmonary edema  Echocardiogram  IV lasix 40 mg daily  Hold oral diuretics  Echocardiogram  Consult cardiology

## 2019-11-22 NOTE — ASSESSMENT & PLAN NOTE
Highly suspicious for syncope, the event was unwitnessed   Small hematoma on forehead  Head CT negative  Cardiac monitor for arrhthymias/heart block  Echocardiogram  Consult cardiology

## 2019-11-22 NOTE — ASSESSMENT & PLAN NOTE
Results for KENNETH URBAN (MRN [id number]) as of 11/21/2019 20:35   Ref. Range 4/11/2018 12:25 4/16/2018 09:24 6/4/2018 10:49 10/9/2018 13:31 11/21/2019 18:00   Platelets Latest Ref Range: 150 - 350 K/uL 114 (L) 109 (L) 124 (L) 92 (L) 77 (L)     Chronic  Monitor  No pharmacologic VTE prophylaxis

## 2019-11-22 NOTE — PLAN OF CARE
11/22/19 1000   LOWRY Message   Medicare Outpatient and Observation Notification regarding financial responsibility Given to patient/caregiver;Explained to patient/caregiver;Signed/date by patient/caregiver   Date LOWRY was signed 11/22/19   Time LOWRY was signed 0940     Introduced self and asked pt if ok to take few min to discuss Medicare form, and ok with pt.  Explained that pt in observation status and Medicare wants to inform them of LOWRY form, pt verbalized an understanding to form, form signed and form scanned into CM notes.

## 2019-11-22 NOTE — SUBJECTIVE & OBJECTIVE
Review of Systems     All systems reviewed and are negative except as noted per above.    Objective:     Vital Signs (Most Recent):  Temp: 97.7 °F (36.5 °C) (11/22/19 1514)  Pulse: 88 (11/22/19 1717)  Resp: 20 (11/22/19 1515)  BP: 114/66 (11/22/19 1717)  SpO2: 96 % (11/22/19 1515) Vital Signs (24h Range):  Temp:  [97.5 °F (36.4 °C)-98.1 °F (36.7 °C)] 97.7 °F (36.5 °C)  Pulse:  [49-88] 88  Resp:  [20-28] 20  SpO2:  [92 %-98 %] 96 %  BP: (114-203)/(61-93) 114/66     Weight: 68 kg (150 lb)  Body mass index is 20.34 kg/m².    Intake/Output Summary (Last 24 hours) at 11/22/2019 1756  Last data filed at 11/22/2019 1230  Gross per 24 hour   Intake 920 ml   Output 2945 ml   Net -2025 ml      Physical Exam

## 2019-11-22 NOTE — PROGRESS NOTES
[facility] Medicine  Progress Note    Patient Name: Halley Rodrigues  MRN: [id number]  Patient Class: OP- Observation   Admission Date: 11/21/2019  Date of Service: 11/22/2019  Attending Physician: Taya Randhawa MD  Primary Care Provider: Scott Payne MD    Subjective:     Principal Problem:Syncope and collapse    HPI:    The patien is an 89-year-old male with history of CAD, sp CABG x 4 in 1979 and dementia. He is also very hard hearing and unable to provide much information. Most information is obtained from his son and daughter in law. He lives by himself about 200 feet from his son and daughter in law. Per son and daughter in law, the security camera at his house showed that he was in the bathroom for 2 hours and 8 minutes yesterday. They later noted that he has a small hematoma on his forehead. The patient is unable to say whether he fell or passed out in the bathroom.     They report that his health has been quickly declined over the past month. He has been smoking over 70 years, now on e cigarettes. He has lost 13 lbs over the past 4 months. He has swelling of his legs despite having his medication adjusted by his PCP. They states that he usually sleeps only 4 hours at night and spent the rest of his time on a sofa. He has been falling to sleep easily and may fall asleep while at the dinner table.  He has neuropathy in his feet and has been taking norco for pain. He has been complaining of progressive shortness of breath and requiring more breathing treatments. He called them at 2 am a few night ago that he was very short of breath and needed breathing treatments.    He was seen by his PCP earlier today and advised to come to the ED. Currently, he denies any pain or discomfort.     CT head in the ED was unremarkble> he was noted to have elevated BNP at 844. Troponin was slightly elevated at 0.218.  EKG, personally reviewed, shows sinus rhythm with no ST elevation.       Overview/Hospital Course:    11/22:  Pt admitted for syncope and CHF exacerbation.  Pt reports SOB improved.  No CP.  Edema improved.  No palpitations.  No dizziness, HA, or vision changes since admission.  No concerns per nursing.  Questions answered.    Review of Systems     All systems reviewed and are negative except as noted per above.    Objective:     /66 (BP Location: Left arm, Patient Position: Standing)   Pulse 88   Temp 97.7 °F (36.5 °C) (Oral)   Resp 20   Ht 6' (1.829 m)   Wt 68 kg (150 lb)   SpO2 96%   BMI 20.34 kg/m²     Gross per 24 hour   Intake 920 ml   Output 2945 ml   Net -2025 ml      Physical Exam  Gen: alert, responsive; underweight  HEENT:  Eyes - no pallor, PERRL  Small hematoma on forehead  External ears with no lesions  Nares patent  Mouth - lips chapped  CV: irregular  Lungs: rales  Abd: +BS, soft, NT, ND  Ext: +edema, +atrophy   Skin: warm, dry  Neuro: grossly intact  Psych: pleasant    All labs, images, and other studies reviewed personally by me.    Assessment/Plan:      * Syncope and collapse  - pt reports that he did lose consciousness and hit his head  - CT head unremarkable  - pt denies focal neurological deficits   - echo  - Carotid US  - EKG as noted per chart review  - telemetry  - cardiology consulted, recs appreciated     CHF exacerbation  - echo  - rales and edema on exam; SOB improved today  - IV lasix  - daily weights, strict I/O, 1.2L fluid restriction  - cardiac diet    Elevated troponin  No chest pain.  EKG with ST - T changes  Trending troponin: not trending upwards  Continue aspirin, metoprolol, statin    Severe protein-energy malnutrition  Physical Deconditioning   - pt underweight; reports decreased appetite  - 14.47% weight loss in 2 months  - severe subQ fat depletion of triceps and torso  - nutrition following, thank you  - prealbumin   - PT/OT    HTN, uncontrolled  - start norvasc; continue home metoprolol   - lisinopril held 2/2 mildly elevated  sCr  - hydralazine prn    Thrombocytopenia  Anemia, chronic disease  - dvt ppx with lovenox: held  - appears to be chronic; uncertain of etiology at this time  - GI bleed unlikely; H&H stable  - trending CBC    Hypokalemia, resolved  Electrolyte derangement:  Replacement prn  - trending BMP    Other chronic conditions:  Continue home Rx meds    VTE Risk Mitigation (From admission, onward)         Ordered     Place sequential compression device  Until discontinued      11/21/19 2046     IP VTE HIGH RISK PATIENT  Once      11/21/19 1952              Taya Randhawa MD  Department of Hospital Medicine   [facility]

## 2019-11-22 NOTE — CONSULTS
SONYA Casas   Nurse Practitioner   [facility] Medicine   H&P   Signed                       [facility]  Cardiology consultation     Patient Name: Halley Rodrigues  MRN: [id number]  Admission Date: 11/21/2019  Attending Physician: Michelle Theodore MD   Primary Care Provider: Scott Payne MD           Patient information was obtained from patient, relative(s) and ER records.      Subjective:      Principal Problem:Syncope and collapse     Chief Complaint:   Chief Complaint   Patient presents with    Head Injury       fell yesterday per family unsure if patient passed out, hematoma to forehead    Shortness of Breath       also c/o shortness of breath and swelling to feet         HPI: The patien is an 89-year-old male with history of CAD, sp CABG x 4 in 1979 and dementia. He is also very hard hearing and unable to provide much information. Most information is obtained from his son and daughter in law. He lives by himself about 200 feet from his son and daughter in law. Per son and daughter in law, the security camera at his house showed that he was in the bathroom for 2 hours and 8 minutes yesterday. They later noted that he has a small hematoma on his forehead. The patient is unable to say whether he fell or passed out in the bathroom.  Echocardiogram 02/11/2018 revealed normal LV size borderline left ventricular hypertrophy, severely dilated left atrium, LVEF 65% mild-to-moderate mitral regurgitation with estimated PA systolic pressure 39 mmHg.  Lexiscan Myoview stress test 02/11/2018 did not reveal any ischemia.  LVEF was 71%     They report that his health has been quickly declined over the past month. He has been smoking over 70 years, now on e cigarettes. He has lost 13 lbs over the past 4 months. He has swelling of his legs despite having his medication adjusted by his PCP. They states that he usually sleeps only 4 hours at night and spent the rest of his time on a sofa. He has  been falling to sleep easily and may fall asleep while at the dinner table.  He has neuropathy in his feet and has been taking norco for pain. He has been complaining of progressive shortness of breath and requiring more breathing treatments. He called them at 2 am a few night ago that he was very short of breath and needed breathing treatments.     He was seen by his PCP earlier today and advised to come to the ED. Currently, he denies any pain or discomfort.  The patient sees Dr. Oseguera in the office     CT head in the ED was unremarkble> he was noted to have elevated BNP at 844. Troponin was slightly elevated at 0.218.  EKG, personally reviewed, shows sinus rhythm with no ST elevation.            Past Medical History:   Diagnosis Date    Anemia      Anemia due to chronic blood loss 7/25/2018    Anemia, chronic disease 7/25/2018    Dementia      GERD (gastroesophageal reflux disease)      Heart disease      Hypertension      Neuropathy                 Past Surgical History:   Procedure Laterality Date    CORONARY ARTERY BYPASS GRAFT                  Review of patient's allergies indicates:   Allergen Reactions    Eucalyptus containing products Palpitations         No current facility-administered medications on file prior to encounter.       Current Outpatient Medications on File Prior to Encounter   Medication Sig    aspirin 81 MG Chew Take 81 mg by mouth once daily.     donepezil (ARICEPT) 10 MG tablet TAKE 1 TABLET BY MOUTH ONCE DAILY (Patient taking differently: Take 10 mg by mouth once daily. )    furosemide (LASIX) 20 MG tablet Take 20 mg by mouth once daily.     HYDROcodone-acetaminophen (NORCO)  mg per tablet Take 1 tablet by mouth every 8 (eight) hours as needed (pain). Medically necessary for greater than 7 days for chronic pain    ipratropium (ATROVENT) 42 mcg (0.06 %) nasal spray 1 spray by Nasal route 2 (two) times daily.     LYRICA 150 mg capsule Take 150 mg by mouth 2 (two)  times daily.     metoprolol tartrate (LOPRESSOR) 25 MG tablet Take 25 mg by mouth 2 (two) times daily.     pantoprazole (PROTONIX) 40 MG tablet Take 40 mg by mouth once daily.     sertraline (ZOLOFT) 100 MG tablet Take 100 mg by mouth every evening.     simvastatin (ZOCOR) 40 MG tablet Take 40 mg by mouth every evening.     albuterol-ipratropium 2.5mg-0.5mg/3mL (DUO-NEB) 0.5 mg-3 mg(2.5 mg base)/3 mL nebulizer solution      ciclopirox (PENLAC) 8 % Soln Apply topically nightly.    ergocalciferol (ERGOCALCIFEROL) 50,000 unit Cap TAKE ONE CAPSULE BY MOUTH EVERY WEEK    HYDROcodone-acetaminophen (NORCO)  mg per tablet Take 1 tablet by mouth every 8 (eight) hours as needed (pain). Medically necessary for greater than 7 days for chronic pain    [START ON 12/18/2019] HYDROcodone-acetaminophen (NORCO)  mg per tablet Take 1 tablet by mouth every 8 (eight) hours as needed (pain). Medically necessary for greater than 7 days for chronic pain    lisinopril 10 MG tablet      mupirocin calcium 2% (BACTROBAN) 2 % cream             Family History      Problem Relation (Age of Onset)     Dementia Father                Tobacco Use    Smoking status: Current Every Day Smoker       Types: Cigarettes, Vaping with nicotine    Smokeless tobacco: Never Used    Tobacco comment: e cigarettes   Substance and Sexual Activity    Alcohol use: No    Drug use: No    Sexual activity: Not on file      Review of Systems   All other systems reviewed and are negative.     Objective:      Vital Signs (Most Recent):  Temp: 97.8 °F (36.6 °C) (11/21/19 1706)  Pulse: 65 (11/21/19 1830)  Resp: (!) 22 (11/21/19 1830)  BP: (!) 189/83 (11/21/19 1830)  SpO2: 95 % (11/21/19 1830) Vital Signs (24h Range):  Temp:  [97.8 °F (36.6 °C)] 97.8 °F (36.6 °C)  Pulse:  [59-69] 65  Resp:  [18-28] 22  SpO2:  [92 %-96 %] 95 %  BP: (169-189)/(74-83) 189/83      Weight: 68 kg (150 lb)  Body mass index is 20.34 kg/m².     Physical Exam    Constitutional: He appears well-developed and well-nourished.   HENT:  Complete upper plate  Head: Normocephalic.   Small hematoma on forehead   Eyes: EOM are normal. Right eye exhibits no discharge. Left eye exhibits no discharge.   Neck: Normal range of motion. Neck supple. No JVD present.   Cardiovascular: Normal rate, regular rhythm and normal heart sounds. Exam reveals no gallop.   Pulmonary/Chest: Effort normal.  No rales or rhonchi    Abdominal: Soft. Bowel sounds are normal.   Genitourinary: Penis normal.   Musculoskeletal: Normal range of motion.  No edema.   Lymphadenopathy:     He has no cervical adenopathy.   Neurological: He is alert.   Aware that he is in the hospital. Recognizes his family members, no focal deficits   Skin: Skin is warm and dry. Capillary refill takes less than 2 seconds.   Psychiatric: He has a normal mood and affect.   Vitals reviewed.                   Significant Labs:   CMP:       Recent Labs   Lab 11/21/19  1800      K 3.0*      CO2 28   *   BUN 17   CREATININE 1.4   CALCIUM 8.7   PROT 6.8   ALBUMIN 4.0   BILITOT 1.7*   ALKPHOS 73   AST 24   ALT 16   ANIONGAP 8   EGFRNONAA 44.2*      Cardiac Markers:       Recent Labs   Lab 11/21/19  1800   *      Troponin:       Recent Labs   Lab 11/21/19  1800   TROPONINI 0.218*      All pertinent labs within the past 24 hours have been reviewed.     Significant Imaging: I have reviewed all pertinent imaging results/findings within the past 24 hours.   Ct Head Without Contrast     Result Date: 11/21/2019  EXAMINATION: CT HEAD WITHOUT CONTRAST CLINICAL HISTORY: Syncope/fainting; TECHNIQUE: CMS Mandated Quality Data-CT Radiation Dose-436 All CT scans at this facility dose modulation, iterative reconstruction, and or weight-based dosing when appropriate to reduce radiation dose to as low as reasonably achievable. COMPARISON: CT head without contrast 04/07/2017 FINDINGS: Negative for acute intracranial hemorrhage,  midline shift, or mass effect.  Cerebral atrophy with associated ventricular and sulcal enlargement.  Moderate confluent periventricular and deep white matter hypoattenuation consistent with microangiopathic change.  Focal ovoid hypoattenuation consistent with a lacunar infarct involving left internal capsule.  Cerebellar hemispheres are mildly atrophic.  Atherosclerotic calcification of the cavernous carotid arteries. No calvarial lesion or fracture.  Visualized paranasal sinuses and mastoid air cells are clear.      No CT evidence of acute intracranial pathology. Cerebral atrophy and white matter microangiopathic changes. Old lacunar infarct involving left internal capsule. Electronically signed by:    Andrew Blankenship MD Date:                                          11/21/2019 Time:                                            17:56     X-ray Chest Ap Portable     Result Date: 11/21/2019  EXAMINATION: XR CHEST AP PORTABLE CLINICAL HISTORY: CHF; COMPARISON: 02/10/2018 FINDINGS: Cardiac silhouette size is stable from prior.  Patient is status post median sternotomy.  Atherosclerotic calcification of the aorta. No confluent airspace disease.  No large pleural effusion or pneumothorax.  No acute osseous abnormality.      Atherosclerosis and prior median sternotomy.  No acute pulmonary process. Electronically signed by:        Andrew Blankenship MD Date:                                         11/21/2019 Time:                                      17:40        Assessment/Plan:      Possible syncope  Highly suspicious for syncope, the event was unwitnessed   Small hematoma on forehead  Head CT negative  Cardiac monitor for arrhthymias/heart block  Echocardiogram ECG reveals sinus rhythm.  Nonspecific ST-T abnormality.  Occasional ventricular premature beats.  Mildly prolonged QTC  Check orthostatics  If no significant cardiac arrhythmias, the patient may be ambulated in a.m. and subsequently discharged.  The patient has a  history of recurrent falls.          Heart failure; hypertension; mild-to-moderate mitral regurgitation; chronic kidney disease  As evidenced by elevated BNP,  CXR showed no pulmonary edema  Echocardiogram  IV lasix 40 mg daily  Hold oral diuretics  Echocardiogram          Hypokalemia  Replete  Monitor  BMP daily        Elevated troponin  The patient denies chest pain.  EKG showed no ST elevation  Trend troponin  Add aspirin  Continue metoprolol        Thrombocytopenia  Results for KENNETH URBAN (MRN [id number]) as of 11/21/2019 20:35    Ref. Range 4/11/2018 12:25 4/16/2018 09:24 6/4/2018 10:49 10/9/2018 13:31 11/21/2019 18:00   Platelets Latest Ref Range: 150 - 350 K/uL 114 (L) 109 (L) 124 (L) 92 (L) 77 (L)      Chronic  Monitor  No pharmacologic VTE prophylaxis     Dementia  Resume home medication        Anemia, chronic disease  Stable  Monitor        Leg pain  Chronic  Resume medication            VTE Risk Mitigation (From admission, onward)                  Ordered        IP VTE HIGH RISK PATIENT  Once      11/21/19 1952                        EH Hall MD [facility]  Cardiology   [facility]      Cosigned by: Michelle Theodore MD at 11/21/2019 11:50 PM   Revision History

## 2019-11-22 NOTE — ED NOTES
Is alert, Ox3, TERRY.  Very Hard of hearing.   Denies any head pain or chest pain.    REsps regular and not labored.  Color pink, warm, dry.  SR on monitor.  To floor per stretcher in stable condition.

## 2019-11-22 NOTE — PLAN OF CARE
11/21/19 2009   Patient Assessment/Suction   Respiratory Effort Unlabored   All Lung Fields Breath Sounds clear   Rhythm/Pattern, Respiratory pattern regular   PRE-TX-O2   O2 Device (Oxygen Therapy) room air   SpO2 (!) 94 %   Pulse Oximetry Type Intermittent   $ Pulse Oximetry - Multiple Charge Pulse Oximetry - Multiple   Pulse (!) 50   Resp 20   Aerosol Therapy   $ Aerosol Therapy Charges PRN treatment not required

## 2019-11-23 LAB
ANION GAP SERPL CALC-SCNC: 10 MMOL/L (ref 8–16)
AORTIC ROOT ANNULUS: 3.1 CM
AORTIC VALVE CUSP SEPERATION: 1.89 CM
AV INDEX (PROSTH): 0.65
AV MEAN GRADIENT: 3 MMHG
AV PEAK GRADIENT: 5 MMHG
AV VALVE AREA: 2.2 CM2
AV VELOCITY RATIO: 69.07
BASOPHILS # BLD AUTO: 0.02 K/UL (ref 0–0.2)
BASOPHILS NFR BLD: 0.2 % (ref 0–1.9)
BSA FOR ECHO PROCEDURE: 1.86 M2
BUN SERPL-MCNC: 27 MG/DL (ref 8–23)
CALCIUM SERPL-MCNC: 9 MG/DL (ref 8.7–10.5)
CHLORIDE SERPL-SCNC: 102 MMOL/L (ref 95–110)
CO2 SERPL-SCNC: 29 MMOL/L (ref 23–29)
CREAT SERPL-MCNC: 1.4 MG/DL (ref 0.5–1.4)
CV ECHO LV RWT: 0.4 CM
DIFFERENTIAL METHOD: ABNORMAL
DOP CALC AO PEAK VEL: 1.09 M/S
DOP CALC AO VTI: 23.38 CM
DOP CALC LVOT AREA: 3.4 CM2
DOP CALC LVOT DIAMETER: 2.07 CM
DOP CALC LVOT PEAK VEL: 75.29 M/S
DOP CALC LVOT STROKE VOLUME: 51.46 CM3
DOP CALCLVOT PEAK VEL VTI: 15.3 CM
E WAVE DECELERATION TIME: 223.77 MSEC
E/A RATIO: 1.31
E/E' RATIO: 11.33 M/S
ECHO LV POSTERIOR WALL: 1.07 CM (ref 0.6–1.1)
EOSINOPHIL # BLD AUTO: 0.1 K/UL (ref 0–0.5)
EOSINOPHIL NFR BLD: 1.3 % (ref 0–8)
ERYTHROCYTE [DISTWIDTH] IN BLOOD BY AUTOMATED COUNT: 14.7 % (ref 11.5–14.5)
EST. GFR  (AFRICAN AMERICAN): 51.1 ML/MIN/1.73 M^2
EST. GFR  (NON AFRICAN AMERICAN): 44.2 ML/MIN/1.73 M^2
FRACTIONAL SHORTENING: 18 % (ref 28–44)
GLUCOSE SERPL-MCNC: 108 MG/DL (ref 70–110)
HCT VFR BLD AUTO: 39.1 % (ref 40–54)
HGB BLD-MCNC: 13 G/DL (ref 14–18)
IMM GRANULOCYTES # BLD AUTO: 0.03 K/UL (ref 0–0.04)
IMM GRANULOCYTES NFR BLD AUTO: 0.4 % (ref 0–0.5)
INTERVENTRICULAR SEPTUM: 1.15 CM (ref 0.6–1.1)
LEFT ATRIUM SIZE: 5.06 CM
LEFT INTERNAL DIMENSION IN SYSTOLE: 4.33 CM (ref 2.1–4)
LEFT VENTRICLE MASS INDEX: 123 G/M2
LEFT VENTRICULAR INTERNAL DIMENSION IN DIASTOLE: 5.31 CM (ref 3.5–6)
LEFT VENTRICULAR MASS: 231.26 G
LV LATERAL E/E' RATIO: 10.2 M/S
LV SEPTAL E/E' RATIO: 12.75 M/S
LYMPHOCYTES # BLD AUTO: 1.8 K/UL (ref 1–4.8)
LYMPHOCYTES NFR BLD: 21.2 % (ref 18–48)
MAGNESIUM SERPL-MCNC: 1.7 MG/DL (ref 1.6–2.6)
MCH RBC QN AUTO: 31.3 PG (ref 27–31)
MCHC RBC AUTO-ENTMCNC: 33.2 G/DL (ref 32–36)
MCV RBC AUTO: 94 FL (ref 82–98)
MONOCYTES # BLD AUTO: 0.8 K/UL (ref 0.3–1)
MONOCYTES NFR BLD: 9.3 % (ref 4–15)
MV PEAK A VEL: 0.39 M/S
MV PEAK E VEL: 0.51 M/S
NEUTROPHILS # BLD AUTO: 5.8 K/UL (ref 1.8–7.7)
NEUTROPHILS NFR BLD: 67.6 % (ref 38–73)
NRBC BLD-RTO: 0 /100 WBC
PISA TR MAX VEL: 2.92 M/S
PLATELET # BLD AUTO: 82 K/UL (ref 150–350)
PMV BLD AUTO: 13 FL (ref 9.2–12.9)
POTASSIUM SERPL-SCNC: 3.4 MMOL/L (ref 3.5–5.1)
PREALB SERPL-MCNC: 20 MG/DL (ref 20–43)
PV PEAK VELOCITY: 106.33 CM/S
RA PRESSURE: 3 MMHG
RBC # BLD AUTO: 4.16 M/UL (ref 4.6–6.2)
RIGHT VENTRICULAR END-DIASTOLIC DIMENSION: 335 CM
SODIUM SERPL-SCNC: 141 MMOL/L (ref 136–145)
TDI LATERAL: 0.05 M/S
TDI SEPTAL: 0.04 M/S
TDI: 0.05 M/S
TR MAX PG: 34 MMHG
TV REST PULMONARY ARTERY PRESSURE: 37 MMHG
WBC # BLD AUTO: 8.51 K/UL (ref 3.9–12.7)

## 2019-11-23 PROCEDURE — G0378 HOSPITAL OBSERVATION PER HR: HCPCS

## 2019-11-23 PROCEDURE — 99900035 HC TECH TIME PER 15 MIN (STAT)

## 2019-11-23 PROCEDURE — 93005 ELECTROCARDIOGRAM TRACING: CPT

## 2019-11-23 PROCEDURE — 97162 PT EVAL MOD COMPLEX 30 MIN: CPT

## 2019-11-23 PROCEDURE — 63600175 PHARM REV CODE 636 W HCPCS: Performed by: INTERNAL MEDICINE

## 2019-11-23 PROCEDURE — 97165 OT EVAL LOW COMPLEX 30 MIN: CPT

## 2019-11-23 PROCEDURE — 25500020 PHARM REV CODE 255: Performed by: FAMILY MEDICINE

## 2019-11-23 PROCEDURE — 80048 BASIC METABOLIC PNL TOTAL CA: CPT

## 2019-11-23 PROCEDURE — 83735 ASSAY OF MAGNESIUM: CPT

## 2019-11-23 PROCEDURE — 25000003 PHARM REV CODE 250: Performed by: INTERNAL MEDICINE

## 2019-11-23 PROCEDURE — 25000003 PHARM REV CODE 250: Performed by: FAMILY MEDICINE

## 2019-11-23 PROCEDURE — 84134 ASSAY OF PREALBUMIN: CPT

## 2019-11-23 PROCEDURE — 25000003 PHARM REV CODE 250: Performed by: NURSE PRACTITIONER

## 2019-11-23 PROCEDURE — 94761 N-INVAS EAR/PLS OXIMETRY MLT: CPT

## 2019-11-23 PROCEDURE — 36415 COLL VENOUS BLD VENIPUNCTURE: CPT

## 2019-11-23 PROCEDURE — 85025 COMPLETE CBC W/AUTO DIFF WBC: CPT

## 2019-11-23 RX ORDER — AMLODIPINE BESYLATE 5 MG/1
5 TABLET ORAL DAILY
Status: DISCONTINUED | OUTPATIENT
Start: 2019-11-24 | End: 2019-11-24

## 2019-11-23 RX ORDER — FUROSEMIDE 20 MG/1
20 TABLET ORAL DAILY
Status: DISCONTINUED | OUTPATIENT
Start: 2019-11-24 | End: 2019-12-03 | Stop reason: HOSPADM

## 2019-11-23 RX ORDER — METOPROLOL SUCCINATE 25 MG/1
25 TABLET, EXTENDED RELEASE ORAL DAILY
Status: DISCONTINUED | OUTPATIENT
Start: 2019-11-24 | End: 2019-12-03 | Stop reason: HOSPADM

## 2019-11-23 RX ORDER — LISINOPRIL 10 MG/1
10 TABLET ORAL DAILY
Status: DISCONTINUED | OUTPATIENT
Start: 2019-11-24 | End: 2019-11-24

## 2019-11-23 RX ORDER — FUROSEMIDE 10 MG/ML
60 INJECTION INTRAMUSCULAR; INTRAVENOUS 2 TIMES DAILY
Status: DISCONTINUED | OUTPATIENT
Start: 2019-11-23 | End: 2019-11-23

## 2019-11-23 RX ADMIN — PREGABALIN 150 MG: 75 CAPSULE ORAL at 08:11

## 2019-11-23 RX ADMIN — SERTRALINE HYDROCHLORIDE 100 MG: 50 TABLET ORAL at 08:11

## 2019-11-23 RX ADMIN — POTASSIUM CHLORIDE 40 MEQ: 20 SOLUTION ORAL at 05:11

## 2019-11-23 RX ADMIN — METOPROLOL TARTRATE 25 MG: 25 TABLET ORAL at 08:11

## 2019-11-23 RX ADMIN — SIMVASTATIN 40 MG: 40 TABLET, FILM COATED ORAL at 08:11

## 2019-11-23 RX ADMIN — FUROSEMIDE 40 MG: 10 INJECTION, SOLUTION INTRAMUSCULAR; INTRAVENOUS at 08:11

## 2019-11-23 RX ADMIN — PANTOPRAZOLE SODIUM 40 MG: 40 TABLET, DELAYED RELEASE ORAL at 05:11

## 2019-11-23 RX ADMIN — MAGNESIUM OXIDE 800 MG: 400 TABLET ORAL at 08:11

## 2019-11-23 RX ADMIN — AMLODIPINE BESYLATE 10 MG: 5 TABLET ORAL at 08:11

## 2019-11-23 RX ADMIN — IOHEXOL 100 ML: 350 INJECTION, SOLUTION INTRAVENOUS at 11:11

## 2019-11-23 RX ADMIN — ASPIRIN 81 MG 81 MG: 81 TABLET ORAL at 08:11

## 2019-11-23 RX ADMIN — HYDROCODONE BITARTRATE AND ACETAMINOPHEN 1 TABLET: 10; 325 TABLET ORAL at 08:11

## 2019-11-23 RX ADMIN — DONEPEZIL HYDROCHLORIDE 10 MG: 5 TABLET, FILM COATED ORAL at 08:11

## 2019-11-23 NOTE — PT/OT/SLP EVAL
Occupational Therapy   Evaluation    Name: Halley Rodrigues  MRN: [id number]  Admitting Diagnosis:  Syncope and collapse    The primary encounter diagnosis was Closed head injury, initial encounter. Diagnoses of Shortness of breath, Congestive heart failure, unspecified HF chronicity, unspecified heart failure type, Syncope and collapse, Elevated troponin, and HF (heart failure) were also pertinent to this visit.    Recommendations:     Discharge Recommendations: home with home health, home health OT(24/7 supervision and assist, high fall risk)  Discharge Equipment Recommendations:  none  Barriers to discharge:  None    Assessment:     Halley Rodrigues is a 89 y.o. male with a medical diagnosis of Syncope and collapse.  He presents with Performance deficits affecting function: weakness, impaired endurance, impaired self care skills, impaired balance, gait instability, impaired functional mobilty, decreased safety awareness, decreased coordination, impaired cognition.      Rehab Prognosis: Fair; patient would benefit from acute skilled OT services to address these deficits and reach maximum level of function.       Plan:     Patient to be seen 5 x/week to address the above listed problems via self-care/home management, therapeutic activities, therapeutic exercises  · Plan of Care Expires: 12/23/19  · Plan of Care Reviewed with: patient    Subjective     Chief Complaint: none  Patient/Family Comments/goals: to go home    Occupational Profile:  Living Environment: lives alone on same property as son in trailer with 2 steps and HR; tub/shower combo; security camera in home so family can monitor him.  Previous level of function: per pt's report, he ws indep ADLS, ambulated with no device.  Questionable historian 2/2 hx dementia. On provides transportation.  Roles and Routines: fairly active  Equipment Used at Home:  bath bench, walker, rolling  Assistance upon Discharge: son    Pain/Comfort:  Pain Rating 1:  0/10  Pain Rating Post-Intervention 1: 0/10    Patients cultural, spiritual, Pentec[employer]al conflicts given the current situation:      Objective:     Communicated with: nurse prior to session.  Patient found up in chair with telemetry(chair alarm) upon OT entry to room.    General Precautions: Standard, fall   Orthopedic Precautions:N/A   Braces: N/A     Occupational Performance:      Functional Mobility/Transfers:  · Patient completed Sit <> Stand Transfer with stand by assistance  with  rolling walker and grab bars(s)   · Patient completed Toilet Transfer Step Transfer technique with contact guard assistance with  rolling walker  · Functional Mobility: ambulated with CGA using RW, leaning to right, self corrected, pushes walker too far out in front and kifts walker off floor at times, decreased safety /judgement.    Activities of Daily Living:  · Feeding:  independence .  · Grooming: contact guard assistance standing at sink , leans to right, self corrected but not fully back to midline standing with RW  · Lower Body Dressing: modified independence socks, lifting knee up to chest to don  · Toileting: contact guard assistance steadying assist for poor balance    Cognitive/Visual Perceptual:  Cognitive/Psychosocial Skills:     -       Oriented to: Person, Time and Situation   -       Follows Commands/attention:Follows one-step commands  -       Communication: clear/fluent  -       Memory: Impaired STM, Impaired LTM and Poor immediate recall  -       Safety awareness/insight to disability: impaired   -       Mood/Affect/Coping skills/emotional control: Appropriate to situation  Visual/Perceptual:      -Intact .    Physical Exam:  Balance:    -       sitting:  leans to right, good-   dynamic: fair plus   standing:  poor plus-leans and loses balance to right extended standing   Postural examination/scapula alignment:    -       Rounded shoulders  -       Affected scapula elevated  -       Kyphosis  Dominant hand:    -        right  Upper Extremity Range of Motion:     -       Right Upper Extremity: WFL  -       Left Upper Extremity: WFL  Upper Extremity Strength:    -       Right Upper Extremity: WFL  -       Left Upper Extremity: WFL   Strength:    -       Right Upper Extremity: WFL  -       Left Upper Extremity: WFL    AMPAC 6 Click ADL:  AMPAC Total Score: 19    Treatment & Education:  -purpose of OT visit and POC, safety ed with RW use for balance with ADLS and fall prevention-pt needs ongoing education and training; lacks insight. Hx Dementia.  Education:    Patient left up in chair with all lines intact, call button in reach, chair alarm on and nurse notified    GOALS:   Multidisciplinary Problems     Occupational Therapy Goals        Problem: Occupational Therapy Goal    Goal Priority Disciplines Outcome Interventions   Occupational Therapy Goal     OT, PT/OT Ongoing, Progressing    Description:  Goals to be met by: 11/30/2019    Patient will increase functional independence with ADLs by performing:    LE Dressing with Modified [place].  Grooming while standing at sink with Modified [place].  Toileting from toilet with Modified [place] for hygiene and clothing management.   Toilet transfer to toilet with Modified [place].                      History:     Past Medical History:   Diagnosis Date    Anemia     Anemia due to chronic blood loss 7/25/2018    Anemia, chronic disease 7/25/2018    Dementia     GERD (gastroesophageal reflux disease)     Heart disease     Hypertension     Neuropathy          Past Surgical History:   Procedure Laterality Date    CORONARY ARTERY BYPASS GRAFT         Time Tracking:     OT Date of Treatment: 11/23/19  OT Start Time: 1037  OT Stop Time: 1049  OT Total Time (min): 12 min    Billable Minutes:Evaluation 12  Total Time 12    Lori Saucedo OT  11/23/2019

## 2019-11-23 NOTE — PROGRESS NOTES
11/23/19 1440 11/23/19 1442 11/23/19 1444   Vital Signs   Pulse 76 67 76   Heart Rate Source Monitor Monitor Monitor   Resp 18 18 18   SpO2 95 % (!) 94 % (!) 94 %   Pulse Oximetry Type Intermittent Intermittent Intermittent   O2 Device (Oxygen Therapy) room air room air room air   /73 121/64 129/65   BP Location Right arm Right arm Right arm   BP Method Automatic Automatic Automatic   Patient Position Lying Sitting Standing      negative orthostatic bp

## 2019-11-23 NOTE — PLAN OF CARE
11/23/19 0928   Patient Assessment/Suction   Level of Consciousness (AVPU) alert   Respiratory Effort Unlabored;Normal   Expansion/Accessory Muscles/Retractions no retractions;no use of accessory muscles   All Lung Fields Breath Sounds clear   Rhythm/Pattern, Respiratory unlabored;pattern regular;depth regular;no shortness of breath reported   PRE-TX-O2   O2 Device (Oxygen Therapy) room air   SpO2 (!) 94 %   Pulse Oximetry Type Intermittent   $ Pulse Oximetry - Multiple Charge Pulse Oximetry - Multiple   Pulse 77   Resp 18   Aerosol Therapy   $ Aerosol Therapy Charges PRN treatment not required

## 2019-11-23 NOTE — PLAN OF CARE
Problem: Occupational Therapy Goal  Goal: Occupational Therapy Goal  Description  Goals to be met by: 11/30/2019    Patient will increase functional independence with ADLs by performing:    LE Dressing with Modified [place].  Grooming while standing at sink with Modified [place].  Toileting from toilet with Modified [place] for hygiene and clothing management.   Toilet transfer to toilet with Modified [place].     Outcome: Ongoing, Progressing   OT initial eval completed. Pt would benefit from HHOT/HH aide.  Continue with OT POC.

## 2019-11-23 NOTE — PROGRESS NOTES
Progress Note  Cardiology    Admit Date: 11/21/2019   LOS: 0 days     Follow-up For:  Possible syncope    Scheduled Meds:   amLODIPine  10 mg Oral Daily    aspirin  81 mg Oral Daily    donepezil  10 mg Oral Daily    furosemide  60 mg Intravenous BID    metoprolol tartrate  25 mg Oral BID    pantoprazole  40 mg Oral Daily    pregabalin  150 mg Oral BID    sertraline  100 mg Oral QHS    simvastatin  40 mg Oral QHS     Continuous Infusions:  PRN Meds:acetaminophen, albuterol sulfate, bisacodyl, hydrALAZINE, HYDROcodone-acetaminophen, magnesium oxide, magnesium oxide, potassium chloride 10%, potassium chloride 10%, potassium chloride 10%, potassium, sodium phosphates, potassium, sodium phosphates, potassium, sodium phosphates, sodium chloride 0.9%    Review of patient's allergies indicates:   Allergen Reactions    Eucalyptus containing products Palpitations       SUBJECTIVE:     Interval History: Patient has no complaint of dizziness or shortness of breath.    Review of Systems  Respiratory: negative for cough, hemoptysis, sputum and wheezing  Cardiovascular: negative for chest pressure/discomfort, orthopnea, palpitations and paroxysmal nocturnal dyspnea    OBJECTIVE:     Vital Signs (Most Recent)  Temp: 97.7 °F (36.5 °C) (11/23/19 1130)  Pulse: 68 (11/23/19 1215)  Resp: 18 (11/23/19 1215)  BP: 131/61 (11/23/19 1215)  SpO2: 95 % (11/23/19 1215)    Vital Signs Range (Last 24H):  Temp:  [97.7 °F (36.5 °C)-98.3 °F (36.8 °C)]   Pulse:  [58-88]   Resp:  [18-20]   BP: (104-182)/(45-81)   SpO2:  [94 %-97 %]       Physical Exam:  Neck: no carotid bruit, no JVD and supple, symmetrical, trachea midline  Lungs: clear to auscultation bilaterally, normal respiratory effort  Heart: regular rate and rhythm, S1, S2 normal, no murmur, click, rub or gallop and Heart rate in the 40s during sleep.  Abdomen: soft, non-tender; bowel sounds normal; no masses,  no organomegaly  Extremities: Extremities normal, atraumatic, no  cyanosis, clubbing, or edema    Recent Results (from the past 24 hour(s))   CBC auto differential    Collection Time: 11/23/19  4:26 AM   Result Value Ref Range    WBC 8.51 3.90 - 12.70 K/uL    RBC 4.16 (L) 4.60 - 6.20 M/uL    Hemoglobin 13.0 (L) 14.0 - 18.0 g/dL    Hematocrit 39.1 (L) 40.0 - 54.0 %    Mean Corpuscular Volume 94 82 - 98 fL    Mean Corpuscular Hemoglobin 31.3 (H) 27.0 - 31.0 pg    Mean Corpuscular Hemoglobin Conc 33.2 32.0 - 36.0 g/dL    RDW 14.7 (H) 11.5 - 14.5 %    Platelets 82 (L) 150 - 350 K/uL    MPV 13.0 (H) 9.2 - 12.9 fL    Immature Granulocytes 0.4 0.0 - 0.5 %    Gran # (ANC) 5.8 1.8 - 7.7 K/uL    Immature Grans (Abs) 0.03 0.00 - 0.04 K/uL    Lymph # 1.8 1.0 - 4.8 K/uL    Mono # 0.8 0.3 - 1.0 K/uL    Eos # 0.1 0.0 - 0.5 K/uL    Baso # 0.02 0.00 - 0.20 K/uL    nRBC 0 0 /100 WBC    Gran% 67.6 38.0 - 73.0 %    Lymph% 21.2 18.0 - 48.0 %    Mono% 9.3 4.0 - 15.0 %    Eosinophil% 1.3 0.0 - 8.0 %    Basophil% 0.2 0.0 - 1.9 %    Differential Method Automated    Basic metabolic panel     Collection Time: 11/23/19  4:26 AM   Result Value Ref Range    Sodium 141 136 - 145 mmol/L    Potassium 3.4 (L) 3.5 - 5.1 mmol/L    Chloride 102 95 - 110 mmol/L    CO2 29 23 - 29 mmol/L    Glucose 108 70 - 110 mg/dL    BUN, Bld 27 (H) 8 - 23 mg/dL    Creatinine 1.4 0.5 - 1.4 mg/dL    Calcium 9.0 8.7 - 10.5 mg/dL    Anion Gap 10 8 - 16 mmol/L    eGFR if African American 51.1 (A) >60 mL/min/1.73 m^2    eGFR if non  44.2 (A) >60 mL/min/1.73 m^2   Prealbumin    Collection Time: 11/23/19  4:26 AM   Result Value Ref Range    Prealbumin 20 20.0 - 43.0 mg/dL   Magnesium    Collection Time: 11/23/19  4:26 AM   Result Value Ref Range    Magnesium 1.7 1.6 - 2.6 mg/dL       Diagnostic Results:  Labs: Reviewed  ECG: Reviewed    ASSESSMENT/PLAN:     No significant arrhythmias-tachy or Cory.  Heart rate does drop into the 40s - observed by me-the patient was sleep.  The patient has a history of frequent falls for the  last 3 years.  Will change 2 metoprolol ER 25 mg daily.  A standing blood pressure has not been checked yet today.  Discontinue intravenous furosemide; transition to Lasix 20 mg daily p.o. in a.m..  Ejection fraction is depressed; he has significant mitral regurgitation.  Amlodipine will be decreased to 5 mg daily; add lisinopril 10 mg daily and up titrate as tolerated as an outpatient.  Please call if can be of further assistance in his management

## 2019-11-23 NOTE — PT/OT/SLP EVAL
Physical Therapy Evaluation and Discharge Note    Patient Name:  Halley Rodrigues   MRN:  [id number]    Recommendations:     Discharge Recommendations:  home   Discharge Equipment Recommendations: none   Barriers to discharge: lives alone but son lives across the street    Assessment:     Halley Rodrigues is a 89 y.o. male admitted with a medical diagnosis of Syncope and collapse. Upon entering room, pt answering all history questions and agreeing to participate with PT. Pt performing supine to sit and sit to stand transfers with independence, as well as walking 150 ft w/ Spv assist without use of AD. Pt performing 2 rounds on stairs, first trial without handrails and Spv assist, however, mild LOB at top of steps upon ascent. Upon further discussion, pt informing PT that he does have a handle on the trailer itself that he can hold on to. As a result, another trial was performed with unilateral HR with Spv assist and no LOB. At this time, pt at PLOF and no further PT required. Pt would benefit from assistance of his son across the street prn.     Recent Surgery: * No surgery found *      Plan:     During this hospitalization, patient does not require further acute PT services.  Please re-consult if situation changes.      Subjective     Chief Complaint: none  Patient/Family Comments/goals: home with son across the street  Pain/Comfort:  · Pain Rating 1: 0/10    Patients cultural, spiritual, Taoism conflicts given the current situation:      Living Environment:  Pt lives in a trailer with 2 ASHELY and handle on side of trailer. Pt's son lives across the street  Prior to admission, patients level of function was Indepent.  Equipment used at home: none.  DME owned (not currently used): none.  Upon discharge, patient will have assistance from son.    Objective:     Communicated with Nsg prior to session.  Patient found supine with   upon PT entry to room.    General Precautions: Standard, fall   Orthopedic  Precautions:N/A   Braces: N/A     Exams:  · Cognitive Exam:  Patient is oriented to Person, Place, Time and Situation  · Sensation:    · -       Intact  · RUE Strength: WFL  · LUE Strength: WFL  · RLE ROM: WNL  · RLE Strength: 4/5  · LLE ROM: WFL  · LLE Strength: 4/5    Functional Mobility:  · Bed Mobility:     · Supine to Sit: independence  · Transfers:  Sit to Stand:  independence with no AD  · Gait: 150 ft w/ Spv A  · Balance: sitting: normal; standing: normal  · Stairs:  Pt ascended/descended 2 stair(s) with No Assistive Device with left handrail with Stand-by Assistance.     AM-PAC 6 CLICK MOBILITY  Total Score:24       Therapeutic Activities and Exercises:   none    AM-PAC 6 CLICK MOBILITY  Total Score:24     Patient left up in chair with call button in reach, chair alarm on and nsg notified.    GOALS:   Multidisciplinary Problems     Physical Therapy Goals     Not on file                History:     Past Medical History:   Diagnosis Date    Anemia     Anemia due to chronic blood loss 7/25/2018    Anemia, chronic disease 7/25/2018    Dementia     GERD (gastroesophageal reflux disease)     Heart disease     Hypertension     Neuropathy        Past Surgical History:   Procedure Laterality Date    CORONARY ARTERY BYPASS GRAFT         Time Tracking:     PT Received On: 11/23/19  PT Start Time: 0938     PT Stop Time: 0958  PT Total Time (min): 20 min     Billable Minutes: Evaluation 20 minutes       Eliezer Sabillon, PT  11/23/2019

## 2019-11-23 NOTE — PROGRESS NOTES
[facility] Medicine  Progress Note    Patient Name: Halley Rodrigues  MRN: [id number]  Patient Class: OP- Observation   Admission Date: 11/21/2019  Date of Service: 11/22/2019  Attending Physician: Taya Randhawa MD  Primary Care Provider: Scott Payne MD    Subjective:     Principal Problem:Syncope and collapse    HPI:    The patien is an 89-year-old male with history of CAD, sp CABG x 4 in 1979 and dementia. He is also very hard hearing and unable to provide much information. Most information is obtained from his son and daughter in law. He lives by himself about 200 feet from his son and daughter in law. Per son and daughter in law, the security camera at his house showed that he was in the bathroom for 2 hours and 8 minutes yesterday. They later noted that he has a small hematoma on his forehead. The patient is unable to say whether he fell or passed out in the bathroom.     They report that his health has been quickly declined over the past month. He has been smoking over 70 years, now on e cigarettes. He has lost 13 lbs over the past 4 months. He has swelling of his legs despite having his medication adjusted by his PCP. They states that he usually sleeps only 4 hours at night and spent the rest of his time on a sofa. He has been falling to sleep easily and may fall asleep while at the dinner table.  He has neuropathy in his feet and has been taking norco for pain. He has been complaining of progressive shortness of breath and requiring more breathing treatments. He called them at 2 am a few night ago that he was very short of breath and needed breathing treatments.    He was seen by his PCP earlier today and advised to come to the ED. Currently, he denies any pain or discomfort.     CT head in the ED was unremarkble> he was noted to have elevated BNP at 844. Troponin was slightly elevated at 0.218.  EKG, personally reviewed, shows sinus rhythm with no ST elevation.       Overview/Hospital Course:    11/22:  Pt admitted for syncope and CHF exacerbation.  Pt reports SOB improved.  No CP.  Edema improved.  No palpitations.  No dizziness, HA, or vision changes since admission.  No concerns per nursing.  Questions answered.    11/23:  Carotid US with ICA stenosis.  CT Neck ordered; vascular surgery continues.  Diuresis continues for CHF exacerbation.  Echo report in progress.   Norvasc increased overnight.  Denies CP.  SOB continues to improve.  No fever, no chills.    Review of Systems     All systems reviewed and are negative except as noted per above.    Objective:     BP (!) 180/75 (BP Location: Right arm, Patient Position: Lying)   Pulse 77   Temp 98.1 °F (36.7 °C) (Oral)   Resp 18   Ht 6' (1.829 m)   Wt 68.7 kg (151 lb 7.3 oz)   SpO2 (!) 94%   BMI 20.54 kg/m²     Physical Exam    Gen: alert, responsive; underweight  HEENT:  Eyes - no pallor  Small hematoma on forehead  External ears with no lesions  Nares patent  Mouth - lips chapped  CV: irregular  Lungs: rales  Abd: +BS, soft, NT, ND  Ext: +edema, +atrophy   Skin: warm, dry  Neuro: grossly intact  Psych: pleasant    All labs, images, and other studies reviewed personally by me.    Assessment/Plan:      Syncope and collapse  - pt reports that he did lose consciousness and hit his head  - CT head unremarkable; pt denies focal neurological deficits        - EKG with sinus rhythm with PAC and long QT  - telemetry  - cardiology consulted, recs appreciated   - echo report in progress  - Carotid US with >70% L ICA stenosis and R ICA 50 - 69% stenosis  - CTA neck ordered  - vascular surgery consulted    CHF exacerbation  - echo report in progress  - daily weights- no improvement  - strict I/O (which have not been charted overnight!); I will discuss this with nursing  - increasing IV lasix; trend BMP for renal function  - 1.2L fluid restriction  - cardiac diet    Elevated troponin  No chest pain.  EKG with ST - T  changes  Trending troponin: not trending upwards  Continue aspirin, metoprolol, statin    Severe protein-energy malnutrition  Physical Deconditioning   - pt underweight; reports decreased appetite  - 14.47% weight loss in 2 months  - severe subQ fat depletion of triceps and torso  - nutrition following, thank you  - prealbumin borderline  - PT/OT    HTN, uncontrolled  - continue norvasc; continue home metoprolol   - may restart home lisinopril; monitoring BP  - hydralazine prn    Thrombocytopenia  Anemia, chronic disease- being followed outpt  - dvt ppx with lovenox: held  - appears to be chronic; uncertain of etiology at this time  - GI bleed unlikely; H&H stable  - trending CBC    Hypokalemia, resolved  Electrolyte derangement:  Replacement prn  - trending BMP    Other chronic conditions:  Continue home Rx meds    VTE Risk Mitigation (From admission, onward)         Ordered     Place sequential compression device  Until discontinued      11/21/19 2046     IP VTE HIGH RISK PATIENT  Once      11/21/19 1952              Taya Randhawa MD  Department of Hospital Medicine   [facility]

## 2019-11-23 NOTE — CONSULTS
[facility]  Vascular Surgery  Consult Note    Consults  Subjective:     Chief Complaint/Reason for Admission:syncope    History of Present Illness:The patien is an 89-year-old male with history of CAD, sp CABG x 4 in  and dementia. He is also very hard hearing and unable to provide much information. Most information is obtained from his son and daughter in law. He lives by himself about 200 feet from his son and daughter in law. Per son and daughter in law, the security camera at his house showed that he was in the bathroom for 2 hours and 8 minutes yesterday. They later noted that he has a small hematoma on his forehead. The patient is unable to say whether he fell or passed out in the bathroom.   Carotid ultrasound shows left internal carotid with peak systolic velocity of 290 centimeters/second.  This was read as severe greater than 70%, CTA today which I have also reviewed appears more consistent with about a 60% moderate stenosis on the left.  Given his episode of syncope and no real history of lateralizing motor function deficits or amaurosis I would suspect this is asymptomatic.    Medications Prior to Admission   Medication Sig Dispense Refill Last Dose    aspirin 81 MG Chew Take 81 mg by mouth once daily.    2019 at 10:00    donepezil (ARICEPT) 10 MG tablet TAKE 1 TABLET BY MOUTH ONCE DAILY (Patient taking differently: Take 10 mg by mouth once daily. ) 90 tablet 1 2019 at 10:00    furosemide (LASIX) 20 MG tablet Take 20 mg by mouth once daily.    2019 at 10:00    [] HYDROcodone-acetaminophen (NORCO)  mg per tablet Take 1 tablet by mouth every 8 (eight) hours as needed (pain). Medically necessary for greater than 7 days for chronic pain 90 tablet 0 2019 at 10:00    ipratropium (ATROVENT) 42 mcg (0.06 %) nasal spray 1 spray by Nasal route 2 (two) times daily.    2019 at 10:00    LYRICA 150 mg capsule Take 150 mg by mouth 2 (two) times daily.     11/21/2019 at 10:00    metoprolol tartrate (LOPRESSOR) 25 MG tablet Take 25 mg by mouth 2 (two) times daily.   0 11/21/2019 at 10:00    pantoprazole (PROTONIX) 40 MG tablet Take 40 mg by mouth once daily.    11/21/2019 at 10:00    sertraline (ZOLOFT) 100 MG tablet Take 100 mg by mouth every evening.    11/20/2019 at 22:00    simvastatin (ZOCOR) 40 MG tablet Take 40 mg by mouth every evening.    11/20/2019 at 22:00    albuterol-ipratropium 2.5mg-0.5mg/3mL (DUO-NEB) 0.5 mg-3 mg(2.5 mg base)/3 mL nebulizer solution    Taking    ciclopirox (PENLAC) 8 % Soln Apply topically nightly. 6.6 mL 11 Taking    ergocalciferol (ERGOCALCIFEROL) 50,000 unit Cap TAKE ONE CAPSULE BY MOUTH EVERY WEEK 12 capsule 0 Taking    HYDROcodone-acetaminophen (NORCO)  mg per tablet Take 1 tablet by mouth every 8 (eight) hours as needed (pain). Medically necessary for greater than 7 days for chronic pain 90 tablet 0     [START ON 12/18/2019] HYDROcodone-acetaminophen (NORCO)  mg per tablet Take 1 tablet by mouth every 8 (eight) hours as needed (pain). Medically necessary for greater than 7 days for chronic pain 90 tablet 0     lisinopril 10 MG tablet    Taking    mupirocin calcium 2% (BACTROBAN) 2 % cream    Taking       Review of patient's allergies indicates:   Allergen Reactions    Eucalyptus containing products Palpitations       Past Medical History:   Diagnosis Date    Anemia     Anemia due to chronic blood loss 7/25/2018    Anemia, chronic disease 7/25/2018    Dementia     GERD (gastroesophageal reflux disease)     Heart disease     Hypertension     Neuropathy      Past Surgical History:   Procedure Laterality Date    CORONARY ARTERY BYPASS GRAFT       Family History     Problem Relation (Age of Onset)    Dementia Father        Tobacco Use    Smoking status: Current Every Day Smoker     Types: Cigarettes, Vaping with nicotine    Smokeless tobacco: Never Used    Tobacco comment: e cigarettes   Substance  and Sexual Activity    Alcohol use: No    Drug use: No    Sexual activity: Not on file     Review of Systems   Unable to perform ROS: Dementia (Patient is very somnolent)     Objective:     Vital Signs (Most Recent):  Temp: 97.7 °F (36.5 °C) (11/23/19 1130)  Pulse: 68 (11/23/19 1215)  Resp: 18 (11/23/19 1215)  BP: 131/61 (11/23/19 1215)  SpO2: 95 % (11/23/19 1215) Vital Signs (24h Range):  Temp:  [97.7 °F (36.5 °C)-98.3 °F (36.8 °C)] 97.7 °F (36.5 °C)  Pulse:  [58-88] 68  Resp:  [18-20] 18  SpO2:  [94 %-97 %] 95 %  BP: (104-182)/(45-81) 131/61     Weight: 68.7 kg (151 lb 7.3 oz)  Body mass index is 20.54 kg/m².    Date 11/23/19 0700 - 11/24/19 0659   Shift 6863-1178 6448-7430 8988-0642 24 Hour Total   INTAKE   P.O. 240   240   Shift Total(mL/kg) 240(3.5)   240(3.5)   OUTPUT   Shift Total(mL/kg)       Weight (kg) 68.7 68.7 68.7 68.7       Physical Exam   Constitutional: He appears lethargic.   Some nylon, poor historian difficult to examine   Neck: Carotid bruit is not present.   Cardiovascular: Normal rate.   Pulses:       Radial pulses are 2+ on the right side, and 2+ on the left side.        Femoral pulses are 2+ on the right side, and 2+ on the left side.  Neurological: He has normal strength. He appears lethargic.   Oriented to place       Significant Labs:  CBC:   Recent Labs   Lab 11/23/19 0426   WBC 8.51   RBC 4.16*   HGB 13.0*   HCT 39.1*   PLT 82*   MCV 94   MCH 31.3*   MCHC 33.2     CMP:   Recent Labs   Lab 11/22/19  0009  11/23/19 0426   *   < > 108   CALCIUM 8.5*   < > 9.0   ALBUMIN 3.8  --   --    PROT 6.6  --   --       < > 141   K 3.8   < > 3.4*   CO2 28   < > 29      < > 102   BUN 18   < > 27*   CREATININE 1.5*   < > 1.4   ALKPHOS 69  --   --    ALT 17  --   --    AST 24  --   --    BILITOT 1.3*  --   --     < > = values in this interval not displayed.       Significant Diagnostics:  CT: No results found in the last 24 hours.  U/S: No results found in the last 24 hours.  I  have reviewed all pertinent imaging results/findings within the past 24 hours.    Assessment/Plan:   Given the findings of the CTA and his episode of syncope I would not suspect he has symptomatic carotid stenosis.  At most would consider changing aspirin to a full dose 325 mg daily.  Active Diagnoses:    Diagnosis Date Noted POA    PRINCIPAL PROBLEM:  Syncope and collapse [R55] 11/21/2019 Yes    Severe protein-energy malnutrition [E43] 11/22/2019 Yes    Heart failure [I50.9] 11/21/2019 Yes    Elevated troponin [R79.89] 11/21/2019 Yes    Hypokalemia [E87.6] 11/21/2019 Yes    Dementia [F03.90] 11/21/2019 Yes    Advanced age [R54] 11/21/2019 Yes    Thrombocytopenia [D69.6] 11/21/2019 Yes    Nicotine abuse [Z72.0] 11/21/2019 Yes    Anemia, chronic disease [D63.8] 07/25/2018 Yes    Leg pain [M79.606] 05/09/2017 Yes      Problems Resolved During this Admission:    Diagnosis Date Noted Date Resolved POA    Anemia due to chronic blood loss [D50.0] 07/25/2018 11/21/2019 Yes       Thank you for your consult. I will sign off. Please contact us if you have any additional questions.    Brown Castro MD  Vascular Surgery  [facility]

## 2019-11-24 PROBLEM — R55 SYNCOPE AND COLLAPSE: Status: RESOLVED | Noted: 2019-11-21 | Resolved: 2019-11-24

## 2019-11-24 PROBLEM — I50.40 COMBINED SYSTOLIC AND DIASTOLIC CONGESTIVE HEART FAILURE: Status: ACTIVE | Noted: 2019-11-21

## 2019-11-24 PROBLEM — M79.606 LEG PAIN: Status: RESOLVED | Noted: 2017-05-09 | Resolved: 2019-11-24

## 2019-11-24 PROBLEM — I50.43 ACUTE ON CHRONIC COMBINED SYSTOLIC AND DIASTOLIC CONGESTIVE HEART FAILURE: Status: RESOLVED | Noted: 2019-11-21 | Resolved: 2019-11-24

## 2019-11-24 PROBLEM — I50.43 ACUTE ON CHRONIC COMBINED SYSTOLIC AND DIASTOLIC CONGESTIVE HEART FAILURE: Status: ACTIVE | Noted: 2019-11-21

## 2019-11-24 PROBLEM — E87.6 HYPOKALEMIA: Status: RESOLVED | Noted: 2019-11-21 | Resolved: 2019-11-24

## 2019-11-24 PROBLEM — R79.89 ELEVATED TROPONIN: Status: RESOLVED | Noted: 2019-11-21 | Resolved: 2019-11-24

## 2019-11-24 LAB
ANION GAP SERPL CALC-SCNC: 9 MMOL/L (ref 8–16)
BASOPHILS # BLD AUTO: 0.02 K/UL (ref 0–0.2)
BASOPHILS NFR BLD: 0.2 % (ref 0–1.9)
BUN SERPL-MCNC: 37 MG/DL (ref 8–23)
CALCIUM SERPL-MCNC: 9.4 MG/DL (ref 8.7–10.5)
CHLORIDE SERPL-SCNC: 102 MMOL/L (ref 95–110)
CO2 SERPL-SCNC: 32 MMOL/L (ref 23–29)
CREAT SERPL-MCNC: 1.6 MG/DL (ref 0.5–1.4)
DIFFERENTIAL METHOD: ABNORMAL
EOSINOPHIL # BLD AUTO: 0.2 K/UL (ref 0–0.5)
EOSINOPHIL NFR BLD: 2.2 % (ref 0–8)
ERYTHROCYTE [DISTWIDTH] IN BLOOD BY AUTOMATED COUNT: 14.9 % (ref 11.5–14.5)
EST. GFR  (AFRICAN AMERICAN): 43.5 ML/MIN/1.73 M^2
EST. GFR  (NON AFRICAN AMERICAN): 37.6 ML/MIN/1.73 M^2
GLUCOSE SERPL-MCNC: 108 MG/DL (ref 70–110)
HCT VFR BLD AUTO: 40.7 % (ref 40–54)
HGB BLD-MCNC: 13.1 G/DL (ref 14–18)
IMM GRANULOCYTES # BLD AUTO: 0.02 K/UL (ref 0–0.04)
IMM GRANULOCYTES NFR BLD AUTO: 0.2 % (ref 0–0.5)
LYMPHOCYTES # BLD AUTO: 1.9 K/UL (ref 1–4.8)
LYMPHOCYTES NFR BLD: 21.3 % (ref 18–48)
MCH RBC QN AUTO: 30.8 PG (ref 27–31)
MCHC RBC AUTO-ENTMCNC: 32.2 G/DL (ref 32–36)
MCV RBC AUTO: 96 FL (ref 82–98)
MONOCYTES # BLD AUTO: 0.8 K/UL (ref 0.3–1)
MONOCYTES NFR BLD: 9.4 % (ref 4–15)
NEUTROPHILS # BLD AUTO: 5.9 K/UL (ref 1.8–7.7)
NEUTROPHILS NFR BLD: 66.7 % (ref 38–73)
NRBC BLD-RTO: 0 /100 WBC
PLATELET # BLD AUTO: 82 K/UL (ref 150–350)
PMV BLD AUTO: 12.2 FL (ref 9.2–12.9)
POTASSIUM SERPL-SCNC: 4.2 MMOL/L (ref 3.5–5.1)
RBC # BLD AUTO: 4.25 M/UL (ref 4.6–6.2)
SODIUM SERPL-SCNC: 143 MMOL/L (ref 136–145)
WBC # BLD AUTO: 8.82 K/UL (ref 3.9–12.7)

## 2019-11-24 PROCEDURE — 25000003 PHARM REV CODE 250: Performed by: NURSE PRACTITIONER

## 2019-11-24 PROCEDURE — 36415 COLL VENOUS BLD VENIPUNCTURE: CPT

## 2019-11-24 PROCEDURE — 80048 BASIC METABOLIC PNL TOTAL CA: CPT

## 2019-11-24 PROCEDURE — 25000003 PHARM REV CODE 250: Performed by: SPECIALIST

## 2019-11-24 PROCEDURE — 94761 N-INVAS EAR/PLS OXIMETRY MLT: CPT

## 2019-11-24 PROCEDURE — 25000003 PHARM REV CODE 250: Performed by: INTERNAL MEDICINE

## 2019-11-24 PROCEDURE — 85025 COMPLETE CBC W/AUTO DIFF WBC: CPT

## 2019-11-24 PROCEDURE — 99900035 HC TECH TIME PER 15 MIN (STAT)

## 2019-11-24 PROCEDURE — G0378 HOSPITAL OBSERVATION PER HR: HCPCS

## 2019-11-24 PROCEDURE — 27000221 HC OXYGEN, UP TO 24 HOURS

## 2019-11-24 PROCEDURE — 93005 ELECTROCARDIOGRAM TRACING: CPT

## 2019-11-24 RX ORDER — METOPROLOL SUCCINATE 25 MG/1
25 TABLET, EXTENDED RELEASE ORAL DAILY
Qty: 30 TABLET | Refills: 0 | Status: SHIPPED | OUTPATIENT
Start: 2019-11-25 | End: 2020-11-24

## 2019-11-24 RX ORDER — AMLODIPINE BESYLATE 10 MG/1
10 TABLET ORAL DAILY
Qty: 30 TABLET | Refills: 0 | Status: SHIPPED | OUTPATIENT
Start: 2019-11-25 | End: 2020-11-24

## 2019-11-24 RX ORDER — AMLODIPINE BESYLATE 5 MG/1
10 TABLET ORAL DAILY
Status: DISCONTINUED | OUTPATIENT
Start: 2019-11-25 | End: 2019-12-03

## 2019-11-24 RX ORDER — SODIUM CHLORIDE 9 MG/ML
INJECTION, SOLUTION INTRAVENOUS CONTINUOUS
Status: DISCONTINUED | OUTPATIENT
Start: 2019-11-24 | End: 2019-11-24

## 2019-11-24 RX ADMIN — DONEPEZIL HYDROCHLORIDE 10 MG: 5 TABLET, FILM COATED ORAL at 08:11

## 2019-11-24 RX ADMIN — SIMVASTATIN 40 MG: 40 TABLET, FILM COATED ORAL at 08:11

## 2019-11-24 RX ADMIN — PREGABALIN 150 MG: 75 CAPSULE ORAL at 08:11

## 2019-11-24 RX ADMIN — LISINOPRIL 10 MG: 10 TABLET ORAL at 08:11

## 2019-11-24 RX ADMIN — SERTRALINE HYDROCHLORIDE 100 MG: 50 TABLET ORAL at 08:11

## 2019-11-24 RX ADMIN — METOPROLOL SUCCINATE 25 MG: 25 TABLET, FILM COATED, EXTENDED RELEASE ORAL at 08:11

## 2019-11-24 RX ADMIN — ASPIRIN 81 MG 81 MG: 81 TABLET ORAL at 08:11

## 2019-11-24 RX ADMIN — FUROSEMIDE 20 MG: 20 TABLET ORAL at 08:11

## 2019-11-24 RX ADMIN — PANTOPRAZOLE SODIUM 40 MG: 40 TABLET, DELAYED RELEASE ORAL at 05:11

## 2019-11-24 RX ADMIN — AMLODIPINE BESYLATE 5 MG: 5 TABLET ORAL at 08:11

## 2019-11-24 NOTE — CONSULTS
Consult received for home health OT services. Patient has [employer] insurance.  Patient choice form signed and scanned into patient record. Referral sent to [facility] .

## 2019-11-24 NOTE — PROGRESS NOTES
[facility] Medicine  Progress Note    Patient Name: Halley Rodrigues  MRN: [id number]  Patient Class: OP- Observation   Admission Date: 11/21/2019  Date of Service: 11/22/2019  Attending Physician: Taya Randhawa MD  Primary Care Provider: Scott Payne MD    Subjective:     Principal Problem:Syncope and collapse    HPI:    The patien is an 89-year-old male with history of CAD, sp CABG x 4 in 1979 and dementia. He is also very hard hearing and unable to provide much information. Most information is obtained from his son and daughter in law. He lives by himself about 200 feet from his son and daughter in law. Per son and daughter in law, the security camera at his house showed that he was in the bathroom for 2 hours and 8 minutes yesterday. They later noted that he has a small hematoma on his forehead. The patient is unable to say whether he fell or passed out in the bathroom.     They report that his health has been quickly declined over the past month. He has been smoking over 70 years, now on e cigarettes. He has lost 13 lbs over the past 4 months. He has swelling of his legs despite having his medication adjusted by his PCP. They states that he usually sleeps only 4 hours at night and spent the rest of his time on a sofa. He has been falling to sleep easily and may fall asleep while at the dinner table.  He has neuropathy in his feet and has been taking norco for pain. He has been complaining of progressive shortness of breath and requiring more breathing treatments. He called them at 2 am a few night ago that he was very short of breath and needed breathing treatments.    He was seen by his PCP earlier today and advised to come to the ED. Currently, he denies any pain or discomfort.     CT head in the ED was unremarkble> he was noted to have elevated BNP at 844. Troponin was slightly elevated at 0.218.  EKG, personally reviewed, shows sinus rhythm with no ST elevation.       Overview/Hospital Course:    11/22:  Pt admitted for syncope and CHF exacerbation.  Pt reports SOB improved.  No CP.  Edema improved.  No palpitations.  No dizziness, HA, or vision changes since admission.  No concerns per nursing.  Questions answered.    11/23:  Carotid US with ICA stenosis.  CT Neck ordered; vascular surgery consulted.  Diuresis continues for CHF exacerbation.  Echo report in progress.   Norvasc increased overnight.  Denies CP.  SOB continues to improve.  No fever, no chills.    11/24:  Pt considered stable for discharge; however PT/OT reports pt needs 24/7 supervision with continued PT/OT.  Pt's family report that while they live nearby, they are only able to check on him once a day.  Case management consulted for SNF.    Review of Systems     All systems reviewed and are negative except as noted per above.    Objective:     BP (!) 154/75   Pulse 93   Temp 97.8 °F (36.6 °C) (Oral)   Resp 14   Ht 6' (1.829 m)   Wt 66.9 kg (147 lb 7.8 oz)   SpO2 (!) 94%   BMI 20.00 kg/m²     Physical Exam    Gen: alert, responsive; underweight   HEENT:  Eyes - no pallor  Small hematoma on forehead  External ears with no lesions  Nares patent  Mouth - lips chapped  CV: irregular  Lungs: CTA B/L  Abd: +BS, soft, NT, ND  Ext: no edema; +atrophy   Skin: warm, dry  Neuro: grossly intact  Psych: pleasant    All labs, images, and other studies reviewed personally by me.    Assessment/Plan:      Syncope and collapse  Complicated by Acute on Chronic HFrEF 43% with both systolic and diastolic dysfxn  Complicated by Carotid stenosis  Complicated by Elevated troponin  Complicated by HTN, uncontrolled  - pt reports that he did lose consciousness and hit his head  - no CP; troponin stable and not trending upwards  - CT head unremarkable; pt denies focal neurological deficits        - EKG with sinus rhythm with PAC and long QT  - telemetry : no significant arrhythmias       - echo with HFrEF43% with acute on chronic  systolic and diastolic dysfunction  - cardiology consulted, recs appreciated - medication adjusted as per cards recs  - Carotid US with >70% L ICA stenosis and R ICA 50 - 69% stenosis  - CTA neck with 60% B/L ICA stenosis  - vascular surgery consulted - recommend follow-up as outpt at this time, CTA neck reveals stenosis is not as pronounced as per US report  - transitioned to po lasix; trend BMP for renal function  - 1.2L fluid restriction  - cardiac diet  - continue asa and adjusting metoprolol as per cards recs  - norvasc decreased and lisinopril started yesterday per cards  - however will increase norvasc again for BP control and hold lisinpril for slight increase in sCr today  - pt to be transitioned to lisinopril as outpt when sCr improves  - hydralazine prn  - continue statin  - monitoring BP  - likely to be discharged home today     Severe protein-energy malnutrition  Physical Deconditioning   - pt underweight; reports decreased appetite  - 14.47% weight loss in 2 months  - severe subQ fat depletion of triceps and torso  - nutrition following, thank you  - prealbumin borderline  - PT/OT - recommends HHOT with 24/7 supervision 2/2 high fall risk  - family reports they will not be able to provide supervision  - Case management consulted for SNF    Thrombocytopenia  Anemia, chronic disease- being followed outpt  - dvt ppx with lovenox: held  - appears to be chronic; uncertain of etiology at this time  - GI bleed unlikely; H&H stable  - trending CBC    Hypokalemia, resolved  Electrolyte derangement:  Replacement prn  - trending BMP  - mildly elevated sCr - likely result of over-diuresis 2/2 CHF exacerbation  - lasix transitioned to po; holding ACE for now    Other chronic conditions:  Continue home Rx meds    VTE Risk Mitigation (From admission, onward)         Ordered     Place sequential compression device  Until discontinued      11/21/19 2046     IP VTE HIGH RISK PATIENT  Once      11/21/19 1952               Taya Randhawa MD  Department of Hospital Medicine   [facility]

## 2019-11-24 NOTE — PLAN OF CARE
11/24/19 1340   Patient Assessment/Suction   Level of Consciousness (AVPU) alert   Respiratory Effort Normal;Unlabored   Expansion/Accessory Muscles/Retractions expansion symmetric;no use of accessory muscles   All Lung Fields Breath Sounds clear   Rhythm/Pattern, Respiratory pattern regular   PRE-TX-O2   O2 Device (Oxygen Therapy) nasal cannula  (stby)   $ Is the patient on Low Flow Oxygen? Yes   Flow (L/min) 0   SpO2 95 %   Pulse Oximetry Type Intermittent   $ Pulse Oximetry - Multiple Charge Pulse Oximetry - Multiple   Pulse 83   Resp 18   Positioning HOB elevated 45 degrees   Aerosol Therapy   $ Aerosol Therapy Charges PRN treatment not required

## 2019-11-24 NOTE — PLAN OF CARE
11/23/19 1957   Patient Assessment/Suction   Level of Consciousness (AVPU) alert   Respiratory Effort Normal;Unlabored   Expansion/Accessory Muscles/Retractions no use of accessory muscles;no retractions   All Lung Fields Breath Sounds clear;equal bilaterally   PRE-TX-O2   O2 Device (Oxygen Therapy) room air   SpO2 (!) 94 %   Pulse Oximetry Type Intermittent   $ Pulse Oximetry - Multiple Charge Pulse Oximetry - Multiple   Pulse 89   Resp 16   Aerosol Therapy   $ Aerosol Therapy Charges PRN treatment not required

## 2019-11-25 LAB
ANION GAP SERPL CALC-SCNC: 9 MMOL/L (ref 8–16)
BASOPHILS # BLD AUTO: 0.02 K/UL (ref 0–0.2)
BASOPHILS NFR BLD: 0.2 % (ref 0–1.9)
BUN SERPL-MCNC: 41 MG/DL (ref 8–23)
CALCIUM SERPL-MCNC: 9.3 MG/DL (ref 8.7–10.5)
CHLORIDE SERPL-SCNC: 103 MMOL/L (ref 95–110)
CO2 SERPL-SCNC: 30 MMOL/L (ref 23–29)
CREAT SERPL-MCNC: 1.5 MG/DL (ref 0.5–1.4)
DIFFERENTIAL METHOD: ABNORMAL
EOSINOPHIL # BLD AUTO: 0.2 K/UL (ref 0–0.5)
EOSINOPHIL NFR BLD: 2.2 % (ref 0–8)
ERYTHROCYTE [DISTWIDTH] IN BLOOD BY AUTOMATED COUNT: 14.5 % (ref 11.5–14.5)
EST. GFR  (AFRICAN AMERICAN): 47 ML/MIN/1.73 M^2
EST. GFR  (NON AFRICAN AMERICAN): 40.7 ML/MIN/1.73 M^2
GLUCOSE SERPL-MCNC: 111 MG/DL (ref 70–110)
HCT VFR BLD AUTO: 38.7 % (ref 40–54)
HGB BLD-MCNC: 12.8 G/DL (ref 14–18)
IMM GRANULOCYTES # BLD AUTO: 0.02 K/UL (ref 0–0.04)
IMM GRANULOCYTES NFR BLD AUTO: 0.2 % (ref 0–0.5)
LYMPHOCYTES # BLD AUTO: 1.6 K/UL (ref 1–4.8)
LYMPHOCYTES NFR BLD: 18.7 % (ref 18–48)
MCH RBC QN AUTO: 31.7 PG (ref 27–31)
MCHC RBC AUTO-ENTMCNC: 33.1 G/DL (ref 32–36)
MCV RBC AUTO: 96 FL (ref 82–98)
MONOCYTES # BLD AUTO: 0.7 K/UL (ref 0.3–1)
MONOCYTES NFR BLD: 7.4 % (ref 4–15)
NEUTROPHILS # BLD AUTO: 6.3 K/UL (ref 1.8–7.7)
NEUTROPHILS NFR BLD: 71.3 % (ref 38–73)
NRBC BLD-RTO: 0 /100 WBC
PLATELET # BLD AUTO: 86 K/UL (ref 150–350)
PMV BLD AUTO: 13.6 FL (ref 9.2–12.9)
POTASSIUM SERPL-SCNC: 4.1 MMOL/L (ref 3.5–5.1)
RBC # BLD AUTO: 4.04 M/UL (ref 4.6–6.2)
SODIUM SERPL-SCNC: 142 MMOL/L (ref 136–145)
WBC # BLD AUTO: 8.77 K/UL (ref 3.9–12.7)

## 2019-11-25 PROCEDURE — 94761 N-INVAS EAR/PLS OXIMETRY MLT: CPT

## 2019-11-25 PROCEDURE — 97535 SELF CARE MNGMENT TRAINING: CPT

## 2019-11-25 PROCEDURE — 25000003 PHARM REV CODE 250: Performed by: SPECIALIST

## 2019-11-25 PROCEDURE — G0378 HOSPITAL OBSERVATION PER HR: HCPCS

## 2019-11-25 PROCEDURE — 85025 COMPLETE CBC W/AUTO DIFF WBC: CPT

## 2019-11-25 PROCEDURE — 36415 COLL VENOUS BLD VENIPUNCTURE: CPT

## 2019-11-25 PROCEDURE — 25000003 PHARM REV CODE 250: Performed by: INTERNAL MEDICINE

## 2019-11-25 PROCEDURE — 99900035 HC TECH TIME PER 15 MIN (STAT)

## 2019-11-25 PROCEDURE — 86580 TB INTRADERMAL TEST: CPT | Performed by: FAMILY MEDICINE

## 2019-11-25 PROCEDURE — 25000003 PHARM REV CODE 250: Performed by: FAMILY MEDICINE

## 2019-11-25 PROCEDURE — 97530 THERAPEUTIC ACTIVITIES: CPT

## 2019-11-25 PROCEDURE — 30200315 PPD INTRADERMAL TEST REV CODE 302: Performed by: FAMILY MEDICINE

## 2019-11-25 PROCEDURE — 27000221 HC OXYGEN, UP TO 24 HOURS

## 2019-11-25 PROCEDURE — 80048 BASIC METABOLIC PNL TOTAL CA: CPT

## 2019-11-25 PROCEDURE — 25000003 PHARM REV CODE 250: Performed by: NURSE PRACTITIONER

## 2019-11-25 RX ADMIN — TUBERCULIN PURIFIED PROTEIN DERIVATIVE 5 UNITS: 5 INJECTION, SOLUTION INTRADERMAL at 12:11

## 2019-11-25 RX ADMIN — FUROSEMIDE 20 MG: 20 TABLET ORAL at 09:11

## 2019-11-25 RX ADMIN — PREGABALIN 150 MG: 75 CAPSULE ORAL at 09:11

## 2019-11-25 RX ADMIN — AMLODIPINE BESYLATE 10 MG: 5 TABLET ORAL at 09:11

## 2019-11-25 RX ADMIN — SIMVASTATIN 40 MG: 40 TABLET, FILM COATED ORAL at 09:11

## 2019-11-25 RX ADMIN — ASPIRIN 81 MG 81 MG: 81 TABLET ORAL at 09:11

## 2019-11-25 RX ADMIN — DONEPEZIL HYDROCHLORIDE 10 MG: 5 TABLET, FILM COATED ORAL at 09:11

## 2019-11-25 RX ADMIN — PANTOPRAZOLE SODIUM 40 MG: 40 TABLET, DELAYED RELEASE ORAL at 05:11

## 2019-11-25 RX ADMIN — METOPROLOL SUCCINATE 25 MG: 25 TABLET, FILM COATED, EXTENDED RELEASE ORAL at 09:11

## 2019-11-25 RX ADMIN — SERTRALINE HYDROCHLORIDE 100 MG: 50 TABLET ORAL at 09:11

## 2019-11-25 NOTE — PLAN OF CARE
Problem: Occupational Therapy Goal  Goal: Occupational Therapy Goal  Description  Goals to be met by: 11/30/2019    Patient will increase functional independence with ADLs by performing:    LE Dressing with Modified [place].  Grooming while standing at sink with Modified [place].  Toileting from toilet with Modified [place] for hygiene and clothing management.   Toilet transfer to toilet with Modified [place].     Outcome: Ongoing, Progressing

## 2019-11-25 NOTE — PROGRESS NOTES
Progress Note  Cardiology    Admit Date: 11/21/2019   LOS: 0 days     Follow-up For: cardiology    Scheduled Meds:   amLODIPine  10 mg Oral Daily    aspirin  81 mg Oral Daily    donepezil  10 mg Oral Daily    furosemide  20 mg Oral Daily    metoprolol succinate  25 mg Oral Daily    pantoprazole  40 mg Oral Daily    pregabalin  150 mg Oral BID    sertraline  100 mg Oral QHS    simvastatin  40 mg Oral QHS     Continuous Infusions:  PRN Meds:acetaminophen, albuterol sulfate, bisacodyl, hydrALAZINE, HYDROcodone-acetaminophen, magnesium oxide, magnesium oxide, potassium chloride 10%, potassium chloride 10%, potassium chloride 10%, potassium, sodium phosphates, potassium, sodium phosphates, potassium, sodium phosphates, sodium chloride 0.9%    Review of patient's allergies indicates:   Allergen Reactions    Eucalyptus containing products Palpitations       SUBJECTIVE:     Interval History: Patient has no complaint of  CP/ SOB..  He is up in the chair, Fully alwert        OBJECTIVE:     Vital Signs (Most Recent)  Temp: 97.8 °F (36.6 °C) (11/25/19 1500)  Pulse: 72 (11/25/19 1500)  Resp: 18 (11/25/19 1500)  BP: (!) 134/59 (11/25/19 1500)  SpO2: 97 % (11/25/19 1500)    Vital Signs Range (Last 24H):  Temp:  [97.6 °F (36.4 °C)-98.7 °F (37.1 °C)]   Pulse:  [61-82]   Resp:  [14-18]   BP: (130-168)/(59-70)   SpO2:  [93 %-98 %]       Physical Exam:  Neck: no carotid bruit and no JVD  Lungs: clear to auscultation bilaterally, normal respiratory effort  Heart: regular rate and rhythm  Extremities: Extremities normal, atraumatic, no cyanosis, clubbing, or edema    Recent Results (from the past 24 hour(s))   CBC auto differential    Collection Time: 11/25/19  4:19 AM   Result Value Ref Range    WBC 8.77 3.90 - 12.70 K/uL    RBC 4.04 (L) 4.60 - 6.20 M/uL    Hemoglobin 12.8 (L) 14.0 - 18.0 g/dL    Hematocrit 38.7 (L) 40.0 - 54.0 %    Mean Corpuscular Volume 96 82 - 98 fL    Mean Corpuscular Hemoglobin 31.7 (H) 27.0 - 31.0 pg     Mean Corpuscular Hemoglobin Conc 33.1 32.0 - 36.0 g/dL    RDW 14.5 11.5 - 14.5 %    Platelets 86 (L) 150 - 350 K/uL    MPV 13.6 (H) 9.2 - 12.9 fL    Immature Granulocytes 0.2 0.0 - 0.5 %    Gran # (ANC) 6.3 1.8 - 7.7 K/uL    Immature Grans (Abs) 0.02 0.00 - 0.04 K/uL    Lymph # 1.6 1.0 - 4.8 K/uL    Mono # 0.7 0.3 - 1.0 K/uL    Eos # 0.2 0.0 - 0.5 K/uL    Baso # 0.02 0.00 - 0.20 K/uL    nRBC 0 0 /100 WBC    Gran% 71.3 38.0 - 73.0 %    Lymph% 18.7 18.0 - 48.0 %    Mono% 7.4 4.0 - 15.0 %    Eosinophil% 2.2 0.0 - 8.0 %    Basophil% 0.2 0.0 - 1.9 %    Differential Method Automated    Basic metabolic panel     Collection Time: 11/25/19  4:19 AM   Result Value Ref Range    Sodium 142 136 - 145 mmol/L    Potassium 4.1 3.5 - 5.1 mmol/L    Chloride 103 95 - 110 mmol/L    CO2 30 (H) 23 - 29 mmol/L    Glucose 111 (H) 70 - 110 mg/dL    BUN, Bld 41 (H) 8 - 23 mg/dL    Creatinine 1.5 (H) 0.5 - 1.4 mg/dL    Calcium 9.3 8.7 - 10.5 mg/dL    Anion Gap 9 8 - 16 mmol/L    eGFR if African American 47.0 (A) >60 mL/min/1.73 m^2    eGFR if non  40.7 (A) >60 mL/min/1.73 m^2       Diagnostic Results:  Labs: Reviewed    ASSESSMENT/PLAN:   stable    Plan: Continue Current.

## 2019-11-25 NOTE — PT/OT/SLP PROGRESS
Occupational Therapy   Treatment    Name: Halley Rodrigues  MRN: [id number]  Admitting Diagnosis:  Syncope and collapse       Recommendations:     Discharge Recommendations: nursing facility, skilled  Discharge Equipment Recommendations:  none  Barriers to discharge:  Decreased caregiver support    Assessment:     Halley Rodrigues is a 89 y.o. male with a medical diagnosis of Syncope and collapse.  He presents with cooperative and agreeable affect. Performance deficits affecting function are weakness, impaired endurance, impaired self care skills, decreased lower extremity function, impaired muscle length, impaired balance, gait instability, impaired joint extensibility, impaired cardiopulmonary response to activity, decreased safety awareness, impaired cognition, impaired functional mobilty.     Rehab Prognosis:  Fair; patient would benefit from acute skilled OT services to address these deficits and reach maximum level of function.       Plan:     Patient to be seen 5 x/week to address the above listed problems via self-care/home management, therapeutic activities, therapeutic exercises  · Plan of Care Expires: 12/23/19  · Plan of Care Reviewed with: patient, daughter    Subjective     Pain/Comfort:  · Pain Rating 1: 0/10  · Pain Rating Post-Intervention 1: 0/10    Objective:     Communicated with: Nursing and CNA prior to session.  Patient found supine with telemetry, peripheral IV upon OT entry to room.    General Precautions: Standard, fall   Orthopedic Precautions:N/A   Braces: N/A     Occupational Performance:     Bed Mobility:    · Patient completed Rolling/Turning to Left with  stand by assistance  · Patient completed Supine to Sit with stand by assistance     Functional Mobility/Transfers:  · Patient completed Sit <> Stand Transfer with minimum assistance and however somewhat leaning to the right  with  rolling walker   · Patient completed Bed <> Chair Transfer using Step Transfer technique  with minimum assistance with rolling walker  · Functional Mobility: room ambulation with RW from bed to bathroom with leaning to his right (aware of scoliosis, however, pt with lateral weight shift) especially noted in static standing at the sink during g/h in which pt was instructed to complete g/h in sitting due to severe right sided leanding/weight shift; pt ambulated from bathroom to the window to peer out the window to look at the weather x ~ 30 seconds to then ambulate back to the chair with Min A and min cues for chair transfer at RW level; daughter entered the room   Activities of Daily Living:  · Grooming: supervision sitting (initially attempted g/h in standing however, pt with severe R sided leaning)         [facility] 6 Click ADL: 15    Treatment & Education:  Family education on falls potential with pt's knee's having a tendency to flex; pt also with poor safety stating the socks were slipping not his knee's when in fact it was his knees flexing.     Patient left up in chair with all lines intact, call button in reach, chair alarm on, CNA notified and CNA and daughter, DANITA  presentEducation:    OT collaborated with SW who states that pt's discharge plans are to SNF as in MD note.     GOALS:   Multidisciplinary Problems     Occupational Therapy Goals        Problem: Occupational Therapy Goal    Goal Priority Disciplines Outcome Interventions   Occupational Therapy Goal     OT, PT/OT Ongoing, Progressing    Description:  Goals to be met by: 11/30/2019    Patient will increase functional independence with ADLs by performing:    LE Dressing with Modified [place].  Grooming while standing at sink with Modified [place].  Toileting from toilet with Modified [place] for hygiene and clothing management.   Toilet transfer to toilet with Modified [place].                      Time Tracking:     OT Date of Treatment: 11/25/19  OT Start Time: 1410  OT Stop Time: 1444  OT Total Time (min): 34  min    Billable Minutes:Self Care/Home Management 20  Therapeutic Activity 14    Carmela Hawk OT  11/25/20194:25 PM

## 2019-11-25 NOTE — PLAN OF CARE
11/25/19 0919   Discharge Reassessment   Assessment Type Discharge Planning Reassessment   Anticipated Discharge Disposition SNF     SW received consult for SNF placement at the time of d/c. Due to pt's history of dementia, SW placed telephone call to the pt's daughter-in-law, Erica [phone], to discuss SNF placement. Erica was unable to speak with SW at the time and will follow-up with SW. SW to continue to work on d/c placement.    Update 1248: CHAPARRO received telephone call from Angie at [facility]. Angie stated that they were having difficulties accessing Epic and she needed further documentation. SW faxed H&P, PT/OT Eval and Progress notes, and the most recent hospitalist note.     Update 1444: CHAPARRO met with pt's family to discuss SNF at the time of d/c. Family had a lot of in depth questions regarding SNF placement and what a SNF could provide to the pt. Family also voiced concerns regarding how to approach the pt regarding this placement. They stated that they felt as though the pt would become upset by this, as he has lost a significant amount of independence over the last year or so. SW went over how to possibly best approach the conversation, with emphasis on pt safety. SW stated that they would meet with the pt and the family to approach the topic with the pt if they felt most comfortable that way. Family stated that they felt that that would be the best approach. Family also had concerns regarding safety of the pt if he were to return home. Family has installed cameras in the home to monitor the pt when they are not readily available. CHAPARRO spoke with the family regarding life-alert type buttons, their functionality including jerk-alarms, and provided them with information for such service. Family was provided with a list of local SNF facilities but were not ready to sign the patient choice form at the time of meeting as they wanted to look over the list more thoroughly. CHAPARRO also discussed with  the family Meals on Wheels to help them with meals. SW to continue to work on d/c planning.    SW 1527: SW met with pt and family to discuss SNF placement. Pt was in agreement that this would be the best plan for him. SW provided family with information on the Meals on Wheels program as well as information for the senior center in [place]. Family signed patient choice form and its to be scanned into the pt file. Family stated that they wanted to look at facilities before making any decisions regarding where they'd like referrals to be sent. SW to follow-up on d/c planning.    Update 1605: CHAPARRO spoke with Angie at [facility] who stated that once there is an accepting facilities for the pt, to send her the information and referral. She stated that due to time, she didn't want the auth to  before we were ready to d/c. SW to continue to work on d/c planning.

## 2019-11-25 NOTE — PROGRESS NOTES
[facility] Medicine  Progress Note    Patient Name: Halely Rodrigues  MRN: [id number]  Patient Class: OP- Observation   Admission Date: 11/21/2019  Date of Service: 11/22/2019  Attending Physician: Taya Randhawa MD  Primary Care Provider: Scott Payne MD    Subjective:     Principal Problem:Syncope and collapse    HPI:    The patien is an 89-year-old male with history of CAD, sp CABG x 4 in 1979 and dementia. He is also very hard hearing and unable to provide much information. Most information is obtained from his son and daughter in law. He lives by himself about 200 feet from his son and daughter in law. Per son and daughter in law, the security camera at his house showed that he was in the bathroom for 2 hours and 8 minutes yesterday. They later noted that he has a small hematoma on his forehead. The patient is unable to say whether he fell or passed out in the bathroom.     They report that his health has been quickly declined over the past month. He has been smoking over 70 years, now on e cigarettes. He has lost 13 lbs over the past 4 months. He has swelling of his legs despite having his medication adjusted by his PCP. They states that he usually sleeps only 4 hours at night and spent the rest of his time on a sofa. He has been falling to sleep easily and may fall asleep while at the dinner table.  He has neuropathy in his feet and has been taking norco for pain. He has been complaining of progressive shortness of breath and requiring more breathing treatments. He called them at 2 am a few night ago that he was very short of breath and needed breathing treatments.    He was seen by his PCP earlier today and advised to come to the ED. Currently, he denies any pain or discomfort.     CT head in the ED was unremarkble> he was noted to have elevated BNP at 844. Troponin was slightly elevated at 0.218.  EKG, personally reviewed, shows sinus rhythm with no ST elevation.       Overview/Hospital Course:    11/22:  Pt admitted for syncope and CHF exacerbation.  Pt reports SOB improved.  No CP.  Edema improved.  No palpitations.  No dizziness, HA, or vision changes since admission.  No concerns per nursing.  Questions answered.    11/23:  Carotid US with ICA stenosis.  CT Neck ordered; vascular surgery consulted.  Diuresis continues for CHF exacerbation.  Echo report in progress.   Norvasc increased overnight.  Denies CP.  SOB continues to improve.  No fever, no chills.    11/24:  Pt considered stable for discharge; however PT/OT reports pt needs 24/7 supervision with continued PT/OT.  Pt's family report that while they live nearby, they are only able to check on him once a day.  Case management consulted for SNF.    11/25: pt's family pursuing placement.  SNF referrals sent out today.  Pt eating well; good appetite.  No n/v.  No fever or chills.  No CP or SOB.  Family at bedside.    Review of Systems     All systems reviewed and are negative except as noted per above.    Objective:     BP (!) 134/59   Pulse 72   Temp 97.8 °F (36.6 °C) (Oral)   Resp 18   Ht 6' (1.829 m)   Wt 67.3 kg (148 lb 5.9 oz)   SpO2 97%   BMI 20.12 kg/m²     Physical Exam    Gen: alert, responsive; underweight   HEENT:  Eyes - no pallor  Small hematoma on forehead - improved  External ears with no lesions  Nares patent  Mouth - lips chapped  CV: irregular  Lungs: CTA B/L  Abd: +BS, soft, NT, ND  Ext: no edema; +atrophy   Skin: warm, dry  Neuro: grossly intact  Psych: pleasant    All labs, images, and other studies reviewed personally by me.    Assessment/Plan:      Advanced Placement Planning  - pt considered stable for discharge  - however pt requires SNF; this is in progress    Severe protein-energy malnutrition  Physical Deconditioning   - pt underweight; reports decreased appetite  - 14.47% weight loss in 2 months  - severe subQ fat depletion of triceps and torso  - nutrition following, thank you  -  prealbumin borderline  - diet adjusted as appropriate and supplements added  - PT/OT - recommends HHOT with 24/7 supervision 2/2 high fall risk  - family reports they will not be able to provide supervision  - Case management consulted for SNF    Thrombocytopenia  Anemia, chronic disease- being followed outpt  - dvt ppx with lovenox: held  - appears to be chronic; uncertain of etiology at this time  - GI bleed unlikely; H&H stable  - trending CBC    Syncope and collapse - admission diagnosis, stable  Complicated by Acute on Chronic HFrEF 43% with both systolic and diastolic dysfxn, improved  Complicated by Carotid stenosis, stable  Complicated by Elevated troponin, stable  Complicated by HTN, stable  - pt reports that he did lose consciousness and hit his head  - no CP; troponin stable and not trending upwards  - CT head unremarkable; pt denies focal neurological deficits        - EKG with sinus rhythm with PAC and long QT  - telemetry : no significant arrhythmias       - echo with HFrEF43% with acute on chronic systolic and diastolic dysfunction, improved  - cardiology consulted, recs appreciated - medication adjusted as per cards recs  - Carotid US with >70% L ICA stenosis and R ICA 50 - 69% stenosis  - CTA neck with 60% B/L ICA stenosis  - vascular surgery consulted - recommend follow-up as outpt at this time, CTA neck reveals stenosis is not as pronounced as per US report  - transitioned to po lasix; trend BMP for renal function  - 1.2L fluid restriction  - cardiac diet  - continue asa and metoprolol   - norvasc decreased and lisinopril started previously per cards  - however I have increased norvasc again for BP control and holding lisinpril for slight increase in sCr   - pt to be transitioned to lisinopril as outpt when sCr improves  - hydralazine prn  - continue statin  - monitoring BP    Electrolyte derangement:  Replacement prn  - trending BMP  - mildly elevated sCr - likely result of over-diuresis 2/2 CHF  exacerbation  - lasix transitioned to po; holding ACE for now    Other chronic conditions:  Continue home Rx meds    VTE Risk Mitigation (From admission, onward)         Ordered     Place sequential compression device  Until discontinued      11/21/19 2046     IP VTE HIGH RISK PATIENT  Once      11/21/19 1952              Taya Randhawa MD  Department of Hospital Medicine   [facility]

## 2019-11-25 NOTE — PROGRESS NOTES
[facility]  Adult Nutrition   Progress Note (Follow-Up)    SUMMARY     Recommendations/Interventions:  1. Continue current diet as tolerated, encourage intake.   2. Changed Ensure to BID due to 1200mL fluid restriction.  Goals:   1. Pt to meet at least 75% of estimated needs via PO intake of meals and supplements.  Nutrition Goal Status: new    Reason for Assessment    Reason For Assessment: RD follow-up  Diagnosis: (TIA)  Relevant Medical History: Anemia, dementia, GERD, HTN, heart disease, neuropathy  Interdisciplinary Rounds: attended    Nutrition Risk Screen    Nutrition Risk Screen: no indicators present    Nutrition/Diet History    Patient Reported Diet/Restrictions/Preferences: general  Food Allergies: NKFA  Factors Affecting Nutritional Intake: None identified at this time    Anthropometrics    Temp: 98.7 °F (37.1 °C)  Height Method: Stated  Height: 6' (182.9 cm)  Height (inches): 72 in  Weight Method: Bed Scale  Weight: 67.3 kg (148 lb 5.9 oz)  Weight (lb): 148.37 lb  Ideal Body Weight (IBW), Male: 178 lb  % Ideal Body Weight, Male (lb): 84.27 lb  BMI (Calculated): 20.1  BMI Grade: 18.5-24.9 - normal  Weight Loss: unintentional  Usual Body Weight (UBW), k.55 kg  Weight Change Amount: 15 lb  % Usual Body Weight: 85.71  % Weight Change From Usual Weight: -14.47 %     Weight History:  Wt Readings from Last 10 Encounters:   19 67.3 kg (148 lb 5.9 oz)   10/21/19 73.5 kg (162 lb)   19 74.8 kg (165 lb)   19 73.5 kg (162 lb)   19 73.5 kg (162 lb)   19 73.8 kg (162 lb 9.6 oz)   19 70.8 kg (156 lb)   19 70.8 kg (156 lb)   10/18/18 71 kg (156 lb 9.6 oz)   10/09/18 69.9 kg (154 lb)     Lab/Procedures/Meds: Pertinent Labs Reviewed  Clinical Chemistry:  Recent Labs   Lab 19  0009 19  0426 19  0419    141 142   K 3.8 3.4* 4.1    102 103   CO2 28 29 30*   * 108 111*   BUN 18 27* 41*   CREATININE 1.5* 1.4 1.5*   CALCIUM 8.5*  9.0 9.3   PROT 6.6  --   --    ALBUMIN 3.8  --   --    BILITOT 1.3*  --   --    ALKPHOS 69  --   --    AST 24  --   --    ALT 17  --   --    ANIONGAP 11 10 9   ESTGFRAFRICA 47.0* 51.1* 47.0*   EGFRNONAA 40.7* 44.2* 40.7*   MG 1.6 1.7  --    PHOS 3.2  --   --     < > = values in this interval not displayed.     CBC:   Recent Labs   Lab 11/25/19  0419   WBC 8.77   RBC 4.04*   HGB 12.8*   HCT 38.7*   PLT 86*   MCV 96   MCH 31.7*   MCHC 33.1     Cardiac Profile:  Recent Labs   Lab 11/21/19  1800 11/22/19  0009 11/22/19  0437   *  --   --    TROPONINI 0.218* 0.245* 0.232*     Medications: Pertinent Medications reviewed  Scheduled Meds:   amLODIPine  10 mg Oral Daily    aspirin  81 mg Oral Daily    donepezil  10 mg Oral Daily    furosemide  20 mg Oral Daily    metoprolol succinate  25 mg Oral Daily    pantoprazole  40 mg Oral Daily    pregabalin  150 mg Oral BID    sertraline  100 mg Oral QHS    simvastatin  40 mg Oral QHS     Continuous Infusions:  PRN Meds:.acetaminophen, albuterol sulfate, bisacodyl, hydrALAZINE, HYDROcodone-acetaminophen, magnesium oxide, magnesium oxide, potassium chloride 10%, potassium chloride 10%, potassium chloride 10%, potassium, sodium phosphates, potassium, sodium phosphates, potassium, sodium phosphates, sodium chloride 0.9%    Estimated/Assessed Needs    Weight Used For Calorie Calculations: 68 kg (150 lb)  Energy Calorie Requirements (kcal): 5669-5551 kcals/day (30-35 kcals/kg)  Energy Need Method: Kcal/kg  Protein Requirements: 68-95 g/day (1.0-1.4 g/kg)  Weight Used For Protein Calculations: 68 kg (150 lb)     Estimated Fluid Requirement Method: RDA Method    Nutrition Prescription Ordered    Current Diet Order: Cardiac Diet/1200mL fluid restriction   Oral Nutrition Supplement: Ensure Enlive TID    Evaluation of Received Nutrient/Fluid Intake    Energy Calories Required: not meeting needs  Protein Required: not meeting needs  Fluid Required: meeting needs  Tolerance:  tolerating  % Intake of Estimated Energy Needs: 50 - 75 %  % Meal Intake: 50 - 75 %    Intake/Output Summary (Last 24 hours) at 11/25/2019 1330  Last data filed at 11/25/2019 0800  Gross per 24 hour   Intake 480 ml   Output 125 ml   Net 355 ml      Nutrition Risk    Level of Risk/Frequency of Follow-up: moderate     Dietitian Rounds Brief:  · Eating about 50% of meals and drinking 3 ensure a day. No issues with N/V/D,swallowing, or chewing. Awaiting placement. Per MD stable for discharge.    Monitor and Evaluation    Food and Nutrient Intake: energy intake, food and beverage intake  Food and Nutrient Adminstration: diet order  Physical Activity and Function: nutrition-related ADLs and IADLs  Anthropometric Measurements: weight, weight change  Biochemical Data, Medical Tests and Procedures: lipid profile, gastrointestinal profile, glucose/endocrine profile, electrolyte and renal panel, inflammatory profile  Nutrition-Focused Physical Findings: overall appearance     Nutrition Follow-Up    RD Follow-up?: Yes  Vy Genao RD 11/25/2019 1:32 PM

## 2019-11-25 NOTE — PLAN OF CARE
Problem: Oral Intake Inadequate  Goal: Improved Oral Intake  Outcome: Ongoing, Progressing  Intervention: Promote and Optimize Oral Intake  Flowsheets (Taken 11/25/2019 1333)  Oral Nutrition Promotion: calorie dense liquids provided   Changed Ensure to BID due to 1200mL fluid restriction.

## 2019-11-26 LAB
ANION GAP SERPL CALC-SCNC: 9 MMOL/L (ref 8–16)
BASOPHILS # BLD AUTO: 0.03 K/UL (ref 0–0.2)
BASOPHILS NFR BLD: 0.4 % (ref 0–1.9)
BUN SERPL-MCNC: 45 MG/DL (ref 8–23)
CALCIUM SERPL-MCNC: 9.3 MG/DL (ref 8.7–10.5)
CHLORIDE SERPL-SCNC: 103 MMOL/L (ref 95–110)
CO2 SERPL-SCNC: 29 MMOL/L (ref 23–29)
CREAT SERPL-MCNC: 1.5 MG/DL (ref 0.5–1.4)
DIFFERENTIAL METHOD: ABNORMAL
EOSINOPHIL # BLD AUTO: 0.2 K/UL (ref 0–0.5)
EOSINOPHIL NFR BLD: 2.7 % (ref 0–8)
ERYTHROCYTE [DISTWIDTH] IN BLOOD BY AUTOMATED COUNT: 14.6 % (ref 11.5–14.5)
EST. GFR  (AFRICAN AMERICAN): 47 ML/MIN/1.73 M^2
EST. GFR  (NON AFRICAN AMERICAN): 40.7 ML/MIN/1.73 M^2
GLUCOSE SERPL-MCNC: 109 MG/DL (ref 70–110)
HCT VFR BLD AUTO: 38.6 % (ref 40–54)
HGB BLD-MCNC: 12.7 G/DL (ref 14–18)
IMM GRANULOCYTES # BLD AUTO: 0.02 K/UL (ref 0–0.04)
IMM GRANULOCYTES NFR BLD AUTO: 0.3 % (ref 0–0.5)
LYMPHOCYTES # BLD AUTO: 1.4 K/UL (ref 1–4.8)
LYMPHOCYTES NFR BLD: 18.6 % (ref 18–48)
MCH RBC QN AUTO: 31.4 PG (ref 27–31)
MCHC RBC AUTO-ENTMCNC: 32.9 G/DL (ref 32–36)
MCV RBC AUTO: 95 FL (ref 82–98)
MONOCYTES # BLD AUTO: 0.6 K/UL (ref 0.3–1)
MONOCYTES NFR BLD: 8.6 % (ref 4–15)
NEUTROPHILS # BLD AUTO: 5.2 K/UL (ref 1.8–7.7)
NEUTROPHILS NFR BLD: 69.4 % (ref 38–73)
NRBC BLD-RTO: 0 /100 WBC
PLATELET # BLD AUTO: 86 K/UL (ref 150–350)
PMV BLD AUTO: 13.3 FL (ref 9.2–12.9)
POTASSIUM SERPL-SCNC: 4.2 MMOL/L (ref 3.5–5.1)
RBC # BLD AUTO: 4.05 M/UL (ref 4.6–6.2)
SODIUM SERPL-SCNC: 141 MMOL/L (ref 136–145)
WBC # BLD AUTO: 7.47 K/UL (ref 3.9–12.7)

## 2019-11-26 PROCEDURE — 93005 ELECTROCARDIOGRAM TRACING: CPT

## 2019-11-26 PROCEDURE — 97535 SELF CARE MNGMENT TRAINING: CPT

## 2019-11-26 PROCEDURE — 25000003 PHARM REV CODE 250: Performed by: FAMILY MEDICINE

## 2019-11-26 PROCEDURE — 36415 COLL VENOUS BLD VENIPUNCTURE: CPT

## 2019-11-26 PROCEDURE — 25000003 PHARM REV CODE 250: Performed by: INTERNAL MEDICINE

## 2019-11-26 PROCEDURE — 25000003 PHARM REV CODE 250: Performed by: SPECIALIST

## 2019-11-26 PROCEDURE — 97110 THERAPEUTIC EXERCISES: CPT

## 2019-11-26 PROCEDURE — 99900035 HC TECH TIME PER 15 MIN (STAT)

## 2019-11-26 PROCEDURE — 85025 COMPLETE CBC W/AUTO DIFF WBC: CPT

## 2019-11-26 PROCEDURE — 80048 BASIC METABOLIC PNL TOTAL CA: CPT

## 2019-11-26 PROCEDURE — 25000003 PHARM REV CODE 250: Performed by: NURSE PRACTITIONER

## 2019-11-26 PROCEDURE — 94761 N-INVAS EAR/PLS OXIMETRY MLT: CPT

## 2019-11-26 PROCEDURE — G0378 HOSPITAL OBSERVATION PER HR: HCPCS

## 2019-11-26 RX ADMIN — PREGABALIN 150 MG: 75 CAPSULE ORAL at 08:11

## 2019-11-26 RX ADMIN — SIMVASTATIN 40 MG: 40 TABLET, FILM COATED ORAL at 08:11

## 2019-11-26 RX ADMIN — AMLODIPINE BESYLATE 10 MG: 5 TABLET ORAL at 10:11

## 2019-11-26 RX ADMIN — PREGABALIN 150 MG: 75 CAPSULE ORAL at 10:11

## 2019-11-26 RX ADMIN — SERTRALINE HYDROCHLORIDE 100 MG: 50 TABLET ORAL at 08:11

## 2019-11-26 RX ADMIN — DONEPEZIL HYDROCHLORIDE 10 MG: 5 TABLET, FILM COATED ORAL at 10:11

## 2019-11-26 RX ADMIN — METOPROLOL SUCCINATE 25 MG: 25 TABLET, FILM COATED, EXTENDED RELEASE ORAL at 10:11

## 2019-11-26 RX ADMIN — FUROSEMIDE 20 MG: 20 TABLET ORAL at 10:11

## 2019-11-26 RX ADMIN — ASPIRIN 81 MG 81 MG: 81 TABLET ORAL at 10:11

## 2019-11-26 NOTE — PROGRESS NOTES
[facility] Medicine  Progress Note    Patient Name: Halley Rodrigues  MRN: [id number]  Patient Class: OP- Observation   Admission Date: 11/21/2019  Length of Stay: 0 days  Attending Physician: Mamta Dorsey MD  Primary Care Provider: Scott Payne MD        Subjective:     Principal Problem:Syncope and collapse        HPI:  The patien is an 89-year-old male with history of CAD, sp CABG x 4 in 1979 and dementia. He is also very hard hearing and unable to provide much information. Most information is obtained from his son and daughter in law. He lives by himself about 200 feet from his son and daughter in law. Per son and daughter in law, the security camera at his house showed that he was in the bathroom for 2 hours and 8 minutes yesterday. They later noted that he has a small hematoma on his forehead. The patient is unable to say whether he fell or passed out in the bathroom.     They report that his health has been quickly declined over the past month. He has been smoking over 70 years, now on e cigarettes. He has lost 13 lbs over the past 4 months. He has swelling of his legs despite having his medication adjusted by his PCP. They states that he usually sleeps only 4 hours at night and spent the rest of his time on a sofa. He has been falling to sleep easily and may fall asleep while at the dinner table.  He has neuropathy in his feet and has been taking norco for pain. He has been complaining of progressive shortness of breath and requiring more breathing treatments. He called them at 2 am a few night ago that he was very short of breath and needed breathing treatments.    He was seen by his PCP earlier today and advised to come to the ED. Currently, he denies any pain or discomfort.     CT head in the ED was unremarkble> he was noted to have elevated BNP at 844. Troponin was slightly elevated at 0.218.  EKG, personally reviewed, shows sinus rhythm with no ST elevation.       Overview/Hospital Course:  No notes on file    Interval History: patient seen at bedside working with occupational therapy. He denies any pain, shortness of breath. He needs mod assist with activity.     Review of Systems   HENT: Negative for congestion and sore throat.    Cardiovascular: Negative for chest pain and palpitations.   Gastrointestinal: Negative for abdominal pain, constipation, diarrhea, nausea and vomiting.   Genitourinary: Negative for dysuria, hematuria and urgency.   Skin: Negative for rash.     Objective:     Vital Signs (Most Recent):  Temp: 97.8 °F (36.6 °C) (11/26/19 1100)  Pulse: 80 (11/26/19 1100)  Resp: 20 (11/26/19 1100)  BP: 135/61 (11/26/19 1100)  SpO2: 96 % (11/26/19 1100) Vital Signs (24h Range):  Temp:  [97.8 °F (36.6 °C)-98.5 °F (36.9 °C)] 97.8 °F (36.6 °C)  Pulse:  [69-80] 80  Resp:  [17-20] 20  SpO2:  [94 %-97 %] 96 %  BP: (104-135)/(57-68) 135/61     Weight: 68.3 kg (150 lb 9.2 oz)  Body mass index is 20.42 kg/m².    Intake/Output Summary (Last 24 hours) at 11/26/2019 1450  Last data filed at 11/25/2019 1700  Gross per 24 hour   Intake 240 ml   Output 250 ml   Net -10 ml      Physical Exam   Constitutional: He is oriented to person, place, and time. He appears well-developed and well-nourished.   HENT:   Mouth/Throat: Oropharynx is clear and moist.   Eyes: Pupils are equal, round, and reactive to light. EOM are normal.   Cardiovascular: Normal rate, regular rhythm and normal heart sounds.   No murmur heard.  Pulmonary/Chest: Effort normal and breath sounds normal. No respiratory distress. He has no wheezes. He has no rales.   Abdominal: Soft. Bowel sounds are normal. There is no tenderness.   Musculoskeletal: Normal range of motion.   Lymphadenopathy:     He has no cervical adenopathy.   Neurological: He is alert and oriented to person, place, and time. No cranial nerve deficit.   Skin: Skin is warm. Capillary refill takes less than 2 seconds.   Nursing note and vitals  reviewed.      Significant Labs:   BMP:   Recent Labs   Lab 11/26/19  0425         K 4.2      CO2 29   BUN 45*   CREATININE 1.5*   CALCIUM 9.3     CBC:   Recent Labs   Lab 11/25/19  0419 11/26/19  0425   WBC 8.77 7.47   HGB 12.8* 12.7*   HCT 38.7* 38.6*   PLT 86* 86*     Magnesium: No results for input(s): MG in the last 48 hours.    Significant Imaging: I have reviewed all pertinent imaging results/findings within the past 24 hours.      Assessment/Plan:      Severe protein-energy malnutrition        Nicotine abuse        Thrombocytopenia  Results for KENNETH URBAN (MRN [id number]) as of 11/21/2019 20:35   Ref. Range 4/11/2018 12:25 4/16/2018 09:24 6/4/2018 10:49 10/9/2018 13:31 11/21/2019 18:00   Platelets Latest Ref Range: 150 - 350 K/uL 114 (L) 109 (L) 124 (L) 92 (L) 77 (L)     Chronic  Monitor  No pharmacologic VTE prophylaxis    Advanced age        Dementia  Resume home medication      Anemia, chronic disease  Stable, continue to monitor CBC        VTE Risk Mitigation (From admission, onward)         Ordered     Place sequential compression device  Until discontinued      11/21/19 2046     IP VTE HIGH RISK PATIENT  Once      11/21/19 1952                dispo planning: awaiting SNF placement.       Mamta Dorsey MD  Department of Hospital Medicine   [facility]

## 2019-11-26 NOTE — PLAN OF CARE
Plan of care reviewed with the pt. Cardiac, vital signs, and lab monitoring. Bed alarm on. Watch for falls. Assist x2. Strict I&O. Daily weights.

## 2019-11-26 NOTE — PLAN OF CARE
11/26/19 1413   Discharge Reassessment   Assessment Type Discharge Planning Reassessment   Anticipated Discharge Disposition SNF     CHAPARRO spoke with pt's daughter-in-law, Erica [phone], who stated that the family has not made any decisions regarding placement. Erica stated that she would need more time to look at facilities before making any decisions about where referrals needed to be sent. She did not want referrals sent before she had the opportunity to look into these facilities. SW to continue to work on d/c planning.    Update 1547: CHAPARRO spoke with Erica at length regarding the [facility] SNF list. Erica had many questions regarding the various ratings and what the colors and percentages meant on the list. SW explained and answered all of Erica's questions. SW to continue to work on d/c planning.

## 2019-11-26 NOTE — PT/OT/SLP PROGRESS
Occupational Therapy   Treatment    Name: Halley Rodrigues  MRN: [id number]  Admitting Diagnosis:  Syncope and collapse       Recommendations:     Discharge Recommendations: nursing facility, skilled  Discharge Equipment Recommendations:  none  Barriers to discharge:  None    Assessment:     Halley Rodrigues is a 89 y.o. male with a medical diagnosis of Syncope and collapse.  He presents with Performance deficits affecting function are weakness, impaired endurance, impaired self care skills, impaired balance, gait instability, impaired functional mobilty, decreased safety awareness, decreased coordination, impaired cognition.     Rehab Prognosis:  Fair; patient would benefit from acute skilled OT services to address these deficits and reach maximum level of function.       Plan:     Patient to be seen 5 x/week to address the above listed problems via self-care/home management, therapeutic activities, therapeutic exercises  · Plan of Care Expires: 12/23/19  · Plan of Care Reviewed with: patient    Subjective     Pain/Comfort:  · Pain Rating 1: 0/10  · Pain Rating Post-Intervention 1: 0/10    Objective:     Communicated with: nurse prior to session.  Patient found HOB elevated with bed alarm, telemetry upon OT entry to room.    General Precautions: Standard, hearing impaired, fall   Orthopedic Precautions:N/A   Braces: N/A     Occupational Performance:     Bed Mobility:    · Patient completed Rolling/Turning to Left with  supervision  · Patient completed Scooting/Bridging with supervision  · Patient completed Supine to Sit with supervision     Functional Mobility/Transfers:  · Patient completed Sit <> Stand Transfer with contact guard assistance  with  rolling walker   · Patient completed Toilet Transfer Step Transfer technique with contact guard assistance with  rolling walker and grab bars  · Functional Mobility: ambulated with CGA using RW to bathroom, sink and back to bedside chair, forward flexed  "trunk.    Activities of Daily Living:  · Grooming: contact guard assistance and flexed forward, poor posture, slight sdleaning to right, knees "buckly" mod vc for posture correction standing with RW at sink  · Toileting: minimum assistance clothes management standing with RW, also wearing brief        Treatment & Education:  Purpose of OT visit  UE AROM with towel roll x 10 reps shld flex/ext/chest press/hor abd/add, bicep curls seated BS chair with SBA.     Patient left up in chair with all lines intact, call button in reach and chair alarm onEducation:      GOALS:   Multidisciplinary Problems     Occupational Therapy Goals        Problem: Occupational Therapy Goal    Goal Priority Disciplines Outcome Interventions   Occupational Therapy Goal     OT, PT/OT Ongoing, Progressing    Description:  Goals to be met by: 11/30/2019    Patient will increase functional independence with ADLs by performing:    LE Dressing with Modified [place].  Grooming while standing at sink with Modified [place].  Toileting from toilet with Modified [place] for hygiene and clothing management.   Toilet transfer to toilet with Modified [place].                      Time Tracking:     OT Date of Treatment: 11/26/19  OT Start Time: 1030  OT Stop Time: 1101  OT Total Time (min): 31 min    Billable Minutes:Self Care/Home Management 18  Therapeutic Exercise 13  Total Time 31    Lori Saucedo OT  11/26/2019    "

## 2019-11-26 NOTE — PLAN OF CARE
11/25/19 1947   PRE-TX-O2   O2 Device (Oxygen Therapy) room air   SpO2 (!) 94 %   Pulse Oximetry Type Intermittent   Pulse 75   Resp 19   Aerosol Therapy   $ Aerosol Therapy Charges PRN treatment not required   Respiratory Treatment Status (SVN) PRN treatment not required

## 2019-11-26 NOTE — CARE UPDATE
11/26/19 1029   Patient Assessment/Suction   Level of Consciousness (AVPU) alert   Respiratory Effort Normal   All Lung Fields Breath Sounds clear   Cough Frequency infrequent   Cough Type nonproductive   PRE-TX-O2   O2 Device (Oxygen Therapy) room air   SpO2 96 %   Pulse Oximetry Type Intermittent   $ Pulse Oximetry - Multiple Charge Pulse Oximetry - Multiple   Pulse 77   Resp 20   Aerosol Therapy   $ Aerosol Therapy Charges PRN treatment not required   Respiratory Treatment Status (SVN) PRN treatment not required

## 2019-11-26 NOTE — ASSESSMENT & PLAN NOTE
Results for KENNETH URBAN (MRN [id number]) as of 11/21/2019 20:35   Ref. Range 4/11/2018 12:25 4/16/2018 09:24 6/4/2018 10:49 10/9/2018 13:31 11/21/2019 18:00   Platelets Latest Ref Range: 150 - 350 K/uL 114 (L) 109 (L) 124 (L) 92 (L) 77 (L)     Chronic  Monitor  No pharmacologic VTE prophylaxis

## 2019-11-26 NOTE — SUBJECTIVE & OBJECTIVE
Interval History: patient seen at bedside working with occupational therapy. He denies any pain, shortness of breath. He needs mod assist with activity.     Review of Systems   HENT: Negative for congestion and sore throat.    Cardiovascular: Negative for chest pain and palpitations.   Gastrointestinal: Negative for abdominal pain, constipation, diarrhea, nausea and vomiting.   Genitourinary: Negative for dysuria, hematuria and urgency.   Skin: Negative for rash.     Objective:     Vital Signs (Most Recent):  Temp: 97.8 °F (36.6 °C) (11/26/19 1100)  Pulse: 80 (11/26/19 1100)  Resp: 20 (11/26/19 1100)  BP: 135/61 (11/26/19 1100)  SpO2: 96 % (11/26/19 1100) Vital Signs (24h Range):  Temp:  [97.8 °F (36.6 °C)-98.5 °F (36.9 °C)] 97.8 °F (36.6 °C)  Pulse:  [69-80] 80  Resp:  [17-20] 20  SpO2:  [94 %-97 %] 96 %  BP: (104-135)/(57-68) 135/61     Weight: 68.3 kg (150 lb 9.2 oz)  Body mass index is 20.42 kg/m².    Intake/Output Summary (Last 24 hours) at 11/26/2019 1450  Last data filed at 11/25/2019 1700  Gross per 24 hour   Intake 240 ml   Output 250 ml   Net -10 ml      Physical Exam   Constitutional: He is oriented to person, place, and time. He appears well-developed and well-nourished.   HENT:   Mouth/Throat: Oropharynx is clear and moist.   Eyes: Pupils are equal, round, and reactive to light. EOM are normal.   Cardiovascular: Normal rate, regular rhythm and normal heart sounds.   No murmur heard.  Pulmonary/Chest: Effort normal and breath sounds normal. No respiratory distress. He has no wheezes. He has no rales.   Abdominal: Soft. Bowel sounds are normal. There is no tenderness.   Musculoskeletal: Normal range of motion.   Lymphadenopathy:     He has no cervical adenopathy.   Neurological: He is alert and oriented to person, place, and time. No cranial nerve deficit.   Skin: Skin is warm. Capillary refill takes less than 2 seconds.   Nursing note and vitals reviewed.      Significant Labs:   BMP:   Recent Labs   Lab  11/26/19 0425         K 4.2      CO2 29   BUN 45*   CREATININE 1.5*   CALCIUM 9.3     CBC:   Recent Labs   Lab 11/25/19 0419 11/26/19 0425   WBC 8.77 7.47   HGB 12.8* 12.7*   HCT 38.7* 38.6*   PLT 86* 86*     Magnesium: No results for input(s): MG in the last 48 hours.    Significant Imaging: I have reviewed all pertinent imaging results/findings within the past 24 hours.

## 2019-11-27 PROBLEM — I10 ESSENTIAL HYPERTENSION: Status: ACTIVE | Noted: 2019-11-27

## 2019-11-27 PROBLEM — I50.42 CHRONIC COMBINED SYSTOLIC AND DIASTOLIC HEART FAILURE: Status: ACTIVE | Noted: 2019-11-21

## 2019-11-27 LAB
ANION GAP SERPL CALC-SCNC: 8 MMOL/L (ref 8–16)
BASOPHILS # BLD AUTO: 0.04 K/UL (ref 0–0.2)
BASOPHILS NFR BLD: 0.6 % (ref 0–1.9)
BUN SERPL-MCNC: 45 MG/DL (ref 8–23)
CALCIUM SERPL-MCNC: 9 MG/DL (ref 8.7–10.5)
CHLORIDE SERPL-SCNC: 104 MMOL/L (ref 95–110)
CO2 SERPL-SCNC: 28 MMOL/L (ref 23–29)
CREAT SERPL-MCNC: 1.3 MG/DL (ref 0.5–1.4)
DIFFERENTIAL METHOD: ABNORMAL
EOSINOPHIL # BLD AUTO: 0.2 K/UL (ref 0–0.5)
EOSINOPHIL NFR BLD: 2.6 % (ref 0–8)
ERYTHROCYTE [DISTWIDTH] IN BLOOD BY AUTOMATED COUNT: 14.3 % (ref 11.5–14.5)
EST. GFR  (AFRICAN AMERICAN): 55.9 ML/MIN/1.73 M^2
EST. GFR  (NON AFRICAN AMERICAN): 48.4 ML/MIN/1.73 M^2
GLUCOSE SERPL-MCNC: 99 MG/DL (ref 70–110)
HCT VFR BLD AUTO: 36.8 % (ref 40–54)
HGB BLD-MCNC: 12 G/DL (ref 14–18)
IMM GRANULOCYTES # BLD AUTO: 0.03 K/UL (ref 0–0.04)
IMM GRANULOCYTES NFR BLD AUTO: 0.4 % (ref 0–0.5)
LYMPHOCYTES # BLD AUTO: 1.4 K/UL (ref 1–4.8)
LYMPHOCYTES NFR BLD: 20.1 % (ref 18–48)
MCH RBC QN AUTO: 31.2 PG (ref 27–31)
MCHC RBC AUTO-ENTMCNC: 32.6 G/DL (ref 32–36)
MCV RBC AUTO: 96 FL (ref 82–98)
MONOCYTES # BLD AUTO: 0.6 K/UL (ref 0.3–1)
MONOCYTES NFR BLD: 8.7 % (ref 4–15)
NEUTROPHILS # BLD AUTO: 4.8 K/UL (ref 1.8–7.7)
NEUTROPHILS NFR BLD: 67.6 % (ref 38–73)
NRBC BLD-RTO: 0 /100 WBC
PLATELET # BLD AUTO: 88 K/UL (ref 150–350)
PMV BLD AUTO: 13.3 FL (ref 9.2–12.9)
POTASSIUM SERPL-SCNC: 4.2 MMOL/L (ref 3.5–5.1)
RBC # BLD AUTO: 3.85 M/UL (ref 4.6–6.2)
SODIUM SERPL-SCNC: 140 MMOL/L (ref 136–145)
WBC # BLD AUTO: 7.02 K/UL (ref 3.9–12.7)

## 2019-11-27 PROCEDURE — 27000221 HC OXYGEN, UP TO 24 HOURS

## 2019-11-27 PROCEDURE — 25000003 PHARM REV CODE 250: Performed by: SPECIALIST

## 2019-11-27 PROCEDURE — 80048 BASIC METABOLIC PNL TOTAL CA: CPT

## 2019-11-27 PROCEDURE — 25000003 PHARM REV CODE 250: Performed by: INTERNAL MEDICINE

## 2019-11-27 PROCEDURE — 85025 COMPLETE CBC W/AUTO DIFF WBC: CPT

## 2019-11-27 PROCEDURE — 97535 SELF CARE MNGMENT TRAINING: CPT

## 2019-11-27 PROCEDURE — G0378 HOSPITAL OBSERVATION PER HR: HCPCS

## 2019-11-27 PROCEDURE — 25000003 PHARM REV CODE 250: Performed by: NURSE PRACTITIONER

## 2019-11-27 PROCEDURE — 94761 N-INVAS EAR/PLS OXIMETRY MLT: CPT

## 2019-11-27 PROCEDURE — 36415 COLL VENOUS BLD VENIPUNCTURE: CPT

## 2019-11-27 PROCEDURE — 25000003 PHARM REV CODE 250: Performed by: FAMILY MEDICINE

## 2019-11-27 PROCEDURE — 99900035 HC TECH TIME PER 15 MIN (STAT)

## 2019-11-27 PROCEDURE — 97530 THERAPEUTIC ACTIVITIES: CPT

## 2019-11-27 RX ADMIN — SIMVASTATIN 40 MG: 40 TABLET, FILM COATED ORAL at 08:11

## 2019-11-27 RX ADMIN — ASPIRIN 81 MG 81 MG: 81 TABLET ORAL at 09:11

## 2019-11-27 RX ADMIN — PREGABALIN 150 MG: 75 CAPSULE ORAL at 08:11

## 2019-11-27 RX ADMIN — SERTRALINE HYDROCHLORIDE 100 MG: 50 TABLET ORAL at 08:11

## 2019-11-27 RX ADMIN — PANTOPRAZOLE SODIUM 40 MG: 40 TABLET, DELAYED RELEASE ORAL at 06:11

## 2019-11-27 RX ADMIN — FUROSEMIDE 20 MG: 20 TABLET ORAL at 09:11

## 2019-11-27 RX ADMIN — AMLODIPINE BESYLATE 10 MG: 5 TABLET ORAL at 09:11

## 2019-11-27 RX ADMIN — METOPROLOL SUCCINATE 25 MG: 25 TABLET, FILM COATED, EXTENDED RELEASE ORAL at 09:11

## 2019-11-27 RX ADMIN — PREGABALIN 150 MG: 75 CAPSULE ORAL at 09:11

## 2019-11-27 RX ADMIN — DONEPEZIL HYDROCHLORIDE 10 MG: 5 TABLET, FILM COATED ORAL at 09:11

## 2019-11-27 NOTE — PROGRESS NOTES
[facility] Medicine  Progress Note    Patient Name: Halley Rodrigues  MRN: [id number]  Patient Class: OP- Observation   Admission Date: 11/21/2019  Length of Stay: 0 days  Attending Physician: Mamta Dorsey MD  Primary Care Provider: Scott Payne MD        Subjective:     Principal Problem:Syncope and collapse        HPI:  The patien is an 89-year-old male with history of CAD, sp CABG x 4 in 1979 and dementia. He is also very hard hearing and unable to provide much information. Most information is obtained from his son and daughter in law. He lives by himself about 200 feet from his son and daughter in law. Per son and daughter in law, the security camera at his house showed that he was in the bathroom for 2 hours and 8 minutes yesterday. They later noted that he has a small hematoma on his forehead. The patient is unable to say whether he fell or passed out in the bathroom.     They report that his health has been quickly declined over the past month. He has been smoking over 70 years, now on e cigarettes. He has lost 13 lbs over the past 4 months. He has swelling of his legs despite having his medication adjusted by his PCP. They states that he usually sleeps only 4 hours at night and spent the rest of his time on a sofa. He has been falling to sleep easily and may fall asleep while at the dinner table.  He has neuropathy in his feet and has been taking norco for pain. He has been complaining of progressive shortness of breath and requiring more breathing treatments. He called them at 2 am a few night ago that he was very short of breath and needed breathing treatments.    He was seen by his PCP earlier today and advised to come to the ED. Currently, he denies any pain or discomfort.     CT head in the ED was unremarkble> he was noted to have elevated BNP at 844. Troponin was slightly elevated at 0.218.  EKG, personally reviewed, shows sinus rhythm with no ST elevation.       Overview/Hospital Course:  No notes on file    Interval History: patient has no complaints this morning. He is concerned about his disposition planning.    Review of Systems   HENT: Negative for congestion and sore throat.    Cardiovascular: Negative for chest pain and palpitations.   Gastrointestinal: Negative for abdominal pain, constipation, diarrhea, nausea and vomiting.   Genitourinary: Negative for dysuria, hematuria and urgency.   Skin: Negative for rash.     Objective:     Vital Signs (Most Recent):  Temp: 97.6 °F (36.4 °C) (11/27/19 1100)  Pulse: 74 (11/27/19 1100)  Resp: 16 (11/27/19 1100)  BP: (!) 192/74 (11/27/19 1100)  SpO2: 96 % (11/27/19 1100) Vital Signs (24h Range):  Temp:  [97.6 °F (36.4 °C)-98.7 °F (37.1 °C)] 97.6 °F (36.4 °C)  Pulse:  [70-89] 74  Resp:  [14-20] 16  SpO2:  [92 %-96 %] 96 %  BP: (116-192)/(58-74) 192/74     Weight: 68.4 kg (150 lb 12.7 oz)  Body mass index is 20.45 kg/m².    Intake/Output Summary (Last 24 hours) at 11/27/2019 1208  Last data filed at 11/27/2019 0900  Gross per 24 hour   Intake 700 ml   Output 125 ml   Net 575 ml      Physical Exam   Constitutional: He is oriented to person, place, and time. He appears well-developed and well-nourished.   HENT:   Mouth/Throat: Oropharynx is clear and moist.   Eyes: Pupils are equal, round, and reactive to light. EOM are normal.   Cardiovascular: Normal rate, regular rhythm and normal heart sounds.   No murmur heard.  Pulmonary/Chest: Effort normal and breath sounds normal. No respiratory distress. He has no wheezes. He has no rales.   Abdominal: Soft. Bowel sounds are normal. There is no tenderness.   Musculoskeletal: Normal range of motion.   Lymphadenopathy:     He has no cervical adenopathy.   Neurological: He is alert and oriented to person, place, and time. No cranial nerve deficit.   Skin: Skin is warm. Capillary refill takes less than 2 seconds.   Nursing note and vitals reviewed.      Significant Labs:   BMP:   Recent  Labs   Lab 11/27/19  0434   GLU 99      K 4.2      CO2 28   BUN 45*   CREATININE 1.3   CALCIUM 9.0     CBC:   Recent Labs   Lab 11/26/19  0425 11/27/19  0434   WBC 7.47 7.02   HGB 12.7* 12.0*   HCT 38.6* 36.8*   PLT 86* 88*       Significant Imaging: I have reviewed all pertinent imaging results/findings within the past 24 hours.      Assessment/Plan:      Severe protein-energy malnutrition  Continue current supplements, Ensure BID, Nutritionist following, appreciate recs.       Essential hypertension  Continue current antihypertnsives - He is on amlodipine 10mg daily, and metoprolol.      Chronic combined systolic and diastolic heart failure  Currently compensated. Continue oral diuretics, beta blockers. He is on 1.2L fluid restriction      Anemia, chronic disease  Stable, continue to monitor CBC      Dementia  Continue Donepezil.       Nicotine abuse        Thrombocytopenia  Results for KENNETH URBAN (MRN [id number]) as of 11/21/2019 20:35   Ref. Range 4/11/2018 12:25 4/16/2018 09:24 6/4/2018 10:49 10/9/2018 13:31 11/21/2019 18:00   Platelets Latest Ref Range: 150 - 350 K/uL 114 (L) 109 (L) 124 (L) 92 (L) 77 (L)     Chronic  Monitor  No pharmacologic VTE prophylaxis    Advanced age          VTE Risk Mitigation (From admission, onward)         Ordered     Place sequential compression device  Until discontinued      11/21/19 2046     IP VTE HIGH RISK PATIENT  Once      11/21/19 1952                      Mamta Dorsey MD  Department of Hospital Medicine   [facility]

## 2019-11-27 NOTE — PLAN OF CARE
11/27/19 1400   Discharge Reassessment   Assessment Type Discharge Planning Reassessment   Anticipated Discharge Disposition SNF       SW refaxed the PASRR to the [place] [facility], as a 142 has not yet been received. SW to continue to work on d/c planning.    Update 1444: A 142 has been obtained and will be scanned into the pt's file.

## 2019-11-27 NOTE — PLAN OF CARE
Problem: Occupational Therapy Goal  Goal: Occupational Therapy Goal  Description  Goals to be met by: 11/30/2019    Patient will increase functional independence with ADLs by performing:    LE Dressing with Modified [place].  Grooming while standing at sink with Modified [place].  Toileting from toilet with Modified [place] for hygiene and clothing management.   Toilet transfer to toilet with Modified [place].     Outcome: Ongoing, Progressing

## 2019-11-27 NOTE — PLAN OF CARE
11/26/19 2023   Patient Assessment/Suction   Respiratory Effort Unlabored   All Lung Fields Breath Sounds clear   Rhythm/Pattern, Respiratory pattern regular   PRE-TX-O2   O2 Device (Oxygen Therapy) nasal cannula  (on standby)   SpO2 (!) 93 %   Pulse 81   Resp 16   Aerosol Therapy   $ Aerosol Therapy Charges PRN treatment not required

## 2019-11-27 NOTE — PT/OT/SLP PROGRESS
Occupational Therapy   Treatment    Name: Halley Rodrigues  MRN: [id number]  Admitting Diagnosis:  Syncope and collapse       Recommendations:     Discharge Recommendations: nursing facility, skilled  Discharge Equipment Recommendations:  none  Barriers to discharge:  Decreased caregiver support, Other (Comment)(increased burden of care)    Assessment:     Halley Rodrigues is a 89 y.o. male with a medical diagnosis of Syncope and collapse.  He presents with cooperative however, somewhat flat affect towards OT session. Performance deficits affecting function are weakness, impaired endurance, impaired self care skills, decreased lower extremity function, impaired muscle length, impaired balance, gait instability, impaired joint extensibility, impaired cardiopulmonary response to activity, decreased safety awareness, impaired cognition, impaired functional mobilty.     Rehab Prognosis:  Fair; patient would benefit from acute skilled OT services to address these deficits and reach maximum level of function.       Plan:     Patient to be seen 5 x/week to address the above listed problems via self-care/home management, therapeutic activities, therapeutic exercises  · Plan of Care Expires: 12/23/19  · Plan of Care Reviewed with: patient    Subjective     Pain/Comfort:  · Pain Rating 1: 0/10  · Pain Rating Post-Intervention 1: 0/10    Objective:     Communicated with: Nursing prior to session.  Patient found up in chair with peripheral IV, telemetry with chair alarm activated upon OT entry to room.    General Precautions: Standard, hearing impaired, fall   Orthopedic Precautions:N/A   Braces:       Occupational Performance: greatly impacted by pt's B knee's unstable/buckling tendency therefore, the room rolling chair was utilized for returning back to the bedside chair from the bathroom    Functional Mobility/Transfers:  · Patient completed Sit <> Stand Transfer with minimum assistance and with hand placement  cues  with  rolling walker   · Patient completed Bed <> Chair Transfer using Step Transfer technique with minimum assistance with rolling walker and and cues for hand placement   · Patient completed Toilet Transfer Step Transfer technique with minimum assistance and and hand placement cues  with  rolling walker, grab bars and cues for hand placement   · Functional Mobility: RW ambulation from bedside chair into the bathroom for toilet transfer with Mod A for ambulation due to high risk for falls when pt's B knee's appear to be buckling; greatly impacted by pt's B knee's unstable/buckling tendency therefore, the room rolling chair was utilized for returning back to the bedside chair from the bathroom    Activities of Daily Living:  · Grooming: supervision and seated in chair at the sink with cue needed for identifying the hand soap on the wall for proper hand washing, oral care Independent, and face washing independent     · Toileting: pt without LB clothing, however, pt was able to complete periarea hygiene after BM seated on toilet with left lateral wt shifting        AMPA 6 Click ADL: 19    Treatment & Education: TE deferred due to lunch arriving; call don't fall, hand placement with transfers    Patient left up in chair with all lines intact, call button in reach, chair alarm on and nursing  notifiedEducation:   regarding pt having had a BM during OT session     GOALS:   Multidisciplinary Problems     Occupational Therapy Goals        Problem: Occupational Therapy Goal    Goal Priority Disciplines Outcome Interventions   Occupational Therapy Goal     OT, PT/OT Ongoing, Progressing    Description:  Goals to be met by: 11/30/2019    Patient will increase functional independence with ADLs by performing:    LE Dressing with Modified [place].  Grooming while standing at sink with Modified [place].  Toileting from toilet with Modified [place] for hygiene and clothing management.   Toilet transfer to  toilet with Modified [place].                      Time Tracking:     OT Date of Treatment: 11/27/19  OT Start Time: 1204  OT Stop Time: 1229  OT Total Time (min): 25 min    Billable Minutes:Self Care/Home Management 15  Therapeutic Activity 10    Carmela Hawk OT12:42 PM    11/27/2019

## 2019-11-27 NOTE — SUBJECTIVE & OBJECTIVE
Interval History: patient has no complaints this morning. He is concerned about his disposition planning.    Review of Systems   HENT: Negative for congestion and sore throat.    Cardiovascular: Negative for chest pain and palpitations.   Gastrointestinal: Negative for abdominal pain, constipation, diarrhea, nausea and vomiting.   Genitourinary: Negative for dysuria, hematuria and urgency.   Skin: Negative for rash.     Objective:     Vital Signs (Most Recent):  Temp: 97.6 °F (36.4 °C) (11/27/19 1100)  Pulse: 74 (11/27/19 1100)  Resp: 16 (11/27/19 1100)  BP: (!) 192/74 (11/27/19 1100)  SpO2: 96 % (11/27/19 1100) Vital Signs (24h Range):  Temp:  [97.6 °F (36.4 °C)-98.7 °F (37.1 °C)] 97.6 °F (36.4 °C)  Pulse:  [70-89] 74  Resp:  [14-20] 16  SpO2:  [92 %-96 %] 96 %  BP: (116-192)/(58-74) 192/74     Weight: 68.4 kg (150 lb 12.7 oz)  Body mass index is 20.45 kg/m².    Intake/Output Summary (Last 24 hours) at 11/27/2019 1208  Last data filed at 11/27/2019 0900  Gross per 24 hour   Intake 700 ml   Output 125 ml   Net 575 ml      Physical Exam   Constitutional: He is oriented to person, place, and time. He appears well-developed and well-nourished.   HENT:   Mouth/Throat: Oropharynx is clear and moist.   Eyes: Pupils are equal, round, and reactive to light. EOM are normal.   Cardiovascular: Normal rate, regular rhythm and normal heart sounds.   No murmur heard.  Pulmonary/Chest: Effort normal and breath sounds normal. No respiratory distress. He has no wheezes. He has no rales.   Abdominal: Soft. Bowel sounds are normal. There is no tenderness.   Musculoskeletal: Normal range of motion.   Lymphadenopathy:     He has no cervical adenopathy.   Neurological: He is alert and oriented to person, place, and time. No cranial nerve deficit.   Skin: Skin is warm. Capillary refill takes less than 2 seconds.   Nursing note and vitals reviewed.      Significant Labs:   BMP:   Recent Labs   Lab 11/27/19  2847   GLU 99      K 4.2       CO2 28   BUN 45*   CREATININE 1.3   CALCIUM 9.0     CBC:   Recent Labs   Lab 11/26/19  0425 11/27/19  0434   WBC 7.47 7.02   HGB 12.7* 12.0*   HCT 38.6* 36.8*   PLT 86* 88*       Significant Imaging: I have reviewed all pertinent imaging results/findings within the past 24 hours.

## 2019-11-27 NOTE — CARE UPDATE
11/27/19 1747   Patient Assessment/Suction   Level of Consciousness (AVPU) alert   Respiratory Effort Normal;Unlabored   All Lung Fields Breath Sounds diminished   PRE-TX-O2   O2 Device (Oxygen Therapy) nasal cannula  (SB)   $ Is the patient on Low Flow Oxygen? Yes   Flow (L/min) 0   SpO2 97 %   Pulse 79   Resp 16   Aerosol Therapy   $ Aerosol Therapy Charges PRN treatment not required

## 2019-11-28 LAB
ANION GAP SERPL CALC-SCNC: 9 MMOL/L (ref 8–16)
BASOPHILS # BLD AUTO: 0.04 K/UL (ref 0–0.2)
BASOPHILS NFR BLD: 0.5 % (ref 0–1.9)
BUN SERPL-MCNC: 41 MG/DL (ref 8–23)
CALCIUM SERPL-MCNC: 9.1 MG/DL (ref 8.7–10.5)
CHLORIDE SERPL-SCNC: 101 MMOL/L (ref 95–110)
CO2 SERPL-SCNC: 27 MMOL/L (ref 23–29)
CREAT SERPL-MCNC: 1.3 MG/DL (ref 0.5–1.4)
DIFFERENTIAL METHOD: ABNORMAL
EOSINOPHIL # BLD AUTO: 0.2 K/UL (ref 0–0.5)
EOSINOPHIL NFR BLD: 2.2 % (ref 0–8)
ERYTHROCYTE [DISTWIDTH] IN BLOOD BY AUTOMATED COUNT: 14.1 % (ref 11.5–14.5)
EST. GFR  (AFRICAN AMERICAN): 55.9 ML/MIN/1.73 M^2
EST. GFR  (NON AFRICAN AMERICAN): 48.4 ML/MIN/1.73 M^2
GLUCOSE SERPL-MCNC: 150 MG/DL (ref 70–110)
HCT VFR BLD AUTO: 37.5 % (ref 40–54)
HGB BLD-MCNC: 12.4 G/DL (ref 14–18)
IMM GRANULOCYTES # BLD AUTO: 0.04 K/UL (ref 0–0.04)
IMM GRANULOCYTES NFR BLD AUTO: 0.5 % (ref 0–0.5)
LYMPHOCYTES # BLD AUTO: 1.2 K/UL (ref 1–4.8)
LYMPHOCYTES NFR BLD: 15 % (ref 18–48)
MCH RBC QN AUTO: 31.6 PG (ref 27–31)
MCHC RBC AUTO-ENTMCNC: 33.1 G/DL (ref 32–36)
MCV RBC AUTO: 95 FL (ref 82–98)
MONOCYTES # BLD AUTO: 0.5 K/UL (ref 0.3–1)
MONOCYTES NFR BLD: 6.2 % (ref 4–15)
NEUTROPHILS # BLD AUTO: 6.2 K/UL (ref 1.8–7.7)
NEUTROPHILS NFR BLD: 75.6 % (ref 38–73)
NRBC BLD-RTO: 0 /100 WBC
PLATELET # BLD AUTO: 91 K/UL (ref 150–350)
PMV BLD AUTO: 12.9 FL (ref 9.2–12.9)
POTASSIUM SERPL-SCNC: 4.1 MMOL/L (ref 3.5–5.1)
RBC # BLD AUTO: 3.93 M/UL (ref 4.6–6.2)
SODIUM SERPL-SCNC: 137 MMOL/L (ref 136–145)
TB INDURATION 48 - 72 HR READ: 0 MM
WBC # BLD AUTO: 8.21 K/UL (ref 3.9–12.7)

## 2019-11-28 PROCEDURE — 99900035 HC TECH TIME PER 15 MIN (STAT)

## 2019-11-28 PROCEDURE — 94761 N-INVAS EAR/PLS OXIMETRY MLT: CPT

## 2019-11-28 PROCEDURE — 36415 COLL VENOUS BLD VENIPUNCTURE: CPT

## 2019-11-28 PROCEDURE — 25000003 PHARM REV CODE 250: Performed by: INTERNAL MEDICINE

## 2019-11-28 PROCEDURE — 25000003 PHARM REV CODE 250: Performed by: FAMILY MEDICINE

## 2019-11-28 PROCEDURE — 80048 BASIC METABOLIC PNL TOTAL CA: CPT

## 2019-11-28 PROCEDURE — 25000003 PHARM REV CODE 250: Performed by: NURSE PRACTITIONER

## 2019-11-28 PROCEDURE — 85025 COMPLETE CBC W/AUTO DIFF WBC: CPT

## 2019-11-28 PROCEDURE — 25000003 PHARM REV CODE 250: Performed by: SPECIALIST

## 2019-11-28 PROCEDURE — 27000221 HC OXYGEN, UP TO 24 HOURS

## 2019-11-28 PROCEDURE — G0378 HOSPITAL OBSERVATION PER HR: HCPCS

## 2019-11-28 RX ADMIN — PREGABALIN 150 MG: 75 CAPSULE ORAL at 10:11

## 2019-11-28 RX ADMIN — ASPIRIN 81 MG 81 MG: 81 TABLET ORAL at 10:11

## 2019-11-28 RX ADMIN — AMLODIPINE BESYLATE 10 MG: 5 TABLET ORAL at 10:11

## 2019-11-28 RX ADMIN — DONEPEZIL HYDROCHLORIDE 10 MG: 5 TABLET, FILM COATED ORAL at 10:11

## 2019-11-28 RX ADMIN — FUROSEMIDE 20 MG: 20 TABLET ORAL at 10:11

## 2019-11-28 RX ADMIN — PREGABALIN 150 MG: 75 CAPSULE ORAL at 09:11

## 2019-11-28 RX ADMIN — SIMVASTATIN 40 MG: 40 TABLET, FILM COATED ORAL at 09:11

## 2019-11-28 RX ADMIN — SERTRALINE HYDROCHLORIDE 100 MG: 50 TABLET ORAL at 09:11

## 2019-11-28 RX ADMIN — METOPROLOL SUCCINATE 25 MG: 25 TABLET, FILM COATED, EXTENDED RELEASE ORAL at 10:11

## 2019-11-28 RX ADMIN — PANTOPRAZOLE SODIUM 40 MG: 40 TABLET, DELAYED RELEASE ORAL at 06:11

## 2019-11-28 NOTE — PROGRESS NOTES
[facility]  Progress Note    Patient Name: Halley Rodrigues  MRN: [id number]  Patient Class: OP- Observation   Admission Date: 11/21/2019  Length of Stay: 0 days  Attending Physician: Mamta Dorsey MD  Primary Care Provider: Scott Payne MD        Subjective:     Principal Problem:Syncope and collapse        HPI:  The patien is an 89-year-old male with history of CAD, sp CABG x 4 in 1979 and dementia. He is also very hard hearing and unable to provide much information. Most information is obtained from his son and daughter in law. He lives by himself about 200 feet from his son and daughter in law. Per son and daughter in law, the security camera at his house showed that he was in the bathroom for 2 hours and 8 minutes yesterday. They later noted that he has a small hematoma on his forehead. The patient is unable to say whether he fell or passed out in the bathroom.     They report that his health has been quickly declined over the past month. He has been smoking over 70 years, now on e cigarettes. He has lost 13 lbs over the past 4 months. He has swelling of his legs despite having his medication adjusted by his PCP. They states that he usually sleeps only 4 hours at night and spent the rest of his time on a sofa. He has been falling to sleep easily and may fall asleep while at the dinner table.  He has neuropathy in his feet and has been taking norco for pain. He has been complaining of progressive shortness of breath and requiring more breathing treatments. He called them at 2 am a few night ago that he was very short of breath and needed breathing treatments.    He was seen by his PCP earlier today and advised to come to the ED. Currently, he denies any pain or discomfort.     CT head in the ED was unremarkble> he was noted to have elevated BNP at 844. Troponin was slightly elevated at 0.218.  EKG, personally reviewed, shows sinus rhythm with no ST elevation.       Overview/Hospital Course:  No notes on file    Interval History: no complaints today    Review of Systems   HENT: Negative for congestion and sore throat.    Cardiovascular: Negative for chest pain and palpitations.   Gastrointestinal: Negative for abdominal pain, constipation, diarrhea, nausea and vomiting.   Genitourinary: Negative for dysuria, hematuria and urgency.   Skin: Negative for rash.     Objective:     Vital Signs (Most Recent):  Temp: 98.4 °F (36.9 °C) (11/28/19 1100)  Pulse: 89 (11/28/19 1439)  Resp: 16 (11/28/19 1439)  BP: 138/68 (11/28/19 1100)  SpO2: 95 % (11/28/19 1439) Vital Signs (24h Range):  Temp:  [97.5 °F (36.4 °C)-98.5 °F (36.9 °C)] 98.4 °F (36.9 °C)  Pulse:  [67-92] 89  Resp:  [12-18] 16  SpO2:  [93 %-97 %] 95 %  BP: (120-161)/(54-71) 138/68     Weight: 68.8 kg (151 lb 10.8 oz)  Body mass index is 20.57 kg/m².    Intake/Output Summary (Last 24 hours) at 11/28/2019 1519  Last data filed at 11/28/2019 1500  Gross per 24 hour   Intake --   Output 1475 ml   Net -1475 ml      Physical Exam   Constitutional: He is oriented to person, place, and time. He appears well-developed and well-nourished.   HENT:   Mouth/Throat: Oropharynx is clear and moist.   Eyes: Pupils are equal, round, and reactive to light. EOM are normal.   Cardiovascular: Normal rate, regular rhythm and normal heart sounds.   No murmur heard.  Pulmonary/Chest: Effort normal and breath sounds normal. No respiratory distress. He has no wheezes. He has no rales.   Abdominal: Soft. Bowel sounds are normal. There is no tenderness.   Musculoskeletal: Normal range of motion.   Lymphadenopathy:     He has no cervical adenopathy.   Neurological: He is alert and oriented to person, place, and time. No cranial nerve deficit.   Skin: Skin is warm. Capillary refill takes less than 2 seconds.   Nursing note and vitals reviewed.      Significant Labs:   BMP:   Recent Labs   Lab 11/28/19  0450   *      K 4.1      CO2 27    BUN 41*   CREATININE 1.3   CALCIUM 9.1     CBC:   Recent Labs   Lab 11/27/19  0434 11/28/19  0449   WBC 7.02 8.21   HGB 12.0* 12.4*   HCT 36.8* 37.5*   PLT 88* 91*     Magnesium: No results for input(s): MG in the last 48 hours.    Significant Imaging: I have reviewed all pertinent imaging results/findings within the past 24 hours.      Assessment/Plan:      Severe protein-energy malnutrition  Continue current supplements, Ensure BID, Nutritionist following, appreciate recs.       Essential hypertension  Continue  amlodipine 10mg daily, and metoprolol.      Chronic combined systolic and diastolic heart failure  Currently compensated. Continue oral diuretics, beta blockers. He is on 1.2L fluid restriction      Anemia, chronic disease  Stable, continue to monitor CBC      Dementia  Continue Donepezil.       Nicotine abuse        Thrombocytopenia  Results for KENNETH URBAN (MRN [id number]) as of 11/21/2019 20:35   Ref. Range 4/11/2018 12:25 4/16/2018 09:24 6/4/2018 10:49 10/9/2018 13:31 11/21/2019 18:00   Platelets Latest Ref Range: 150 - 350 K/uL 114 (L) 109 (L) 124 (L) 92 (L) 77 (L)     Chronic  Monitor  No pharmacologic VTE prophylaxis    Advanced age          VTE Risk Mitigation (From admission, onward)         Ordered     Place sequential compression device  Until discontinued      11/21/19 2046     IP VTE HIGH RISK PATIENT  Once      11/21/19 1952                      Mamta Dorsey MD  Department of Hospital Medicine   [facility]

## 2019-11-28 NOTE — CARE UPDATE
11/28/19 1439   Patient Assessment/Suction   Level of Consciousness (AVPU) alert   Respiratory Effort Normal;Unlabored   All Lung Fields Breath Sounds diminished   PRE-TX-O2   O2 Device (Oxygen Therapy) nasal cannula  (sb)   $ Is the patient on Low Flow Oxygen? Yes   Flow (L/min) 0   SpO2 95 %   Pulse 89   Resp 16   Aerosol Therapy   $ Aerosol Therapy Charges PRN treatment not required

## 2019-11-28 NOTE — SUBJECTIVE & OBJECTIVE
Interval History: no complaints today    Review of Systems   HENT: Negative for congestion and sore throat.    Cardiovascular: Negative for chest pain and palpitations.   Gastrointestinal: Negative for abdominal pain, constipation, diarrhea, nausea and vomiting.   Genitourinary: Negative for dysuria, hematuria and urgency.   Skin: Negative for rash.     Objective:     Vital Signs (Most Recent):  Temp: 98.4 °F (36.9 °C) (11/28/19 1100)  Pulse: 89 (11/28/19 1439)  Resp: 16 (11/28/19 1439)  BP: 138/68 (11/28/19 1100)  SpO2: 95 % (11/28/19 1439) Vital Signs (24h Range):  Temp:  [97.5 °F (36.4 °C)-98.5 °F (36.9 °C)] 98.4 °F (36.9 °C)  Pulse:  [67-92] 89  Resp:  [12-18] 16  SpO2:  [93 %-97 %] 95 %  BP: (120-161)/(54-71) 138/68     Weight: 68.8 kg (151 lb 10.8 oz)  Body mass index is 20.57 kg/m².    Intake/Output Summary (Last 24 hours) at 11/28/2019 1519  Last data filed at 11/28/2019 1500  Gross per 24 hour   Intake --   Output 1475 ml   Net -1475 ml      Physical Exam   Constitutional: He is oriented to person, place, and time. He appears well-developed and well-nourished.   HENT:   Mouth/Throat: Oropharynx is clear and moist.   Eyes: Pupils are equal, round, and reactive to light. EOM are normal.   Cardiovascular: Normal rate, regular rhythm and normal heart sounds.   No murmur heard.  Pulmonary/Chest: Effort normal and breath sounds normal. No respiratory distress. He has no wheezes. He has no rales.   Abdominal: Soft. Bowel sounds are normal. There is no tenderness.   Musculoskeletal: Normal range of motion.   Lymphadenopathy:     He has no cervical adenopathy.   Neurological: He is alert and oriented to person, place, and time. No cranial nerve deficit.   Skin: Skin is warm. Capillary refill takes less than 2 seconds.   Nursing note and vitals reviewed.      Significant Labs:   BMP:   Recent Labs   Lab 11/28/19  0450   *      K 4.1      CO2 27   BUN 41*   CREATININE 1.3   CALCIUM 9.1     CBC:    Recent Labs   Lab 11/27/19  0434 11/28/19  0449   WBC 7.02 8.21   HGB 12.0* 12.4*   HCT 36.8* 37.5*   PLT 88* 91*     Magnesium: No results for input(s): MG in the last 48 hours.    Significant Imaging: I have reviewed all pertinent imaging results/findings within the past 24 hours.

## 2019-11-28 NOTE — PLAN OF CARE
11/27/19 2045   Patient Assessment/Suction   Level of Consciousness (AVPU) alert   Respiratory Effort Normal;Unlabored   Expansion/Accessory Muscles/Retractions no use of accessory muscles   All Lung Fields Breath Sounds clear   PRE-TX-O2   O2 Device (Oxygen Therapy) room air   SpO2 97 %   Pulse Oximetry Type Intermittent   $ Pulse Oximetry - Multiple Charge Pulse Oximetry - Multiple   Pulse 71   Resp 12   BP (!) 161/71   Aerosol Therapy   $ Aerosol Therapy Charges PRN treatment not required   Daily Review of Necessity (SVN) completed   Respiratory Treatment Status (SVN) PRN treatment not required   continue tx. As ordered

## 2019-11-29 LAB
ANION GAP SERPL CALC-SCNC: 7 MMOL/L (ref 8–16)
BASOPHILS # BLD AUTO: 0.03 K/UL (ref 0–0.2)
BASOPHILS NFR BLD: 0.4 % (ref 0–1.9)
BUN SERPL-MCNC: 44 MG/DL (ref 8–23)
CALCIUM SERPL-MCNC: 9.1 MG/DL (ref 8.7–10.5)
CHLORIDE SERPL-SCNC: 106 MMOL/L (ref 95–110)
CO2 SERPL-SCNC: 28 MMOL/L (ref 23–29)
CREAT SERPL-MCNC: 1.3 MG/DL (ref 0.5–1.4)
DIFFERENTIAL METHOD: ABNORMAL
EOSINOPHIL # BLD AUTO: 0.1 K/UL (ref 0–0.5)
EOSINOPHIL NFR BLD: 1.8 % (ref 0–8)
ERYTHROCYTE [DISTWIDTH] IN BLOOD BY AUTOMATED COUNT: 14.1 % (ref 11.5–14.5)
EST. GFR  (AFRICAN AMERICAN): 55.9 ML/MIN/1.73 M^2
EST. GFR  (NON AFRICAN AMERICAN): 48.4 ML/MIN/1.73 M^2
GLUCOSE SERPL-MCNC: 107 MG/DL (ref 70–110)
HCT VFR BLD AUTO: 39.4 % (ref 40–54)
HGB BLD-MCNC: 12.7 G/DL (ref 14–18)
IMM GRANULOCYTES # BLD AUTO: 0.02 K/UL (ref 0–0.04)
IMM GRANULOCYTES NFR BLD AUTO: 0.3 % (ref 0–0.5)
LYMPHOCYTES # BLD AUTO: 1.1 K/UL (ref 1–4.8)
LYMPHOCYTES NFR BLD: 15.4 % (ref 18–48)
MCH RBC QN AUTO: 30.9 PG (ref 27–31)
MCHC RBC AUTO-ENTMCNC: 32.2 G/DL (ref 32–36)
MCV RBC AUTO: 96 FL (ref 82–98)
MONOCYTES # BLD AUTO: 0.7 K/UL (ref 0.3–1)
MONOCYTES NFR BLD: 9.6 % (ref 4–15)
NEUTROPHILS # BLD AUTO: 5.4 K/UL (ref 1.8–7.7)
NEUTROPHILS NFR BLD: 72.5 % (ref 38–73)
NRBC BLD-RTO: 0 /100 WBC
PLATELET # BLD AUTO: 98 K/UL (ref 150–350)
PMV BLD AUTO: 12.9 FL (ref 9.2–12.9)
POTASSIUM SERPL-SCNC: 4.6 MMOL/L (ref 3.5–5.1)
RBC # BLD AUTO: 4.11 M/UL (ref 4.6–6.2)
SODIUM SERPL-SCNC: 141 MMOL/L (ref 136–145)
WBC # BLD AUTO: 7.41 K/UL (ref 3.9–12.7)

## 2019-11-29 PROCEDURE — 97110 THERAPEUTIC EXERCISES: CPT

## 2019-11-29 PROCEDURE — 25000003 PHARM REV CODE 250: Performed by: SPECIALIST

## 2019-11-29 PROCEDURE — 85025 COMPLETE CBC W/AUTO DIFF WBC: CPT

## 2019-11-29 PROCEDURE — 27000221 HC OXYGEN, UP TO 24 HOURS

## 2019-11-29 PROCEDURE — 25000003 PHARM REV CODE 250: Performed by: NURSE PRACTITIONER

## 2019-11-29 PROCEDURE — 80048 BASIC METABOLIC PNL TOTAL CA: CPT

## 2019-11-29 PROCEDURE — 25000003 PHARM REV CODE 250: Performed by: INTERNAL MEDICINE

## 2019-11-29 PROCEDURE — 97530 THERAPEUTIC ACTIVITIES: CPT

## 2019-11-29 PROCEDURE — 25000003 PHARM REV CODE 250: Performed by: FAMILY MEDICINE

## 2019-11-29 PROCEDURE — 94761 N-INVAS EAR/PLS OXIMETRY MLT: CPT

## 2019-11-29 PROCEDURE — G0378 HOSPITAL OBSERVATION PER HR: HCPCS

## 2019-11-29 PROCEDURE — 99900035 HC TECH TIME PER 15 MIN (STAT)

## 2019-11-29 PROCEDURE — 97535 SELF CARE MNGMENT TRAINING: CPT

## 2019-11-29 PROCEDURE — 36415 COLL VENOUS BLD VENIPUNCTURE: CPT

## 2019-11-29 RX ADMIN — DONEPEZIL HYDROCHLORIDE 10 MG: 5 TABLET, FILM COATED ORAL at 11:11

## 2019-11-29 RX ADMIN — SIMVASTATIN 40 MG: 40 TABLET, FILM COATED ORAL at 09:11

## 2019-11-29 RX ADMIN — FUROSEMIDE 20 MG: 20 TABLET ORAL at 11:11

## 2019-11-29 RX ADMIN — ASPIRIN 81 MG 81 MG: 81 TABLET ORAL at 11:11

## 2019-11-29 RX ADMIN — PREGABALIN 150 MG: 75 CAPSULE ORAL at 11:11

## 2019-11-29 RX ADMIN — AMLODIPINE BESYLATE 10 MG: 5 TABLET ORAL at 11:11

## 2019-11-29 RX ADMIN — PREGABALIN 150 MG: 75 CAPSULE ORAL at 09:11

## 2019-11-29 RX ADMIN — SERTRALINE HYDROCHLORIDE 100 MG: 50 TABLET ORAL at 09:11

## 2019-11-29 RX ADMIN — PANTOPRAZOLE SODIUM 40 MG: 40 TABLET, DELAYED RELEASE ORAL at 06:11

## 2019-11-29 RX ADMIN — METOPROLOL SUCCINATE 25 MG: 25 TABLET, FILM COATED, EXTENDED RELEASE ORAL at 11:11

## 2019-11-29 NOTE — SUBJECTIVE & OBJECTIVE
Interval History: no acute overnight events  Review of Systems   HENT: Negative for congestion and sore throat.    Cardiovascular: Negative for chest pain and palpitations.   Gastrointestinal: Negative for abdominal pain, constipation, diarrhea, nausea and vomiting.   Genitourinary: Negative for dysuria, hematuria and urgency.   Skin: Negative for rash.     Objective:     Vital Signs (Most Recent):  Temp: 97.9 °F (36.6 °C) (11/29/19 1153)  Pulse: 75 (11/29/19 1153)  Resp: 16 (11/29/19 1153)  BP: (!) 161/76 (11/29/19 1153)  SpO2: 96 % (11/29/19 1153) Vital Signs (24h Range):  Temp:  [97.6 °F (36.4 °C)-98.1 °F (36.7 °C)] 97.9 °F (36.6 °C)  Pulse:  [67-79] 75  Resp:  [15-18] 16  SpO2:  [94 %-98 %] 96 %  BP: (153-161)/(68-76) 161/76     Weight: 66.5 kg (146 lb 9.7 oz)  Body mass index is 19.88 kg/m².    Intake/Output Summary (Last 24 hours) at 11/29/2019 1440  Last data filed at 11/29/2019 1300  Gross per 24 hour   Intake 240 ml   Output 901 ml   Net -661 ml      Physical Exam   Constitutional: He is oriented to person, place, and time. He appears well-developed and well-nourished.   HENT:   Mouth/Throat: Oropharynx is clear and moist.   Eyes: Pupils are equal, round, and reactive to light. EOM are normal.   Cardiovascular: Normal rate, regular rhythm and normal heart sounds.   No murmur heard.  Pulmonary/Chest: Effort normal and breath sounds normal. No respiratory distress. He has no wheezes. He has no rales.   Abdominal: Soft. Bowel sounds are normal. There is no tenderness.   Musculoskeletal: Normal range of motion.   Lymphadenopathy:     He has no cervical adenopathy.   Neurological: He is alert and oriented to person, place, and time. No cranial nerve deficit.   Skin: Skin is warm. Capillary refill takes less than 2 seconds.   Nursing note and vitals reviewed.      Significant Labs: None    Significant Imaging: none

## 2019-11-29 NOTE — PROGRESS NOTES
[facility]  Progress Note    Patient Name: Halley Rodrigues  MRN: [id number]  Patient Class: OP- Observation   Admission Date: 11/21/2019  Length of Stay: 0 days  Attending Physician: Mamta Dorsey MD  Primary Care Provider: Scott Payne MD        Subjective:     Principal Problem:Syncope and collapse        HPI:  The patien is an 89-year-old male with history of CAD, sp CABG x 4 in 1979 and dementia. He is also very hard hearing and unable to provide much information. Most information is obtained from his son and daughter in law. He lives by himself about 200 feet from his son and daughter in law. Per son and daughter in law, the security camera at his house showed that he was in the bathroom for 2 hours and 8 minutes yesterday. They later noted that he has a small hematoma on his forehead. The patient is unable to say whether he fell or passed out in the bathroom.     They report that his health has been quickly declined over the past month. He has been smoking over 70 years, now on e cigarettes. He has lost 13 lbs over the past 4 months. He has swelling of his legs despite having his medication adjusted by his PCP. They states that he usually sleeps only 4 hours at night and spent the rest of his time on a sofa. He has been falling to sleep easily and may fall asleep while at the dinner table.  He has neuropathy in his feet and has been taking norco for pain. He has been complaining of progressive shortness of breath and requiring more breathing treatments. He called them at 2 am a few night ago that he was very short of breath and needed breathing treatments.    He was seen by his PCP earlier today and advised to come to the ED. Currently, he denies any pain or discomfort.     CT head in the ED was unremarkble> he was noted to have elevated BNP at 844. Troponin was slightly elevated at 0.218.  EKG, personally reviewed, shows sinus rhythm with no ST elevation.       Overview/Hospital Course:  No notes on file    Interval History: no acute overnight events  Review of Systems   HENT: Negative for congestion and sore throat.    Cardiovascular: Negative for chest pain and palpitations.   Gastrointestinal: Negative for abdominal pain, constipation, diarrhea, nausea and vomiting.   Genitourinary: Negative for dysuria, hematuria and urgency.   Skin: Negative for rash.     Objective:     Vital Signs (Most Recent):  Temp: 97.9 °F (36.6 °C) (11/29/19 1153)  Pulse: 75 (11/29/19 1153)  Resp: 16 (11/29/19 1153)  BP: (!) 161/76 (11/29/19 1153)  SpO2: 96 % (11/29/19 1153) Vital Signs (24h Range):  Temp:  [97.6 °F (36.4 °C)-98.1 °F (36.7 °C)] 97.9 °F (36.6 °C)  Pulse:  [67-79] 75  Resp:  [15-18] 16  SpO2:  [94 %-98 %] 96 %  BP: (153-161)/(68-76) 161/76     Weight: 66.5 kg (146 lb 9.7 oz)  Body mass index is 19.88 kg/m².    Intake/Output Summary (Last 24 hours) at 11/29/2019 1440  Last data filed at 11/29/2019 1300  Gross per 24 hour   Intake 240 ml   Output 901 ml   Net -661 ml      Physical Exam   Constitutional: He is oriented to person, place, and time. He appears well-developed and well-nourished.   HENT:   Mouth/Throat: Oropharynx is clear and moist.   Eyes: Pupils are equal, round, and reactive to light. EOM are normal.   Cardiovascular: Normal rate, regular rhythm and normal heart sounds.   No murmur heard.  Pulmonary/Chest: Effort normal and breath sounds normal. No respiratory distress. He has no wheezes. He has no rales.   Abdominal: Soft. Bowel sounds are normal. There is no tenderness.   Musculoskeletal: Normal range of motion.   Lymphadenopathy:     He has no cervical adenopathy.   Neurological: He is alert and oriented to person, place, and time. No cranial nerve deficit.   Skin: Skin is warm. Capillary refill takes less than 2 seconds.   Nursing note and vitals reviewed.      Significant Labs: None    Significant Imaging: none      Assessment/Plan:      Severe protein-energy  malnutrition  Continue current supplements, Ensure BID, Nutritionist following, appreciate recs.       Essential hypertension  Continue  amlodipine 10mg daily, and metoprolol.      Chronic combined systolic and diastolic heart failure  Currently compensated. Continue oral diuretics, beta blockers. He is on 1.2L fluid restriction      Anemia, chronic disease  Stable, continue to monitor CBC      Dementia  Continue Donepezil.       Nicotine abuse        Thrombocytopenia  Results for KENNETH URBAN (MRN [id number]) as of 11/21/2019 20:35   Ref. Range 4/11/2018 12:25 4/16/2018 09:24 6/4/2018 10:49 10/9/2018 13:31 11/21/2019 18:00   Platelets Latest Ref Range: 150 - 350 K/uL 114 (L) 109 (L) 124 (L) 92 (L) 77 (L)     Chronic  Monitor  No pharmacologic VTE prophylaxis    Advanced age          VTE Risk Mitigation (From admission, onward)         Ordered     Place sequential compression device  Until discontinued      11/21/19 2046     IP VTE HIGH RISK PATIENT  Once      11/21/19 1952                Labs fortnightly until nursing home placement is secured  Daily PT/OT      Mamta Dorsey MD  Department of Hospital Medicine   [facility]

## 2019-11-29 NOTE — PLAN OF CARE
11/29/19 1449   Patient Assessment/Suction   Level of Consciousness (AVPU)   (asleep)   Respiratory Effort Normal;Unlabored   Expansion/Accessory Muscles/Retractions expansion symmetric;no use of accessory muscles   All Lung Fields Breath Sounds clear   Rhythm/Pattern, Respiratory snoring   PRE-TX-O2   O2 Device (Oxygen Therapy) nasal cannula   $ Is the patient on Low Flow Oxygen? Yes   Flow (L/min) 0  (stby)   SpO2 (!) 94 %   Pulse Oximetry Type Intermittent   $ Pulse Oximetry - Multiple Charge Pulse Oximetry - Multiple   Pulse 79   Resp 19   Positioning HOB elevated 30 degrees   Aerosol Therapy   $ Aerosol Therapy Charges PRN treatment not required   Daily Review of Necessity (SVN) completed

## 2019-11-29 NOTE — PT/OT/SLP PROGRESS
Occupational Therapy   Treatment    Name: Halley Rodrigues  MRN: [id number]  Admitting Diagnosis:  Syncope and collapse       Recommendations:     Discharge Recommendations: nursing facility, skilled  Discharge Equipment Recommendations:  none  Barriers to discharge:  Decreased caregiver support, Other (Comment)(increased burden of care)    Assessment:     Halley Rodrigues is a 89 y.o. male with a medical diagnosis of Syncope and collapse.  He presents with agreeable attitude with pt looking for his call button that had fallen on the ground. Performance deficits affecting function are weakness, impaired endurance, impaired self care skills, decreased lower extremity function, impaired muscle length, impaired balance, gait instability, impaired joint extensibility, impaired cardiopulmonary response to activity, decreased safety awareness, impaired cognition, impaired functional mobilty.     Rehab Prognosis:  Fair; patient would benefit from acute skilled OT services to address these deficits and reach maximum level of function.       Plan:     Patient to be seen 5 x/week to address the above listed problems via self-care/home management, therapeutic activities, therapeutic exercises  · Plan of Care Expires: 12/23/19  · Plan of Care Reviewed with: patient    Subjective     Pain/Comfort:  · Pain Rating 1: 0/10  · Pain Rating Post-Intervention 1: 0/10    Objective:     Communicated with: Nursing  prior to session.  Patient found HOB elevated with peripheral IV, telemetry upon OT entry to room.    General Precautions: Standard, fall   Orthopedic Precautions:N/A   Braces: N/A     Occupational Performance: with focus placed on static sitting balance during arm exercises; pt with fair -    Bed Mobility:    · Patient completed Rolling/Turning to Right with supervision  · Patient completed Supine to Sit with supervision     Functional Mobility/Transfers:  · Patient completed Sit <> Stand Transfer with minimum  assistance and hand placement cues   with  no assistive device   · Patient completed Bed <> Chair Transfer using Stand Pivot technique with minimum assistance with no assistive device  · Patient completed Toilet Transfer Stand Pivot technique with minimum assistance with  no AD  · Functional Mobility: greatly impacted by pt's impaired B knee stability impairment; appear to buckle    Activities of Daily Living:  · Grooming: supervision seated in chair at the sink w/ cue to locate soap on the wall for hand hygiene after toileting ; oral care wtihout cues needed; pt still waiting form son to bring his hair clipper to shave his beard   · Toileting: minimum assistance and cues for underwear mgt due to impaired motor planning (pt is with dementia)         [facility] 6 Click ADL: 19    Treatment & Education: Dow[place] exercises sitting on the side of the bed completed 2 x 10 reps for shoulder flexion, shoulder press, horizontal ab abduction;Patient left up in chair with all lines intact, call button in reach, chair alarm on and nursing  notifiedEducation:      GOALS:   Multidisciplinary Problems     Occupational Therapy Goals        Problem: Occupational Therapy Goal    Goal Priority Disciplines Outcome Interventions   Occupational Therapy Goal     OT, PT/OT Ongoing, Progressing    Description:  Goals to be met by: 11/30/2019    Patient will increase functional independence with ADLs by performing:    LE Dressing with Modified [place].  Grooming while standing at sink with Modified [place].  Toileting from toilet with Modified [place] for hygiene and clothing management.   Toilet transfer to toilet with Modified [place].                      Time Tracking:     OT Date of Treatment: 11/29/19  OT Start Time: 1455  OT Stop Time: 1539  OT Total Time (min): 44 min    Billable Minutes:Self Care/Home Management 15  Therapeutic Activity 15  Therapeutic Exercise 14    Carmela Hawk OT3:55 PM   11/29/2019

## 2019-11-29 NOTE — PLAN OF CARE
11/28/19 2018   Patient Assessment/Suction   Level of Consciousness (AVPU) alert   Respiratory Effort Normal;Unlabored   Expansion/Accessory Muscles/Retractions no use of accessory muscles   All Lung Fields Breath Sounds clear   PRE-TX-O2   O2 Device (Oxygen Therapy) room air   SpO2 98 %   Pulse Oximetry Type Intermittent   $ Pulse Oximetry - Multiple Charge Pulse Oximetry - Multiple   Pulse 78   Resp 16   Aerosol Therapy   $ Aerosol Therapy Charges PRN treatment not required   Daily Review of Necessity (SVN) completed   Respiratory Treatment Status (SVN) PRN treatment not required   continue tx. As ordered

## 2019-11-29 NOTE — PLAN OF CARE
Problem: Occupational Therapy Goal  Goal: Occupational Therapy Goal  Description  Goals to be met by: 11/30/2019    Patient will increase functional independence with ADLs by performing:    LE Dressing with Modified [place].  Grooming while standing at sink with Modified [place].  Toileting from toilet with Modified [place] for hygiene and clothing management.   Toilet transfer to toilet with Modified [place].     Outcome: Ongoing, Progressing

## 2019-11-30 PROBLEM — Z75.8 DISCHARGE PLANNING ISSUES: Status: ACTIVE | Noted: 2019-11-30

## 2019-11-30 LAB
ANION GAP SERPL CALC-SCNC: 8 MMOL/L (ref 8–16)
BASOPHILS # BLD AUTO: 0.04 K/UL (ref 0–0.2)
BASOPHILS NFR BLD: 0.5 % (ref 0–1.9)
BUN SERPL-MCNC: 44 MG/DL (ref 8–23)
CALCIUM SERPL-MCNC: 9.2 MG/DL (ref 8.7–10.5)
CHLORIDE SERPL-SCNC: 104 MMOL/L (ref 95–110)
CO2 SERPL-SCNC: 28 MMOL/L (ref 23–29)
CREAT SERPL-MCNC: 1.3 MG/DL (ref 0.5–1.4)
DIFFERENTIAL METHOD: ABNORMAL
EOSINOPHIL # BLD AUTO: 0.2 K/UL (ref 0–0.5)
EOSINOPHIL NFR BLD: 2.4 % (ref 0–8)
ERYTHROCYTE [DISTWIDTH] IN BLOOD BY AUTOMATED COUNT: 14 % (ref 11.5–14.5)
EST. GFR  (AFRICAN AMERICAN): 55.9 ML/MIN/1.73 M^2
EST. GFR  (NON AFRICAN AMERICAN): 48.4 ML/MIN/1.73 M^2
GLUCOSE SERPL-MCNC: 120 MG/DL (ref 70–110)
HCT VFR BLD AUTO: 37.7 % (ref 40–54)
HGB BLD-MCNC: 12.3 G/DL (ref 14–18)
IMM GRANULOCYTES # BLD AUTO: 0.03 K/UL (ref 0–0.04)
IMM GRANULOCYTES NFR BLD AUTO: 0.4 % (ref 0–0.5)
LYMPHOCYTES # BLD AUTO: 1.1 K/UL (ref 1–4.8)
LYMPHOCYTES NFR BLD: 12.8 % (ref 18–48)
MCH RBC QN AUTO: 31 PG (ref 27–31)
MCHC RBC AUTO-ENTMCNC: 32.6 G/DL (ref 32–36)
MCV RBC AUTO: 95 FL (ref 82–98)
MONOCYTES # BLD AUTO: 0.7 K/UL (ref 0.3–1)
MONOCYTES NFR BLD: 7.9 % (ref 4–15)
NEUTROPHILS # BLD AUTO: 6.4 K/UL (ref 1.8–7.7)
NEUTROPHILS NFR BLD: 76 % (ref 38–73)
NRBC BLD-RTO: 0 /100 WBC
PLATELET # BLD AUTO: 103 K/UL (ref 150–350)
PMV BLD AUTO: 13.4 FL (ref 9.2–12.9)
POTASSIUM SERPL-SCNC: 4.4 MMOL/L (ref 3.5–5.1)
RBC # BLD AUTO: 3.97 M/UL (ref 4.6–6.2)
SODIUM SERPL-SCNC: 140 MMOL/L (ref 136–145)
WBC # BLD AUTO: 8.37 K/UL (ref 3.9–12.7)

## 2019-11-30 PROCEDURE — 25000003 PHARM REV CODE 250: Performed by: FAMILY MEDICINE

## 2019-11-30 PROCEDURE — 36415 COLL VENOUS BLD VENIPUNCTURE: CPT

## 2019-11-30 PROCEDURE — 80048 BASIC METABOLIC PNL TOTAL CA: CPT

## 2019-11-30 PROCEDURE — 94761 N-INVAS EAR/PLS OXIMETRY MLT: CPT

## 2019-11-30 PROCEDURE — 25000003 PHARM REV CODE 250: Performed by: NURSE PRACTITIONER

## 2019-11-30 PROCEDURE — 25000003 PHARM REV CODE 250: Performed by: SPECIALIST

## 2019-11-30 PROCEDURE — 85025 COMPLETE CBC W/AUTO DIFF WBC: CPT

## 2019-11-30 PROCEDURE — G0378 HOSPITAL OBSERVATION PER HR: HCPCS

## 2019-11-30 PROCEDURE — 25000003 PHARM REV CODE 250: Performed by: INTERNAL MEDICINE

## 2019-11-30 PROCEDURE — 99900035 HC TECH TIME PER 15 MIN (STAT)

## 2019-11-30 PROCEDURE — 27000221 HC OXYGEN, UP TO 24 HOURS

## 2019-11-30 RX ADMIN — PANTOPRAZOLE SODIUM 40 MG: 40 TABLET, DELAYED RELEASE ORAL at 05:11

## 2019-11-30 RX ADMIN — AMLODIPINE BESYLATE 10 MG: 5 TABLET ORAL at 08:11

## 2019-11-30 RX ADMIN — ASPIRIN 81 MG 81 MG: 81 TABLET ORAL at 08:11

## 2019-11-30 RX ADMIN — FUROSEMIDE 20 MG: 20 TABLET ORAL at 08:11

## 2019-11-30 RX ADMIN — SERTRALINE HYDROCHLORIDE 100 MG: 50 TABLET ORAL at 09:11

## 2019-11-30 RX ADMIN — METOPROLOL SUCCINATE 25 MG: 25 TABLET, FILM COATED, EXTENDED RELEASE ORAL at 08:11

## 2019-11-30 RX ADMIN — DONEPEZIL HYDROCHLORIDE 10 MG: 5 TABLET, FILM COATED ORAL at 08:11

## 2019-11-30 RX ADMIN — PREGABALIN 150 MG: 75 CAPSULE ORAL at 09:11

## 2019-11-30 RX ADMIN — PREGABALIN 150 MG: 75 CAPSULE ORAL at 08:11

## 2019-11-30 RX ADMIN — SIMVASTATIN 40 MG: 40 TABLET, FILM COATED ORAL at 09:11

## 2019-11-30 NOTE — CARE UPDATE
11/29/19 2220   Patient Assessment/Suction   Level of Consciousness (AVPU)   (SLEEPING)   Respiratory Effort Normal;Unlabored   Expansion/Accessory Muscles/Retractions expansion symmetric;no retractions;no use of accessory muscles   All Lung Fields Breath Sounds diminished   Rhythm/Pattern, Respiratory no shortness of breath reported;pattern regular;unlabored   Aerosol Therapy   $ Aerosol Therapy Charges PRN treatment not required   Daily Review of Necessity (SVN) completed

## 2019-11-30 NOTE — ASSESSMENT & PLAN NOTE
Case management working on SNF placement. Referrals have been sent to [facility], will resume planning on Monday.

## 2019-11-30 NOTE — PROGRESS NOTES
[facility]  Progress Note    Patient Name: Halley Rodrigues  MRN: [id number]  Patient Class: OP- Observation   Admission Date: 11/21/2019  Length of Stay: 0 days  Attending Physician: Mamta Dorsey MD  Primary Care Provider: Scott Payne MD        Subjective:     Principal Problem:Syncope and collapse        HPI:  The patien is an 89-year-old male with history of CAD, sp CABG x 4 in 1979 and dementia. He is also very hard hearing and unable to provide much information. Most information is obtained from his son and daughter in law. He lives by himself about 200 feet from his son and daughter in law. Per son and daughter in law, the security camera at his house showed that he was in the bathroom for 2 hours and 8 minutes yesterday. They later noted that he has a small hematoma on his forehead. The patient is unable to say whether he fell or passed out in the bathroom.     They report that his health has been quickly declined over the past month. He has been smoking over 70 years, now on e cigarettes. He has lost 13 lbs over the past 4 months. He has swelling of his legs despite having his medication adjusted by his PCP. They states that he usually sleeps only 4 hours at night and spent the rest of his time on a sofa. He has been falling to sleep easily and may fall asleep while at the dinner table.  He has neuropathy in his feet and has been taking norco for pain. He has been complaining of progressive shortness of breath and requiring more breathing treatments. He called them at 2 am a few night ago that he was very short of breath and needed breathing treatments.    He was seen by his PCP earlier today and advised to come to the ED. Currently, he denies any pain or discomfort.     CT head in the ED was unremarkble> he was noted to have elevated BNP at 844. Troponin was slightly elevated at 0.218.  EKG, personally reviewed, shows sinus rhythm with no ST elevation.       Overview/Hospital Course:  No notes on file    Interval History: patient seen sitting up in chair, family visiting with him. Daughter at bedside concerned that physical therapy is not working with patient.     Review of Systems   HENT: Negative for congestion and sore throat.    Cardiovascular: Negative for chest pain and palpitations.   Gastrointestinal: Negative for abdominal pain, constipation, diarrhea, nausea and vomiting.   Genitourinary: Negative for dysuria, hematuria and urgency.   Skin: Negative for rash.     Objective:     Vital Signs (Most Recent):  Temp: 97.6 °F (36.4 °C) (11/30/19 1515)  Pulse: 73 (11/30/19 1515)  Resp: 16 (11/30/19 1515)  BP: (!) 143/66 (11/30/19 1515)  SpO2: 98 % (11/30/19 1515) Vital Signs (24h Range):  Temp:  [97.3 °F (36.3 °C)-98.4 °F (36.9 °C)] 97.6 °F (36.4 °C)  Pulse:  [50-79] 73  Resp:  [16-19] 16  SpO2:  [96 %-98 %] 98 %  BP: (120-161)/(58-85) 143/66     Weight: 66.5 kg (146 lb 9.7 oz)  Body mass index is 19.88 kg/m².    Intake/Output Summary (Last 24 hours) at 11/30/2019 1610  Last data filed at 11/30/2019 0900  Gross per 24 hour   Intake 239 ml   Output 350 ml   Net -111 ml      Physical Exam   Constitutional: He appears well-developed and well-nourished. He is active and cooperative.   HENT:   Head: Normocephalic and atraumatic.   Eyes: Pupils are equal, round, and reactive to light. Conjunctivae are normal.   Neurological: He is alert. Coordination normal.   Psychiatric: He has a normal mood and affect.       Significant Labs:   BMP:   Recent Labs   Lab 11/30/19  0442   *      K 4.4      CO2 28   BUN 44*   CREATININE 1.3   CALCIUM 9.2     CBC:   Recent Labs   Lab 11/29/19  0440 11/30/19 0442   WBC 7.41 8.37   HGB 12.7* 12.3*   HCT 39.4* 37.7*   PLT 98* 103*     Magnesium: No results for input(s): MG in the last 48 hours.    Significant Imaging: I have reviewed all pertinent imaging results/findings within the past 24 hours.      Assessment/Plan:       Discharge planning issues  Case management working on SNF placement. Referrals have been sent to [facility], will resume planning on Monday.       Severe protein-energy malnutrition  Continue current supplements, Ensure BID, Nutritionist following, appreciate recs.       Essential hypertension  Continue  amlodipine 10mg daily, and metoprolol.      Chronic combined systolic and diastolic heart failure  Currently compensated. Continue oral diuretics, beta blockers. He is on 1.2L fluid restriction      Anemia, chronic disease  Stable, continue to monitor CBC      Dementia  Continue Donepezil.       Nicotine abuse        Thrombocytopenia  Results for KENNETH URBAN (MRN [id number]) as of 11/21/2019 20:35   Ref. Range 4/11/2018 12:25 4/16/2018 09:24 6/4/2018 10:49 10/9/2018 13:31 11/21/2019 18:00   Platelets Latest Ref Range: 150 - 350 K/uL 114 (L) 109 (L) 124 (L) 92 (L) 77 (L)     Chronic  Monitor  No pharmacologic VTE prophylaxis    Advanced age          VTE Risk Mitigation (From admission, onward)         Ordered     Place sequential compression device  Until discontinued      11/21/19 2046     IP VTE HIGH RISK PATIENT  Once      11/21/19 1952                  He was evaluated by physical therapy in the course of his admission and was deemed not to need further physical therapy during the hospitalization.  Hospitalization has been prolonged secondary to discharge planning issues will re-consult physical therapy while we await SNF placement.    Mamta Dorsey MD  Department of Hospital Medicine   [facility]

## 2019-11-30 NOTE — CARE UPDATE
11/30/19 1003   Patient Assessment/Suction   Level of Consciousness (AVPU) alert   Respiratory Effort Normal;Unlabored   All Lung Fields Breath Sounds diminished   PRE-TX-O2   O2 Device (Oxygen Therapy) nasal cannula  (SB)   $ Is the patient on Low Flow Oxygen? Yes   Flow (L/min) 0   Pulse Oximetry Type Intermittent   $ Pulse Oximetry - Multiple Charge Pulse Oximetry - Multiple   Pulse (!) 50   Resp 16   Aerosol Therapy   $ Aerosol Therapy Charges PRN treatment not required

## 2019-11-30 NOTE — SUBJECTIVE & OBJECTIVE
Interval History: patient seen sitting up in chair, family visiting with him. Daughter at bedside concerned that physical therapy is not working with patient.     Review of Systems   HENT: Negative for congestion and sore throat.    Cardiovascular: Negative for chest pain and palpitations.   Gastrointestinal: Negative for abdominal pain, constipation, diarrhea, nausea and vomiting.   Genitourinary: Negative for dysuria, hematuria and urgency.   Skin: Negative for rash.     Objective:     Vital Signs (Most Recent):  Temp: 97.6 °F (36.4 °C) (11/30/19 1515)  Pulse: 73 (11/30/19 1515)  Resp: 16 (11/30/19 1515)  BP: (!) 143/66 (11/30/19 1515)  SpO2: 98 % (11/30/19 1515) Vital Signs (24h Range):  Temp:  [97.3 °F (36.3 °C)-98.4 °F (36.9 °C)] 97.6 °F (36.4 °C)  Pulse:  [50-79] 73  Resp:  [16-19] 16  SpO2:  [96 %-98 %] 98 %  BP: (120-161)/(58-85) 143/66     Weight: 66.5 kg (146 lb 9.7 oz)  Body mass index is 19.88 kg/m².    Intake/Output Summary (Last 24 hours) at 11/30/2019 1610  Last data filed at 11/30/2019 0900  Gross per 24 hour   Intake 239 ml   Output 350 ml   Net -111 ml      Physical Exam   Constitutional: He appears well-developed and well-nourished. He is active and cooperative.   HENT:   Head: Normocephalic and atraumatic.   Eyes: Pupils are equal, round, and reactive to light. Conjunctivae are normal.   Neurological: He is alert. Coordination normal.   Psychiatric: He has a normal mood and affect.       Significant Labs:   BMP:   Recent Labs   Lab 11/30/19  0442   *      K 4.4      CO2 28   BUN 44*   CREATININE 1.3   CALCIUM 9.2     CBC:   Recent Labs   Lab 11/29/19 0440 11/30/19 0442   WBC 7.41 8.37   HGB 12.7* 12.3*   HCT 39.4* 37.7*   PLT 98* 103*     Magnesium: No results for input(s): MG in the last 48 hours.    Significant Imaging: I have reviewed all pertinent imaging results/findings within the past 24 hours.

## 2019-12-01 LAB
ANION GAP SERPL CALC-SCNC: 8 MMOL/L (ref 8–16)
BASOPHILS # BLD AUTO: 0.03 K/UL (ref 0–0.2)
BASOPHILS NFR BLD: 0.4 % (ref 0–1.9)
BUN SERPL-MCNC: 46 MG/DL (ref 8–23)
CALCIUM SERPL-MCNC: 9.2 MG/DL (ref 8.7–10.5)
CHLORIDE SERPL-SCNC: 103 MMOL/L (ref 95–110)
CO2 SERPL-SCNC: 27 MMOL/L (ref 23–29)
CREAT SERPL-MCNC: 1.4 MG/DL (ref 0.5–1.4)
DIFFERENTIAL METHOD: ABNORMAL
EOSINOPHIL # BLD AUTO: 0.2 K/UL (ref 0–0.5)
EOSINOPHIL NFR BLD: 2.5 % (ref 0–8)
ERYTHROCYTE [DISTWIDTH] IN BLOOD BY AUTOMATED COUNT: 14 % (ref 11.5–14.5)
EST. GFR  (AFRICAN AMERICAN): 51.1 ML/MIN/1.73 M^2
EST. GFR  (NON AFRICAN AMERICAN): 44.2 ML/MIN/1.73 M^2
GLUCOSE SERPL-MCNC: 110 MG/DL (ref 70–110)
HCT VFR BLD AUTO: 38.4 % (ref 40–54)
HGB BLD-MCNC: 12.7 G/DL (ref 14–18)
IMM GRANULOCYTES # BLD AUTO: 0.01 K/UL (ref 0–0.04)
IMM GRANULOCYTES NFR BLD AUTO: 0.1 % (ref 0–0.5)
LYMPHOCYTES # BLD AUTO: 1.4 K/UL (ref 1–4.8)
LYMPHOCYTES NFR BLD: 19.2 % (ref 18–48)
MCH RBC QN AUTO: 31.1 PG (ref 27–31)
MCHC RBC AUTO-ENTMCNC: 33.1 G/DL (ref 32–36)
MCV RBC AUTO: 94 FL (ref 82–98)
MONOCYTES # BLD AUTO: 0.5 K/UL (ref 0.3–1)
MONOCYTES NFR BLD: 7.4 % (ref 4–15)
NEUTROPHILS # BLD AUTO: 5.1 K/UL (ref 1.8–7.7)
NEUTROPHILS NFR BLD: 70.4 % (ref 38–73)
NRBC BLD-RTO: 0 /100 WBC
PLATELET # BLD AUTO: 99 K/UL (ref 150–350)
PMV BLD AUTO: 13.1 FL (ref 9.2–12.9)
POTASSIUM SERPL-SCNC: 4.6 MMOL/L (ref 3.5–5.1)
RBC # BLD AUTO: 4.09 M/UL (ref 4.6–6.2)
SODIUM SERPL-SCNC: 138 MMOL/L (ref 136–145)
WBC # BLD AUTO: 7.19 K/UL (ref 3.9–12.7)

## 2019-12-01 PROCEDURE — 97116 GAIT TRAINING THERAPY: CPT

## 2019-12-01 PROCEDURE — 27000221 HC OXYGEN, UP TO 24 HOURS

## 2019-12-01 PROCEDURE — 94761 N-INVAS EAR/PLS OXIMETRY MLT: CPT

## 2019-12-01 PROCEDURE — 25000003 PHARM REV CODE 250: Performed by: FAMILY MEDICINE

## 2019-12-01 PROCEDURE — 25000003 PHARM REV CODE 250: Performed by: INTERNAL MEDICINE

## 2019-12-01 PROCEDURE — 25000003 PHARM REV CODE 250: Performed by: NURSE PRACTITIONER

## 2019-12-01 PROCEDURE — G0378 HOSPITAL OBSERVATION PER HR: HCPCS

## 2019-12-01 PROCEDURE — 99900035 HC TECH TIME PER 15 MIN (STAT)

## 2019-12-01 PROCEDURE — 85025 COMPLETE CBC W/AUTO DIFF WBC: CPT

## 2019-12-01 PROCEDURE — 80048 BASIC METABOLIC PNL TOTAL CA: CPT

## 2019-12-01 PROCEDURE — 36415 COLL VENOUS BLD VENIPUNCTURE: CPT

## 2019-12-01 PROCEDURE — 97164 PT RE-EVAL EST PLAN CARE: CPT

## 2019-12-01 PROCEDURE — 25000003 PHARM REV CODE 250: Performed by: SPECIALIST

## 2019-12-01 RX ADMIN — FUROSEMIDE 20 MG: 20 TABLET ORAL at 08:12

## 2019-12-01 RX ADMIN — SERTRALINE HYDROCHLORIDE 100 MG: 50 TABLET ORAL at 09:12

## 2019-12-01 RX ADMIN — PREGABALIN 150 MG: 75 CAPSULE ORAL at 08:12

## 2019-12-01 RX ADMIN — METOPROLOL SUCCINATE 25 MG: 25 TABLET, FILM COATED, EXTENDED RELEASE ORAL at 08:12

## 2019-12-01 RX ADMIN — DONEPEZIL HYDROCHLORIDE 10 MG: 5 TABLET, FILM COATED ORAL at 08:12

## 2019-12-01 RX ADMIN — PREGABALIN 150 MG: 75 CAPSULE ORAL at 09:12

## 2019-12-01 RX ADMIN — AMLODIPINE BESYLATE 10 MG: 5 TABLET ORAL at 08:12

## 2019-12-01 RX ADMIN — PANTOPRAZOLE SODIUM 40 MG: 40 TABLET, DELAYED RELEASE ORAL at 05:12

## 2019-12-01 RX ADMIN — SIMVASTATIN 40 MG: 40 TABLET, FILM COATED ORAL at 09:12

## 2019-12-01 RX ADMIN — ASPIRIN 81 MG 81 MG: 81 TABLET ORAL at 08:12

## 2019-12-01 NOTE — PT/OT/SLP RE-EVAL
Physical Therapy Re-evaluation    Patient Name:  Halley Rodrigues   MRN:  [id number]    Recommendations:     Discharge Recommendations:  nursing facility, skilled   Discharge Equipment Recommendations: none   Barriers to discharge: None    Assessment:     Halley Rodrigues is a 89 y.o. male admitted with a medical diagnosis of Syncope and collapse.  He presents with the following impairments/functional limitations:  weakness, gait instability, impaired endurance, impaired balance, impaired functional mobilty, decreased safety awareness. Pt was initially evaluated on 11/23 and found to be at baseline. Re-evaluation performed and pt ambulated 200' with RW and CGA. He has crouched posture with ambulation and has intermittent periods of R knee buckling.     Rehab Prognosis:  fair; patient would benefit from acute skilled PT services to address these deficits and reach maximum level of function.      Recent Surgery: * No surgery found *      Plan:     During this hospitalization, patient to be seen 5 x/week to address the above listed problems via gait training, therapeutic activities, therapeutic exercises  · Plan of Care Expires:  01/01/20   Plan of Care Reviewed with: patient    Subjective     Communicated with RN prior to session.  Patient found up in chair with telemetry upon PT entry to room, agreeable to evaluation.      Chief Complaint: toe pain  Patient comments/goals: walk  Pain/Comfort:  · Pain Rating 1: 0/10    Patients cultural, spiritual, Christian conflicts given the current situation:        Objective:     Patient found with: telemetry     General Precautions: Standard, fall   Orthopedic Precautions:N/A   Braces: N/A     Exams:  · RLE Strength: WFL  · LLE Strength: WFL    Functional Mobility:  · Transfers:     · Sit to Stand:  contact guard assistance with rolling walker  · Gait: 200' RW CGA; R knee buckling; crouches posture  · Balance: fair    AM-PAC 6 CLICK MOBILITY  Total  Score:22       Therapeutic Activities and Exercises:   Patient was educated on the importance of OOB activity during hospital stay, safe transfers and ambulation     Patient left HOB elevated with call button in reach, bed alarm on and RN notified.    GOALS:   Multidisciplinary Problems     Physical Therapy Goals        Problem: Physical Therapy Goal    Goal Priority Disciplines Outcome Goal Variances Interventions   Physical Therapy Goal     PT, PT/OT      Description:  Goals to be met by: D/C     Patient will increase functional independence with mobility by performin. Supine to sit with Stand-by Assistance  2. Sit to stand transfer with Supervision  3. Bed to chair transfer with Supervision using Rolling Walker  4. Gait  x 300 feet with Supervision using Rolling Walker.                       History:     Past Medical History:   Diagnosis Date    Anemia     Anemia due to chronic blood loss 2018    Anemia, chronic disease 2018    Dementia     GERD (gastroesophageal reflux disease)     Heart disease     Hypertension     Neuropathy        Past Surgical History:   Procedure Laterality Date    CORONARY ARTERY BYPASS GRAFT         Time Tracking:     PT Received On: 19  PT Start Time: 0958     PT Stop Time: 1015  PT Total Time (min): 17 min     Billable Minutes: Re-eval 7 and Gait Training 10      Savannah Dunne, PT  2019

## 2019-12-01 NOTE — SUBJECTIVE & OBJECTIVE
Interval History: ambulated in the hallway with PT this morning. Not symptomatic.     Review of Systems   HENT: Negative for congestion and sore throat.    Cardiovascular: Negative for chest pain and palpitations.   Gastrointestinal: Negative for abdominal pain, constipation, diarrhea, nausea and vomiting.   Genitourinary: Negative for dysuria, hematuria and urgency.   Skin: Negative for rash.     Objective:     Vital Signs (Most Recent):  Temp: 97.8 °F (36.6 °C) (12/01/19 1454)  Pulse: 75 (12/01/19 1454)  Resp: 18 (12/01/19 1454)  BP: 131/62 (12/01/19 1454)  SpO2: 99 % (12/01/19 1454) Vital Signs (24h Range):  Temp:  [97.8 °F (36.6 °C)-98.6 °F (37 °C)] 97.8 °F (36.6 °C)  Pulse:  [56-92] 75  Resp:  [16-19] 18  SpO2:  [95 %-99 %] 99 %  BP: ()/(58-79) 131/62     Weight: 66.2 kg (145 lb 15.1 oz)  Body mass index is 19.79 kg/m².    Intake/Output Summary (Last 24 hours) at 12/1/2019 1611  Last data filed at 12/1/2019 1542  Gross per 24 hour   Intake 480 ml   Output 250 ml   Net 230 ml      Physical Exam   Constitutional: He appears well-developed and well-nourished. He is active and cooperative.   HENT:   Head: Normocephalic and atraumatic.   Eyes: Pupils are equal, round, and reactive to light. Conjunctivae are normal.   Neurological: He is alert. Coordination normal.   Psychiatric: He has a normal mood and affect.       Significant Labs:   BMP:   Recent Labs   Lab 12/01/19  0437         K 4.6      CO2 27   BUN 46*   CREATININE 1.4   CALCIUM 9.2     CBC:   Recent Labs   Lab 11/30/19  0442 12/01/19  0437   WBC 8.37 7.19   HGB 12.3* 12.7*   HCT 37.7* 38.4*   * 99*     Magnesium: No results for input(s): MG in the last 48 hours.    Significant Imaging: I have reviewed all pertinent imaging results/findings within the past 24 hours.

## 2019-12-01 NOTE — ASSESSMENT & PLAN NOTE
Case management working on SNF placement. Referrals have been sent to [facility], will resume planning on Monday.

## 2019-12-01 NOTE — CARE UPDATE
12/01/19 1000   Patient Assessment/Suction   Level of Consciousness (AVPU) alert   Respiratory Effort Normal;Unlabored   Expansion/Accessory Muscles/Retractions no use of accessory muscles   All Lung Fields Breath Sounds diminished   PRE-TX-O2   O2 Device (Oxygen Therapy) nasal cannula  (SB)   $ Is the patient on Low Flow Oxygen? Yes   Flow (L/min) 0   SpO2 98 %   Pulse Oximetry Type Intermittent   $ Pulse Oximetry - Multiple Charge Pulse Oximetry - Multiple   Pulse 68   Aerosol Therapy   $ Aerosol Therapy Charges PRN treatment not required

## 2019-12-01 NOTE — PLAN OF CARE
Cardiac, vital signs, lab monitoring.  Increase activity as tolerated. PT / OT. Bed alarm on. Watch for falls.

## 2019-12-01 NOTE — PROGRESS NOTES
[facility] Medicine  Progress Note    Patient Name: Halley Rodrigues  MRN: [id number]  Patient Class: OP- Observation   Admission Date: 11/21/2019  Length of Stay: 0 days  Attending Physician: Mamta Dorsey MD  Primary Care Provider: Scott Payne MD        Subjective:     Principal Problem:Syncope and collapse        HPI:  The patien is an 89-year-old male with history of CAD, sp CABG x 4 in 1979 and dementia. He is also very hard hearing and unable to provide much information. Most information is obtained from his son and daughter in law. He lives by himself about 200 feet from his son and daughter in law. Per son and daughter in law, the security camera at his house showed that he was in the bathroom for 2 hours and 8 minutes yesterday. They later noted that he has a small hematoma on his forehead. The patient is unable to say whether he fell or passed out in the bathroom.     They report that his health has been quickly declined over the past month. He has been smoking over 70 years, now on e cigarettes. He has lost 13 lbs over the past 4 months. He has swelling of his legs despite having his medication adjusted by his PCP. They states that he usually sleeps only 4 hours at night and spent the rest of his time on a sofa. He has been falling to sleep easily and may fall asleep while at the dinner table.  He has neuropathy in his feet and has been taking norco for pain. He has been complaining of progressive shortness of breath and requiring more breathing treatments. He called them at 2 am a few night ago that he was very short of breath and needed breathing treatments.    He was seen by his PCP earlier today and advised to come to the ED. Currently, he denies any pain or discomfort.     CT head in the ED was unremarkble> he was noted to have elevated BNP at 844. Troponin was slightly elevated at 0.218.  EKG, personally reviewed, shows sinus rhythm with no ST elevation.       Overview/Hospital Course:  No notes on file    Interval History: ambulated in the hallway with PT this morning. Not symptomatic.     Review of Systems   HENT: Negative for congestion and sore throat.    Cardiovascular: Negative for chest pain and palpitations.   Gastrointestinal: Negative for abdominal pain, constipation, diarrhea, nausea and vomiting.   Genitourinary: Negative for dysuria, hematuria and urgency.   Skin: Negative for rash.     Objective:     Vital Signs (Most Recent):  Temp: 97.8 °F (36.6 °C) (12/01/19 1454)  Pulse: 75 (12/01/19 1454)  Resp: 18 (12/01/19 1454)  BP: 131/62 (12/01/19 1454)  SpO2: 99 % (12/01/19 1454) Vital Signs (24h Range):  Temp:  [97.8 °F (36.6 °C)-98.6 °F (37 °C)] 97.8 °F (36.6 °C)  Pulse:  [56-92] 75  Resp:  [16-19] 18  SpO2:  [95 %-99 %] 99 %  BP: ()/(58-79) 131/62     Weight: 66.2 kg (145 lb 15.1 oz)  Body mass index is 19.79 kg/m².    Intake/Output Summary (Last 24 hours) at 12/1/2019 1611  Last data filed at 12/1/2019 1542  Gross per 24 hour   Intake 480 ml   Output 250 ml   Net 230 ml      Physical Exam   Constitutional: He appears well-developed and well-nourished. He is active and cooperative.   HENT:   Head: Normocephalic and atraumatic.   Eyes: Pupils are equal, round, and reactive to light. Conjunctivae are normal.   Neurological: He is alert. Coordination normal.   Psychiatric: He has a normal mood and affect.       Significant Labs:   BMP:   Recent Labs   Lab 12/01/19  0437         K 4.6      CO2 27   BUN 46*   CREATININE 1.4   CALCIUM 9.2     CBC:   Recent Labs   Lab 11/30/19  0442 12/01/19  0437   WBC 8.37 7.19   HGB 12.3* 12.7*   HCT 37.7* 38.4*   * 99*     Magnesium: No results for input(s): MG in the last 48 hours.    Significant Imaging: I have reviewed all pertinent imaging results/findings within the past 24 hours.      Assessment/Plan:      Discharge planning issues  Case management working on SNF placement. Referrals have  been sent to [facility], will resume planning on Monday.       Severe protein-energy malnutrition  Ensure BID, Nutritionist following, appreciate recs.       Essential hypertension  Continue  Current antihypertensives: amlodipine 10mg daily, and metoprolol.      Chronic combined systolic and diastolic heart failure  Currently compensated. Continue oral diuretics, beta blockers. He is on 1.2L fluid restriction      Anemia, chronic disease  Stable, continue to monitor CBC      Dementia  Continue Donepezil.       Nicotine abuse        Thrombocytopenia  Results for KENNETH URBAN (MRN [id number]) as of 11/21/2019 20:35   Ref. Range 4/11/2018 12:25 4/16/2018 09:24 6/4/2018 10:49 10/9/2018 13:31 11/21/2019 18:00   Platelets Latest Ref Range: 150 - 350 K/uL 114 (L) 109 (L) 124 (L) 92 (L) 77 (L)     Chronic  Monitor  No pharmacologic VTE prophylaxis    Advanced age          VTE Risk Mitigation (From admission, onward)         Ordered     Place sequential compression device  Until discontinued      11/21/19 2046     IP VTE HIGH RISK PATIENT  Once      11/21/19 1952                      Mamta Dorsey MD  Department of Hospital Medicine   [facility]

## 2019-12-01 NOTE — PLAN OF CARE
11/30/19 2310   Patient Assessment/Suction   Respiratory Effort Unlabored   All Lung Fields Breath Sounds clear   Rhythm/Pattern, Respiratory pattern regular   PRE-TX-O2   O2 Device (Oxygen Therapy) room air   SpO2 97 %   Pulse 73   Resp 16   Aerosol Therapy   $ Aerosol Therapy Charges PRN treatment not required

## 2019-12-02 LAB
ANION GAP SERPL CALC-SCNC: 9 MMOL/L (ref 8–16)
BASOPHILS # BLD AUTO: 0.03 K/UL (ref 0–0.2)
BASOPHILS NFR BLD: 0.4 % (ref 0–1.9)
BUN SERPL-MCNC: 46 MG/DL (ref 8–23)
CALCIUM SERPL-MCNC: 9.2 MG/DL (ref 8.7–10.5)
CHLORIDE SERPL-SCNC: 103 MMOL/L (ref 95–110)
CO2 SERPL-SCNC: 27 MMOL/L (ref 23–29)
CREAT SERPL-MCNC: 1.2 MG/DL (ref 0.5–1.4)
DIFFERENTIAL METHOD: ABNORMAL
EOSINOPHIL # BLD AUTO: 0.1 K/UL (ref 0–0.5)
EOSINOPHIL NFR BLD: 1.8 % (ref 0–8)
ERYTHROCYTE [DISTWIDTH] IN BLOOD BY AUTOMATED COUNT: 14 % (ref 11.5–14.5)
EST. GFR  (AFRICAN AMERICAN): >60 ML/MIN/1.73 M^2
EST. GFR  (NON AFRICAN AMERICAN): 53.3 ML/MIN/1.73 M^2
GLUCOSE SERPL-MCNC: 107 MG/DL (ref 70–110)
HCT VFR BLD AUTO: 37.7 % (ref 40–54)
HGB BLD-MCNC: 12.7 G/DL (ref 14–18)
IMM GRANULOCYTES # BLD AUTO: 0.02 K/UL (ref 0–0.04)
IMM GRANULOCYTES NFR BLD AUTO: 0.3 % (ref 0–0.5)
LYMPHOCYTES # BLD AUTO: 1.4 K/UL (ref 1–4.8)
LYMPHOCYTES NFR BLD: 19 % (ref 18–48)
MCH RBC QN AUTO: 31.7 PG (ref 27–31)
MCHC RBC AUTO-ENTMCNC: 33.7 G/DL (ref 32–36)
MCV RBC AUTO: 94 FL (ref 82–98)
MONOCYTES # BLD AUTO: 0.6 K/UL (ref 0.3–1)
MONOCYTES NFR BLD: 8 % (ref 4–15)
NEUTROPHILS # BLD AUTO: 5.1 K/UL (ref 1.8–7.7)
NEUTROPHILS NFR BLD: 70.5 % (ref 38–73)
NRBC BLD-RTO: 0 /100 WBC
PLATELET # BLD AUTO: 100 K/UL (ref 150–350)
PMV BLD AUTO: 13.2 FL (ref 9.2–12.9)
POTASSIUM SERPL-SCNC: 4.5 MMOL/L (ref 3.5–5.1)
RBC # BLD AUTO: 4.01 M/UL (ref 4.6–6.2)
SODIUM SERPL-SCNC: 139 MMOL/L (ref 136–145)
WBC # BLD AUTO: 7.16 K/UL (ref 3.9–12.7)

## 2019-12-02 PROCEDURE — 25000003 PHARM REV CODE 250: Performed by: SPECIALIST

## 2019-12-02 PROCEDURE — 25000003 PHARM REV CODE 250: Performed by: FAMILY MEDICINE

## 2019-12-02 PROCEDURE — 25000003 PHARM REV CODE 250: Performed by: NURSE PRACTITIONER

## 2019-12-02 PROCEDURE — G0378 HOSPITAL OBSERVATION PER HR: HCPCS

## 2019-12-02 PROCEDURE — 94761 N-INVAS EAR/PLS OXIMETRY MLT: CPT

## 2019-12-02 PROCEDURE — 99900035 HC TECH TIME PER 15 MIN (STAT)

## 2019-12-02 PROCEDURE — 36415 COLL VENOUS BLD VENIPUNCTURE: CPT

## 2019-12-02 PROCEDURE — 97116 GAIT TRAINING THERAPY: CPT

## 2019-12-02 PROCEDURE — 80048 BASIC METABOLIC PNL TOTAL CA: CPT

## 2019-12-02 PROCEDURE — 25000003 PHARM REV CODE 250: Performed by: INTERNAL MEDICINE

## 2019-12-02 PROCEDURE — 85025 COMPLETE CBC W/AUTO DIFF WBC: CPT

## 2019-12-02 RX ADMIN — PREGABALIN 150 MG: 75 CAPSULE ORAL at 08:12

## 2019-12-02 RX ADMIN — DONEPEZIL HYDROCHLORIDE 10 MG: 5 TABLET, FILM COATED ORAL at 08:12

## 2019-12-02 RX ADMIN — AMLODIPINE BESYLATE 10 MG: 5 TABLET ORAL at 08:12

## 2019-12-02 RX ADMIN — SERTRALINE HYDROCHLORIDE 100 MG: 50 TABLET ORAL at 08:12

## 2019-12-02 RX ADMIN — PANTOPRAZOLE SODIUM 40 MG: 40 TABLET, DELAYED RELEASE ORAL at 06:12

## 2019-12-02 RX ADMIN — FUROSEMIDE 20 MG: 20 TABLET ORAL at 08:12

## 2019-12-02 RX ADMIN — ASPIRIN 81 MG 81 MG: 81 TABLET ORAL at 08:12

## 2019-12-02 RX ADMIN — METOPROLOL SUCCINATE 25 MG: 25 TABLET, FILM COATED, EXTENDED RELEASE ORAL at 08:12

## 2019-12-02 RX ADMIN — SIMVASTATIN 40 MG: 40 TABLET, FILM COATED ORAL at 08:12

## 2019-12-02 NOTE — PROGRESS NOTES
[facility] Medicine  Progress Note    Patient Name: Halley Rodrigues  MRN: [id number]  Patient Class: OP- Observation   Admission Date: 11/21/2019  Length of Stay: 0 days  Attending Physician: Mamta Dorsey MD  Primary Care Provider: Scott Payne MD        Subjective:     Principal Problem:Syncope and collapse        HPI:  The patien is an 89-year-old male with history of CAD, sp CABG x 4 in 1979 and dementia. He is also very hard hearing and unable to provide much information. Most information is obtained from his son and daughter in law. He lives by himself about 200 feet from his son and daughter in law. Per son and daughter in law, the security camera at his house showed that he was in the bathroom for 2 hours and 8 minutes yesterday. They later noted that he has a small hematoma on his forehead. The patient is unable to say whether he fell or passed out in the bathroom.     They report that his health has been quickly declined over the past month. He has been smoking over 70 years, now on e cigarettes. He has lost 13 lbs over the past 4 months. He has swelling of his legs despite having his medication adjusted by his PCP. They states that he usually sleeps only 4 hours at night and spent the rest of his time on a sofa. He has been falling to sleep easily and may fall asleep while at the dinner table.  He has neuropathy in his feet and has been taking norco for pain. He has been complaining of progressive shortness of breath and requiring more breathing treatments. He called them at 2 am a few night ago that he was very short of breath and needed breathing treatments.    He was seen by his PCP earlier today and advised to come to the ED. Currently, he denies any pain or discomfort.     CT head in the ED was unremarkble> he was noted to have elevated BNP at 844. Troponin was slightly elevated at 0.218.  EKG, personally reviewed, shows sinus rhythm with no ST elevation.       Overview/Hospital Course:  89 year old male who presented with syncope. Workup was essentially negative for stroke, seizures. He was seen intially by physical therapy who cleared him for discharge home. The family have expressed concern about him continuing to live by himself and so case management is working on SNF placement.     Interval History: seen this morning eating breakfast. He has no complaints. Continues to work with PT/OT. Case management working on discharge planning.     Review of Systems   HENT: Negative for congestion and sore throat.    Cardiovascular: Negative for chest pain and palpitations.   Gastrointestinal: Negative for abdominal pain, constipation, diarrhea, nausea and vomiting.   Genitourinary: Negative for dysuria, hematuria and urgency.   Skin: Negative for rash.     Objective:     Vital Signs (Most Recent):  Temp: 98.4 °F (36.9 °C) (12/02/19 1200)  Pulse: 73 (12/02/19 1200)  Resp: 20 (12/02/19 1200)  BP: (!) 146/67 (12/02/19 1200)  SpO2: 97 % (12/02/19 1200) Vital Signs (24h Range):  Temp:  [97.5 °F (36.4 °C)-98.4 °F (36.9 °C)] 98.4 °F (36.9 °C)  Pulse:  [61-87] 73  Resp:  [16-20] 20  SpO2:  [95 %-99 %] 97 %  BP: (126-158)/(62-80) 146/67     Weight: 63 kg (138 lb 14.2 oz)  Body mass index is 18.84 kg/m².    Intake/Output Summary (Last 24 hours) at 12/2/2019 1207  Last data filed at 12/2/2019 1004  Gross per 24 hour   Intake 880 ml   Output 950 ml   Net -70 ml      Physical Exam   Constitutional: He is oriented to person, place, and time. He appears well-developed and well-nourished.   HENT:   Mouth/Throat: Oropharynx is clear and moist.   Eyes: Pupils are equal, round, and reactive to light. EOM are normal.   Cardiovascular: Normal rate, regular rhythm and normal heart sounds.   No murmur heard.  Pulmonary/Chest: Effort normal and breath sounds normal. No respiratory distress. He has no wheezes. He has no rales.   Abdominal: Soft. Bowel sounds are normal. There is no tenderness.    Musculoskeletal: Normal range of motion.   Lymphadenopathy:     He has no cervical adenopathy.   Neurological: He is alert and oriented to person, place, and time. No cranial nerve deficit.   Skin: Skin is warm. Capillary refill takes less than 2 seconds.   Nursing note and vitals reviewed.      Significant Labs:   BMP:   Recent Labs   Lab 12/02/19 0437         K 4.5      CO2 27   BUN 46*   CREATININE 1.2   CALCIUM 9.2     CBC:   Recent Labs   Lab 12/01/19 0437 12/02/19 0437   WBC 7.19 7.16   HGB 12.7* 12.7*   HCT 38.4* 37.7*   PLT 99* 100*     Magnesium: No results for input(s): MG in the last 48 hours.    Significant Imaging: I have reviewed all pertinent imaging results/findings within the past 24 hours.      Assessment/Plan:      Discharge planning issues  Case management working on SNF placement. He has been accepted to [facility]. [facility] still reviewing referral. It appears family has a preference for [facility].        Severe protein-energy malnutrition  On meal supplements, Nutritionist following, appreciate recs.       Essential hypertension  Continue  Current antihypertensives: amlodipine 10mg daily, and metoprolol.      Chronic combined systolic and diastolic heart failure  Currently compensated. Continue oral diuretics, beta blockers. He is on 1.2L fluid restriction      Anemia, chronic disease  Stable, continue to monitor CBC      Dementia  Continue Donepezil.       Nicotine abuse        Thrombocytopenia  Results for WILMAR KENNETH FITZGERALD (MRN [id number]) as of 11/21/2019 20:35   Ref. Range 4/11/2018 12:25 4/16/2018 09:24 6/4/2018 10:49 10/9/2018 13:31 11/21/2019 18:00   Platelets Latest Ref Range: 150 - 350 K/uL 114 (L) 109 (L) 124 (L) 92 (L) 77 (L)     Chronic  Monitor  No pharmacologic VTE prophylaxis    Advanced age          VTE Risk Mitigation (From admission, onward)         Ordered     Place sequential compression device  Until discontinued      11/21/19 2046     IP VTE  HIGH RISK PATIENT  Once      11/21/19 1952                      Mamta Dorsey MD  Department of Hospital Medicine   [facility]

## 2019-12-02 NOTE — PLAN OF CARE
Problem: Oral Intake Inadequate  Goal: Improved Oral Intake  Outcome: Ongoing, Progressing  Intervention: Promote and Optimize Oral Intake  Flowsheets (Taken 12/2/2019 1038)  Oral Nutrition Promotion: -- (Meal Encouragement)   Continue current diet as tolerated, continue encouraging intake of meals and supplements.    to assist in meal choices daily.

## 2019-12-02 NOTE — ASSESSMENT & PLAN NOTE
Case management working on SNF placement. He has been accepted to [facility]. [facility] still reviewing referral. It appears family has a preference for [facility].

## 2019-12-02 NOTE — PLAN OF CARE
12/01/19 2033   Patient Assessment/Suction   Respiratory Effort Unlabored   All Lung Fields Breath Sounds clear   Rhythm/Pattern, Respiratory pattern regular   PRE-TX-O2   O2 Device (Oxygen Therapy) room air   SpO2 95 %   Pulse 63   Resp 16   Aerosol Therapy   $ Aerosol Therapy Charges PRN treatment not required

## 2019-12-02 NOTE — PLAN OF CARE
Problem: Physical Therapy Goal  Goal: Physical Therapy Goal  Description  Goals to be met by: D/C     Patient will increase functional independence with mobility by performin. Supine to sit with Stand-by Assistance  2. Sit to stand transfer with Supervision  3. Bed to chair transfer with Supervision using Rolling Walker  4. Gait  x 300 feet with Supervision using Rolling Walker.      Outcome: Ongoing, Progressing   Pt continues to progress towards goals.

## 2019-12-02 NOTE — HOSPITAL COURSE
89 year old male who presented with syncope. Workup was essentially negative for stroke, seizures. CTA neck found moderate vascular calcification in the carotid bolus and proximal internal carotid arteries resulting in 60% hemodynamically significant stenosis.  Vascular surgery evaluated patient with no intervention at this time.  He was seen intially by physical therapy who cleared him for discharge home. The family have expressed concern about him continuing to live by himself and so case management is working on SNF placement.  Patient was stable at discharge to SNF instructions to follow up with Cardiology, PCP, vascular surgery.    General: Patient resting comfortably in no acute distress. Appears as stated age. Calm  Eyes: EOM intact. No conjunctivae injection. No scleral icterus.  ENT: Hearing grossly intact. No discharge from ears. No nasal discharge.   CVS: RRR. No LE edema BL.  Lungs: CTA BL, no wheezing or crackles. Good breath sounds. No accessory muscle use. No acute respiratory distress  Neuro: AOx3. GCS 15. Cranial nerves grossly intact. Moves all extremities equally. Follows commands. Responds appropriately

## 2019-12-02 NOTE — PLAN OF CARE
12/02/19 0900   Discharge Reassessment   Assessment Type Discharge Planning Reassessment   Anticipated Discharge Disposition SNF     CHAPARRO followed up with Alberta at [facility] regarding referral sent on Friday. She stated that she did not see one come thru. SW refaxed referral and will f/u later today.    Update 1001: CHAPARRO spoke with Leann at [facility] who states that the pt has been clinically accepted. Leann stated that she has spoken with the pt's son, who informed her that he would need to speak with his wife about it. SW to f/u regarding d/c placement. Referral sent to [facility].    Update 1047: CHAPARRO received telephone call from pt's daughter-in-law, Erica [phone], who stated that they have received a msg from Leann at [facility]. Erica had questions regarding what steps she needed to take next. SW encouraged Erica to call Leann back to get the financial review process started. Erica was concerned that they had not yet heard from [facility]. SW reassured Erica that the referral was sent and the case was in review. SW to continue to f/u for d/c planning.    Update 1446: CHAPARRO met with pt's son and daughter-in-law, Cali and Erica. They stated that they went and visited both [facility] and [facility]. They only want [facility] at this time. CHAPARRO provided family with and explained the 5 Wishes Document, as requested by the family. CHAPARRO also spoke with Alberta at [facility] who stated that the pt is being reviewed and should have an answer today. CHAPARRO to contact [facility] for auth and send all necessary documents to [facility] and [facility], pending acceptance.

## 2019-12-02 NOTE — SUBJECTIVE & OBJECTIVE
Interval History: seen this morning eating breakfast. He has no complaints. Continues to work with PT/OT. Case management working on discharge planning.     Review of Systems   HENT: Negative for congestion and sore throat.    Cardiovascular: Negative for chest pain and palpitations.   Gastrointestinal: Negative for abdominal pain, constipation, diarrhea, nausea and vomiting.   Genitourinary: Negative for dysuria, hematuria and urgency.   Skin: Negative for rash.     Objective:     Vital Signs (Most Recent):  Temp: 98.4 °F (36.9 °C) (12/02/19 1200)  Pulse: 73 (12/02/19 1200)  Resp: 20 (12/02/19 1200)  BP: (!) 146/67 (12/02/19 1200)  SpO2: 97 % (12/02/19 1200) Vital Signs (24h Range):  Temp:  [97.5 °F (36.4 °C)-98.4 °F (36.9 °C)] 98.4 °F (36.9 °C)  Pulse:  [61-87] 73  Resp:  [16-20] 20  SpO2:  [95 %-99 %] 97 %  BP: (126-158)/(62-80) 146/67     Weight: 63 kg (138 lb 14.2 oz)  Body mass index is 18.84 kg/m².    Intake/Output Summary (Last 24 hours) at 12/2/2019 1207  Last data filed at 12/2/2019 1004  Gross per 24 hour   Intake 880 ml   Output 950 ml   Net -70 ml      Physical Exam   Constitutional: He is oriented to person, place, and time. He appears well-developed and well-nourished.   HENT:   Mouth/Throat: Oropharynx is clear and moist.   Eyes: Pupils are equal, round, and reactive to light. EOM are normal.   Cardiovascular: Normal rate, regular rhythm and normal heart sounds.   No murmur heard.  Pulmonary/Chest: Effort normal and breath sounds normal. No respiratory distress. He has no wheezes. He has no rales.   Abdominal: Soft. Bowel sounds are normal. There is no tenderness.   Musculoskeletal: Normal range of motion.   Lymphadenopathy:     He has no cervical adenopathy.   Neurological: He is alert and oriented to person, place, and time. No cranial nerve deficit.   Skin: Skin is warm. Capillary refill takes less than 2 seconds.   Nursing note and vitals reviewed.      Significant Labs:   BMP:   Recent Labs    Lab 12/02/19 0437         K 4.5      CO2 27   BUN 46*   CREATININE 1.2   CALCIUM 9.2     CBC:   Recent Labs   Lab 12/01/19 0437 12/02/19 0437   WBC 7.19 7.16   HGB 12.7* 12.7*   HCT 38.4* 37.7*   PLT 99* 100*     Magnesium: No results for input(s): MG in the last 48 hours.    Significant Imaging: I have reviewed all pertinent imaging results/findings within the past 24 hours.

## 2019-12-02 NOTE — PROGRESS NOTES
[facility]  Adult Nutrition   Progress Note (Follow-Up)    SUMMARY     Recommendations/Interventions:  1. Continue current diet as tolerated, continue encouraging intake of meals and supplements.   2.  to assist in meal choices daily.  Goals:  1. Pt to meet at least 75% of estimated needs via PO intake of meals and supplements.  Nutrition Goal Status: progressing towards goal    Reason for Assessment    Reason For Assessment: RD follow-up  Diagnosis: (TIA)  Relevant Medical History: Anemia, dementia, GERD, HTN, heart disease, neuropathy  Interdisciplinary Rounds: attended    Nutrition Risk Screen    Nutrition Risk Screen: no indicators present    Nutrition/Diet History    Patient Reported Diet/Restrictions/Preferences: general  Food Allergies: NKFA  Factors Affecting Nutritional Intake: None identified at this time    Anthropometrics    Temp: 97.8 °F (36.6 °C)  Height Method: Stated  Height: 6' (182.9 cm)  Height (inches): 72 in  Weight Method: Bed Scale  Weight: 63 kg (138 lb 14.2 oz)  Weight (lb): 138.89 lb  Ideal Body Weight (IBW), Male: 178 lb  % Ideal Body Weight, Male (lb): 84.27 lb  BMI (Calculated): 18.8  BMI Grade: 18.5-24.9 - normal  Weight Loss: unintentional  Usual Body Weight (UBW), k.55 kg  Weight Change Amount: 15 lb  % Usual Body Weight: 85.71  % Weight Change From Usual Weight: -14.47 %     Weight History:  Wt Readings from Last 10 Encounters:   19 63 kg (138 lb 14.2 oz)   10/21/19 73.5 kg (162 lb)   19 74.8 kg (165 lb)   19 73.5 kg (162 lb)   19 73.5 kg (162 lb)   19 73.8 kg (162 lb 9.6 oz)   19 70.8 kg (156 lb)   19 70.8 kg (156 lb)   10/18/18 71 kg (156 lb 9.6 oz)   10/09/18 69.9 kg (154 lb)     Lab/Procedures/Meds: Pertinent Labs Reviewed  Clinical Chemistry:  Recent Labs   Lab 19  0437      K 4.5      CO2 27      BUN 46*   CREATININE 1.2   CALCIUM 9.2   ANIONGAP 9   ESTGFRAFRICA >60.0   EGFRNONAA 53.3*      CBC:   Recent Labs   Lab 12/02/19  0437   WBC 7.16   RBC 4.01*   HGB 12.7*   HCT 37.7*   *   MCV 94   MCH 31.7*   MCHC 33.7     Medications: Pertinent Medications reviewed  Scheduled Meds:   amLODIPine  10 mg Oral Daily    aspirin  81 mg Oral Daily    donepezil  10 mg Oral Daily    furosemide  20 mg Oral Daily    metoprolol succinate  25 mg Oral Daily    pantoprazole  40 mg Oral Daily    pregabalin  150 mg Oral BID    sertraline  100 mg Oral QHS    simvastatin  40 mg Oral QHS     Continuous Infusions:  PRN Meds:.acetaminophen, albuterol sulfate, bisacodyl, hydrALAZINE, HYDROcodone-acetaminophen, magnesium oxide, magnesium oxide, potassium chloride 10%, potassium chloride 10%, potassium chloride 10%, potassium, sodium phosphates, potassium, sodium phosphates, potassium, sodium phosphates, sodium chloride 0.9%    Estimated/Assessed Needs    Weight Used For Calorie Calculations: 68 kg (150 lb)  Energy Calorie Requirements (kcal): 1144-8053 kcals/day (30-35 kcals/kg)  Energy Need Method: Kcal/kg  Protein Requirements: 68-95 g/day (1.0-1.4 g/kg)  Weight Used For Protein Calculations: 68 kg (150 lb)     Estimated Fluid Requirement Method: RDA Method    Nutrition Prescription Ordered    Current Diet Order: Cardiac Diet/1200mL fluid restriction   Oral Nutrition Supplement: Ensure Enlive BID     Evaluation of Received Nutrient/Fluid Intake    Energy Calories Required: meeting needs  Protein Required: meeting needs  Fluid Required: meeting needs  Tolerance: tolerating  % Intake of Estimated Energy Needs: 75 - 100 %  % Meal Intake: 75 - 100 %    Intake/Output Summary (Last 24 hours) at 12/2/2019 1032  Last data filed at 12/2/2019 1004  Gross per 24 hour   Intake 880 ml   Output 950 ml   Net -70 ml      Nutrition Risk    Level of Risk/Frequency of Follow-up: moderate     Dietitian Rounds Brief:  · Patient appetite and intake are great eating % of meals and 2 ensure a day. He loves the ensure per RN. No  issues with N/V/D,swallowing, or chewing.  · Awaiting placement.    Monitor and Evaluation    Food and Nutrient Intake: energy intake, food and beverage intake  Food and Nutrient Adminstration: diet order  Physical Activity and Function: nutrition-related ADLs and IADLs  Anthropometric Measurements: weight, weight change  Biochemical Data, Medical Tests and Procedures: lipid profile, gastrointestinal profile, glucose/endocrine profile, electrolyte and renal panel, inflammatory profile  Nutrition-Focused Physical Findings: overall appearance     Malnutrition Assessment  Severe Protein Energy Malnutrition RT decreased appetite AEB 14.47% weight loss in 2 months, severe subcutaneous fat depletion of triceps and thoracic and lumbar region, and severe muscle mass depletion of temples, clavicle region, scapular region and interosseous muscle.    Nutrition Follow-Up    RD Follow-up?: Yes  Vy Genao RD 12/02/2019 10:34 AM

## 2019-12-02 NOTE — PT/OT/SLP PROGRESS
Occupational Therapy      Patient Name:  Halley Rodrigues   MRN:  [id number]    Patient not seen today secondary to pt not available when OT attempted in PM ( having discussion with CM).  Will follow up next service date.    Efren Palomino, OT  12/2/2019

## 2019-12-02 NOTE — PT/OT/SLP PROGRESS
Physical Therapy Treatment    Patient Name:  Halley Rodrigues   MRN:  [id number]    Recommendations:     Discharge Recommendations:  nursing facility, skilled   Discharge Equipment Recommendations: none   Barriers to discharge: None    Assessment:     Halley Rodrigues is a 89 y.o. male admitted with a medical diagnosis of Syncope and collapse.  He presents with the following impairments/functional limitations:  weakness, gait instability, impaired endurance, impaired balance, impaired functional mobilty, decreased safety awareness. Pt continues to progress with gait and mobility noting RLE buckling occassionally with no LOB. Continue with PT and POC.    Rehab Prognosis: Good; patient would benefit from acute skilled PT services to address these deficits and reach maximum level of function.    Recent Surgery: * No surgery found *      Plan:     During this hospitalization, patient to be seen 5 x/week to address the identified rehab impairments via gait training, therapeutic activities, therapeutic exercises and progress toward the following goals:    · Plan of Care Expires:  01/01/20    Subjective     Chief Complaint: No complaints  Patient/Family Comments/goals:   Pain/Comfort:  · Pain Rating 1: 0/10  · Pain Rating Post-Intervention 1: 0/10      Objective:     Communicated with nursing prior to session.  Patient found HOB elevated with telemetry upon PT entry to room.     General Precautions: Standard, fall   Orthopedic Precautions:N/A   Braces:       Functional Mobility:  · Bed Mobility:     · Supine to Sit: stand by assistance  · Transfers:     · Sit to Stand:  contact guard assistance with rolling walker  · Bed to Chair: contact guard assistance with  rolling walker  using  Step Transfer  · Gait: 175ft with RW and CGA      AM-PAC 6 CLICK MOBILITY          Therapeutic Activities and Exercises:   bed mobility; sitting EOB for trunk control and midline orientation; sit <> stands; transfer training; gait  training    Patient left up in chair with all lines intact, call button in reach and chair alarm on..    GOALS:   Multidisciplinary Problems     Physical Therapy Goals        Problem: Physical Therapy Goal    Goal Priority Disciplines Outcome Goal Variances Interventions   Physical Therapy Goal     PT, PT/OT Ongoing, Progressing     Description:  Goals to be met by: D/C     Patient will increase functional independence with mobility by performin. Supine to sit with Stand-by Assistance  2. Sit to stand transfer with Supervision  3. Bed to chair transfer with Supervision using Rolling Walker  4. Gait  x 300 feet with Supervision using Rolling Walker.                       Time Tracking:     PT Received On: 19  PT Start Time: 944     PT Stop Time: 954  PT Total Time (min): 10 min     Billable Minutes: Gait Training 10    Treatment Type: Treatment  PT/PTA: PTA     PTA Visit Number: Miki Gomez PTA  2019

## 2019-12-03 VITALS
DIASTOLIC BLOOD PRESSURE: 62 MMHG | SYSTOLIC BLOOD PRESSURE: 143 MMHG | OXYGEN SATURATION: 96 % | WEIGHT: 140.88 LBS | HEIGHT: 72 IN | TEMPERATURE: 98 F | BODY MASS INDEX: 19.08 KG/M2 | HEART RATE: 64 BPM | RESPIRATION RATE: 16 BRPM

## 2019-12-03 LAB
ANION GAP SERPL CALC-SCNC: 10 MMOL/L (ref 8–16)
BASOPHILS # BLD AUTO: 0.04 K/UL (ref 0–0.2)
BASOPHILS NFR BLD: 0.5 % (ref 0–1.9)
BUN SERPL-MCNC: 47 MG/DL (ref 8–23)
CALCIUM SERPL-MCNC: 9.2 MG/DL (ref 8.7–10.5)
CHLORIDE SERPL-SCNC: 103 MMOL/L (ref 95–110)
CO2 SERPL-SCNC: 27 MMOL/L (ref 23–29)
CREAT SERPL-MCNC: 1.3 MG/DL (ref 0.5–1.4)
DIFFERENTIAL METHOD: ABNORMAL
EOSINOPHIL # BLD AUTO: 0.1 K/UL (ref 0–0.5)
EOSINOPHIL NFR BLD: 1.6 % (ref 0–8)
ERYTHROCYTE [DISTWIDTH] IN BLOOD BY AUTOMATED COUNT: 13.9 % (ref 11.5–14.5)
EST. GFR  (AFRICAN AMERICAN): 55.9 ML/MIN/1.73 M^2
EST. GFR  (NON AFRICAN AMERICAN): 48.4 ML/MIN/1.73 M^2
GLUCOSE SERPL-MCNC: 102 MG/DL (ref 70–110)
HCT VFR BLD AUTO: 37.1 % (ref 40–54)
HGB BLD-MCNC: 12.2 G/DL (ref 14–18)
IMM GRANULOCYTES # BLD AUTO: 0.02 K/UL (ref 0–0.04)
IMM GRANULOCYTES NFR BLD AUTO: 0.3 % (ref 0–0.5)
LYMPHOCYTES # BLD AUTO: 1.7 K/UL (ref 1–4.8)
LYMPHOCYTES NFR BLD: 22.4 % (ref 18–48)
MCH RBC QN AUTO: 31 PG (ref 27–31)
MCHC RBC AUTO-ENTMCNC: 32.9 G/DL (ref 32–36)
MCV RBC AUTO: 94 FL (ref 82–98)
MONOCYTES # BLD AUTO: 0.5 K/UL (ref 0.3–1)
MONOCYTES NFR BLD: 6.9 % (ref 4–15)
NEUTROPHILS # BLD AUTO: 5.1 K/UL (ref 1.8–7.7)
NEUTROPHILS NFR BLD: 68.3 % (ref 38–73)
NRBC BLD-RTO: 0 /100 WBC
PLATELET # BLD AUTO: 99 K/UL (ref 150–350)
PMV BLD AUTO: 13.1 FL (ref 9.2–12.9)
POTASSIUM SERPL-SCNC: 4.6 MMOL/L (ref 3.5–5.1)
RBC # BLD AUTO: 3.93 M/UL (ref 4.6–6.2)
SODIUM SERPL-SCNC: 140 MMOL/L (ref 136–145)
WBC # BLD AUTO: 7.42 K/UL (ref 3.9–12.7)

## 2019-12-03 PROCEDURE — 25000003 PHARM REV CODE 250: Performed by: INTERNAL MEDICINE

## 2019-12-03 PROCEDURE — 85025 COMPLETE CBC W/AUTO DIFF WBC: CPT

## 2019-12-03 PROCEDURE — 94761 N-INVAS EAR/PLS OXIMETRY MLT: CPT

## 2019-12-03 PROCEDURE — G0378 HOSPITAL OBSERVATION PER HR: HCPCS

## 2019-12-03 PROCEDURE — 80048 BASIC METABOLIC PNL TOTAL CA: CPT

## 2019-12-03 PROCEDURE — 97116 GAIT TRAINING THERAPY: CPT

## 2019-12-03 PROCEDURE — 25000003 PHARM REV CODE 250: Performed by: SPECIALIST

## 2019-12-03 PROCEDURE — 36415 COLL VENOUS BLD VENIPUNCTURE: CPT

## 2019-12-03 PROCEDURE — 97110 THERAPEUTIC EXERCISES: CPT

## 2019-12-03 PROCEDURE — 25000003 PHARM REV CODE 250: Performed by: FAMILY MEDICINE

## 2019-12-03 PROCEDURE — 25000003 PHARM REV CODE 250: Performed by: NURSE PRACTITIONER

## 2019-12-03 RX ORDER — IPRATROPIUM BROMIDE 42 UG/1
1 SPRAY, METERED NASAL 2 TIMES DAILY
Refills: 0
Start: 2019-12-03

## 2019-12-03 RX ORDER — IPRATROPIUM BROMIDE AND ALBUTEROL SULFATE 2.5; .5 MG/3ML; MG/3ML
3 SOLUTION RESPIRATORY (INHALATION) EVERY 6 HOURS PRN
Qty: 1 BOX | Refills: 0
Start: 2019-12-03

## 2019-12-03 RX ORDER — AMLODIPINE BESYLATE 5 MG/1
5 TABLET ORAL DAILY
Status: DISCONTINUED | OUTPATIENT
Start: 2019-12-03 | End: 2019-12-03 | Stop reason: HOSPADM

## 2019-12-03 RX ADMIN — PREGABALIN 150 MG: 75 CAPSULE ORAL at 08:12

## 2019-12-03 RX ADMIN — METOPROLOL SUCCINATE 25 MG: 25 TABLET, FILM COATED, EXTENDED RELEASE ORAL at 08:12

## 2019-12-03 RX ADMIN — ASPIRIN 81 MG 81 MG: 81 TABLET ORAL at 08:12

## 2019-12-03 RX ADMIN — DONEPEZIL HYDROCHLORIDE 10 MG: 5 TABLET, FILM COATED ORAL at 08:12

## 2019-12-03 RX ADMIN — PANTOPRAZOLE SODIUM 40 MG: 40 TABLET, DELAYED RELEASE ORAL at 05:12

## 2019-12-03 RX ADMIN — AMLODIPINE BESYLATE 5 MG: 5 TABLET ORAL at 08:12

## 2019-12-03 RX ADMIN — FUROSEMIDE 20 MG: 20 TABLET ORAL at 08:12

## 2019-12-03 NOTE — PLAN OF CARE
12/03/19 0852   Discharge Reassessment   Assessment Type Discharge Planning Reassessment   Anticipated Discharge Disposition SNF     CHAPARRO spoke with Alberta at [facility] who is currently awaiting the financials for the pt in order to fully accept him. He has been clinically accepted. [facility] stated that as long as the family gets the financials to them today, as well as proof of [place] residency, he should be able to be admitted to their facility today.     Update 1233: CHAPARRO received notification from Alberta at [facility] that the pt has been accepted to their facility. Necessary documentation has been sent to [facility] for auth. SW to continue to f/u for d/c planning.    Update 1322: CHAPARRO spoke with the pt's daughter-in-law, Erica, to inform her that the pt has been approved at [facility] and that we are awaiting auth from [facility]. Erica informed SW that she and the pt's son want to be present when the pt is being ready to d/c. SW to inform attending of this and let Erica know when the auth has been approved.

## 2019-12-03 NOTE — PT/OT/SLP PROGRESS
Physical Therapy Treatment    Patient Name:  Halley Rodrigues   MRN:  [id number]    Recommendations:     Discharge Recommendations:  nursing facility, skilled   Discharge Equipment Recommendations: none   Barriers to discharge: None    Assessment:     Halley Rodrigues is a 89 y.o. male admitted with a medical diagnosis of Syncope and collapse.  He presents with the following impairments/functional limitations:  weakness, gait instability, impaired endurance, impaired balance, impaired functional mobilty, decreased safety awareness. Pt with noted increase in RLE buckling today requiring w/c following during gait training however pt did not require seated rest break. Pt with poor safety awareness requiring verbal cues for RW management. Continue with PT and POC.     Rehab Prognosis: Good; patient would benefit from acute skilled PT services to address these deficits and reach maximum level of function.    Recent Surgery: * No surgery found *      Plan:     During this hospitalization, patient to be seen 5 x/week to address the identified rehab impairments via gait training, therapeutic activities, therapeutic exercises and progress toward the following goals:    · Plan of Care Expires:  01/01/20    Subjective     Chief Complaint: No complaints  Patient/Family Comments/goals:   Pain/Comfort:  · Pain Rating 1: 0/10  · Pain Rating Post-Intervention 1: 0/10      Objective:     Communicated with nursing prior to session.  Patient found up in chair with telemetry upon PT entry to room.     General Precautions: Standard, fall   Orthopedic Precautions:N/A   Braces:       Functional Mobility:  · Transfers:     · Sit to Stand:  stand by assistance with rolling walker  · Gait: 175ft with RW and Min Assist for RW management      AM-PAC 6 CLICK MOBILITY          Therapeutic Activities and Exercises:  sit <> stands; transfer training; gait training    Patient left up in chair with all lines intact, call button in reach  and chair alarm on..    GOALS:   Multidisciplinary Problems     Physical Therapy Goals        Problem: Physical Therapy Goal    Goal Priority Disciplines Outcome Goal Variances Interventions   Physical Therapy Goal     PT, PT/OT Ongoing, Progressing     Description:  Goals to be met by: D/C     Patient will increase functional independence with mobility by performin. Supine to sit with Stand-by Assistance  2. Sit to stand transfer with Supervision  3. Bed to chair transfer with Supervision using Rolling Walker  4. Gait  x 300 feet with Supervision using Rolling Walker.                       Time Tracking:     PT Received On: 19  PT Start Time: 935     PT Stop Time: 945  PT Total Time (min): 10 min     Billable Minutes: Gait Training 10    Treatment Type: Treatment  PT/PTA: PTA     PTA Visit Number: 2     Amina Gomez, DIONE  2019

## 2019-12-03 NOTE — PLAN OF CARE
Problem: Occupational Therapy Goal  Goal: Occupational Therapy Goal  Description  Goals to be met by: 11/30/2019    Patient will increase functional independence with ADLs by performing:    LE Dressing with Modified [place].  Grooming while standing at sink with Modified [place].  Toileting from toilet with Modified [place] for hygiene and clothing management.   Toilet transfer to toilet with Modified [place].     Outcome: Ongoing, Progressing

## 2019-12-03 NOTE — CARE UPDATE
12/02/19 2996   Patient Assessment/Suction   Level of Consciousness (AVPU) alert   Respiratory Effort Unlabored   Expansion/Accessory Muscles/Retractions no use of accessory muscles   All Lung Fields Breath Sounds clear;diminished   Rhythm/Pattern, Respiratory unlabored   Cough Frequency no cough   PRE-TX-O2   O2 Device (Oxygen Therapy) room air   SpO2 99 %   Pulse Oximetry Type Intermittent   Pulse 88   Resp 20   Positioning   Head of Bed (HOB) HOB at 30-45 degrees   Aerosol Therapy   $ Aerosol Therapy Charges PRN treatment not required

## 2019-12-03 NOTE — PLAN OF CARE
12/03/19 0820   Patient Assessment/Suction   Level of Consciousness (AVPU) alert   Respiratory Effort Normal;Unlabored   Expansion/Accessory Muscles/Retractions no use of accessory muscles   All Lung Fields Breath Sounds clear   Rhythm/Pattern, Respiratory no shortness of breath reported   Cough Frequency no cough   PRE-TX-O2   O2 Device (Oxygen Therapy) room air   SpO2 96 %   Pulse Oximetry Type Intermittent   $ Pulse Oximetry - Multiple Charge Pulse Oximetry - Multiple   Pulse 94   Resp 20

## 2019-12-03 NOTE — PT/OT/SLP PROGRESS
Occupational Therapy   Treatment    Name: Halley Rodrigues  MRN: [id number]  Admitting Diagnosis:  Syncope and collapse       Recommendations:     Discharge Recommendations: nursing facility, skilled  Discharge Equipment Recommendations:  none  Barriers to discharge:       Assessment:     Halley Rodrigues is a 89 y.o. male with a medical diagnosis of Syncope and collapse. Performance deficits affecting function are weakness, impaired endurance, impaired self care skills, impaired cognition. Pt only agreeable to UE exercises this session. Pt required multiple v/c's during session about how to complete an exercise that was shown to him a few minutes prior.    Rehab Prognosis:  Fair; patient would benefit from acute skilled OT services to address these deficits and reach maximum level of function.       Plan:     Patient to be seen 5 x/week to address the above listed problems via self-care/home management, therapeutic activities, therapeutic exercises  · Plan of Care Expires: 12/23/19  · Plan of Care Reviewed with: patient    Subjective     Pain/Comfort:  · Pain Rating 1: 0/10    Objective:     Communicated with: nursing prior to session.  Patient found HOB elevated with bed alarm, telemetry upon OT entry to room.    General Precautions: Standard, fall   Orthopedic Precautions:N/A   Braces: N/A     Occupational Performance:     Activities of Daily Living:  · Pt refused    Treatment & Education:  UE exercises with dowel stick 2X10 reps with multiple v/c's for proper technique.  Pt educated on use of call light.    Patient left HOB elevated with all lines intact, call button in reach and bed alarm onEducation:      GOALS:   Multidisciplinary Problems     Occupational Therapy Goals        Problem: Occupational Therapy Goal    Goal Priority Disciplines Outcome Interventions   Occupational Therapy Goal     OT, PT/OT Ongoing, Progressing    Description:  Goals to be met by: 11/30/2019    Patient will increase  functional independence with ADLs by performing:    LE Dressing with Modified [place].  Grooming while standing at sink with Modified [place].  Toileting from toilet with Modified [place] for hygiene and clothing management.   Toilet transfer to toilet with Modified [place].                      Time Tracking:     OT Date of Treatment: 12/03/19  OT Start Time: 1426  OT Stop Time: 1441  OT Total Time (min): 15 min    Billable Minutes:Therapeutic Exercise 15    Nessa Gaspar OT  12/3/2019

## 2019-12-03 NOTE — DISCHARGE SUMMARY
[facility]  Discharge Summary      Patient Name: Halley Rodrigues  MRN: [id number]  Admission Date: 11/21/2019  Hospital Length of Stay: 0 days  Discharge Date and Time:  12/03/2019 5:01 PM  Attending Physician: Olvin Raines MD   Discharging Provider: Olvin Raines MD  Primary Care Provider: Scott Payne MD      HPI:   The patien is an 89-year-old male with history of CAD, sp CABG x 4 in 1979 and dementia. He is also very hard hearing and unable to provide much information. Most information is obtained from his son and daughter in law. He lives by himself about 200 feet from his son and daughter in law. Per son and daughter in law, the security camera at his house showed that he was in the bathroom for 2 hours and 8 minutes yesterday. They later noted that he has a small hematoma on his forehead. The patient is unable to say whether he fell or passed out in the bathroom.     They report that his health has been quickly declined over the past month. He has been smoking over 70 years, now on e cigarettes. He has lost 13 lbs over the past 4 months. He has swelling of his legs despite having his medication adjusted by his PCP. They states that he usually sleeps only 4 hours at night and spent the rest of his time on a sofa. He has been falling to sleep easily and may fall asleep while at the dinner table.  He has neuropathy in his feet and has been taking norco for pain. He has been complaining of progressive shortness of breath and requiring more breathing treatments. He called them at 2 am a few night ago that he was very short of breath and needed breathing treatments.    He was seen by his PCP earlier today and advised to come to the ED. Currently, he denies any pain or discomfort.     CT head in the ED was unremarkble> he was noted to have elevated BNP at 844. Troponin was slightly elevated at 0.218.  EKG, personally reviewed, shows sinus rhythm with no ST  elevation.      * No surgery found *      Hospital Course:   89 year old male who presented with syncope. Workup was essentially negative for stroke, seizures. CTA neck found moderate vascular calcification in the carotid bolus and proximal internal carotid arteries resulting in 60% hemodynamically significant stenosis.  Vascular surgery evaluated patient with no intervention at this time.  He was seen intially by physical therapy who cleared him for discharge home. The family have expressed concern about him continuing to live by himself and so case management is working on SNF placement.  Patient was stable at discharge to SNF instructions to follow up with Cardiology, PCP, vascular surgery.    General: Patient resting comfortably in no acute distress. Appears as stated age. Calm  Eyes: EOM intact. No conjunctivae injection. No scleral icterus.  ENT: Hearing grossly intact. No discharge from ears. No nasal discharge.   CVS: RRR. No LE edema BL.  Lungs: CTA BL, no wheezing or crackles. Good breath sounds. No accessory muscle use. No acute respiratory distress  Neuro: AOx3. GCS 15. Cranial nerves grossly intact. Moves all extremities equally. Follows commands. Responds appropriately      Consults:   Consults (From admission, onward)        Status Ordering Provider     Inpatient consult to Cardiology  Once     Provider:  Ella Oseguera MD    Acknowledged LV CURRAN     Inpatient consult to Hospitalist  Once     Provider:  SONYA Casas    Acknowledged SEVERIANO FARRIS     Inpatient consult to   Once     Provider:  (Not yet assigned)    Completed RACHEL ROSE     Inpatient consult to   Once     Provider:  (Not yet assigned)    Completed RACHEL ROSE     Inpatient consult to Vascular Surgery  Once     Provider:  Brown Castro MD    Completed RACHEL ROSE          No new Assessment & Plan notes have been filed under this hospital service since the last note  was generated.  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] 11/21/2019 Unknown    Dementia [F03.90] 11/21/2019 Yes    Advanced age [R54] 11/21/2019 Yes    Thrombocytopenia [D69.6] 11/21/2019 Yes    Nicotine abuse [Z72.0] 11/21/2019 Yes    Anemia, chronic disease [D63.8] 07/25/2018 Yes      Problems Resolved During this Admission:    Diagnosis Date Noted Date Resolved POA    PRINCIPAL PROBLEM:  Syncope and collapse [R55] 11/21/2019 11/24/2019 Yes    Elevated troponin [R79.89] 11/21/2019 11/24/2019 Yes    Hypokalemia [E87.6] 11/21/2019 11/24/2019 Yes    Anemia due to chronic blood loss [D50.0] 07/25/2018 11/21/2019 Yes    Leg pain [M79.606] 05/09/2017 11/24/2019 Yes       Discharged Condition: stable    Disposition: Skilled Nursing Facility    Follow Up:  Follow-up Information     Schedule an appointment as soon as possible for a visit with Scott Payne MD.    Specialty:  Internal Medicine  Why:  for hospital discharge follow-up and to recheck kidney function.  Contact information:  [address] [place] [address] [address]  [place] [address] [phone]             Mary Hall MD In 2 weeks.    Specialty:  Cardiology  Why:  For check up s/p hospital discharge  Contact information:  [address] [facility]  [address]  [place] [address] [phone]             Brown Castro MD In 2 weeks.    Specialties:  Vascular Surgery, Cardiology  Why:  For check up s/p hospital discharge  Contact information:  [address] [facility]  [address]  [place] [id number] [phone]                 Patient Instructions:      Ambulatory Referral to Cardiology   Referral Priority: Routine Referral Type: Consultation   Referral Reason: Specialty Services Required    Referred to Provider: SERENA HALL Requested Specialty: Cardiology   Number of Visits Requested: 1     Ambulatory Referral to Vascular Surgery   Referral Priority: Routine Referral Type: Consultation   Referral Reason: Specialty Services Required   Referred to Provider: MARBIN LEVY Requested Specialty: Vascular Surgery   Number of Visits Requested: 1     Ambulatory Referral to Family Practice   Referral Priority: Routine Referral Type: Consultation   Referral Reason: Specialty Services Required   Referred to Provider: DAVID BAUER   Number of Visits Requested: 1     Diet Cardiac     HOME HEALTH ORDERS   Order Comments: Subsequent Home Health Orders    Current Medications:  Current Facility-Administered Medications:  acetaminophen tablet 650 mg, 650 mg, Oral, Q4H PRN, Michael Last, APRN  albuterol nebulizer solution 2.5 mg, 2.5 mg, Nebulization, Q4H PRN, Michael Last, APRN  amLODIPine tablet 5 mg, 5 mg, Oral, Daily, Serena Hall MD, 5 mg at 11/24/19 0853  aspirin chewable tablet 81 mg, 81 mg, Oral, Daily, Michelle Theodore MD, 81 mg at 11/24/19 0853  bisacodyl suppository 10 mg, 10 mg, Rectal, Daily PRN, Michael Last, APRN  donepezil tablet 10 mg, 10 mg, Oral, Daily, Michael Last APRN, 10 mg at 11/24/19 0853  furosemide tablet 20 mg, 20 mg, Oral, Daily, Serena Hall MD, 20 mg at 11/24/19 0853  hydrALAZINE injection 10 mg, 10 mg, Intravenous, Q4H PRN, Michelle Theodore MD, 10 mg at 11/22/19 1619  HYDROcodone-acetaminophen  mg per tablet 1 tablet, 1 tablet, Oral, Q8H PRN, Michael Last APRN, 1 tablet at 11/23/19 2005  lisinopril tablet 10 mg, 10 mg, Oral, Daily, Serena Hall MD, 10 mg at 11/24/19 0853  magnesium oxide tablet 800 mg, 800 mg, Oral, PRN, Michael Last APRN, 800 mg at 11/23/19 0802  magnesium oxide tablet 800 mg, 800 mg, Oral, PRN, SONYA Casas  metoprolol succinate (TOPROL-XL) 24 hr tablet 25 mg, 25 mg, Oral, Daily,  Mary Hall MD, 25 mg at 11/24/19 0853  pantoprazole EC tablet 40 mg, 40 mg, Oral, Daily, [place] P. Berhane, APRN, 40 mg at 11/24/19 0546  potassium chloride 10% oral solution 40 mEq, 40 mEq, Oral, PRN, [place] P. Berhane, APRN, 40 mEq at 11/23/19 0529  potassium chloride 10% oral solution 40 mEq, 40 mEq, Oral, PRN, [place] P. Berhane, APRN  potassium chloride 10% oral solution 40 mEq, 40 mEq, Oral, PRN, [place] P. Berhane, APRN  potassium, sodium phosphates 280-160-250 mg packet 2 packet, 2 packet, Oral, PRN, [place] P. Berhane, APRN  potassium, sodium phosphates 280-160-250 mg packet 2 packet, 2 packet, Oral, PRN, [place] P. Berhane, APRN  potassium, sodium phosphates 280-160-250 mg packet 2 packet, 2 packet, Oral, PRN, [place] P. Berhane, APRN  pregabalin capsule 150 mg, 150 mg, Oral, BID, N[place]e P. Berhane, APRN, 150 mg at 11/24/19 0853  sertraline tablet 100 mg, 100 mg, Oral, QHS, N[place]e P. Berhane, APRN, 100 mg at 11/23/19 2005  simvastatin tablet 40 mg, 40 mg, Oral, QHS, Nant[place]e P. Berhane, APRN, 40 mg at 11/23/19 2005  sodium chloride 0.9% flush 10 mL, 10 mL, Intravenous, PRN, [place]e P. Berhane, APRN        Nursing:   Skilled nursing evaluation for needs    Diet:   cardiac diet    Activities:   activity as tolerated    Labs:  none    Referrals/ Consults  Occupational Therapy to evaluate and treat. Evaluate home environment for safety and equipment needs. Perform/Instruct on transfers, ADL training, ROM, and therapeutic exercises.    Home Health Aide:  Nursing Other: to evaluate for needs, Physical Therapy Other: to evaluate for needs at home and Occupational Therapy Daily     Order Specific Question Answer Comments   What Home Health Agency is the patient currently using? Other/External as per case management     Notify your health care provider if you experience any of the following:  temperature >100.4     Notify your health care provider if you experience any of the following:  persistent nausea and  vomiting or diarrhea     Notify your health care provider if you experience any of the following:  severe uncontrolled pain     Notify your health care provider if you experience any of the following:  redness, tenderness, or signs of infection (pain, swelling, redness, odor or green/yellow discharge around incision site)     Notify your health care provider if you experience any of the following:  difficulty breathing or increased cough     Notify your health care provider if you experience any of the following:  severe persistent headache     Notify your health care provider if you experience any of the following:  worsening rash     Notify your health care provider if you experience any of the following:  persistent dizziness, light-headedness, or visual disturbances     Notify your health care provider if you experience any of the following:  increased confusion or weakness     Activity as tolerated       Significant Diagnostic Studies: Labs:   CMP   Recent Labs   Lab 12/02/19  0437 12/03/19  0501    140   K 4.5 4.6    103   CO2 27 27    102   BUN 46* 47*   CREATININE 1.2 1.3   CALCIUM 9.2 9.2   ANIONGAP 9 10   ESTGFRAFRICA >60.0 55.9*   EGFRNONAA 53.3* 48.4*    and CBC   Recent Labs   Lab 12/02/19  0437 12/03/19  0501   WBC 7.16 7.42   HGB 12.7* 12.2*   HCT 37.7* 37.1*   * 99*       Pending Diagnostic Studies:     None         Medications:  Reconciled Home Medications:      Medication List      START taking these medications    amLODIPine 10 MG tablet  Commonly known as:  NORVASC  Take 1 tablet (10 mg total) by mouth once daily.     metoprolol succinate 25 MG 24 hr tablet  Commonly known as:  TOPROL-XL  Take 1 tablet (25 mg total) by mouth once daily.        CHANGE how you take these medications    albuterol-ipratropium 2.5 mg-0.5 mg/3 mL nebulizer solution  Commonly known as:  DUO-NEB  Take 3 mLs by nebulization every 6 (six) hours as needed for Wheezing or Shortness of Breath.  What  changed:    · how much to take  · how to take this  · when to take this  · reasons to take this     * HYDROcodone-acetaminophen  mg per tablet  Commonly known as:  NORCO  Take 1 tablet by mouth every 8 (eight) hours as needed (pain). Medically necessary for greater than 7 days for chronic pain  What changed:  Another medication with the same name was removed. Continue taking this medication, and follow the directions you see here.     * HYDROcodone-acetaminophen  mg per tablet  Commonly known as:  NORCO  Take 1 tablet by mouth every 8 (eight) hours as needed (pain). Medically necessary for greater than 7 days for chronic pain  Start taking on:  December 18, 2019  What changed:  Another medication with the same name was removed. Continue taking this medication, and follow the directions you see here.         * This list has 2 medication(s) that are the same as other medications prescribed for you. Read the directions carefully, and ask your doctor or other care provider to review them with you.            CONTINUE taking these medications    aspirin 81 MG Chew  Take 81 mg by mouth once daily.     donepezil 10 MG tablet  Commonly known as:  ARICEPT  TAKE 1 TABLET BY MOUTH ONCE DAILY     ergocalciferol 50,000 unit Cap  Commonly known as:  ERGOCALCIFEROL  TAKE ONE CAPSULE BY MOUTH EVERY WEEK     furosemide 20 MG tablet  Commonly known as:  LASIX  Take 20 mg by mouth once daily.     ipratropium 42 mcg (0.06 %) nasal spray  Commonly known as:  ATROVENT  1 spray by Nasal route 2 (two) times daily.     Lyrica 150 MG capsule  Generic drug:  pregabalin  Take 150 mg by mouth 2 (two) times daily.     pantoprazole 40 MG tablet  Commonly known as:  PROTONIX  Take 40 mg by mouth once daily.     sertraline 100 MG tablet  Commonly known as:  ZOLOFT  Take 100 mg by mouth every evening.     simvastatin 40 MG tablet  Commonly known as:  ZOCOR  Take 40 mg by mouth every evening.        STOP taking these medications     ciclopirox 8 % Soln  Commonly known as:  PENLAC     lisinopril 10 MG tablet     metoprolol tartrate 25 MG tablet  Commonly known as:  LOPRESSOR     mupirocin calcium 2% 2 % cream  Commonly known as:  BACTROBAN            Indwelling Lines/Drains at time of discharge:   Lines/Drains/Airways     Pressure Ulcer                 Pressure Injury 11/27/19 0741 Left posterior #1 6 days         Pressure Injury 11/27/19 0741 Right anterior #1 6 days                Time spent on the discharge of patient: 40 minutes  Patient was seen and examined on the date of discharge and determined to be suitable for discharge.         Olvin Raines MD  Department of Hospital Medicine  [facility]  Date of service: 12/03/2019 5:01 PM

## 2019-12-03 NOTE — PLAN OF CARE
12/03/19 1543   Discharge Reassessment   Assessment Type Discharge Planning Reassessment   Anticipated Discharge Disposition SNF   Discharge Plan A Skilled Nursing Facility     CHAPARRO received auth from [facility]; [id number]. CHAPARRO informed Alberta at [facility] who provided number for report; [phone], E[facility]. CHAPARRO informed attending who is to write d/c orders. Once d/c orders are in place, CHAPARRO will send them to [facility]. CHAPARRO to update pt's family on d/c plan.

## 2019-12-04 NOTE — PT/OT/SLP DISCHARGE
Occupational Therapy Discharge Summary    Halley Rodrigues  MRN: [id number]   Principal Problem: Syncope and collapse      Patient Discharged from acute Occupational Therapy on 12/3/2019.  Please refer to prior OT note dated 12/3/2019 for functional status.    Assessment:      Patient appropriate for care in another setting.    Objective:     GOALS:   Multidisciplinary Problems     Occupational Therapy Goals        Problem: Occupational Therapy Goal    Goal Priority Disciplines Outcome Interventions   Occupational Therapy Goal     OT, PT/OT Ongoing, Progressing    Description:  Goals to be met by: 11/30/2019    Patient will increase functional independence with ADLs by performing:    LE Dressing with Modified [place].  Grooming while standing at sink with Modified [place].  Toileting from toilet with Modified [place] for hygiene and clothing management.   Toilet transfer to toilet with Modified [place].                      Reasons for Discontinuation of Therapy Services  Transfer to alternate level of care.      Plan:     Patient Discharged to: Skilled Nursing Facility    Efren Palomino, OT  12/4/2019

## 2020-01-01 ENCOUNTER — HOSPITAL ENCOUNTER (INPATIENT)
Facility: HOSPITAL | Age: 85
LOS: 4 days | Discharge: HOME HEALTH | DRG: 193 | End: 2020-01-06
Attending: EMERGENCY MEDICINE | Admitting: INTERNAL MEDICINE
Payer: MEDICARE

## 2020-01-01 DIAGNOSIS — I21.4 NSTEMI (NON-ST ELEVATED MYOCARDIAL INFARCTION): ICD-10-CM

## 2020-01-01 DIAGNOSIS — J18.9 PNEUMONIA DUE TO INFECTIOUS ORGANISM, UNSPECIFIED LATERALITY, UNSPECIFIED PART OF LUNG: Primary | ICD-10-CM

## 2020-01-01 DIAGNOSIS — R06.02 SOB (SHORTNESS OF BREATH): ICD-10-CM

## 2020-01-01 DIAGNOSIS — R05.9 COUGH: ICD-10-CM

## 2020-01-01 DIAGNOSIS — R09.02 HYPOXIA: ICD-10-CM

## 2020-01-01 DIAGNOSIS — I50.9 CHF (CONGESTIVE HEART FAILURE): ICD-10-CM

## 2020-01-01 PROBLEM — E87.6 HYPOKALEMIA: Status: ACTIVE | Noted: 2020-01-01

## 2020-01-01 PROBLEM — J96.01 ACUTE RESPIRATORY FAILURE WITH HYPOXIA: Status: ACTIVE | Noted: 2020-01-01

## 2020-01-01 PROBLEM — E83.42 HYPOMAGNESEMIA: Status: ACTIVE | Noted: 2020-01-01

## 2020-01-01 PROBLEM — N18.30 STAGE 3 CHRONIC KIDNEY DISEASE: Status: ACTIVE | Noted: 2020-01-01

## 2020-01-01 PROBLEM — D69.6 THROMBOCYTOPENIA, UNSPECIFIED: Status: ACTIVE | Noted: 2020-01-01

## 2020-01-01 LAB
ALBUMIN SERPL BCP-MCNC: 3.4 G/DL (ref 3.5–5.2)
ALP SERPL-CCNC: 52 U/L (ref 55–135)
ALT SERPL W/O P-5'-P-CCNC: 16 U/L (ref 10–44)
AMYLASE SERPL-CCNC: 39 U/L (ref 20–110)
ANION GAP SERPL CALC-SCNC: 14 MMOL/L (ref 8–16)
APTT PPP: 39.1 SEC (ref 23.6–33.3)
AST SERPL-CCNC: 30 U/L (ref 10–40)
BASOPHILS # BLD AUTO: 0.01 K/UL (ref 0–0.2)
BASOPHILS NFR BLD: 0.2 % (ref 0–1.9)
BILIRUB SERPL-MCNC: 1.2 MG/DL (ref 0.1–1)
BNP SERPL-MCNC: 311 PG/ML (ref 0–99)
BUN SERPL-MCNC: 26 MG/DL (ref 8–23)
CALCIUM SERPL-MCNC: 8.4 MG/DL (ref 8.7–10.5)
CHLORIDE SERPL-SCNC: 104 MMOL/L (ref 95–110)
CK MB SERPL-MCNC: 1.9 NG/ML (ref 0.1–6.5)
CK SERPL-CCNC: 154 U/L (ref 20–200)
CO2 SERPL-SCNC: 24 MMOL/L (ref 23–29)
CREAT SERPL-MCNC: 1.7 MG/DL (ref 0.5–1.4)
DIFFERENTIAL METHOD: ABNORMAL
EOSINOPHIL # BLD AUTO: 0 K/UL (ref 0–0.5)
EOSINOPHIL NFR BLD: 0 % (ref 0–8)
ERYTHROCYTE [DISTWIDTH] IN BLOOD BY AUTOMATED COUNT: 14.5 % (ref 11.5–14.5)
EST. GFR  (AFRICAN AMERICAN): 40.4 ML/MIN/1.73 M^2
EST. GFR  (NON AFRICAN AMERICAN): 35 ML/MIN/1.73 M^2
GLUCOSE SERPL-MCNC: 103 MG/DL (ref 70–110)
HCT VFR BLD AUTO: 32.3 % (ref 40–54)
HGB BLD-MCNC: 10.9 G/DL (ref 14–18)
IMM GRANULOCYTES # BLD AUTO: 0.01 K/UL (ref 0–0.04)
IMM GRANULOCYTES NFR BLD AUTO: 0.2 % (ref 0–0.5)
INFLUENZA A, MOLECULAR: NEGATIVE
INFLUENZA B, MOLECULAR: NEGATIVE
INR PPP: 1.2
LDH SERPL L TO P-CCNC: 1.21 MMOL/L (ref 0.5–2.2)
LIPASE SERPL-CCNC: 31 U/L (ref 4–60)
LYMPHOCYTES # BLD AUTO: 0.6 K/UL (ref 1–4.8)
LYMPHOCYTES NFR BLD: 13.2 % (ref 18–48)
MAGNESIUM SERPL-MCNC: 1.4 MG/DL (ref 1.6–2.6)
MCH RBC QN AUTO: 32 PG (ref 27–31)
MCHC RBC AUTO-ENTMCNC: 33.7 G/DL (ref 32–36)
MCV RBC AUTO: 95 FL (ref 82–98)
MONOCYTES # BLD AUTO: 0.3 K/UL (ref 0.3–1)
MONOCYTES NFR BLD: 7.8 % (ref 4–15)
MRSA SCREEN BY PCR: NEGATIVE
NEUTROPHILS # BLD AUTO: 3.3 K/UL (ref 1.8–7.7)
NEUTROPHILS NFR BLD: 78.6 % (ref 38–73)
NRBC BLD-RTO: 0 /100 WBC
PLATELET # BLD AUTO: 59 K/UL (ref 150–350)
PMV BLD AUTO: 13.1 FL (ref 9.2–12.9)
POTASSIUM SERPL-SCNC: 3.1 MMOL/L (ref 3.5–5.1)
PROT SERPL-MCNC: 6.5 G/DL (ref 6–8.4)
PROTHROMBIN TIME: 14.3 SEC (ref 10.6–14.8)
RBC # BLD AUTO: 3.41 M/UL (ref 4.6–6.2)
SAMPLE: NORMAL
SODIUM SERPL-SCNC: 142 MMOL/L (ref 136–145)
SPECIMEN SOURCE: NORMAL
TROPONIN I SERPL DL<=0.01 NG/ML-MCNC: 0.86 NG/ML
WBC # BLD AUTO: 4.23 K/UL (ref 3.9–12.7)

## 2020-01-01 PROCEDURE — 83690 ASSAY OF LIPASE: CPT

## 2020-01-01 PROCEDURE — 85025 COMPLETE CBC W/AUTO DIFF WBC: CPT

## 2020-01-01 PROCEDURE — 82550 ASSAY OF CK (CPK): CPT

## 2020-01-01 PROCEDURE — 94640 AIRWAY INHALATION TREATMENT: CPT

## 2020-01-01 PROCEDURE — 99900035 HC TECH TIME PER 15 MIN (STAT)

## 2020-01-01 PROCEDURE — 82553 CREATINE MB FRACTION: CPT

## 2020-01-01 PROCEDURE — 99285 EMERGENCY DEPT VISIT HI MDM: CPT | Mod: 25

## 2020-01-01 PROCEDURE — 87070 CULTURE OTHR SPECIMN AEROBIC: CPT

## 2020-01-01 PROCEDURE — 85730 THROMBOPLASTIN TIME PARTIAL: CPT

## 2020-01-01 PROCEDURE — 93005 ELECTROCARDIOGRAM TRACING: CPT

## 2020-01-01 PROCEDURE — 36415 COLL VENOUS BLD VENIPUNCTURE: CPT

## 2020-01-01 PROCEDURE — 83735 ASSAY OF MAGNESIUM: CPT

## 2020-01-01 PROCEDURE — G0378 HOSPITAL OBSERVATION PER HR: HCPCS

## 2020-01-01 PROCEDURE — 87040 BLOOD CULTURE FOR BACTERIA: CPT

## 2020-01-01 PROCEDURE — 87502 INFLUENZA DNA AMP PROBE: CPT

## 2020-01-01 PROCEDURE — 87641 MR-STAPH DNA AMP PROBE: CPT

## 2020-01-01 PROCEDURE — 80053 COMPREHEN METABOLIC PANEL: CPT

## 2020-01-01 PROCEDURE — 85610 PROTHROMBIN TIME: CPT

## 2020-01-01 PROCEDURE — 84484 ASSAY OF TROPONIN QUANT: CPT

## 2020-01-01 PROCEDURE — 87040 BLOOD CULTURE FOR BACTERIA: CPT | Mod: 59

## 2020-01-01 PROCEDURE — 27000221 HC OXYGEN, UP TO 24 HOURS

## 2020-01-01 PROCEDURE — 87205 SMEAR GRAM STAIN: CPT

## 2020-01-01 PROCEDURE — 94761 N-INVAS EAR/PLS OXIMETRY MLT: CPT

## 2020-01-01 PROCEDURE — 25000242 PHARM REV CODE 250 ALT 637 W/ HCPCS: Performed by: EMERGENCY MEDICINE

## 2020-01-01 PROCEDURE — 83880 ASSAY OF NATRIURETIC PEPTIDE: CPT

## 2020-01-01 PROCEDURE — 63600175 PHARM REV CODE 636 W HCPCS: Performed by: EMERGENCY MEDICINE

## 2020-01-01 PROCEDURE — 82150 ASSAY OF AMYLASE: CPT

## 2020-01-01 RX ORDER — IPRATROPIUM BROMIDE AND ALBUTEROL SULFATE 2.5; .5 MG/3ML; MG/3ML
3 SOLUTION RESPIRATORY (INHALATION)
Status: COMPLETED | OUTPATIENT
Start: 2020-01-01 | End: 2020-01-01

## 2020-01-01 RX ORDER — FUROSEMIDE 20 MG/1
20 TABLET ORAL DAILY
Status: DISCONTINUED | OUTPATIENT
Start: 2020-01-02 | End: 2020-01-01

## 2020-01-01 RX ORDER — MAGNESIUM SULFATE HEPTAHYDRATE 40 MG/ML
2 INJECTION, SOLUTION INTRAVENOUS
Status: DISCONTINUED | OUTPATIENT
Start: 2020-01-01 | End: 2020-01-06 | Stop reason: HOSPADM

## 2020-01-01 RX ORDER — CALCIUM CHLORIDE IN 0.9 % NACL 1 G/100 ML
1 INTRAVENOUS SOLUTION, PIGGYBACK (ML) INTRAVENOUS
Status: DISCONTINUED | OUTPATIENT
Start: 2020-01-01 | End: 2020-01-06 | Stop reason: HOSPADM

## 2020-01-01 RX ORDER — LANOLIN ALCOHOL/MO/W.PET/CERES
800 CREAM (GRAM) TOPICAL
Status: DISCONTINUED | OUTPATIENT
Start: 2020-01-01 | End: 2020-01-06 | Stop reason: HOSPADM

## 2020-01-01 RX ORDER — NAPROXEN SODIUM 220 MG/1
81 TABLET, FILM COATED ORAL DAILY
Status: DISCONTINUED | OUTPATIENT
Start: 2020-01-02 | End: 2020-01-06 | Stop reason: HOSPADM

## 2020-01-01 RX ORDER — PREGABALIN 75 MG/1
150 CAPSULE ORAL 2 TIMES DAILY
Status: DISCONTINUED | OUTPATIENT
Start: 2020-01-02 | End: 2020-01-06 | Stop reason: HOSPADM

## 2020-01-01 RX ORDER — POTASSIUM CHLORIDE 7.45 MG/ML
20 INJECTION INTRAVENOUS
Status: DISCONTINUED | OUTPATIENT
Start: 2020-01-01 | End: 2020-01-06 | Stop reason: HOSPADM

## 2020-01-01 RX ORDER — POTASSIUM CHLORIDE 20 MEQ/1
20 TABLET, EXTENDED RELEASE ORAL
Status: DISCONTINUED | OUTPATIENT
Start: 2020-01-01 | End: 2020-01-06 | Stop reason: HOSPADM

## 2020-01-01 RX ORDER — POTASSIUM CHLORIDE 7.45 MG/ML
40 INJECTION INTRAVENOUS
Status: DISCONTINUED | OUTPATIENT
Start: 2020-01-01 | End: 2020-01-06 | Stop reason: HOSPADM

## 2020-01-01 RX ORDER — MAGNESIUM SULFATE 1 G/100ML
1 INJECTION INTRAVENOUS ONCE
Status: COMPLETED | OUTPATIENT
Start: 2020-01-02 | End: 2020-01-02

## 2020-01-01 RX ORDER — ACETAMINOPHEN 325 MG/1
650 TABLET ORAL EVERY 4 HOURS PRN
Status: DISCONTINUED | OUTPATIENT
Start: 2020-01-01 | End: 2020-01-06 | Stop reason: HOSPADM

## 2020-01-01 RX ORDER — SERTRALINE HYDROCHLORIDE 50 MG/1
100 TABLET, FILM COATED ORAL NIGHTLY
Status: DISCONTINUED | OUTPATIENT
Start: 2020-01-02 | End: 2020-01-06 | Stop reason: HOSPADM

## 2020-01-01 RX ORDER — MAGNESIUM SULFATE 1 G/100ML
1 INJECTION INTRAVENOUS
Status: DISCONTINUED | OUTPATIENT
Start: 2020-01-01 | End: 2020-01-06 | Stop reason: HOSPADM

## 2020-01-01 RX ORDER — DONEPEZIL HYDROCHLORIDE 5 MG/1
10 TABLET, FILM COATED ORAL DAILY
Status: DISCONTINUED | OUTPATIENT
Start: 2020-01-02 | End: 2020-01-06 | Stop reason: HOSPADM

## 2020-01-01 RX ORDER — POTASSIUM CHLORIDE 20 MEQ/1
40 TABLET, EXTENDED RELEASE ORAL ONCE
Status: COMPLETED | OUTPATIENT
Start: 2020-01-02 | End: 2020-01-02

## 2020-01-01 RX ORDER — SIMVASTATIN 40 MG/1
40 TABLET, FILM COATED ORAL NIGHTLY
Status: DISCONTINUED | OUTPATIENT
Start: 2020-01-02 | End: 2020-01-06 | Stop reason: HOSPADM

## 2020-01-01 RX ORDER — PANTOPRAZOLE SODIUM 40 MG/1
40 TABLET, DELAYED RELEASE ORAL DAILY
Status: DISCONTINUED | OUTPATIENT
Start: 2020-01-02 | End: 2020-01-06 | Stop reason: HOSPADM

## 2020-01-01 RX ORDER — POTASSIUM CHLORIDE 20 MEQ/1
40 TABLET, EXTENDED RELEASE ORAL
Status: DISCONTINUED | OUTPATIENT
Start: 2020-01-01 | End: 2020-01-06 | Stop reason: HOSPADM

## 2020-01-01 RX ORDER — MAGNESIUM SULFATE HEPTAHYDRATE 40 MG/ML
4 INJECTION, SOLUTION INTRAVENOUS
Status: DISCONTINUED | OUTPATIENT
Start: 2020-01-01 | End: 2020-01-06 | Stop reason: HOSPADM

## 2020-01-01 RX ORDER — METOPROLOL SUCCINATE 25 MG/1
25 TABLET, EXTENDED RELEASE ORAL DAILY
Status: DISCONTINUED | OUTPATIENT
Start: 2020-01-02 | End: 2020-01-06 | Stop reason: HOSPADM

## 2020-01-01 RX ORDER — SODIUM CHLORIDE 0.9 % (FLUSH) 0.9 %
10 SYRINGE (ML) INJECTION
Status: DISCONTINUED | OUTPATIENT
Start: 2020-01-01 | End: 2020-01-06 | Stop reason: HOSPADM

## 2020-01-01 RX ORDER — HYDROCODONE BITARTRATE AND ACETAMINOPHEN 10; 325 MG/1; MG/1
1 TABLET ORAL EVERY 8 HOURS PRN
Status: DISCONTINUED | OUTPATIENT
Start: 2020-01-01 | End: 2020-01-06 | Stop reason: HOSPADM

## 2020-01-01 RX ORDER — AMLODIPINE BESYLATE 5 MG/1
10 TABLET ORAL DAILY
Status: DISCONTINUED | OUTPATIENT
Start: 2020-01-02 | End: 2020-01-06 | Stop reason: HOSPADM

## 2020-01-01 RX ORDER — ALBUTEROL SULFATE 0.83 MG/ML
2.5 SOLUTION RESPIRATORY (INHALATION) EVERY 4 HOURS PRN
Status: DISCONTINUED | OUTPATIENT
Start: 2020-01-01 | End: 2020-01-06 | Stop reason: HOSPADM

## 2020-01-01 RX ORDER — IPRATROPIUM BROMIDE AND ALBUTEROL SULFATE 2.5; .5 MG/3ML; MG/3ML
3 SOLUTION RESPIRATORY (INHALATION)
Status: DISCONTINUED | OUTPATIENT
Start: 2020-01-02 | End: 2020-01-06 | Stop reason: HOSPADM

## 2020-01-01 RX ORDER — IPRATROPIUM BROMIDE AND ALBUTEROL SULFATE 2.5; .5 MG/3ML; MG/3ML
3 SOLUTION RESPIRATORY (INHALATION) EVERY 6 HOURS PRN
Status: DISCONTINUED | OUTPATIENT
Start: 2020-01-01 | End: 2020-01-01

## 2020-01-01 RX ADMIN — CEFTRIAXONE 1 G: 1 INJECTION, SOLUTION INTRAVENOUS at 10:01

## 2020-01-01 RX ADMIN — IPRATROPIUM BROMIDE AND ALBUTEROL SULFATE 3 ML: .5; 3 SOLUTION RESPIRATORY (INHALATION) at 09:01

## 2020-01-02 PROBLEM — I21.4 NSTEMI (NON-ST ELEVATED MYOCARDIAL INFARCTION): Status: ACTIVE | Noted: 2019-11-21

## 2020-01-02 LAB
AMPHET+METHAMPHET UR QL: NEGATIVE
ANION GAP SERPL CALC-SCNC: 15 MMOL/L (ref 8–16)
ANION GAP SERPL CALC-SCNC: 16 MMOL/L (ref 8–16)
BARBITURATES UR QL SCN>200 NG/ML: NEGATIVE
BASOPHILS # BLD AUTO: 0 K/UL (ref 0–0.2)
BASOPHILS NFR BLD: 0 % (ref 0–1.9)
BENZODIAZ UR QL SCN>200 NG/ML: NEGATIVE
BILIRUB UR QL STRIP: NEGATIVE
BUN SERPL-MCNC: 25 MG/DL (ref 8–23)
BUN SERPL-MCNC: 27 MG/DL (ref 8–23)
BZE UR QL SCN: NEGATIVE
CALCIUM SERPL-MCNC: 8.3 MG/DL (ref 8.7–10.5)
CALCIUM SERPL-MCNC: 8.4 MG/DL (ref 8.7–10.5)
CANNABINOIDS UR QL SCN: NEGATIVE
CHLORIDE SERPL-SCNC: 103 MMOL/L (ref 95–110)
CHLORIDE SERPL-SCNC: 104 MMOL/L (ref 95–110)
CK MB SERPL-MCNC: 28.3 NG/ML (ref 0.1–6.5)
CK SERPL-CCNC: 711 U/L (ref 20–200)
CLARITY UR: CLEAR
CO2 SERPL-SCNC: 21 MMOL/L (ref 23–29)
CO2 SERPL-SCNC: 22 MMOL/L (ref 23–29)
COLOR UR: YELLOW
CREAT SERPL-MCNC: 1.6 MG/DL (ref 0.5–1.4)
CREAT SERPL-MCNC: 1.7 MG/DL (ref 0.5–1.4)
CREAT UR-MCNC: 71 MG/DL (ref 23–375)
DIFFERENTIAL METHOD: ABNORMAL
EOSINOPHIL # BLD AUTO: 0 K/UL (ref 0–0.5)
EOSINOPHIL NFR BLD: 0 % (ref 0–8)
ERYTHROCYTE [DISTWIDTH] IN BLOOD BY AUTOMATED COUNT: 14.1 % (ref 11.5–14.5)
EST. GFR  (AFRICAN AMERICAN): 40.4 ML/MIN/1.73 M^2
EST. GFR  (AFRICAN AMERICAN): 43.5 ML/MIN/1.73 M^2
EST. GFR  (NON AFRICAN AMERICAN): 35 ML/MIN/1.73 M^2
EST. GFR  (NON AFRICAN AMERICAN): 37.6 ML/MIN/1.73 M^2
GLUCOSE SERPL-MCNC: 135 MG/DL (ref 70–110)
GLUCOSE SERPL-MCNC: 139 MG/DL (ref 70–110)
GLUCOSE UR QL STRIP: NEGATIVE
HCT VFR BLD AUTO: 33.7 % (ref 40–54)
HGB BLD-MCNC: 11.2 G/DL (ref 14–18)
HGB UR QL STRIP: NEGATIVE
IMM GRANULOCYTES # BLD AUTO: 0.02 K/UL (ref 0–0.04)
IMM GRANULOCYTES NFR BLD AUTO: 0.3 % (ref 0–0.5)
KETONES UR QL STRIP: NEGATIVE
LACTATE SERPL-SCNC: 1.5 MMOL/L (ref 0.5–1.9)
LEUKOCYTE ESTERASE UR QL STRIP: NEGATIVE
LYMPHOCYTES # BLD AUTO: 0.4 K/UL (ref 1–4.8)
LYMPHOCYTES NFR BLD: 7.2 % (ref 18–48)
MAGNESIUM SERPL-MCNC: 1.7 MG/DL (ref 1.6–2.6)
MAGNESIUM SERPL-MCNC: 1.8 MG/DL (ref 1.6–2.6)
MCH RBC QN AUTO: 31.5 PG (ref 27–31)
MCHC RBC AUTO-ENTMCNC: 33.2 G/DL (ref 32–36)
MCV RBC AUTO: 95 FL (ref 82–98)
MONOCYTES # BLD AUTO: 0.4 K/UL (ref 0.3–1)
MONOCYTES NFR BLD: 6.6 % (ref 4–15)
NEUTROPHILS # BLD AUTO: 4.9 K/UL (ref 1.8–7.7)
NEUTROPHILS NFR BLD: 85.9 % (ref 38–73)
NITRITE UR QL STRIP: NEGATIVE
NRBC BLD-RTO: 0 /100 WBC
OPIATES UR QL SCN: NEGATIVE
PCP UR QL SCN>25 NG/ML: NEGATIVE
PH UR STRIP: 5 [PH] (ref 5–8)
PLATELET # BLD AUTO: 58 K/UL (ref 150–350)
PMV BLD AUTO: 12.8 FL (ref 9.2–12.9)
POTASSIUM SERPL-SCNC: 3.5 MMOL/L (ref 3.5–5.1)
POTASSIUM SERPL-SCNC: 3.7 MMOL/L (ref 3.5–5.1)
PROT UR QL STRIP: NEGATIVE
RBC # BLD AUTO: 3.56 M/UL (ref 4.6–6.2)
SODIUM SERPL-SCNC: 140 MMOL/L (ref 136–145)
SODIUM SERPL-SCNC: 141 MMOL/L (ref 136–145)
SODIUM UR-SCNC: 28 MMOL/L (ref 20–250)
SP GR UR STRIP: 1.01 (ref 1–1.03)
TOXICOLOGY INFORMATION: NORMAL
TROPONIN I SERPL DL<=0.01 NG/ML-MCNC: 0.7 NG/ML
TROPONIN I SERPL DL<=0.01 NG/ML-MCNC: 5.88 NG/ML
TROPONIN I SERPL DL<=0.01 NG/ML-MCNC: 9.64 NG/ML
URN SPEC COLLECT METH UR: NORMAL
UROBILINOGEN UR STRIP-ACNC: NEGATIVE EU/DL
WBC # BLD AUTO: 5.73 K/UL (ref 3.9–12.7)

## 2020-01-02 PROCEDURE — 97165 OT EVAL LOW COMPLEX 30 MIN: CPT

## 2020-01-02 PROCEDURE — 63600175 PHARM REV CODE 636 W HCPCS: Performed by: INTERNAL MEDICINE

## 2020-01-02 PROCEDURE — 25000242 PHARM REV CODE 250 ALT 637 W/ HCPCS: Performed by: EMERGENCY MEDICINE

## 2020-01-02 PROCEDURE — 84145 PROCALCITONIN (PCT): CPT

## 2020-01-02 PROCEDURE — 85025 COMPLETE CBC W/AUTO DIFF WBC: CPT

## 2020-01-02 PROCEDURE — 82550 ASSAY OF CK (CPK): CPT

## 2020-01-02 PROCEDURE — 82553 CREATINE MB FRACTION: CPT

## 2020-01-02 PROCEDURE — 25000003 PHARM REV CODE 250: Performed by: INTERNAL MEDICINE

## 2020-01-02 PROCEDURE — 80048 BASIC METABOLIC PNL TOTAL CA: CPT | Mod: 91

## 2020-01-02 PROCEDURE — 94640 AIRWAY INHALATION TREATMENT: CPT

## 2020-01-02 PROCEDURE — 83735 ASSAY OF MAGNESIUM: CPT | Mod: 91

## 2020-01-02 PROCEDURE — 83605 ASSAY OF LACTIC ACID: CPT

## 2020-01-02 PROCEDURE — 84484 ASSAY OF TROPONIN QUANT: CPT | Mod: 91

## 2020-01-02 PROCEDURE — 84484 ASSAY OF TROPONIN QUANT: CPT

## 2020-01-02 PROCEDURE — 30000890 LABCORP MISCELLANEOUS TEST

## 2020-01-02 PROCEDURE — 36415 COLL VENOUS BLD VENIPUNCTURE: CPT

## 2020-01-02 PROCEDURE — 25000242 PHARM REV CODE 250 ALT 637 W/ HCPCS: Performed by: INTERNAL MEDICINE

## 2020-01-02 PROCEDURE — 94761 N-INVAS EAR/PLS OXIMETRY MLT: CPT

## 2020-01-02 PROCEDURE — 99900035 HC TECH TIME PER 15 MIN (STAT)

## 2020-01-02 PROCEDURE — 80307 DRUG TEST PRSMV CHEM ANLYZR: CPT

## 2020-01-02 PROCEDURE — 63600175 PHARM REV CODE 636 W HCPCS

## 2020-01-02 PROCEDURE — 99223 1ST HOSP IP/OBS HIGH 75: CPT | Mod: ,,, | Performed by: INTERNAL MEDICINE

## 2020-01-02 PROCEDURE — 86603 ADENOVIRUS ANTIBODY: CPT

## 2020-01-02 PROCEDURE — 27000221 HC OXYGEN, UP TO 24 HOURS

## 2020-01-02 PROCEDURE — 21000000 HC CCU ICU ROOM CHARGE

## 2020-01-02 PROCEDURE — 84300 ASSAY OF URINE SODIUM: CPT

## 2020-01-02 PROCEDURE — 83735 ASSAY OF MAGNESIUM: CPT

## 2020-01-02 PROCEDURE — 80048 BASIC METABOLIC PNL TOTAL CA: CPT

## 2020-01-02 PROCEDURE — 99223 PR INITIAL HOSPITAL CARE,LEVL III: ICD-10-PCS | Mod: ,,, | Performed by: INTERNAL MEDICINE

## 2020-01-02 PROCEDURE — 81003 URINALYSIS AUTO W/O SCOPE: CPT

## 2020-01-02 PROCEDURE — 97535 SELF CARE MNGMENT TRAINING: CPT

## 2020-01-02 RX ORDER — LEVOFLOXACIN 250 MG/1
250 TABLET ORAL
Status: DISCONTINUED | OUTPATIENT
Start: 2020-01-03 | End: 2020-01-06 | Stop reason: HOSPADM

## 2020-01-02 RX ORDER — FUROSEMIDE 10 MG/ML
INJECTION INTRAMUSCULAR; INTRAVENOUS
Status: COMPLETED
Start: 2020-01-02 | End: 2020-01-02

## 2020-01-02 RX ORDER — SODIUM CHLORIDE 9 MG/ML
INJECTION, SOLUTION INTRAVENOUS CONTINUOUS
Status: DISCONTINUED | OUTPATIENT
Start: 2020-01-02 | End: 2020-01-02

## 2020-01-02 RX ORDER — LEVOFLOXACIN 500 MG/1
500 TABLET, FILM COATED ORAL DAILY
Status: DISCONTINUED | OUTPATIENT
Start: 2020-01-02 | End: 2020-01-02

## 2020-01-02 RX ORDER — FUROSEMIDE 10 MG/ML
20 INJECTION INTRAMUSCULAR; INTRAVENOUS 2 TIMES DAILY
Status: DISCONTINUED | OUTPATIENT
Start: 2020-01-02 | End: 2020-01-04

## 2020-01-02 RX ORDER — LEVOFLOXACIN 500 MG/1
500 TABLET, FILM COATED ORAL ONCE
Status: COMPLETED | OUTPATIENT
Start: 2020-01-02 | End: 2020-01-02

## 2020-01-02 RX ADMIN — PREGABALIN 150 MG: 75 CAPSULE ORAL at 08:01

## 2020-01-02 RX ADMIN — FUROSEMIDE 20 MG: 10 INJECTION, SOLUTION INTRAMUSCULAR; INTRAVENOUS at 04:01

## 2020-01-02 RX ADMIN — SIMVASTATIN 40 MG: 40 TABLET, FILM COATED ORAL at 12:01

## 2020-01-02 RX ADMIN — ACETAMINOPHEN 650 MG: 325 TABLET ORAL at 03:01

## 2020-01-02 RX ADMIN — IPRATROPIUM BROMIDE AND ALBUTEROL SULFATE 3 ML: .5; 3 SOLUTION RESPIRATORY (INHALATION) at 07:01

## 2020-01-02 RX ADMIN — PANTOPRAZOLE SODIUM 40 MG: 40 TABLET, DELAYED RELEASE ORAL at 06:01

## 2020-01-02 RX ADMIN — FUROSEMIDE 20 MG: 20 INJECTION, SOLUTION INTRAMUSCULAR; INTRAVENOUS at 04:01

## 2020-01-02 RX ADMIN — PREGABALIN 150 MG: 75 CAPSULE ORAL at 12:01

## 2020-01-02 RX ADMIN — SERTRALINE HYDROCHLORIDE 100 MG: 50 TABLET ORAL at 08:01

## 2020-01-02 RX ADMIN — FUROSEMIDE 20 MG: 10 INJECTION, SOLUTION INTRAMUSCULAR; INTRAVENOUS at 05:01

## 2020-01-02 RX ADMIN — PIPERACILLIN AND TAZOBACTAM 3.38 G: 3; .375 INJECTION, POWDER, LYOPHILIZED, FOR SOLUTION INTRAVENOUS; PARENTERAL at 12:01

## 2020-01-02 RX ADMIN — POTASSIUM CHLORIDE 40 MEQ: 20 TABLET, EXTENDED RELEASE ORAL at 12:01

## 2020-01-02 RX ADMIN — SIMVASTATIN 40 MG: 40 TABLET, FILM COATED ORAL at 08:01

## 2020-01-02 RX ADMIN — DONEPEZIL HYDROCHLORIDE 10 MG: 5 TABLET, FILM COATED ORAL at 08:01

## 2020-01-02 RX ADMIN — SODIUM CHLORIDE: 0.9 INJECTION, SOLUTION INTRAVENOUS at 12:01

## 2020-01-02 RX ADMIN — PIPERACILLIN SODIUM AND TAZOBACTAM SODIUM 3.38 G: 3; .375 INJECTION, POWDER, LYOPHILIZED, FOR SOLUTION INTRAVENOUS at 05:01

## 2020-01-02 RX ADMIN — MAGNESIUM SULFATE 1 G: 1 INJECTION INTRAVENOUS at 12:01

## 2020-01-02 RX ADMIN — ASPIRIN 81 MG 81 MG: 81 TABLET ORAL at 08:01

## 2020-01-02 RX ADMIN — SERTRALINE HYDROCHLORIDE 100 MG: 50 TABLET ORAL at 12:01

## 2020-01-02 RX ADMIN — METOPROLOL SUCCINATE 25 MG: 25 TABLET, EXTENDED RELEASE ORAL at 08:01

## 2020-01-02 RX ADMIN — VANCOMYCIN HYDROCHLORIDE 1500 MG: 1 INJECTION, POWDER, LYOPHILIZED, FOR SOLUTION INTRAVENOUS at 05:01

## 2020-01-02 RX ADMIN — FUROSEMIDE 20 MG: 10 INJECTION, SOLUTION INTRAMUSCULAR; INTRAVENOUS at 08:01

## 2020-01-02 RX ADMIN — PIPERACILLIN SODIUM AND TAZOBACTAM SODIUM 3.38 G: 3; .375 INJECTION, POWDER, LYOPHILIZED, FOR SOLUTION INTRAVENOUS at 08:01

## 2020-01-02 RX ADMIN — IPRATROPIUM BROMIDE AND ALBUTEROL SULFATE 3 ML: .5; 3 SOLUTION RESPIRATORY (INHALATION) at 02:01

## 2020-01-02 RX ADMIN — LEVOFLOXACIN 500 MG: 500 TABLET, FILM COATED ORAL at 07:01

## 2020-01-02 RX ADMIN — AMLODIPINE BESYLATE 10 MG: 5 TABLET ORAL at 08:01

## 2020-01-02 NOTE — PROGRESS NOTES
[facility]  Adult Nutrition   Progress Note (Initial Assessment)     SUMMARY     Recommendations/Interventions:  1. When ready to advavnce diet, recommend starting Cardiac Diet.   2. Will add Ensure Enlive (chocolate) TID once diet advances to aid in meeting estimated needs and due to patient with Hx of Severe Protein Energy Malnutrition.  3. Recommend adding a Daily MVI due to multiple suspected vitamin and mineral deficiencies.   Goals:   1. Diet to advance and patient to meet at least 75% of estimated needs via PO intake of meals and supplements.  2. MVI to be added.  Nutrition Goal Status: new    Reason for Assessment    Reason For Assessment: (MST Score 2)  Diagnosis: (Pneumonia )  Relevant Medical History: anemia, dementia, GERD, HTN  Interdisciplinary Rounds: attended    Nutrition Risk Screen    Nutrition Risk Screen: no indicators present     MST Score: 2  Have you recently lost weight without trying?: Yes: 2-13 lbs  Weight loss score: 1  Have you been eating poorly because of a decreased appetite?: Yes  Appetite score: 1       Nutrition/Diet History    Patient Reported Diet/Restrictions/Preferences: general  Food Allergies: NKFA  Factors Affecting Nutritional Intake: None identified at this time    Anthropometrics    Temp: 99 °F (37.2 °C)  Height Method: Stated  Height: 6' (182.9 cm)  Height (inches): 72 in  Weight Method: Bed Scale  Weight: 70 kg (154 lb 6.4 oz)  Weight (lb): 154.4 lb  Ideal Body Weight (IBW), Male: 178 lb  % Ideal Body Weight, Male (lb): 86.74 %  BMI (Calculated): 20.9  BMI Grade: 18.5-24.9 - normal  Weight Loss: unintentional     Weight History:  Wt Readings from Last 10 Encounters:   01/02/20 70 kg (154 lb 6.4 oz)   12/03/19 63.9 kg (140 lb 14 oz)   10/21/19 73.5 kg (162 lb)   09/09/19 74.8 kg (165 lb)   08/26/19 73.5 kg (162 lb)   07/30/19 73.5 kg (162 lb)   05/02/19 73.8 kg (162 lb 9.6 oz)   04/25/19 70.8 kg (156 lb)   01/31/19 70.8 kg (156 lb)   10/18/18 71 kg (156 lb  9.6 oz)     Lab/Procedures/Meds: Pertinent Labs Reviewed  Clinical Chemistry:  Recent Labs   Lab 01/01/20 2022 01/02/20  0446    140   K 3.1* 3.7    104   CO2 24 21*    135*   BUN 26* 27*   CREATININE 1.7* 1.7*   CALCIUM 8.4* 8.4*   PROT 6.5  --    ALBUMIN 3.4*  --    BILITOT 1.2*  --    ALKPHOS 52*  --    AST 30  --    ALT 16  --    ANIONGAP 14 15   ESTGFRAFRICA 40.4* 40.4*   EGFRNONAA 35.0* 35.0*   MG 1.4* 1.8   AMYLASE 39  --    LIPASE 31  --     < > = values in this interval not displayed.     CBC:   Recent Labs   Lab 01/02/20 0201   WBC 5.73   RBC 3.56*   HGB 11.2*   HCT 33.7*   PLT 58*   MCV 95   MCH 31.5*   MCHC 33.2     Cardiac Profile:  Recent Labs   Lab 01/01/20 2022 01/02/20 0201 01/02/20  0814   *  --   --      --   --    CPKMB 1.9  --   --    TROPONINI 0.864* 0.696* 5.882*     Medications: Pertinent Medications reviewed  Scheduled Meds:   albuterol-ipratropium  3 mL Nebulization Q6H WAKE    amLODIPine  10 mg Oral Daily    aspirin  81 mg Oral Daily    donepezil  10 mg Oral Daily    furosemide  20 mg Intravenous BID    metoprolol succinate  25 mg Oral Daily    pantoprazole  40 mg Oral Daily    piperacillin-tazobactam (ZOSYN) IVPB  3.375 g Intravenous Q8H    pregabalin  150 mg Oral BID    sertraline  100 mg Oral QHS    simvastatin  40 mg Oral QHS     Continuous Infusions:  PRN Meds:.acetaminophen, albuterol sulfate, calcium chloride IVPB, calcium chloride IVPB, calcium chloride IVPB, HYDROcodone-acetaminophen, magnesium oxide, magnesium sulfate IVPB, magnesium sulfate IVPB, magnesium sulfate IVPB, magnesium sulfate IVPB, potassium chloride in water, potassium chloride in water, potassium chloride in water, potassium chloride in water, potassium chloride, potassium chloride, potassium chloride, potassium chloride, sodium chloride 0.9%, sodium phosphate IVPB, sodium phosphate IVPB, sodium phosphate IVPB, sodium phosphate IVPB, sodium phosphate IVPB, Pharmacy to  dose Vancomycin consult **AND** vancomycin - pharmacy to dose    Estimated/Assessed Needs    Weight Used For Calorie Calculations: 70 kg (154 lb 5.2 oz)  Energy Calorie Requirements (kcal): 0158-6292 kcal/day (25-30 kcals/kg)   Energy Need Method: Kcal/kg  Protein Requirements: 70-91 g/day (1.0-1.3 g/kg)  Weight Used For Protein Calculations: 70 kg (154 lb 5.2 oz)     Estimated Fluid Requirement Method: RDA Method    Nutrition Prescription Ordered    Current Diet Order: NPO    Evaluation of Received Nutrient/Fluid Intake    Energy Calories Required: not meeting needs  Protein Required: not meeting needs  Fluid Required: meeting needs  Tolerance: (NPO)  % Intake of Estimated Energy Needs: 0%  % Meal Intake: NPO    Intake/Output Summary (Last 24 hours) at 1/2/2020 0940  Last data filed at 1/1/2020 2246  Gross per 24 hour   Intake 50 ml   Output --   Net 50 ml      Nutrition Risk    Level of Risk/Frequency of Follow-up: high     Dietitian Rounds Brief:  · Seen 2' MST score and RD identified at risk due to previous diagnosis of Severe Malnutrition last admit 11/22. Pt asleep during time of rounds and known hx of dementia, therefore could not gain nutrition hx. Will add ONS once diet advances. Troponin increasing 5.882. RN notified Dr. Theodore. Will continue to monitor labs and diet transition.    Monitor and Evaluation    Food and Nutrient Intake: energy intake, food and beverage intake  Food and Nutrient Adminstration: diet order  Physical Activity and Function: nutrition-related ADLs and IADLs  Anthropometric Measurements: weight, weight change  Biochemical Data, Medical Tests and Procedures: electrolyte and renal panel, inflammatory profile, lipid profile, gastrointestinal profile, glucose/endocrine profile  Nutrition-Focused Physical Findings: overall appearance     Malnutrition Assessment  11/22: Severe Protein Energy Malnutrition RT decreased appetite AEB 14.47% weight loss in 2 months, severe subcutaneous fat  depletion of triceps and thoracic and lumbar region, and severe muscle mass depletion of temples, clavicle region, scapular region and interosseous muscle.  · RD added Ensure Enlive and recommended MVI    Nutrition Follow-Up    RD Follow-up?: Yes  Vy Genao RD 01/02/2020 9:49 AM

## 2020-01-02 NOTE — ASSESSMENT & PLAN NOTE
Aware  Reviewed recent echo from November 2019 with EF of 43% and grade 1 diastolic dysfunction  Patient appears euvolemic on exam  Hold home furosemide secondary to diarrheal illness

## 2020-01-02 NOTE — PT/OT/SLP EVAL
Occupational Therapy   Evaluation    Name: Halley Rodrigues  MRN: [id number]  Admitting Diagnosis:  Pneumonia      Recommendations:     Discharge Recommendations: home health OT  Discharge Equipment Recommendations:  other (see comments)(TBD)  Barriers to discharge:  None    Assessment:     Halley Rodrigues is a 89 y.o. male with a medical diagnosis of Pneumonia.  He presents with general weakness. Performance deficits affecting function: weakness, impaired endurance, impaired self care skills, impaired functional mobilty, gait instability, impaired balance.      Rehab Prognosis: Fair; patient would benefit from acute skilled OT services to address these deficits and reach maximum level of function.       Plan:     Patient to be seen 5 x/week to address the above listed problems via self-care/home management, therapeutic activities, therapeutic exercises  · Plan of Care Expires: 02/02/20  · Plan of Care Reviewed with: patient    Subjective     Chief Complaint: None  Patient/Family Comments/goals: to go home.    Occupational Profile:  Living Environment: lives alone in a trailer on son's property. Son has a security camera to watch patient in his trailer.   Previous level of function: supervision for all ADLs.   Roles and Routines: limited homemaker  Equipment Used at Home:  other (see comments)(TBD)  Assistance upon Discharge: Son    Pain/Comfort:  · Pain Rating 1: 0/10  · Pain Rating Post-Intervention 1: 0/10    Patients cultural, spiritual, Sabianist conflicts given the current situation: no    Objective:     Communicated with: nurse prior to session.  Patient found supine with peripheral IV upon OT entry to room.    General Precautions: Standard, fall   Orthopedic Precautions:N/A   Braces: N/A     Occupational Performance:    Bed Mobility:    · Patient completed Scooting/Bridging with contact guard assistance  · Patient completed Supine to Sit with contact guard assistance  · Patient completed Sit to  Supine with contact guard assistance   · Performed unsupported sitting EOB for 12 minutes with contact guard assistance.    Functional Mobility/Transfers:  · Patient completed Sit <> Stand Transfer with contact guard assistance  with  hand-held assist and rolling walker   · Functional Mobility: ambulated 15 feet in the hospital room with contact guard assistance using rolling walker.    Activities of Daily Living:  · Grooming: contact guard assistance to wash face sitting EOB.  · Lower Body Dressing: contact guard assistance to don/doff socks sitting EOB.  · Toileting: minimum assistance to perform hygiene and clothing management standing EOB.     Cognitive/Visual Perceptual:  Cognitive/Psychosocial Skills:     -       Oriented to: Person and Place   -       Follows Commands/attention:Follows one-step commands  -       Communication: clear/fluent  -       Memory: Impaired STM  -       Safety awareness/insight to disability: impaired   -       Mood/Affect/Coping skills/emotional control: Cooperative and Pleasant  Visual/Perceptual:      -Intact     Physical Exam:  Balance:    -       Sitting/Standing: Contact Guard  Upper Extremity Range of Motion:     -       Right Upper Extremity: WFL  -       Left Upper Extremity: WFL  Upper Extremity Strength:    -       Right Upper Extremity: 4/5  -       Left Upper Extremity: 4/5   Strength:    -       Right Upper Extremity: WFL  -       Left Upper Extremity: WFL  Fine Motor Coordination:    -       Intact    AMPAC 6 Click ADL:  AMPAC Total Score: 19    Treatment & Education:  Patient able to follow 1 step commands and required verbal cues for safety awareness throughout session.  Education:    Patient left supine with all lines intact, call button in reach and bed alarm on    GOALS:   Multidisciplinary Problems     Occupational Therapy Goals        Problem: Occupational Therapy Goal    Goal Priority Disciplines Outcome Interventions   Occupational Therapy Goal     OT,  PT/OT     Description:  Goals to be met by: discharge     Patient will increase functional independence with ADLs by performing:    UE Dressing with Supervision.  LE Dressing with Supervision.  Grooming while standing with Supervision.  Toileting from toilet with Supervision for hygiene and clothing management.   Toilet transfer to toilet with Supervision.                      History:     Past Medical History:   Diagnosis Date    Anemia     Anemia due to chronic blood loss 7/25/2018    Anemia, chronic disease 7/25/2018    Dementia     GERD (gastroesophageal reflux disease)     Heart disease     Hypertension     Neuropathy        Past Surgical History:   Procedure Laterality Date    CORONARY ARTERY BYPASS GRAFT         Time Tracking:     OT Date of Treatment:    OT Start Time: 1041  OT Stop Time: 1106  OT Total Time (min): 25 min    Billable Minutes:Evaluation 10  Self Care/Home Management 15    Abhi Benites OT  1/2/2020

## 2020-01-02 NOTE — HPI
Patient is a 89-year-old  male with known history of CAD status post CABG, dementia, chronic combined systolic and diastolic congestive heart failure and anemia of chronic disease who was recently discharged from our facility after being admitted for syncope and eventually transferred to skilled nursing facility presents with chief complaint of shortness of breath and cough.  History is limited secondary to patient's dementia and is supplemented by his son at bedside.    Patient was discharged from [facility] on 12/23.  According to the patient's son patient was noted to be more weak over the last 3 days.  He was noted to have diarrhea which was nonbloody and watery over the last 2 days.  Reported poor oral intake and generalized weakness.  Patient was also noted to have a rattling cough.  Son denies known choking episodes or aspiration events in the past.  Patient denies chest pain but admits to shortness of breath is worse with exertion and better with rest.  Symptoms are moderate in nature.  No lightheadedness, lower extremity edema, palpitations, abdominal pain, nausea/vomiting or urinary problems. Patient is prone to frequent falls; noncompliant with walker.    In the ER:  Hemodynamically stable.  Patient was noted to be hypoxic with O2 sats in the 80s on room air which improved to greater than 90% on supplemental oxygen.  He was also noted to be tachypneic.  Laboratory workup revealed chronic normocytic anemia, thrombocytopenia, hypokalemia, CKD stage 3, elevated BNP and troponin.  EKG revealed normal sinus rhythm with nonspecific ST-T wave changes and prolonged QT interval.  It is unchanged from EKG dated 11/26/2019.  Chest x-ray revealed left mid and lower lung zone alveolar opacities concerning for pneumonia.  Admitted for further management.    Rest of the 10 point review of systems is negative except as mentioned above.

## 2020-01-02 NOTE — H&P
[facility] Medicine  History & Physical    Patient Name: Halley Rodrigues  MRN: [id number]  Admission Date: 1/1/2020  Attending Physician: Shekhar Andersen MD  Primary Care Provider: Scott Payne MD         Patient information was obtained from patient, relative(s), past medical records and ER records.     Subjective:     Principal Problem:Pneumonia    Chief Complaint:   Chief Complaint   Patient presents with    Cough     x3 days with SOB        HPI: Patient is a 89-year-old  male with known history of CAD status post CABG, dementia, chronic combined systolic and diastolic congestive heart failure and anemia of chronic disease who was recently discharged from our facility after being admitted for syncope and eventually transferred to skilled nursing facility presents with chief complaint of shortness of breath and cough.  History is limited secondary to patient's dementia and is supplemented by his son at bedside.    Patient was discharged from [facility] on 12/23.  According to the patient's son patient was noted to be more weak over the last 3 days.  He was noted to have diarrhea which was nonbloody and watery over the last 2 days.  Reported poor oral intake and generalized weakness.  Patient was also noted to have a rattling cough.  Son denies known choking episodes or aspiration events in the past.  Patient denies chest pain but admits to shortness of breath is worse with exertion and better with rest.  Symptoms are moderate in nature.  No lightheadedness, lower extremity edema, palpitations, abdominal pain, nausea/vomiting or urinary problems. Patient is prone to frequent falls; noncompliant with walker.    In the ER:  Hemodynamically stable.  Patient was noted to be hypoxic with O2 sats in the 80s on room air which improved to greater than 90% on supplemental oxygen.  He was also noted to be tachypneic.  Laboratory workup revealed chronic  normocytic anemia, thrombocytopenia, hypokalemia, CKD stage 3, elevated BNP and troponin.  EKG revealed normal sinus rhythm with nonspecific ST-T wave changes and prolonged QT interval.  It is unchanged from EKG dated 11/26/2019.  Chest x-ray revealed left mid and lower lung zone alveolar opacities concerning for pneumonia.  Admitted for further management.    Rest of the 10 point review of systems is negative except as mentioned above.      Past Medical History:   Diagnosis Date    Anemia     Anemia due to chronic blood loss 7/25/2018    Anemia, chronic disease 7/25/2018    Dementia     GERD (gastroesophageal reflux disease)     Heart disease     Hypertension     Neuropathy        Past Surgical History:   Procedure Laterality Date    CORONARY ARTERY BYPASS GRAFT         Review of patient's allergies indicates:   Allergen Reactions    Eucalyptus containing products Palpitations       No current facility-administered medications on file prior to encounter.      Current Outpatient Medications on File Prior to Encounter   Medication Sig    albuterol-ipratropium (DUO-NEB) 2.5 mg-0.5 mg/3 mL nebulizer solution Take 3 mLs by nebulization every 6 (six) hours as needed for Wheezing or Shortness of Breath.    amLODIPine (NORVASC) 10 MG tablet Take 1 tablet (10 mg total) by mouth once daily.    aspirin 81 MG Chew Take 81 mg by mouth once daily.     donepezil (ARICEPT) 10 MG tablet TAKE 1 TABLET BY MOUTH ONCE DAILY (Patient taking differently: Take 10 mg by mouth once daily. )    ergocalciferol (ERGOCALCIFEROL) 50,000 unit Cap TAKE ONE CAPSULE BY MOUTH EVERY WEEK    furosemide (LASIX) 20 MG tablet Take 20 mg by mouth once daily.     HYDROcodone-acetaminophen (NORCO)  mg per tablet Take 1 tablet by mouth every 8 (eight) hours as needed (pain). Medically necessary for greater than 7 days for chronic pain    ipratropium (ATROVENT) 42 mcg (0.06 %) nasal spray 1 spray by Nasal route 2 (two) times daily.     LYRICA 150 mg capsule Take 150 mg by mouth 2 (two) times daily.     metoprolol succinate (TOPROL-XL) 25 MG 24 hr tablet Take 1 tablet (25 mg total) by mouth once daily.    pantoprazole (PROTONIX) 40 MG tablet Take 40 mg by mouth once daily.     sertraline (ZOLOFT) 100 MG tablet Take 100 mg by mouth every evening.     simvastatin (ZOCOR) 40 MG tablet Take 40 mg by mouth every evening.      Family History     Problem Relation (Age of Onset)    Dementia Father        Tobacco Use    Smoking status: Current Every Day Smoker     Types: Cigarettes, Vaping with nicotine    Smokeless tobacco: Never Used    Tobacco comment: e cigarettes   Substance and Sexual Activity    Alcohol use: No    Drug use: No    Sexual activity: Not on file       Objective:     Vital Signs (Most Recent):  Temp: 98.6 °F (37 °C) (01/01/20 1850)  Pulse: 92 (01/01/20 2131)  Resp: (!) 22 (01/01/20 2110)  BP: (!) 110/53 (01/01/20 2131)  SpO2: 97 % (01/01/20 2131) Vital Signs (24h Range):  Temp:  [98.6 °F (37 °C)] 98.6 °F (37 °C)  Pulse:  [82-92] 92  Resp:  [22] 22  SpO2:  [88 %-97 %] 97 %  BP: (110-141)/(53-83) 110/53     Weight: 72.6 kg (160 lb)  Body mass index is 21.7 kg/m².    Physical Exam   Constitutional: He is oriented to person, place, and time. He is cooperative. He appears ill. He appears distressed.   HENT:   Head: Normocephalic and atraumatic.   Mouth/Throat: Oropharynx is clear and moist.   Eyes: Conjunctivae and EOM are normal.   Neck: Neck supple. No JVD present. No thyromegaly present.   Cardiovascular: Normal rate, regular rhythm, S1 normal, S2 normal and intact distal pulses. Exam reveals no gallop and no friction rub.   No murmur heard.  Pulmonary/Chest: No accessory muscle usage. Tachypnea noted. No respiratory distress. He has no wheezes. He has rhonchi. He has no rales.   Coarse rhonchi diffusely   Abdominal: Soft. Bowel sounds are normal. He exhibits no distension and no mass. There is no tenderness. There is no rebound  and no guarding.   Musculoskeletal: Normal range of motion. He exhibits no edema, tenderness or deformity.   Neurological: He is alert and oriented to person, place, and time. He has normal strength and normal reflexes. No cranial nerve deficit or sensory deficit.   Skin: Skin is warm. No rash noted.   Psychiatric: He has a normal mood and affect. His speech is normal and behavior is normal. He exhibits abnormal remote memory.   Nursing note and vitals reviewed.        CRANIAL NERVES     CN III, IV, VI   Extraocular motions are normal.        Significant Labs:   CBC:   Recent Labs   Lab 01/01/20 2022   WBC 4.23   HGB 10.9*   HCT 32.3*   PLT 59*     CMP:   Recent Labs   Lab 01/01/20 2022      K 3.1*      CO2 24      BUN 26*   CREATININE 1.7*   CALCIUM 8.4*   PROT 6.5   ALBUMIN 3.4*   BILITOT 1.2*   ALKPHOS 52*   AST 30   ALT 16   ANIONGAP 14   EGFRNONAA 35.0*     Coagulation:   Recent Labs   Lab 01/01/20 2022   INR 1.2   APTT 39.1*     Magnesium:   Recent Labs   Lab 01/01/20 2022   MG 1.4*       Significant Imaging: I have reviewed all pertinent imaging results/findings within the past 24 hours.\    Imaging Results          X-Ray Chest AP Portable (Final result)  Result time 01/01/20 19:17:40    Final result by Oziel Jeffries MD (01/01/20 19:17:40)                 Impression:      Development of left mid and lower lung zone confluent alveolar opacities suggesting pneumonia in the appropriate clinical setting.  Unilateral pulmonary edema or alveolar hemorrhage felt less likely.  Radiographic follow-up suggested in 6-8 weeks to document resolution.      Electronically signed by: Oziel Jeffries MD  Date:    01/01/2020  Time:    19:17             Narrative:    EXAMINATION:  XR CHEST AP PORTABLE    CLINICAL HISTORY:  Cough    FINDINGS:  Portable chest at 1900 compared with 11/21/2019 shows normal cardiomediastinal silhouette with postsurgical changes of CABG.    Since the prior exam, alveolar opacities  have developed in left mid and lower lung zone.  Right lung remains clear.  No pleural effusion or pneumothorax.  No acute osseous abnormality.                                  Assessment/Plan:     * Pneumonia  Left middle and lower lobe pneumonia on imaging  Supplemental oxygen, wean as tolerated  Concern for hospital-acquired pneumonia in the setting of recent hospital and skilled nursing facility stay  Ordered respiratory viral panel, MRSA PCR and influenza testing  Follow sputum and blood cultures  Will cover with vancomycin and piperacillin-tazobactam          Acute respiratory failure with hypoxia  Due to pneumonia   Plan as above      Elevated troponin  Denies chest pain   Likely demand ischemia in the setting of pneumonia   Had elevated troponin even during recent admission in 11/2019   EKG with no acute ST-T wave changes  Trend to peak  Consult cardiology      Chronic combined systolic and diastolic heart failure  Aware  Reviewed recent echo from November 2019 with EF of 43% and grade 1 diastolic dysfunction  Patient appears euvolemic on exam  Hold home furosemide secondary to diarrheal illness      Essential hypertension  Stable  Continue home amlodipine and metoprolol succinate      Anemia, chronic disease  Aware  Hb lower than baseline; no report of blood loss  Trend CBC daily       Dementia  Aware  Reportedly issues with remote past   Continue home donepezil       Stage 3 chronic kidney disease  Aware  Stable  Daily BMP      Hypomagnesemia  Replete and monitor daily as per sliding scale      Thrombocytopenia, unspecified  Chronic issue  However platelet count is lower than his baseline  Daily CBC  Avoid heparin or related products      Hypokalemia  Replete as per sliding scale and monitor daily      VTE Risk Mitigation (From admission, onward)         Ordered     Place sequential compression device  Until discontinued      01/01/20 2204     IP VTE HIGH RISK PATIENT  Once      01/01/20 2204                    Shekhar Andersen MD  Department of Hospital Medicine   [facility]

## 2020-01-02 NOTE — PT/OT/SLP PROGRESS
Physical Therapy      Patient Name:  Halley Rodrigues   MRN:  [id number]    Patient not seen today secondary to (nurse hold due to dec O2 sats). Will follow-up as able.    Ana Fnich, PT

## 2020-01-02 NOTE — PROGRESS NOTES
Pharmacist Renal Dose Adjustment Note    Halley Rodrigues is a 89 y.o. male being treated with the medication Levofloxacin    Patient Data:    Vital Signs (Most Recent):  Temp: 97.4 °F (36.3 °C) (01/02/20 1104)  Pulse: 73 (01/02/20 1402)  Resp: 18 (01/02/20 1402)  BP: (!) 120/58 (01/02/20 1104)  SpO2: (!) 90 % (01/02/20 1402)   Vital Signs (72h Range):  Temp:  [97.4 °F (36.3 °C)-99 °F (37.2 °C)]   Pulse:  []   Resp:  [18-24]   BP: (101-141)/(52-83)   SpO2:  [88 %-97 %]      Recent Labs   Lab 01/01/20 2022 01/02/20  0201 01/02/20  0446   CREATININE 1.7* 1.6* 1.7*     Serum creatinine: 1.7 mg/dL (H) 01/02/20 0446  Estimated creatinine clearance: 29.2 mL/min (A)    Medication: Levofloxacin 500 mg PO dose: daily frequency will be changed to medication: Levofloxacin 500 mg PO x1 dose then Levofloxacin 250 mg PO dose: daily frequency.    Pharmacist's Name: Adrianna Alanis  Pharmacist's Extension: [phone]

## 2020-01-02 NOTE — SUBJECTIVE & OBJECTIVE
Past Medical History:   Diagnosis Date    Anemia     Anemia due to chronic blood loss 7/25/2018    Anemia, chronic disease 7/25/2018    Dementia     GERD (gastroesophageal reflux disease)     Heart disease     Hypertension     Neuropathy        Past Surgical History:   Procedure Laterality Date    CORONARY ARTERY BYPASS GRAFT         Review of patient's allergies indicates:   Allergen Reactions    Eucalyptus containing products Palpitations       No current facility-administered medications on file prior to encounter.      Current Outpatient Medications on File Prior to Encounter   Medication Sig    albuterol-ipratropium (DUO-NEB) 2.5 mg-0.5 mg/3 mL nebulizer solution Take 3 mLs by nebulization every 6 (six) hours as needed for Wheezing or Shortness of Breath.    amLODIPine (NORVASC) 10 MG tablet Take 1 tablet (10 mg total) by mouth once daily.    aspirin 81 MG Chew Take 81 mg by mouth once daily.     donepezil (ARICEPT) 10 MG tablet TAKE 1 TABLET BY MOUTH ONCE DAILY (Patient taking differently: Take 10 mg by mouth once daily. )    ergocalciferol (ERGOCALCIFEROL) 50,000 unit Cap TAKE ONE CAPSULE BY MOUTH EVERY WEEK    furosemide (LASIX) 20 MG tablet Take 20 mg by mouth once daily.     HYDROcodone-acetaminophen (NORCO)  mg per tablet Take 1 tablet by mouth every 8 (eight) hours as needed (pain). Medically necessary for greater than 7 days for chronic pain    ipratropium (ATROVENT) 42 mcg (0.06 %) nasal spray 1 spray by Nasal route 2 (two) times daily.    LYRICA 150 mg capsule Take 150 mg by mouth 2 (two) times daily.     metoprolol succinate (TOPROL-XL) 25 MG 24 hr tablet Take 1 tablet (25 mg total) by mouth once daily.    pantoprazole (PROTONIX) 40 MG tablet Take 40 mg by mouth once daily.     sertraline (ZOLOFT) 100 MG tablet Take 100 mg by mouth every evening.     simvastatin (ZOCOR) 40 MG tablet Take 40 mg by mouth every evening.      Family History     Problem Relation (Age of Onset)     Dementia Father        Tobacco Use    Smoking status: Current Every Day Smoker     Types: Cigarettes, Vaping with nicotine    Smokeless tobacco: Never Used    Tobacco comment: e cigarettes   Substance and Sexual Activity    Alcohol use: No    Drug use: No    Sexual activity: Not on file       Objective:     Vital Signs (Most Recent):  Temp: 98.6 °F (37 °C) (01/01/20 1850)  Pulse: 92 (01/01/20 2131)  Resp: (!) 22 (01/01/20 2110)  BP: (!) 110/53 (01/01/20 2131)  SpO2: 97 % (01/01/20 2131) Vital Signs (24h Range):  Temp:  [98.6 °F (37 °C)] 98.6 °F (37 °C)  Pulse:  [82-92] 92  Resp:  [22] 22  SpO2:  [88 %-97 %] 97 %  BP: (110-141)/(53-83) 110/53     Weight: 72.6 kg (160 lb)  Body mass index is 21.7 kg/m².    Physical Exam   Constitutional: He is oriented to person, place, and time. He is cooperative. He appears ill. He appears distressed.   HENT:   Head: Normocephalic and atraumatic.   Mouth/Throat: Oropharynx is clear and moist.   Eyes: Conjunctivae and EOM are normal.   Neck: Neck supple. No JVD present. No thyromegaly present.   Cardiovascular: Normal rate, regular rhythm, S1 normal, S2 normal and intact distal pulses. Exam reveals no gallop and no friction rub.   No murmur heard.  Pulmonary/Chest: No accessory muscle usage. Tachypnea noted. No respiratory distress. He has no wheezes. He has rhonchi. He has no rales.   Coarse rhonchi diffusely   Abdominal: Soft. Bowel sounds are normal. He exhibits no distension and no mass. There is no tenderness. There is no rebound and no guarding.   Musculoskeletal: Normal range of motion. He exhibits no edema, tenderness or deformity.   Neurological: He is alert and oriented to person, place, and time. He has normal strength and normal reflexes. No cranial nerve deficit or sensory deficit.   Skin: Skin is warm. No rash noted.   Psychiatric: He has a normal mood and affect. His speech is normal and behavior is normal. He exhibits abnormal remote memory.   Nursing note  and vitals reviewed.        CRANIAL NERVES     CN III, IV, VI   Extraocular motions are normal.        Significant Labs:   CBC:   Recent Labs   Lab 01/01/20 2022   WBC 4.23   HGB 10.9*   HCT 32.3*   PLT 59*     CMP:   Recent Labs   Lab 01/01/20 2022      K 3.1*      CO2 24      BUN 26*   CREATININE 1.7*   CALCIUM 8.4*   PROT 6.5   ALBUMIN 3.4*   BILITOT 1.2*   ALKPHOS 52*   AST 30   ALT 16   ANIONGAP 14   EGFRNONAA 35.0*     Coagulation:   Recent Labs   Lab 01/01/20 2022   INR 1.2   APTT 39.1*     Magnesium:   Recent Labs   Lab 01/01/20 2022   MG 1.4*       Significant Imaging: I have reviewed all pertinent imaging results/findings within the past 24 hours.\    Imaging Results          X-Ray Chest AP Portable (Final result)  Result time 01/01/20 19:17:40    Final result by Oziel Jeffries MD (01/01/20 19:17:40)                 Impression:      Development of left mid and lower lung zone confluent alveolar opacities suggesting pneumonia in the appropriate clinical setting.  Unilateral pulmonary edema or alveolar hemorrhage felt less likely.  Radiographic follow-up suggested in 6-8 weeks to document resolution.      Electronically signed by: Oziel Jeffries MD  Date:    01/01/2020  Time:    19:17             Narrative:    EXAMINATION:  XR CHEST AP PORTABLE    CLINICAL HISTORY:  Cough    FINDINGS:  Portable chest at 1900 compared with 11/21/2019 shows normal cardiomediastinal silhouette with postsurgical changes of CABG.    Since the prior exam, alveolar opacities have developed in left mid and lower lung zone.  Right lung remains clear.  No pleural effusion or pneumothorax.  No acute osseous abnormality.

## 2020-01-02 NOTE — CONSULTS
INPATIENT NEPHROLOGY CONSULT   [facility] NEPHROLOGY    Halley Rodrigues  01/02/2020    Reason for consultation:    sergio    Chief Complaint:   Chief Complaint   Patient presents with    Cough     x3 days with SOB          History of Present Illness:    Per H and P    Patient is a 89-year-old  male with known history of CAD status post CABG, dementia, chronic combined systolic and diastolic congestive heart failure and anemia of chronic disease who was recently discharged from our facility after being admitted for syncope and eventually transferred to skilled nursing facility presents with chief complaint of shortness of breath and cough.  History is limited secondary to patient's dementia and is supplemented by his son at bedside.     Patient was discharged from [facility] on 12/23.  According to the patient's son patient was noted to be more weak over the last 3 days.  He was noted to have diarrhea which was nonbloody and watery over the last 2 days.  Reported poor oral intake and generalized weakness.  Patient was also noted to have a rattling cough.  Son denies known choking episodes or aspiration events in the past.  Patient denies chest pain but admits to shortness of breath is worse with exertion and better with rest.  Symptoms are moderate in nature.  No lightheadedness, lower extremity edema, palpitations, abdominal pain, nausea/vomiting or urinary problems. Patient is prone to frequent falls; noncompliant with walker.     In the ER:  Hemodynamically stable.  Patient was noted to be hypoxic with O2 sats in the 80s on room air which improved to greater than 90% on supplemental oxygen.  He was also noted to be tachypneic.  Laboratory workup revealed chronic normocytic anemia, thrombocytopenia, hypokalemia, CKD stage 3, elevated BNP and troponin.  EKG revealed normal sinus rhythm with nonspecific ST-T wave changes and prolonged QT interval.  It is unchanged from EKG  dated 11/26/2019.  Chest x-ray revealed left mid and lower lung zone alveolar opacities concerning for pneumonia.  Admitted for further management.    1/2/20  Consulted for GARRETT.  No nausea, chest pain, sob, fever, urinary or bowel complaint, new neurologic symptoms, new joint pain,      Plan of Care:       Assessment:    Acute kidney injury likely hemodynamcially mediated with h/o several days of diarrhea and poor po intake  --urine na  --renal/bladder ultrasound  --ns 60 cc/hour (pt npo with no fluids ordered)  --no nsaids, coxibs, iv contrast     Mild metabolic acidosis (gap and nongap)  --no intervention at this time     Anemia  --blood products per primary    Pneumonia  --abx per primary  --avoid Vancomycin toxicity      Thank you for allowing us to participate in this patient's care. We will continue to follow.    Vital Signs:  Temp Readings from Last 3 Encounters:   01/02/20 97.4 °F (36.3 °C) (Oral)   12/03/19 97.7 °F (36.5 °C)   09/09/19 98.1 °F (36.7 °C)       Pulse Readings from Last 3 Encounters:   01/02/20 70   12/03/19 64   10/21/19 (!) 54       BP Readings from Last 3 Encounters:   01/02/20 (!) 120/58   12/03/19 (!) 143/62   10/21/19 (!) 154/78       Weight:  Wt Readings from Last 3 Encounters:   01/02/20 70 kg (154 lb 6.4 oz)   12/03/19 63.9 kg (140 lb 14 oz)   10/21/19 73.5 kg (162 lb)       Past Medical & Surgical History:  Past Medical History:   Diagnosis Date    Anemia     Anemia due to chronic blood loss 7/25/2018    Anemia, chronic disease 7/25/2018    Dementia     GERD (gastroesophageal reflux disease)     Heart disease     Hypertension     Neuropathy        Past Surgical History:   Procedure Laterality Date    CORONARY ARTERY BYPASS GRAFT         Past Social History:  Social History     Socioeconomic History    Marital status:      Spouse name: Not on file    Number of children: Not on file    Years of education: Not on file    Highest education level: Not on file    Occupational History    Not on file   Social Needs    Financial resource strain: Not on file    Food insecurity:     Worry: Not on file     Inability: Not on file    Transportation needs:     Medical: Not on file     Non-medical: Not on file   Tobacco Use    Smoking status: Current Every Day Smoker     Types: Cigarettes, Vaping with nicotine    Smokeless tobacco: Never Used    Tobacco comment: e cigarettes   Substance and Sexual Activity    Alcohol use: No    Drug use: No    Sexual activity: Not on file   Lifestyle    Physical activity:     Days per week: Not on file     Minutes per session: Not on file    Stress: Not on file   Relationships    Social connections:     Talks on phone: Not on file     Gets together: Not on file     Attends Restoration service: Not on file     Active member of club or organization: Not on file     Attends meetings of clubs or organizations: Not on file     Relationship status: Not on file   Other Topics Concern    Not on file   Social History Narrative    Not on file       Medications:  No current facility-administered medications on file prior to encounter.      Current Outpatient Medications on File Prior to Encounter   Medication Sig Dispense Refill    albuterol-ipratropium (DUO-NEB) 2.5 mg-0.5 mg/3 mL nebulizer solution Take 3 mLs by nebulization every 6 (six) hours as needed for Wheezing or Shortness of Breath. 1 Box 0    amLODIPine (NORVASC) 10 MG tablet Take 1 tablet (10 mg total) by mouth once daily. 30 tablet 0    aspirin 81 MG Chew Take 81 mg by mouth once daily.       donepezil (ARICEPT) 10 MG tablet TAKE 1 TABLET BY MOUTH ONCE DAILY (Patient taking differently: Take 10 mg by mouth once daily. ) 90 tablet 1    ergocalciferol (ERGOCALCIFEROL) 50,000 unit Cap TAKE ONE CAPSULE BY MOUTH EVERY WEEK 12 capsule 0    furosemide (LASIX) 20 MG tablet Take 20 mg by mouth once daily.       HYDROcodone-acetaminophen (NORCO)  mg per tablet Take 1 tablet by  mouth every 8 (eight) hours as needed (pain). Medically necessary for greater than 7 days for chronic pain 90 tablet 0    ipratropium (ATROVENT) 42 mcg (0.06 %) nasal spray 1 spray by Nasal route 2 (two) times daily.  0    LYRICA 150 mg capsule Take 150 mg by mouth 2 (two) times daily.       metoprolol succinate (TOPROL-XL) 25 MG 24 hr tablet Take 1 tablet (25 mg total) by mouth once daily. 30 tablet 0    pantoprazole (PROTONIX) 40 MG tablet Take 40 mg by mouth once daily.       sertraline (ZOLOFT) 100 MG tablet Take 100 mg by mouth every evening.       simvastatin (ZOCOR) 40 MG tablet Take 40 mg by mouth every evening.        Scheduled Meds:   albuterol-ipratropium  3 mL Nebulization Q6H WAKE    amLODIPine  10 mg Oral Daily    aspirin  81 mg Oral Daily    donepezil  10 mg Oral Daily    furosemide  20 mg Intravenous BID    metoprolol succinate  25 mg Oral Daily    pantoprazole  40 mg Oral Daily    piperacillin-tazobactam (ZOSYN) IVPB  3.375 g Intravenous Q8H    pregabalin  150 mg Oral BID    sertraline  100 mg Oral QHS    simvastatin  40 mg Oral QHS     Continuous Infusions:  PRN Meds:.acetaminophen, albuterol sulfate, calcium chloride IVPB, calcium chloride IVPB, calcium chloride IVPB, HYDROcodone-acetaminophen, magnesium oxide, magnesium sulfate IVPB, magnesium sulfate IVPB, magnesium sulfate IVPB, magnesium sulfate IVPB, potassium chloride in water, potassium chloride in water, potassium chloride in water, potassium chloride in water, potassium chloride, potassium chloride, potassium chloride, potassium chloride, sodium chloride 0.9%, sodium phosphate IVPB, sodium phosphate IVPB, sodium phosphate IVPB, sodium phosphate IVPB, sodium phosphate IVPB, Pharmacy to dose Vancomycin consult **AND** vancomycin - pharmacy to dose    Allergies:  Eucalyptus containing products    Past Family History:  Reviewed; refer to Hospitalist Admission Note    Review of Systems:  Review of Systems - All 14 systems  reviewed and negative, except as noted in HPI    Physical Exam:    BP (!) 120/58 (BP Location: Left arm, Patient Position: Lying)   Pulse 70   Temp 97.4 °F (36.3 °C) (Oral)   Resp 20   Ht 6' (1.829 m)   Wt 70 kg (154 lb 6.4 oz)   SpO2 (!) 91%   BMI 20.94 kg/m²     General Appearance:    Alert, cooperative, no distress, appears stated age   Head:    Normocephalic, without obvious abnormality, atraumatic   Eyes:    PER, conjunctiva/corneas clear, EOM's intact in both eyes        Throat:   Lips, mucosa, and tongue normal; teeth and gums normal   Back:     Symmetric, no curvature, ROM normal, no CVA tenderness   Lungs:     Right sided rhonchi   Chest wall:    No tenderness or deformity   Heart:    Regular rate and rhythm, S1 and S2 normal, no murmur, rub   or gallop   Abdomen:     Soft, non-tender, bowel sounds active all four quadrants,     no masses, no organomegaly   Extremities:   Extremities normal, atraumatic, no cyanosis or edema   Pulses:   2+ and symmetric all extremities   MSK:   No joint or muscle swelling, tenderness or deformity   Skin:   Skin color, texture, turgor normal, no rashes or lesions   Neurologic:   CNII-XII intact, normal strength and sensation       Throughout.  No flap     Results:  Lab Results   Component Value Date     01/02/2020    K 3.7 01/02/2020     01/02/2020    CO2 21 (L) 01/02/2020    BUN 27 (H) 01/02/2020    CREATININE 1.7 (H) 01/02/2020    CALCIUM 8.4 (L) 01/02/2020    ANIONGAP 15 01/02/2020    ESTGFRAFRICA 40.4 (A) 01/02/2020    EGFRNONAA 35.0 (A) 01/02/2020       Lab Results   Component Value Date    CALCIUM 8.4 (L) 01/02/2020    PHOS 3.2 11/22/2019       Recent Labs   Lab 01/02/20  0201   WBC 5.73   RBC 3.56*   HGB 11.2*   HCT 33.7*   PLT 58*   MCV 95   MCH 31.5*   MCHC 33.2       I have personally reviewed pertinent radiological imaging and reports.

## 2020-01-02 NOTE — CONSULTS
Patient seen, examined, and record reviewed.  Here with community-acquired pneumonia and influenza.  Agree with antibiotics as ordered and diuresis as renal function tolerates.  Full consult to follow.    Ortiz Seniro MD  01/02/2020  12:55 PM

## 2020-01-02 NOTE — ASSESSMENT & PLAN NOTE
Denies chest pain   Likely demand ischemia in the setting of pneumonia   Had elevated troponin even during recent admission in 11/2019   EKG with no acute ST-T wave changes  Trend to peak  Consult cardiology

## 2020-01-02 NOTE — PROGRESS NOTES
Pharmacist Renal Dose Adjustment Note    Halley Rodrigues is a 89 y.o. male being treated with the medication Piperacillin-Tazobactam.    Patient Data:    Vital Signs (Most Recent):  Temp: 98.9 °F (37.2 °C) (01/02/20 0300)  Pulse: 97 (01/02/20 0300)  Resp: (!) 24 (01/02/20 0300)  BP: 133/69 (01/02/20 0300)  SpO2: 97 % (01/02/20 0123)   Vital Signs (72h Range):  Temp:  [98.6 °F (37 °C)-99 °F (37.2 °C)]   Pulse:  []   Resp:  [22-24]   BP: (110-141)/(53-83)   SpO2:  [88 %-97 %]      Recent Labs   Lab 01/01/20 2022 01/02/20 0201 01/02/20  0446   CREATININE 1.7* 1.6* 1.7*     Serum creatinine: 1.7 mg/dL (H) 01/02/20 0446  Estimated creatinine clearance: 29.2 mL/min (A)    Piperacillin-Tazobactam 3.375 gm IV Q 12 hrs will be changed to Piperacillin-Tazobactam 3.375 gm IV Q 8 hrs.    Pharmacist's Name: Gt Matt  Pharmacist's Extension: [phone]

## 2020-01-02 NOTE — PLAN OF CARE
Problem: Oral Intake Inadequate  Goal: Improved Oral Intake  Outcome: Ongoing, Progressing  Intervention: Promote and Optimize Oral Intake  Flowsheets (Taken 1/2/2020 0877)  Oral Nutrition Promotion: calorie dense liquids provided   When ready to advavnce diet, recommend starting Cardiac Diet.   Will add Ensure Enlive (chocolate) TID once diet advances to aid in meeting estimated needs and due to patient with Hx of Severe Protein Energy Malnutrition.  Recommend adding a Daily MVI due to multiple suspected vitamin and mineral deficiencies.

## 2020-01-02 NOTE — PLAN OF CARE
01/01/20 2110   Patient Assessment/Suction   Level of Consciousness (AVPU) alert   Respiratory Effort Normal;Unlabored   Expansion/Accessory Muscles/Retractions no use of accessory muscles   All Lung Fields Breath Sounds crackles;wheezes, expiratory;pleural rub   Cough Frequency frequent   Cough Type nonproductive   PRE-TX-O2   O2 Device (Oxygen Therapy) nasal cannula  (placed on pt at this time)   $ Is the patient on Low Flow Oxygen? Yes   Flow (L/min) 3  (placed on pt.)   SpO2 (!) 88 %   Pulse Oximetry Type Continuous   $ Pulse Oximetry - Multiple Charge Pulse Oximetry - Multiple   Pulse 82   Resp (!) 22   Aerosol Therapy   $ Aerosol Therapy Charges Aerosol Treatment   Daily Review of Necessity (SVN) completed   Respiratory Treatment Status (SVN) given   Treatment Route (SVN) mask   Patient Position (SVN) semi-Paul's   Post Treatment Assessment (SVN) increased aeration   Signs of Intolerance (SVN) none   Breath Sounds Post-Respiratory Treatment   Throughout All Fields Post-Treatment All Fields   Throughout All Fields Post-Treatment aeration increased   Post-treatment Heart Rate (beats/min) 84   Post-treatment Resp Rate (breaths/min) 16   duoneb tid/udv q4prn

## 2020-01-02 NOTE — ASSESSMENT & PLAN NOTE
Chronic issue  However platelet count is lower than his baseline  Daily CBC  Avoid heparin or related products

## 2020-01-02 NOTE — NURSING TRANSFER
Nursing Transfer Note      1/2/2020     Transfer From [id number]    Transfer via bed    Transfer with IV tubing medication    Medicines sent: yes    Chart send with patient: yes    Notified: Daughter In Law Kate    Patient reassessed at: receiving IDANIA- Yvon    Upon arrival to floor : gave report Yvon EMERSON

## 2020-01-02 NOTE — PROGRESS NOTES
VANCOMYCIN PHARMACOKINETIC NOTE:  Vancomycin Day # 1    Objective/Assessment:    Diagnosis/Indication for Vancomycin: Pneumonia    89 y.o., male; Actual Body Weight = 72.6 kg (160 lb).    The patient has the following labs:     1/1/2020 Estimated Creatinine Clearance: 30.3 mL/min (A) (based on SCr of 1.7 mg/dL (H)). Lab Results   Component Value Date    BUN 26 (H) 01/01/2020       Lab Results   Component Value Date    WBC 4.23 01/01/2020            Plan:  Adjust vancomycin dose and/or frequency based on the patient's actual weight and renal function:  Initiate Vancomycin 1500mg x1 then consider 1250mg mg IV every 24 hours.  Orders have been entered into patient's chart.      Vancomycin trough level has been ordered for 1/2/20 @ 23:00    Pharmacy will manage vancomycin therapy, monitor serum vancomycin levels, monitor renal function and adjust regimen as necessary.    Thank you for allowing us to participate in this patient's care.     Eduardo Pete 1/1/2020 10:05 PM  Department of Pharmacy  Ext [phone]

## 2020-01-02 NOTE — ASSESSMENT & PLAN NOTE
Left middle and lower lobe pneumonia on imaging  Supplemental oxygen, wean as tolerated  Concern for hospital-acquired pneumonia in the setting of recent hospital and skilled nursing facility stay  Ordered respiratory viral panel, MRSA PCR and influenza testing  Follow sputum and blood cultures  Will cover with vancomycin and piperacillin-tazobactam

## 2020-01-02 NOTE — ED PROVIDER NOTES
Encounter Date: 1/1/2020       History     Chief Complaint   Patient presents with    Cough     x3 days with SOB     89-year-old male past medical history anemia, hypertension, GERD presents with shortness of breath and cough.  Patient's son is at the bedside assisting with HPI and states he has had severe cough with congestion x3 days.  Patient reports mild fevers and chills associated with this cough.  He denies any chest pain, nausea, vomiting, diarrhea.  He is otherwise stable and has no other complaints.        Review of patient's allergies indicates:   Allergen Reactions    Eucalyptus containing products Palpitations     Past Medical History:   Diagnosis Date    Anemia     Anemia due to chronic blood loss 7/25/2018    Anemia, chronic disease 7/25/2018    Dementia     GERD (gastroesophageal reflux disease)     Heart disease     Hypertension     Neuropathy      Past Surgical History:   Procedure Laterality Date    CORONARY ARTERY BYPASS GRAFT       Family History   Problem Relation Age of Onset    Dementia Father      Social History     Tobacco Use    Smoking status: Current Every Day Smoker     Types: Cigarettes, Vaping with nicotine    Smokeless tobacco: Never Used    Tobacco comment: e cigarettes   Substance Use Topics    Alcohol use: No    Drug use: No     Review of Systems   Constitutional: Negative for fever.   HENT: Negative for sore throat.    Respiratory: Positive for cough and shortness of breath.    Cardiovascular: Negative for chest pain.   Gastrointestinal: Negative for nausea.   Genitourinary: Negative for dysuria.   Musculoskeletal: Negative for back pain.   Skin: Negative for rash.   Neurological: Negative for weakness.   Hematological: Does not bruise/bleed easily.   All other systems reviewed and are negative.      Physical Exam     Initial Vitals [01/01/20 1850]   BP Pulse Resp Temp SpO2   (!) 126/59 90 (!) 22 98.6 °F (37 °C) (!) 91 %      MAP       --         Physical  Exam    Nursing note and vitals reviewed.  Constitutional: He appears well-developed and well-nourished. He is not diaphoretic. No distress.   HENT:   Head: Normocephalic and atraumatic.   Mouth/Throat: Oropharynx is clear and moist.   Eyes: Conjunctivae and EOM are normal. Pupils are equal, round, and reactive to light.   Neck: Normal range of motion. Neck supple. No thyromegaly present. No JVD present.   Cardiovascular: Normal rate, regular rhythm and normal heart sounds. Exam reveals no gallop and no friction rub.    No murmur heard.  Pulmonary/Chest: He is in respiratory distress. He has no wheezes. He has rales. He exhibits no tenderness.   Abdominal: Soft. Bowel sounds are normal. He exhibits no distension. There is no tenderness. There is no rebound and no guarding.   Musculoskeletal: Normal range of motion. He exhibits no edema or tenderness.   Neurological: He is alert and oriented to person, place, and time. He has normal strength. No cranial nerve deficit or sensory deficit.   Skin: Skin is warm and dry. Capillary refill takes less than 2 seconds. No rash noted. No erythema.   Psychiatric: He has a normal mood and affect.         ED Course   Procedures  Labs Reviewed   TROPONIN I - Abnormal; Notable for the following components:       Result Value    Troponin I 0.864 (*)     All other components within normal limits    Narrative:     Troponin critical result(s) called and verbal readback obtained from   Janneth Oliveros Rn/ER by AS2 01/01/2020 21:27   COMPREHENSIVE METABOLIC PANEL - Abnormal; Notable for the following components:    Potassium 3.1 (*)     BUN, Bld 26 (*)     Creatinine 1.7 (*)     Calcium 8.4 (*)     Albumin 3.4 (*)     Total Bilirubin 1.2 (*)     Alkaline Phosphatase 52 (*)     eGFR if  40.4 (*)     eGFR if non  35.0 (*)     All other components within normal limits   CBC W/ AUTO DIFFERENTIAL - Abnormal; Notable for the following components:    RBC 3.41 (*)      Hemoglobin 10.9 (*)     Hematocrit 32.3 (*)     Mean Corpuscular Hemoglobin 32.0 (*)     Platelets 59 (*)     MPV 13.1 (*)     Lymph # 0.6 (*)     Gran% 78.6 (*)     Lymph% 13.2 (*)     All other components within normal limits   APTT - Abnormal; Notable for the following components:    aPTT 39.1 (*)     All other components within normal limits   MAGNESIUM - Abnormal; Notable for the following components:    Magnesium 1.4 (*)     All other components within normal limits   B-TYPE NATRIURETIC PEPTIDE - Abnormal; Notable for the following components:     (*)     All other components within normal limits   CK   CK-MB   PROTIME-INR   AMYLASE   LIPASE   DRUG SCREEN PANEL, URINE EMERGENCY   URINALYSIS, REFLEX TO URINE CULTURE   LACTIC ACID, PLASMA          Imaging Results          X-Ray Chest AP Portable (Final result)  Result time 01/01/20 19:17:40    Final result by Oziel Jeffries MD (01/01/20 19:17:40)                 Impression:      Development of left mid and lower lung zone confluent alveolar opacities suggesting pneumonia in the appropriate clinical setting.  Unilateral pulmonary edema or alveolar hemorrhage felt less likely.  Radiographic follow-up suggested in 6-8 weeks to document resolution.      Electronically signed by: Oziel Jeffries MD  Date:    01/01/2020  Time:    19:17             Narrative:    EXAMINATION:  XR CHEST AP PORTABLE    CLINICAL HISTORY:  Cough    FINDINGS:  Portable chest at 1900 compared with 11/21/2019 shows normal cardiomediastinal silhouette with postsurgical changes of CABG.    Since the prior exam, alveolar opacities have developed in left mid and lower lung zone.  Right lung remains clear.  No pleural effusion or pneumothorax.  No acute osseous abnormality.                                 Medical Decision Making:   Initial Assessment:   89-year-old male on initial assessment in mild distress secondary to cough and shortness of breath. Patient is alert and oriented x3,  neurologically neurovascular intact no focal deficits.  Patient is nontoxic appearing as vitals are stable at this time.  Differential Diagnosis:   Pneumonia, PE, COPD versus CHF exacerbation, pneumothorax  Independently Interpreted Test(s):   I have ordered and independently interpreted X-rays - see prior notes.  I have ordered and independently interpreted EKG Reading(s) - see prior notes  Clinical Tests:   Lab Tests: Ordered and Reviewed  The following lab test(s) were unremarkable: CBC, CMP, Urinalysis, Troponin, BNP and Lactate  Radiological Study: Ordered and Reviewed  Medical Tests: Ordered and Reviewed  ED Management:  Patient has been reassessed and noted to have no acute changes in his condition. Patient noted to have concern for pneumonia became hypoxic while in the ED.  He started on Rocephin and azithromycin for his pneumonia and placed on nasal cannula with positive results.  Patient also noted to have elevated troponin and has been consulted to the hospital group for admission.  Patient was given aspirin while in the ED and he has otherwise remained stable. Mr. Rodrigues is aware of the plan and in agreement.    Laboratory results and radiology imaging results reviewed.  Based on the patient's history, physical, and workup here in the emergency department I believe the patient requires admission for a diagnosis of PNA, hypoxia and elevated troponin.  I discussed the patient's case with the on-call hospitalist who has agreed to evaluate the patient for admission.  In addition, I discussed the results with the patient and they have verbalized understanding of the results, plan, and need for admission.    ________________________  LORE Knutson MD   Emergency Medicine                     ED Course as of Jan 01 2135 Wed Jan 01, 2020   1852 Cough congestion and SOB. RA o2 sat with mild hypoxia. 89-90% easily desaturates with mild exertion (talking)    [BF]      ED Course User Index  [BF] Srinivasan BLOUNT  CRYSTAL Thomas                Clinical Impression:       ICD-10-CM ICD-9-CM   1. Pneumonia due to infectious organism, unspecified laterality, unspecified part of lung J18.9 136.9     484.8   2. Cough R05 786.2   3. SOB (shortness of breath) R06.02 786.05   4. Hypoxia R09.02 799.02                             Eliezer Knutson MD  01/01/20 [id number]

## 2020-01-02 NOTE — ED NOTES
Pt admitted to cardio B via stretcher. Remote telemetry monitor in place, 2L/O2 via NC. Pt belongings sent to floor with patient. Patient sone at bedside

## 2020-01-02 NOTE — PLAN OF CARE
Plan of care reviewed with pt and informed pt of NPO status due to consult with cardiologist and informed pt of medications and there uses.  Problem: Wound  Goal: Optimal Wound Healing  Outcome: Ongoing, Progressing     Problem: Adult Inpatient Plan of Care  Goal: Plan of Care Review  Outcome: Ongoing, Progressing  Goal: Patient-Specific Goal (Individualization)  Outcome: Ongoing, Progressing  Goal: Absence of Hospital-Acquired Illness or Injury  Outcome: Ongoing, Progressing  Goal: Optimal Comfort and Wellbeing  Outcome: Ongoing, Progressing  Goal: Readiness for Transition of Care  Outcome: Ongoing, Progressing  Goal: Rounds/Family Conference  Outcome: Ongoing, Progressing     Problem: Fall Injury Risk  Goal: Absence of Fall and Fall-Related Injury  Outcome: Ongoing, Progressing     Problem: Infection  Goal: Infection Symptom Resolution  Outcome: Ongoing, Progressing     Problem: Skin Injury Risk Increased  Goal: Skin Health and Integrity  Outcome: Ongoing, Progressing

## 2020-01-02 NOTE — PLAN OF CARE
01/02/20 0716   Patient Assessment/Suction   Level of Consciousness (AVPU) alert   Respiratory Effort Normal;Unlabored   Expansion/Accessory Muscles/Retractions expansion symmetric;no use of accessory muscles   All Lung Fields Breath Sounds crackles, coarse   Rhythm/Pattern, Respiratory pattern regular   Cough Frequency frequent   Cough Type congested;nonproductive   PRE-TX-O2   O2 Device (Oxygen Therapy) Oxymask  (pt reported to be on 15L mask but no doc of when that occur)   $ Is the patient on Low Flow Oxygen? Yes   Flow (L/min) 15   SpO2 (!) 92 %   Pulse Oximetry Type Intermittent   $ Pulse Oximetry - Multiple Charge Pulse Oximetry - Multiple   Pulse 79   Resp 18   Positioning HOB elevated 45 degrees;Left side   Aerosol Therapy   $ Aerosol Therapy Charges Aerosol Treatment   Daily Review of Necessity (SVN) completed   Respiratory Treatment Status (SVN) given   Treatment Route (SVN) mask;oxygen   Patient Position (SVN) HOB elevated   Post Treatment Assessment (SVN) breath sounds unchanged   Signs of Intolerance (SVN) none   Breath Sounds Post-Respiratory Treatment   Throughout All Fields Post-Treatment All Fields   Throughout All Fields Post-Treatment no change   Post-treatment Heart Rate (beats/min) 80   Post-treatment Resp Rate (breaths/min) 18

## 2020-01-03 ENCOUNTER — CLINICAL SUPPORT (OUTPATIENT)
Dept: CARDIOLOGY | Facility: HOSPITAL | Age: 85
DRG: 193 | End: 2020-01-03
Attending: SPECIALIST
Payer: MEDICARE

## 2020-01-03 VITALS — WEIGHT: 153.44 LBS | HEIGHT: 72 IN | BODY MASS INDEX: 20.78 KG/M2

## 2020-01-03 LAB
ALBUMIN SERPL BCP-MCNC: 2.9 G/DL (ref 3.5–5.2)
ANION GAP SERPL CALC-SCNC: 12 MMOL/L (ref 8–16)
ANION GAP SERPL CALC-SCNC: 12 MMOL/L (ref 8–16)
ANION GAP SERPL CALC-SCNC: 13 MMOL/L (ref 8–16)
BASOPHILS # BLD AUTO: 0.01 K/UL (ref 0–0.2)
BASOPHILS # BLD AUTO: 0.01 K/UL (ref 0–0.2)
BASOPHILS NFR BLD: 0.2 % (ref 0–1.9)
BASOPHILS NFR BLD: 0.2 % (ref 0–1.9)
BNP SERPL-MCNC: 837 PG/ML (ref 0–99)
BSA FOR ECHO PROCEDURE: 1.88 M2
BUN SERPL-MCNC: 32 MG/DL (ref 8–23)
CALCIUM SERPL-MCNC: 8 MG/DL (ref 8.7–10.5)
CALCIUM SERPL-MCNC: 8 MG/DL (ref 8.7–10.5)
CALCIUM SERPL-MCNC: 8.5 MG/DL (ref 8.7–10.5)
CHLORIDE SERPL-SCNC: 103 MMOL/L (ref 95–110)
CHLORIDE SERPL-SCNC: 103 MMOL/L (ref 95–110)
CHLORIDE SERPL-SCNC: 99 MMOL/L (ref 95–110)
CO2 SERPL-SCNC: 24 MMOL/L (ref 23–29)
CO2 SERPL-SCNC: 24 MMOL/L (ref 23–29)
CO2 SERPL-SCNC: 25 MMOL/L (ref 23–29)
CREAT SERPL-MCNC: 1.7 MG/DL (ref 0.5–1.4)
CREAT SERPL-MCNC: 1.7 MG/DL (ref 0.5–1.4)
CREAT SERPL-MCNC: 1.8 MG/DL (ref 0.5–1.4)
DIFFERENTIAL METHOD: ABNORMAL
DIFFERENTIAL METHOD: ABNORMAL
EOSINOPHIL # BLD AUTO: 0 K/UL (ref 0–0.5)
EOSINOPHIL # BLD AUTO: 0 K/UL (ref 0–0.5)
EOSINOPHIL NFR BLD: 0 % (ref 0–8)
EOSINOPHIL NFR BLD: 0 % (ref 0–8)
ERYTHROCYTE [DISTWIDTH] IN BLOOD BY AUTOMATED COUNT: 14.1 % (ref 11.5–14.5)
ERYTHROCYTE [DISTWIDTH] IN BLOOD BY AUTOMATED COUNT: 14.1 % (ref 11.5–14.5)
EST. GFR  (AFRICAN AMERICAN): 37.7 ML/MIN/1.73 M^2
EST. GFR  (AFRICAN AMERICAN): 40.4 ML/MIN/1.73 M^2
EST. GFR  (AFRICAN AMERICAN): 40.4 ML/MIN/1.73 M^2
EST. GFR  (NON AFRICAN AMERICAN): 32.6 ML/MIN/1.73 M^2
EST. GFR  (NON AFRICAN AMERICAN): 35 ML/MIN/1.73 M^2
EST. GFR  (NON AFRICAN AMERICAN): 35 ML/MIN/1.73 M^2
FRACTIONAL SHORTENING: 16 % (ref 28–44)
GLUCOSE SERPL-MCNC: 109 MG/DL (ref 70–110)
GLUCOSE SERPL-MCNC: 109 MG/DL (ref 70–110)
GLUCOSE SERPL-MCNC: 98 MG/DL (ref 70–110)
HCT VFR BLD AUTO: 28.9 % (ref 40–54)
HCT VFR BLD AUTO: 28.9 % (ref 40–54)
HGB BLD-MCNC: 9.7 G/DL (ref 14–18)
HGB BLD-MCNC: 9.7 G/DL (ref 14–18)
IMM GRANULOCYTES # BLD AUTO: 0.01 K/UL (ref 0–0.04)
IMM GRANULOCYTES # BLD AUTO: 0.01 K/UL (ref 0–0.04)
IMM GRANULOCYTES NFR BLD AUTO: 0.2 % (ref 0–0.5)
IMM GRANULOCYTES NFR BLD AUTO: 0.2 % (ref 0–0.5)
LEFT INTERNAL DIMENSION IN SYSTOLE: 5.04 CM (ref 2.1–4)
LEFT VENTRICULAR INTERNAL DIMENSION IN DIASTOLE: 6.03 CM (ref 3.5–6)
LYMPHOCYTES # BLD AUTO: 0.7 K/UL (ref 1–4.8)
LYMPHOCYTES # BLD AUTO: 0.7 K/UL (ref 1–4.8)
LYMPHOCYTES NFR BLD: 13.5 % (ref 18–48)
LYMPHOCYTES NFR BLD: 13.5 % (ref 18–48)
MAGNESIUM SERPL-MCNC: 1.6 MG/DL (ref 1.6–2.6)
MAGNESIUM SERPL-MCNC: 1.6 MG/DL (ref 1.6–2.6)
MCH RBC QN AUTO: 31.7 PG (ref 27–31)
MCH RBC QN AUTO: 31.7 PG (ref 27–31)
MCHC RBC AUTO-ENTMCNC: 33.6 G/DL (ref 32–36)
MCHC RBC AUTO-ENTMCNC: 33.6 G/DL (ref 32–36)
MCV RBC AUTO: 94 FL (ref 82–98)
MCV RBC AUTO: 94 FL (ref 82–98)
MONOCYTES # BLD AUTO: 0.3 K/UL (ref 0.3–1)
MONOCYTES # BLD AUTO: 0.3 K/UL (ref 0.3–1)
MONOCYTES NFR BLD: 6.2 % (ref 4–15)
MONOCYTES NFR BLD: 6.2 % (ref 4–15)
NEUTROPHILS # BLD AUTO: 4 K/UL (ref 1.8–7.7)
NEUTROPHILS # BLD AUTO: 4 K/UL (ref 1.8–7.7)
NEUTROPHILS NFR BLD: 79.9 % (ref 38–73)
NEUTROPHILS NFR BLD: 79.9 % (ref 38–73)
NRBC BLD-RTO: 0 /100 WBC
NRBC BLD-RTO: 0 /100 WBC
PHOSPHATE SERPL-MCNC: 3.1 MG/DL (ref 2.7–4.5)
PISA TR MAX VEL: 3.76 M/S
PLATELET # BLD AUTO: 58 K/UL (ref 150–350)
PLATELET # BLD AUTO: 58 K/UL (ref 150–350)
PMV BLD AUTO: 13.6 FL (ref 9.2–12.9)
PMV BLD AUTO: 13.6 FL (ref 9.2–12.9)
POTASSIUM SERPL-SCNC: 3.1 MMOL/L (ref 3.5–5.1)
POTASSIUM SERPL-SCNC: 3.1 MMOL/L (ref 3.5–5.1)
POTASSIUM SERPL-SCNC: 3.4 MMOL/L (ref 3.5–5.1)
PROCALCITONIN SERPL IA-MCNC: 0.17 NG/ML (ref 0–0.5)
RA PRESSURE: 8 MMHG
RBC # BLD AUTO: 3.06 M/UL (ref 4.6–6.2)
RBC # BLD AUTO: 3.06 M/UL (ref 4.6–6.2)
SODIUM SERPL-SCNC: 137 MMOL/L (ref 136–145)
SODIUM SERPL-SCNC: 139 MMOL/L (ref 136–145)
SODIUM SERPL-SCNC: 139 MMOL/L (ref 136–145)
TR MAX PG: 57 MMHG
TV REST PULMONARY ARTERY PRESSURE: 65 MMHG
VANCOMYCIN SERPL-MCNC: 11.4 UG/ML
WBC # BLD AUTO: 5.03 K/UL (ref 3.9–12.7)
WBC # BLD AUTO: 5.03 K/UL (ref 3.9–12.7)

## 2020-01-03 PROCEDURE — 25000242 PHARM REV CODE 250 ALT 637 W/ HCPCS: Performed by: INTERNAL MEDICINE

## 2020-01-03 PROCEDURE — 80069 RENAL FUNCTION PANEL: CPT

## 2020-01-03 PROCEDURE — 83735 ASSAY OF MAGNESIUM: CPT

## 2020-01-03 PROCEDURE — 93308 TTE F-UP OR LMTD: CPT

## 2020-01-03 PROCEDURE — 97530 THERAPEUTIC ACTIVITIES: CPT

## 2020-01-03 PROCEDURE — 99900035 HC TECH TIME PER 15 MIN (STAT)

## 2020-01-03 PROCEDURE — 63600175 PHARM REV CODE 636 W HCPCS: Performed by: INTERNAL MEDICINE

## 2020-01-03 PROCEDURE — 80202 ASSAY OF VANCOMYCIN: CPT

## 2020-01-03 PROCEDURE — 99233 PR SUBSEQUENT HOSPITAL CARE,LEVL III: ICD-10-PCS | Mod: ,,, | Performed by: INTERNAL MEDICINE

## 2020-01-03 PROCEDURE — 25000003 PHARM REV CODE 250: Performed by: INTERNAL MEDICINE

## 2020-01-03 PROCEDURE — 97535 SELF CARE MNGMENT TRAINING: CPT

## 2020-01-03 PROCEDURE — 93005 ELECTROCARDIOGRAM TRACING: CPT

## 2020-01-03 PROCEDURE — 85025 COMPLETE CBC W/AUTO DIFF WBC: CPT

## 2020-01-03 PROCEDURE — 27000221 HC OXYGEN, UP TO 24 HOURS

## 2020-01-03 PROCEDURE — 99233 SBSQ HOSP IP/OBS HIGH 50: CPT | Mod: ,,, | Performed by: INTERNAL MEDICINE

## 2020-01-03 PROCEDURE — 83735 ASSAY OF MAGNESIUM: CPT | Mod: 91

## 2020-01-03 PROCEDURE — 80048 BASIC METABOLIC PNL TOTAL CA: CPT

## 2020-01-03 PROCEDURE — 94640 AIRWAY INHALATION TREATMENT: CPT

## 2020-01-03 PROCEDURE — 94761 N-INVAS EAR/PLS OXIMETRY MLT: CPT

## 2020-01-03 PROCEDURE — 63600175 PHARM REV CODE 636 W HCPCS

## 2020-01-03 PROCEDURE — 36415 COLL VENOUS BLD VENIPUNCTURE: CPT

## 2020-01-03 PROCEDURE — 21000000 HC CCU ICU ROOM CHARGE

## 2020-01-03 PROCEDURE — 97162 PT EVAL MOD COMPLEX 30 MIN: CPT

## 2020-01-03 PROCEDURE — 83880 ASSAY OF NATRIURETIC PEPTIDE: CPT

## 2020-01-03 RX ORDER — FUROSEMIDE 10 MG/ML
40 INJECTION INTRAMUSCULAR; INTRAVENOUS ONCE
Status: COMPLETED | OUTPATIENT
Start: 2020-01-03 | End: 2020-01-03

## 2020-01-03 RX ORDER — FUROSEMIDE 10 MG/ML
INJECTION INTRAMUSCULAR; INTRAVENOUS
Status: COMPLETED
Start: 2020-01-03 | End: 2020-01-03

## 2020-01-03 RX ADMIN — FUROSEMIDE 20 MG: 10 INJECTION, SOLUTION INTRAMUSCULAR; INTRAVENOUS at 08:01

## 2020-01-03 RX ADMIN — PIPERACILLIN SODIUM AND TAZOBACTAM SODIUM 3.38 G: 3; .375 INJECTION, POWDER, LYOPHILIZED, FOR SOLUTION INTRAVENOUS at 05:01

## 2020-01-03 RX ADMIN — PANTOPRAZOLE SODIUM 40 MG: 40 TABLET, DELAYED RELEASE ORAL at 07:01

## 2020-01-03 RX ADMIN — METOPROLOL SUCCINATE 25 MG: 25 TABLET, EXTENDED RELEASE ORAL at 08:01

## 2020-01-03 RX ADMIN — IPRATROPIUM BROMIDE AND ALBUTEROL SULFATE 3 ML: .5; 3 SOLUTION RESPIRATORY (INHALATION) at 01:01

## 2020-01-03 RX ADMIN — PREGABALIN 150 MG: 75 CAPSULE ORAL at 08:01

## 2020-01-03 RX ADMIN — IPRATROPIUM BROMIDE AND ALBUTEROL SULFATE 3 ML: .5; 3 SOLUTION RESPIRATORY (INHALATION) at 07:01

## 2020-01-03 RX ADMIN — IPRATROPIUM BROMIDE AND ALBUTEROL SULFATE 3 ML: .5; 3 SOLUTION RESPIRATORY (INHALATION) at 12:01

## 2020-01-03 RX ADMIN — SIMVASTATIN 40 MG: 40 TABLET, FILM COATED ORAL at 09:01

## 2020-01-03 RX ADMIN — PIPERACILLIN SODIUM AND TAZOBACTAM SODIUM 3.38 G: 3; .375 INJECTION, POWDER, LYOPHILIZED, FOR SOLUTION INTRAVENOUS at 12:01

## 2020-01-03 RX ADMIN — PREGABALIN 150 MG: 75 CAPSULE ORAL at 09:01

## 2020-01-03 RX ADMIN — MAGNESIUM OXIDE 800 MG: 400 TABLET ORAL at 05:01

## 2020-01-03 RX ADMIN — ASPIRIN 81 MG 81 MG: 81 TABLET ORAL at 08:01

## 2020-01-03 RX ADMIN — DONEPEZIL HYDROCHLORIDE 10 MG: 5 TABLET, FILM COATED ORAL at 08:01

## 2020-01-03 RX ADMIN — SERTRALINE HYDROCHLORIDE 100 MG: 50 TABLET ORAL at 09:01

## 2020-01-03 RX ADMIN — PIPERACILLIN SODIUM AND TAZOBACTAM SODIUM 3.38 G: 3; .375 INJECTION, POWDER, LYOPHILIZED, FOR SOLUTION INTRAVENOUS at 10:01

## 2020-01-03 RX ADMIN — LEVOFLOXACIN 250 MG: 250 TABLET, FILM COATED ORAL at 05:01

## 2020-01-03 RX ADMIN — VANCOMYCIN HYDROCHLORIDE 1250 MG: 1 INJECTION, POWDER, LYOPHILIZED, FOR SOLUTION INTRAVENOUS at 08:01

## 2020-01-03 RX ADMIN — FUROSEMIDE 20 MG: 10 INJECTION, SOLUTION INTRAMUSCULAR; INTRAVENOUS at 05:01

## 2020-01-03 RX ADMIN — AMLODIPINE BESYLATE 10 MG: 5 TABLET ORAL at 08:01

## 2020-01-03 RX ADMIN — FUROSEMIDE 40 MG: 10 INJECTION INTRAMUSCULAR; INTRAVENOUS at 04:01

## 2020-01-03 RX ADMIN — FUROSEMIDE 40 MG: 10 INJECTION, SOLUTION INTRAMUSCULAR; INTRAVENOUS at 04:01

## 2020-01-03 RX ADMIN — POTASSIUM CHLORIDE 40 MEQ: 20 TABLET, EXTENDED RELEASE ORAL at 05:01

## 2020-01-03 NOTE — ASSESSMENT & PLAN NOTE
Aware  Reviewed recent echo from November 2019 with EF of 43% and grade 1 diastolic dysfunction  IV lasix in the event CXR also represents pulmonary edema.  Will follow up on renal and cardiology recommendations.

## 2020-01-03 NOTE — PT/OT/SLP EVAL
Physical Therapy Evaluation    Patient Name:  Halley Rodrigues   MRN:  [id number]    Recommendations:     Discharge Recommendations:  home health PT   Discharge Equipment Recommendations: none   Barriers to discharge: None; Family not present to confirm if they could provide assistance    Assessment:     Halley Rodrigues is a 89 y.o. male admitted with a medical diagnosis of Pneumonia.  He presents with the following impairments/functional limitations:  weakness, impaired functional mobilty, decreased safety awareness, impaired cardiopulmonary response to activity, impaired endurance, gait instability, impaired balance, impaired self care skills. Pt found lying on L side with O2 off and B LE's off the side of the bed. Bed soiled and patient asking for assistance to get cleaned.    Rehab Prognosis: Fair; patient would benefit from acute skilled PT services to address these deficits and reach maximum level of function.    Recent Surgery: * No surgery found *      Plan:     During this hospitalization, patient to be seen daily to address the identified rehab impairments via gait training, therapeutic activities, therapeutic exercises and progress toward the following goals:    · Plan of Care Expires:  01/04/20    Subjective     Chief Complaint: States he is soiled. Aware of urine incontinence but not of bowel incontinence  Patient/Family Comments/goals: To go home per pt  Pain/Comfort:  · Pain Rating 1: 0/10    Patients cultural, spiritual, Faith conflicts given the current situation:      Living Environment:  Pt reports he lives with his son in a single story home.  Prior to admission, patients level of function was mod I.  Equipment used at home: none(per patient's report).  DME owned (not currently used): rolling walker.  Upon discharge, patient will have assistance from son.    Objective:     Communicated with nurse prior to session.  Patient found left sidelying with bed alarm, telemetry,  peripheral IV, pulse ox (continuous), oxygen(5 L of O2 via mask)  upon PT entry to room. B LE's were off the side of the bed and O2 was removed by the patinet    General Precautions: Standard, fall   Orthopedic Precautions:N/A   Braces: N/A     Exams:  · Cognitive Exam:  Patient is oriented to Person and Place  · Sensation:    · -       Impaired  light/touch impaired  · RLE Strength: Deficits: 4/5 throughout  · LLE Strength: Deficits: 4/5 throughout    Functional Mobility:  · Bed Mobility:     · Rolling Left:  supervision and minimum assistance  · Rolling Right: minimum assistance  · Supine to Sit: moderate assistance  · Transfers:  Sit to Stand:  minimum assistance with rolling walker  · Bed to Chair: moderate assistance with  rolling walker  using  Stand Pivot  · Gait: 10 ft with rw and mod A. Req VC for safety to keep O2 on and to stay inside rw. Slow speed, dec step length and flexed posture. O2 92% with ambulation  · Balance: CGA to maintain static sitting balance progressing to SBA. Min A to maintain statid standing balance with rw      Therapeutic Activities and Exercises:   Nurse performed perineal care in supine with tot A. Applied brief  In standing with tot A    AM-PAC 6 CLICK MOBILITY  Total Score:14     Patient left up in chair with all lines intact, call button in reach, chair alarm on and nurse notified.    GOALS:   Multidisciplinary Problems     Physical Therapy Goals        Problem: Physical Therapy Goal    Goal Priority Disciplines Outcome Goal Variances Interventions   Physical Therapy Goal     PT, PT/OT      Description:  Goals to be met by: 2020     Patient will increase functional independence with mobility by performin. Supine to sit with MInimal Assistance  2. Sit to stand transfer with Supervision  3. Bed to chair transfer with Supervision using Rolling Walker  4. Gait  x 100 feet with Minimal Assistance using Rolling Walker.                       History:     Past Medical  History:   Diagnosis Date    Anemia     Anemia due to chronic blood loss 7/25/2018    Anemia, chronic disease 7/25/2018    Dementia     GERD (gastroesophageal reflux disease)     Heart disease     Hypertension     Neuropathy        Past Surgical History:   Procedure Laterality Date    CORONARY ARTERY BYPASS GRAFT         Time Tracking:     PT Received On: 01/03/20  PT Start Time: 1015     PT Stop Time: 1040  PT Total Time (min): 25 min     Billable Minutes: Evaluation 15 min and Therapeutic Activity 10 min      Ana Finch, PT  01/03/2020

## 2020-01-03 NOTE — PHYSICIAN QUERY
PT Name: Halley Rodrigues  MR #: [id number]    Physician Query Form - Nutrition Clarification     CDS/: Elmira Villegas Pe               Contact information: [phone]    This form is a permanent document in the medical record.     Query Date: January 3, 2020    By submitting this query, we are merely seeking further clarification of documentation.. Please utilize your independent clinical judgment when addressing the question(s) below.    The Medical record contains the followin/02 - per Dietary Consult  - pt not meeting nutritional goals - 13lb wt loss in 5 mos, general weakness and hx of severe Protein Calorie Malnutrition on last admit - Ensure Enlive TID and MVI daily recommended     AND / ASPEN Clinical Characteristics (2011)  A minimum of two characteristics is recommended for diagnosing either moderate or severe malnutrition   Mild Malnutrition Moderate Malnutrition Severe Malnutrition   Energy Intake from p.o., TF or TPN. < 75% intake of estimated energy needs for less than 7 days < 75% intake of estimated energy needs for greater than 7 days < 50% intake of estimated energy needs for > 5 days   xWeight Loss 1-2% in 1 month  5% in 3 months  7.5% in 6 months  10% in 1 year 1-2 % in 1 week  5% in 1 month  7.5% in 3 months  10% in 6 months  20% in 1 year > 2% in 1 week  > 5% in 1 month  > 7.5% in 3 months  > 10% in 6 months  > 20% in 1 year   Physical Findings     None *Mild subcutaneous fat and/or muscle loss  *Mild fluid accumulation  *Stage II decubitus  *Surgical wound or non-healing wound *Mod/severe subcutaneous fat and/or muscle loss  *Mod/severe fluid accumulation  *Stage III or IV decubitus  *Non-healing surgical wound     Provider, please specify diagnosis or diagnoses associated with above clinical findings.    [ x ] Mild Protein-Calorie Malnutrition   [  ] Moderate Protein-Calorie Malnutrition   [  ] Severe Protein-Calorie Malnutrition   [  ] Cachexia   [  ] Anorexia   [  ]  Abnormal Weight Loss   [  ] Underweight   [  ] Other Nutritional Diagnosis (please specify):    [  ] Other:    [  ] Clinically Undetermined       Please document in your progress notes daily for the duration of treatment until resolved and include in your discharge summary.

## 2020-01-03 NOTE — NURSING
"Writer in room around 0010 to administer zosyn ivpb when pt noted to be actively wheezing in anterior and posterior lobes, call placed to RT. arund 0020 writer spoke with Henrietta AYON writer requested for pt to be administer a treatment d/t pt wheezing. RT stated, "did he ask for it", writer told RT no but that pt was wheezing. RT then asked another RT about administering a neb treatment. Brown AYON stated through phone that, "that a cardiac problem not respiratory, you need to call the doctor". Writer informed Henrietta AYON/Brown that prns were on patient's chart for treatments and that those needed to be attempted before a doctor was called. Henrietta AYON then stated, well its up to our discretion. Writer clarified that she was refusing to come and administer treatment although she had not assessed pt current per the assessment of patient's nurse. RT refusing stating it was up to their discretion. Call placed to on duty night shift charge nurse, notified and aware of all findings, writer to go in and administer patient's neb treatment.who then spo  "

## 2020-01-03 NOTE — PROGRESS NOTES
[facility]  Pulmonology  Progress Note    Subjective     Major issues overnight.  Subjectively unchanged over the past 24 hr.  Doing well this morning without complaint.     Review of Systems   Constitutional: Negative for fever, chills, weight loss and night sweats.   HENT: Negative for nosebleeds and postnasal drip.    Respiratory: Negative for cough, shortness of breath, wheezing and orthopnea.    Cardiovascular: Negative for chest pain, palpitations and leg swelling.   Gastrointestinal: Negative for nausea, vomiting, abdominal pain and acid reflux.   Neurological: Negative for headaches.   Psychiatric/Behavioral: Negative for confusion.   All other systems reviewed and are negative.     I have personally reviewed the following during today's evaluation:  past medical history, ROS, family history, social history, surgical history, current inpatient medications,drug allergies, vital signs over the past 24 hours, results of relevant diagnostic studies and nursing/provider documentation from the past 24 hours.     Objective     VS Temp:  [98 °F (36.7 °C)-99 °F (37.2 °C)]   Pulse:  [73-97]   Resp:  [18-24]   BP: (113-143)/(55-61)   SpO2:  [88 %-93 %]   Ideal body weight: 77.6 kg (171 lb 1.2 oz)   I/O   Intake/Output Summary (Last 24 hours) at 1/3/2020 1151  Last data filed at 1/3/2020 0500  Gross per 24 hour   Intake 1055 ml   Output 1000 ml   Net 55 ml        Vent SpO2 (!) 93% on room air   PE Physical Exam   Constitutional: He is oriented to person, place, and time. He appears well-developed and well-nourished. He appears not cachectic.  Non-toxic appearance. No distress. He is not obese.   HENT:   Head: Normocephalic and atraumatic.   Right Ear: External ear normal.   Left Ear: External ear normal.   Nose: Nose normal.   Mouth/Throat: Uvula is midline, oropharynx is clear and moist and mucous membranes are normal. Mallampati Score: II.   Neck: Trachea normal and normal range of motion. Neck supple.  No JVD present. No tracheal deviation present. No thyromegaly present.   Cardiovascular: Normal rate, regular rhythm, normal heart sounds and intact distal pulses. Exam reveals no gallop and no friction rub.   No murmur heard.  Pulmonary/Chest: Normal expansion, hyperinflation, symmetric chest wall expansion and effort normal. No stridor. He has no wheezes. He has no rhonchi. He has rales (At both bases).   Abdominal: Soft. Bowel sounds are normal. He exhibits no distension. There is no tenderness. There is no guarding.   Musculoskeletal: Normal range of motion. He exhibits no edema, tenderness or deformity.   Lymphadenopathy: No supraclavicular adenopathy is present.     He has no cervical adenopathy.     He has no axillary adenopathy.   Neurological: He is alert and oriented to person, place, and time. He has normal strength. No cranial nerve deficit or sensory deficit. He exhibits normal muscle tone. GCS eye subscore is 4. GCS verbal subscore is 5. GCS motor subscore is 6.   Skin: Skin is warm, dry and intact. No rash noted. He is not diaphoretic. No cyanosis. Nails show no clubbing.   Psychiatric: He has a normal mood and affect. His speech is normal and behavior is normal.   Nursing note and vitals reviewed.        Labs I have personally reviewed and interpreted all labs / diagnostic studies obtained over the past 24 hours, and relevant results are as follows:  Recent Labs   Lab 01/02/20  1540 01/02/20  2226 01/03/20  0341   WBC  --   --  5.03  5.03   RBC  --   --  3.06*  3.06*   HGB  --   --  9.7*  9.7*   HCT  --   --  28.9*  28.9*   PLT  --   --  58*  58*   MCV  --   --  94  94   MCH  --   --  31.7*  31.7*   MCHC  --   --  33.6  33.6   NA  --   --  139  139   K  --   --  3.1*  3.1*   CL  --   --  103  103   CO2  --   --  24  24   BUN  --   --  32*  32*   CREATININE  --   --  1.7*  1.7*   MG  --   --  1.6   ALBUMIN  --   --  2.9*   CPK  --  711*  --    CPKMB  --  28.3*  --    TROPONINI 9.640*  --    --      BNP:  A 37  Procalcitonin 0.17  Serum lactate 1.5   Imaging I have personally reviewed and interpreted the following images and reviewed the associated Radiology report.  CXR: I have reviewed all pertinent results/findings within the past 24 hours and my personal findings are:  Persistent hazy the left sided airspace opacities consistent with unilateral cardiogenic pulmonary edema versus is lower respiratory tract infection.     Micro I have personally reviewed and interpreted the available culture data.  Relevant results are as follows.  Blood Culture   Lab Results   Component Value Date    LABBLOO No Growth to date 01/01/2020    LABBLOO No Growth to date 01/01/2020   , Sputum Culture   Lab Results   Component Value Date    GSRESP <10 epithelial cells per low power field. 01/01/2020    GSRESP Many WBC's 01/01/2020    GSRESP Many Gram positive cocci 01/01/2020    RESPIRATORYC Normal respiratory erendira 01/01/2020    and Urine Culture  No results found for: LABURIN   Medications Scheduled    albuterol-ipratropium  3 mL Nebulization Q6H WAKE    amLODIPine  10 mg Oral Daily    aspirin  81 mg Oral Daily    donepezil  10 mg Oral Daily    furosemide  20 mg Intravenous BID    levoFLOXacin  250 mg Oral Before breakfast    metoprolol succinate  25 mg Oral Daily    pantoprazole  40 mg Oral Daily    piperacillin-tazobactam (ZOSYN) IVPB  3.375 g Intravenous Q8H    pregabalin  150 mg Oral BID    sertraline  100 mg Oral QHS    simvastatin  40 mg Oral QHS      Continuous Infusions:      PRN   acetaminophen, albuterol sulfate, calcium chloride IVPB, calcium chloride IVPB, calcium chloride IVPB, HYDROcodone-acetaminophen, magnesium oxide, magnesium sulfate IVPB, magnesium sulfate IVPB, magnesium sulfate IVPB, magnesium sulfate IVPB, potassium chloride in water, potassium chloride in water, potassium chloride in water, potassium chloride in water, potassium chloride, potassium chloride, potassium chloride,  potassium chloride, sodium chloride 0.9%, sodium phosphate IVPB, sodium phosphate IVPB, sodium phosphate IVPB, sodium phosphate IVPB, sodium phosphate IVPB, Pharmacy to dose Vancomycin consult **AND** vancomycin - pharmacy to dose        Assessment       Active Hospital Problems    Diagnosis    *Pneumonia    Acute respiratory failure with hypoxia    Stage 3 chronic kidney disease    Pneumonia due to infectious organism    Hypokalemia    Thrombocytopenia, unspecified    Hypomagnesemia    Essential hypertension    Dementia    NSTEMI (non-ST elevated myocardial infarction)    Chronic combined systolic and diastolic heart failure    Anemia, chronic disease      My Impression:  Originally presenting with community-acquired pneumonia, but has developed cardiogenic pulmonary edema since admission due to iatrogenic volume overload.    Plan     Pulmonary  · Agree with empiric coverage for community-acquired respiratory pathogens.  · Agree with diuresis and discontinuation of IV fluids.  · I suspect that his acute deterioration since admission secondary to is zealous crystalloid resuscitation rather than progression of his respiratory infection.  · Cardiology following, and I will defer to their expertise regarding management of his cardiac comorbidities.  · Titrate supplemental oxygen to maintain an SpO2 greater than 90%.       Ortiz Senior MD  Pulmonary / Critical Care Medicine  [facility]

## 2020-01-03 NOTE — PHYSICIAN QUERY
PT Name: Halley Rodrigues  MR #: [id number]    Physician Query Form - Myocardial Infarct Specificity     CDS/: Elmira Villegas Pe               Contact information: [phone]  This form is a permanent document in the medical record.     Query Date: January 3, 2020    By submitting this query, we are merely seeking further clarification of documentation.. Please utilize your independent clinical judgment when addressing the question(s) below.    The Medical record contains the following:  Documentation per Progress note 01/02 - NSTEMI (non-ST elevated myocardial infarction) Denies chest pain   Likely demand ischemia in the setting of pneumonia but given that troponin has risen to 9, I do suspect underlying disease     Troponin : 0.864 / 0.696 / 5.882 / 9.640      Please further clarify:     1. Type:     [  ] NSTEMI   [ x ] NSTEMI Type II due to Demand Ischemia from PNA  [  ] Demand Ischemia from PNA without NSTEMI   [  ] Other (please specify) __________________________  [  ] Clinically undetermined

## 2020-01-03 NOTE — PROGRESS NOTES
INPATIENT NEPHROLOGY CONSULT   [facility] NEPHROLOGY    Halley Singerjosi  01/03/2020    Reason for consultation:    sergio    Chief Complaint:   Chief Complaint   Patient presents with    Cough     x3 days with SOB          History of Present Illness:    Per H and P    Patient is a 89-year-old  male with known history of CAD status post CABG, dementia, chronic combined systolic and diastolic congestive heart failure and anemia of chronic disease who was recently discharged from our facility after being admitted for syncope and eventually transferred to skilled nursing facility presents with chief complaint of shortness of breath and cough.  History is limited secondary to patient's dementia and is supplemented by his son at bedside.     Patient was discharged from [facility] on 12/23.  According to the patient's son patient was noted to be more weak over the last 3 days.  He was noted to have diarrhea which was nonbloody and watery over the last 2 days.  Reported poor oral intake and generalized weakness.  Patient was also noted to have a rattling cough.  Son denies known choking episodes or aspiration events in the past.  Patient denies chest pain but admits to shortness of breath is worse with exertion and better with rest.  Symptoms are moderate in nature.  No lightheadedness, lower extremity edema, palpitations, abdominal pain, nausea/vomiting or urinary problems. Patient is prone to frequent falls; noncompliant with walker.     In the ER:  Hemodynamically stable.  Patient was noted to be hypoxic with O2 sats in the 80s on room air which improved to greater than 90% on supplemental oxygen.  He was also noted to be tachypneic.  Laboratory workup revealed chronic normocytic anemia, thrombocytopenia, hypokalemia, CKD stage 3, elevated BNP and troponin.  EKG revealed normal sinus rhythm with nonspecific ST-T wave changes and prolonged QT interval.  It is unchanged from EKG  dated 11/26/2019.  Chest x-ray revealed left mid and lower lung zone alveolar opacities concerning for pneumonia.  Admitted for further management.    1/2/20  Consulted for GARRETT.  No nausea, chest pain, sob, fever, urinary or bowel complaint, new neurologic symptoms, new joint pain,    1/3/20  No nausea, chest pain, sob, fever, urinary or bowel complaint, new neurologic symptoms, new joint pain,    Plan of Care:       Assessment:    Acute kidney injury likely hemodynamcially mediated with h/o several days of diarrhea and poor po intake     --renal/bladder ultrasound negative for obstruction     --no nsaids, coxibs, iv contrast     Mild metabolic acidosis (gap and nongap)  --no intervention at this time     Anemia  --blood products per primary    Pneumonia  --abx per primary  --avoid Vancomycin toxicity      Hypokalemia--replete per prn orders      Hold diuretics if creatinine starts to rise      Thank you for allowing us to participate in this patient's care. We will continue to follow.    Vital Signs:  Temp Readings from Last 3 Encounters:   01/03/20 98.1 °F (36.7 °C) (Oral)   12/03/19 97.7 °F (36.5 °C)   09/09/19 98.1 °F (36.7 °C)       Pulse Readings from Last 3 Encounters:   01/03/20 80   12/03/19 64   10/21/19 (!) 54       BP Readings from Last 3 Encounters:   01/03/20 (!) 131/59   12/03/19 (!) 143/62   10/21/19 (!) 154/78       Weight:  Wt Readings from Last 3 Encounters:   01/03/20 69.6 kg (153 lb 8 oz)   01/03/20 69.6 kg (153 lb 7 oz)   12/03/19 63.9 kg (140 lb 14 oz)       Past Medical & Surgical History:  Past Medical History:   Diagnosis Date    Anemia     Anemia due to chronic blood loss 7/25/2018    Anemia, chronic disease 7/25/2018    Dementia     GERD (gastroesophageal reflux disease)     Heart disease     Hypertension     Neuropathy        Past Surgical History:   Procedure Laterality Date    CORONARY ARTERY BYPASS GRAFT         Past Social History:  Social History     Socioeconomic History     Marital status:      Spouse name: Not on file    Number of children: Not on file    Years of education: Not on file    Highest education level: Not on file   Occupational History    Not on file   Social Needs    Financial resource strain: Not on file    Food insecurity:     Worry: Not on file     Inability: Not on file    Transportation needs:     Medical: Not on file     Non-medical: Not on file   Tobacco Use    Smoking status: Current Every Day Smoker     Types: Cigarettes, Vaping with nicotine    Smokeless tobacco: Never Used    Tobacco comment: e cigarettes   Substance and Sexual Activity    Alcohol use: No    Drug use: No    Sexual activity: Not on file   Lifestyle    Physical activity:     Days per week: Not on file     Minutes per session: Not on file    Stress: Not on file   Relationships    Social connections:     Talks on phone: Not on file     Gets together: Not on file     Attends [employer] service: Not on file     Active member of club or organization: Not on file     Attends meetings of clubs or organizations: Not on file     Relationship status: Not on file   Other Topics Concern    Not on file   Social History Narrative    Not on file       Medications:  No current facility-administered medications on file prior to encounter.      Current Outpatient Medications on File Prior to Encounter   Medication Sig Dispense Refill    albuterol-ipratropium (DUO-NEB) 2.5 mg-0.5 mg/3 mL nebulizer solution Take 3 mLs by nebulization every 6 (six) hours as needed for Wheezing or Shortness of Breath. 1 Box 0    amLODIPine (NORVASC) 10 MG tablet Take 1 tablet (10 mg total) by mouth once daily. 30 tablet 0    aspirin 81 MG Chew Take 81 mg by mouth once daily.       donepezil (ARICEPT) 10 MG tablet TAKE 1 TABLET BY MOUTH ONCE DAILY (Patient taking differently: Take 10 mg by mouth once daily. ) 90 tablet 1    ergocalciferol (ERGOCALCIFEROL) 50,000 unit Cap TAKE ONE CAPSULE BY MOUTH EVERY  WEEK 12 capsule 0    furosemide (LASIX) 20 MG tablet Take 20 mg by mouth once daily.       HYDROcodone-acetaminophen (NORCO)  mg per tablet Take 1 tablet by mouth every 8 (eight) hours as needed (pain). Medically necessary for greater than 7 days for chronic pain 90 tablet 0    ipratropium (ATROVENT) 42 mcg (0.06 %) nasal spray 1 spray by Nasal route 2 (two) times daily.  0    LYRICA 150 mg capsule Take 150 mg by mouth 2 (two) times daily.       metoprolol succinate (TOPROL-XL) 25 MG 24 hr tablet Take 1 tablet (25 mg total) by mouth once daily. 30 tablet 0    pantoprazole (PROTONIX) 40 MG tablet Take 40 mg by mouth once daily.       sertraline (ZOLOFT) 100 MG tablet Take 100 mg by mouth every evening.       simvastatin (ZOCOR) 40 MG tablet Take 40 mg by mouth every evening.        Scheduled Meds:   albuterol-ipratropium  3 mL Nebulization Q6H WAKE    amLODIPine  10 mg Oral Daily    aspirin  81 mg Oral Daily    donepezil  10 mg Oral Daily    furosemide  20 mg Intravenous BID    levoFLOXacin  250 mg Oral Before breakfast    metoprolol succinate  25 mg Oral Daily    pantoprazole  40 mg Oral Daily    piperacillin-tazobactam (ZOSYN) IVPB  3.375 g Intravenous Q8H    pregabalin  150 mg Oral BID    sertraline  100 mg Oral QHS    simvastatin  40 mg Oral QHS     Continuous Infusions:  PRN Meds:.acetaminophen, albuterol sulfate, calcium chloride IVPB, calcium chloride IVPB, calcium chloride IVPB, HYDROcodone-acetaminophen, magnesium oxide, magnesium sulfate IVPB, magnesium sulfate IVPB, magnesium sulfate IVPB, magnesium sulfate IVPB, potassium chloride in water, potassium chloride in water, potassium chloride in water, potassium chloride in water, potassium chloride, potassium chloride, potassium chloride, potassium chloride, sodium chloride 0.9%, sodium phosphate IVPB, sodium phosphate IVPB, sodium phosphate IVPB, sodium phosphate IVPB, sodium phosphate IVPB, Pharmacy to dose Vancomycin consult  **AND** vancomycin - pharmacy to dose    Allergies:  Eucalyptus containing products    Past Family History:  Reviewed; refer to Hospitalist Admission Note    Review of Systems:  Review of Systems - All 14 systems reviewed and negative, except as noted in HPI    Physical Exam:    BP (!) 131/59 (BP Location: Left arm, Patient Position: Lying)   Pulse 80   Temp 98.1 °F (36.7 °C) (Oral)   Resp 20   Ht 6' (1.829 m)   Wt 69.6 kg (153 lb 8 oz)   SpO2 (!) 93%   BMI 20.82 kg/m²     General Appearance:    Alert, cooperative, no distress, appears stated age   Head:    Normocephalic, without obvious abnormality, atraumatic   Eyes:    PER, conjunctiva/corneas clear, EOM's intact in both eyes        Throat:   Lips, mucosa, and tongue normal; teeth and gums normal   Back:     Symmetric, no curvature, ROM normal, no CVA tenderness   Lungs:     Exp wheezes    Chest wall:    No tenderness or deformity   Heart:    Regular rate and rhythm, S1 and S2 normal, no murmur, rub   or gallop   Abdomen:     Soft, non-tender, bowel sounds active all four quadrants,     no masses, no organomegaly   Extremities:   Extremities normal, atraumatic, no cyanosis or edema   Pulses:   2+ and symmetric all extremities   MSK:   No joint or muscle swelling, tenderness or deformity   Skin:   Skin color, texture, turgor normal, no rashes or lesions   Neurologic:   CNII-XII intact, normal strength and sensation       Throughout.  No flap     Results:  Lab Results   Component Value Date     01/03/2020     01/03/2020    K 3.1 (L) 01/03/2020    K 3.1 (L) 01/03/2020     01/03/2020     01/03/2020    CO2 24 01/03/2020    CO2 24 01/03/2020    BUN 32 (H) 01/03/2020    BUN 32 (H) 01/03/2020    CREATININE 1.7 (H) 01/03/2020    CREATININE 1.7 (H) 01/03/2020    CALCIUM 8.0 (L) 01/03/2020    CALCIUM 8.0 (L) 01/03/2020    ANIONGAP 12 01/03/2020    ANIONGAP 12 01/03/2020    ESTGFRAFRICA 40.4 (A) 01/03/2020    ESTGFRAFRICA 40.4 (A) 01/03/2020     EGFRNONAA 35.0 (A) 01/03/2020    EGFRNONAA 35.0 (A) 01/03/2020       Lab Results   Component Value Date    CALCIUM 8.0 (L) 01/03/2020    CALCIUM 8.0 (L) 01/03/2020    PHOS 3.1 01/03/2020       Recent Labs   Lab 01/03/20  0341   WBC 5.03  5.03   RBC 3.06*  3.06*   HGB 9.7*  9.7*   HCT 28.9*  28.9*   PLT 58*  58*   MCV 94  94   MCH 31.7*  31.7*   MCHC 33.6  33.6       I have personally reviewed pertinent radiological imaging and reports.

## 2020-01-03 NOTE — SUBJECTIVE & OBJECTIVE
"Interval History: Patient with baseline dementia and thus unable to provide history.  He denies chest pain, SOB.  I spoke to his family by phone who reports he is not a complainer.  He just got out of SNF 2 weeks ago and was "like another person" he was so improved.  This lasted for 3-5 days and then he started sleeping more and getting weaker. He did have some loose stool at home and was eating poorly.  Patient also developed a cough per family.     Review of Systems   Unable to perform ROS: Dementia     Objective:     Vital Signs (Most Recent):  Temp: 97.4 °F (36.3 °C) (01/02/20 1104)  Pulse: 73 (01/02/20 1402)  Resp: 18 (01/02/20 1402)  BP: (!) 120/58 (01/02/20 1104)  SpO2: (!) 90 % (01/02/20 1402) Vital Signs (24h Range):  Temp:  [97.4 °F (36.3 °C)-99 °F (37.2 °C)] 97.4 °F (36.3 °C)  Pulse:  [] 73  Resp:  [18-24] 18  SpO2:  [88 %-97 %] 90 %  BP: (101-141)/(52-83) 120/58     Weight: 70 kg (154 lb 6.4 oz)  Body mass index is 20.94 kg/m².    Intake/Output Summary (Last 24 hours) at 1/2/2020 1855  Last data filed at 1/2/2020 1100  Gross per 24 hour   Intake 50 ml   Output 200 ml   Net -150 ml      Physical Exam   Constitutional: He appears well-developed.   Appears fatigued but overall looks better than his labs and vitals   HENT:   Head: Normocephalic and atraumatic.   Mouth/Throat: Oropharynx is clear and moist.   oxymask   Eyes: Pupils are equal, round, and reactive to light. EOM are normal.   Neck: Normal range of motion.   Cardiovascular: Regular rhythm.   No murmur heard.  Pulmonary/Chest: Effort normal.   Coarse bilaterally with left worse than right  Otherwise, comfortable respirations   Abdominal: Soft. He exhibits no distension. There is no tenderness. There is no rebound and no guarding.   Musculoskeletal: Normal range of motion.   Neurological: He is alert. No cranial nerve deficit or sensory deficit.   Pleasantly confused   Skin: No rash noted.   Psychiatric: He has a normal mood and affect. "   Nursing note and vitals reviewed.      Significant Labs:   CBC:   Recent Labs   Lab 01/01/20 2022 01/02/20 0201   WBC 4.23 5.73   HGB 10.9* 11.2*   HCT 32.3* 33.7*   PLT 59* 58*     CMP:   Recent Labs   Lab 01/01/20 2022 01/02/20 0201 01/02/20  0446    141 140   K 3.1* 3.5 3.7    103 104   CO2 24 22* 21*    139* 135*   BUN 26* 25* 27*   CREATININE 1.7* 1.6* 1.7*   CALCIUM 8.4* 8.3* 8.4*   PROT 6.5  --   --    ALBUMIN 3.4*  --   --    BILITOT 1.2*  --   --    ALKPHOS 52*  --   --    AST 30  --   --    ALT 16  --   --    ANIONGAP 14 16 15   EGFRNONAA 35.0* 37.6* 35.0*     Cardiac Markers:   Recent Labs   Lab 01/01/20 2022   *     Troponin:   Recent Labs   Lab 01/02/20 0201 01/02/20  0814 01/02/20  1540   TROPONINI 0.696* 5.882* 9.640*       Significant Imaging: CXR: I have reviewed all pertinent results/findings within the past 24 hours and my personal findings are:  worsening left infiltrate

## 2020-01-03 NOTE — ASSESSMENT & PLAN NOTE
Denies chest pain   Likely demand ischemia in the setting of pneumonia but given that troponin has risen to 9, I do suspect underlying disease   Continuing to trend Troponins  Goal directed therapy  Consulted cardiology with evaluation pending  Telemetry monitoring.  Moved to tele A for continuous bedside monitoring.

## 2020-01-03 NOTE — PLAN OF CARE
01/03/20 0712   Patient Assessment/Suction   Level of Consciousness (AVPU) alert   Respiratory Effort Unlabored   Expansion/Accessory Muscles/Retractions no use of accessory muscles   FELICIA Breath Sounds coarse;crackles   PRE-TX-O2   O2 Device (Oxygen Therapy) Oxymask   $ Is the patient on Low Flow Oxygen? Yes   Flow (L/min) 5   SpO2 (!) 93 %   Pulse Oximetry Type Continuous   $ Pulse Oximetry - Multiple Charge Pulse Oximetry - Multiple   Pulse 80   Resp (!) 24   Aerosol Therapy   $ Aerosol Therapy Charges Aerosol Treatment   Daily Review of Necessity (SVN) completed   Respiratory Treatment Status (SVN) given   Treatment Route (SVN) mask   Patient Position (SVN) semi-Paul's   Post Treatment Assessment (SVN) breath sounds unchanged   Signs of Intolerance (SVN) none   Breath Sounds Post-Respiratory Treatment   Throughout All Fields Post-Treatment All Fields   Throughout All Fields Post-Treatment no change   Post-treatment Heart Rate (beats/min) 81   Post-treatment Resp Rate (breaths/min) 24

## 2020-01-03 NOTE — ASSESSMENT & PLAN NOTE
Patient with poor response to IV lasix.  Renal consulted.  Discussed with Dr. Garcia and will continue the gentle IVFs and IV lasix at this time.  If worsening renal function, will need to stop the IV lasix.

## 2020-01-03 NOTE — HOSPITAL COURSE
Patient admitted with acute hypoxic respiratory failure suspected to be pneumonia.  IV abx started.  Troponin mildly elevated and thus he was admitted to cardiology.  1/2/20 CXR with worsening infiltrate and patient with increased O2 needs- oxymask is working better than NC.  Torponin has continued to climb.  He has no complaints but has dementia and family reports he is not a complainer. Patient moved to [facility] for continuous cardiac monitoring.  Cardiology consulted on admission.  Pulmonary consulted given worsening infiltrate. IV lasix ordered in the event this could be pulmonary edema.  Poor urine output in response and thus Renal consulted for oliguria and is following.  1/3/19 patient looks much improved but still requiring supplemental O2 to keep saturations greater than 88%. Echo done and EF 34-42%.  EF 43% November 2019 but otherwise unchanged. 1/4 improved infiltrate on CXR suggesting pulmonary edema.  Changing IV lasix to po.  Supplemental O2 weaned at this time and he is on room air with no home O2 needs.  Renal function normalized and GARRETT resolved. Mobilizing with PT/OT and ambulating over 200 feet.  Having loose stool- stool studies thus far unrevealing of infection.  Probiotics started.  Loose stool better.  Patient cleared for discharge home by consultants.  CM consulted to arrange home health services.  Patient will follow up with cardiology in one week. I discussed plan with family.  Family is interested in long term senior living placement for patient.  CM consulted to provide them information on all of their options including sitters at home, assisted living, senior living NH.  He physically can perform ADLs but his memory deficits make it a risk for him to stay home alone.  Return precautions given.  Patient completed his course of abx during his hospital stay. Patient examined on day of discharge and RRR, CTA B.  This is a discharge summary for date 1/6/20.

## 2020-01-03 NOTE — ASSESSMENT & PLAN NOTE
Left middle and lower lobe pneumonia on imaging- worsening on repeat imaging  Supplemental oxygen, increased requirements 1/2/20  Concern for hospital-acquired pneumonia in the setting of recent hospital and skilled nursing facility stay  Ordered respiratory viral panel, MRSA PCR and influenza testing  Follow sputum and blood cultures  Will cover with vancomycin and piperacillin-tazobactam  Pulmonary consulted given worsening state

## 2020-01-03 NOTE — PT/OT/SLP PROGRESS
Occupational Therapy   Treatment    Name: Halley Rodrigues  MRN: [id number]  Admitting Diagnosis:  Pneumonia       Recommendations:     Discharge Recommendations: home health OT  Discharge Equipment Recommendations:  none  Barriers to discharge:  None    Assessment:     Halley Rodrigues is a 89 y.o. male with a medical diagnosis of Pneumonia.  He presents with the following performance deficits affecting function: weakness, impaired endurance, impaired self care skills, impaired functional mobilty, gait instability, impaired balance, decreased safety awareness, impaired cardiopulmonary response to activity. Pt seated in chair sleeping on OT arrival with no O2, sats 86%. RN notified and pt placed on 2L O2 for tx session. O2 sats remained >90% throughout. O2 removed after pt recovered from activity, sats 91%.    Rehab Prognosis:  Good; patient would benefit from acute skilled OT services to address these deficits and reach maximum level of function.       Plan:     Patient to be seen 5 x/week to address the above listed problems via self-care/home management, therapeutic activities, therapeutic exercises  · Plan of Care Expires: 02/02/20  · Plan of Care Reviewed with: patient    Subjective     Pain/Comfort:  · Pain Rating 1: 0/10    Objective:     Communicated with: nurse prior to session.  Patient found up in chair with telemetry, peripheral IV, pulse ox (continuous), oxygen(chair alarm) upon OT entry to room.    General Precautions: Standard, fall   Orthopedic Precautions:N/A   Braces: N/A     Occupational Performance:    Functional Mobility/Transfers:  · Patient completed Sit <> Stand Transfer with contact guard assistance  with  rolling walker   · Functional Mobility: Pt ambulated 30 ft around room with CGA and RW.    Activities of Daily Living:  · Grooming: contact guard assistance to perform oral care, wash face and hands standing at sink. Total time spent in stance approx 10 min, tolerance good.      Treatment & Education:  OT role/POC  ADL training  Functional mobility training    Patient left up in chair eating lunch with all lines intact, call button in reach, chair alarm on and RN notifiedEducation:      GOALS:   Multidisciplinary Problems     Occupational Therapy Goals        Problem: Occupational Therapy Goal    Goal Priority Disciplines Outcome Interventions   Occupational Therapy Goal     OT, PT/OT Ongoing, Progressing    Description:  Goals to be met by: discharge     Patient will increase functional independence with ADLs by performing:    UE Dressing with Supervision.  LE Dressing with Supervision.  Grooming while standing with Supervision.  Toileting from toilet with Supervision for hygiene and clothing management.   Toilet transfer to toilet with Supervision.                      Time Tracking:     OT Date of Treatment: 01/03/20  OT Start Time: 1145  OT Stop Time: 1218  OT Total Time (min): 33 min    Billable Minutes:Self Care/Home Management 10  Therapeutic Activity 23    Apolonia Holcomb OT  1/3/2020

## 2020-01-03 NOTE — PROGRESS NOTES
[facility] Medicine  Progress Note    Patient Name: Halley Rodrigues  MRN: [id number]  Patient Class: IP- Inpatient   Admission Date: 1/1/2020  Length of Stay: 0 days  Attending Physician: Michelle Theodore MD  Primary Care Provider: Scott Payne MD        Subjective:     Principal Problem:Pneumonia        HPI:  Patient is a 89-year-old  male with known history of CAD status post CABG, dementia, chronic combined systolic and diastolic congestive heart failure and anemia of chronic disease who was recently discharged from our facility after being admitted for syncope and eventually transferred to skilled nursing facility presents with chief complaint of shortness of breath and cough.  History is limited secondary to patient's dementia and is supplemented by his son at bedside.    Patient was discharged from [facility] on 12/23.  According to the patient's son patient was noted to be more weak over the last 3 days.  He was noted to have diarrhea which was nonbloody and watery over the last 2 days.  Reported poor oral intake and generalized weakness.  Patient was also noted to have a rattling cough.  Son denies known choking episodes or aspiration events in the past.  Patient denies chest pain but admits to shortness of breath is worse with exertion and better with rest.  Symptoms are moderate in nature.  No lightheadedness, lower extremity edema, palpitations, abdominal pain, nausea/vomiting or urinary problems. Patient is prone to frequent falls; noncompliant with walker.    In the ER:  Hemodynamically stable.  Patient was noted to be hypoxic with O2 sats in the 80s on room air which improved to greater than 90% on supplemental oxygen.  He was also noted to be tachypneic.  Laboratory workup revealed chronic normocytic anemia, thrombocytopenia, hypokalemia, CKD stage 3, elevated BNP and troponin.  EKG revealed normal sinus rhythm with nonspecific ST-T  "wave changes and prolonged QT interval.  It is unchanged from EKG dated 11/26/2019.  Chest x-ray revealed left mid and lower lung zone alveolar opacities concerning for pneumonia.  Admitted for further management.    Rest of the 10 point review of systems is negative except as mentioned above.      Overview/Hospital Course:  Patient admitted with acute hypoxic respiratory failure suspected to be pneumonia.  IV abx started.  Troponin mildly elevated and thus he was admitted to cardiology.  1/2/20 CXR with worsening infiltrate and patient with increased O2 needs- oxymask is working better than NC.  Torponin has continued to climb.  He has no complaints but has dementia and family reports he is not a complainer. Patient moved to [facility] for continuous cardiac monitoring.  Cardiology consulted on admission.  Pulmonary consulted given worsening infiltrate. IV lasix ordered in the event this could be pulmonary edema.  Poor urine output in response and thus Renal consulted for oliguria.     Interval History: Patient with baseline dementia and thus unable to provide history.  He denies chest pain, SOB.  I spoke to his family by phone who reports he is not a complainer.  He just got out of SNF 2 weeks ago and was "like another person" he was so improved.  This lasted for 3-5 days and then he started sleeping more and getting weaker. He did have some loose stool at home and was eating poorly.  Patient also developed a cough per family.     Review of Systems   Unable to perform ROS: Dementia     Objective:     Vital Signs (Most Recent):  Temp: 97.4 °F (36.3 °C) (01/02/20 1104)  Pulse: 73 (01/02/20 1402)  Resp: 18 (01/02/20 1402)  BP: (!) 120/58 (01/02/20 1104)  SpO2: (!) 90 % (01/02/20 1402) Vital Signs (24h Range):  Temp:  [97.4 °F (36.3 °C)-99 °F (37.2 °C)] 97.4 °F (36.3 °C)  Pulse:  [] 73  Resp:  [18-24] 18  SpO2:  [88 %-97 %] 90 %  BP: (101-141)/(52-83) 120/58     Weight: 70 kg (154 lb 6.4 oz)  Body mass index is " 20.94 kg/m².    Intake/Output Summary (Last 24 hours) at 1/2/2020 1855  Last data filed at 1/2/2020 1100  Gross per 24 hour   Intake 50 ml   Output 200 ml   Net -150 ml      Physical Exam   Constitutional: He appears well-developed.   Appears fatigued but overall looks better than his labs and vitals   HENT:   Head: Normocephalic and atraumatic.   Mouth/Throat: Oropharynx is clear and moist.   oxymask   Eyes: Pupils are equal, round, and reactive to light. EOM are normal.   Neck: Normal range of motion.   Cardiovascular: Regular rhythm.   No murmur heard.  Pulmonary/Chest: Effort normal.   Coarse bilaterally with left worse than right  Otherwise, comfortable respirations   Abdominal: Soft. He exhibits no distension. There is no tenderness. There is no rebound and no guarding.   Musculoskeletal: Normal range of motion.   Neurological: He is alert. No cranial nerve deficit or sensory deficit.   Pleasantly confused   Skin: No rash noted.   Psychiatric: He has a normal mood and affect.   Nursing note and vitals reviewed.      Significant Labs:   CBC:   Recent Labs   Lab 01/01/20 2022 01/02/20 0201   WBC 4.23 5.73   HGB 10.9* 11.2*   HCT 32.3* 33.7*   PLT 59* 58*     CMP:   Recent Labs   Lab 01/01/20 2022 01/02/20 0201 01/02/20  0446    141 140   K 3.1* 3.5 3.7    103 104   CO2 24 22* 21*    139* 135*   BUN 26* 25* 27*   CREATININE 1.7* 1.6* 1.7*   CALCIUM 8.4* 8.3* 8.4*   PROT 6.5  --   --    ALBUMIN 3.4*  --   --    BILITOT 1.2*  --   --    ALKPHOS 52*  --   --    AST 30  --   --    ALT 16  --   --    ANIONGAP 14 16 15   EGFRNONAA 35.0* 37.6* 35.0*     Cardiac Markers:   Recent Labs   Lab 01/01/20 2022   *     Troponin:   Recent Labs   Lab 01/02/20 0201 01/02/20  0814 01/02/20  1540   TROPONINI 0.696* 5.882* 9.640*       Significant Imaging: CXR: I have reviewed all pertinent results/findings within the past 24 hours and my personal findings are:  worsening left  infiltrate      Assessment/Plan:      * Pneumonia  Left middle and lower lobe pneumonia on imaging- worsening on repeat imaging  Supplemental oxygen, increased requirements 1/2/20  Concern for hospital-acquired pneumonia in the setting of recent hospital and skilled nursing facility stay  Ordered respiratory viral panel, MRSA PCR and influenza testing  Follow sputum and blood cultures  Will cover with vancomycin and piperacillin-tazobactam  Pulmonary consulted given worsening state           Hypomagnesemia  Replete and monitor daily as per sliding scale      Thrombocytopenia, unspecified  Chronic issue  However platelet count is lower than his baseline  Daily CBC  Avoid heparin or related products      Hypokalemia  Replete as per sliding scale and monitor daily      Pneumonia due to infectious organism        Stage 3 chronic kidney disease  Patient with poor response to IV lasix.  Renal consulted.  Discussed with Dr. Garcia and will continue the gentle IVFs and IV lasix at this time.  If worsening renal function, will need to stop the IV lasix.      Acute respiratory failure with hypoxia  Due to pneumonia   Plan as above      Essential hypertension  Stable  Continue home amlodipine and metoprolol succinate      Dementia  Discussed with family who reports he is at his mental status baseline  Continue home donepezil       NSTEMI (non-ST elevated myocardial infarction)  Denies chest pain   Likely demand ischemia in the setting of pneumonia but given that troponin has risen to 9, I do suspect underlying disease   Continuing to trend Troponins  Goal directed therapy  Consulted cardiology with evaluation pending  Telemetry monitoring.  Moved to tele A for continuous bedside monitoring.       Chronic combined systolic and diastolic heart failure  Aware  Reviewed recent echo from November 2019 with EF of 43% and grade 1 diastolic dysfunction  IV lasix in the event CXR also represents pulmonary edema.  Will follow up on renal  and cardiology recommendations.       Anemia, chronic disease  Aware  Hb lower than baseline; no report of blood loss  Trend CBC daily         VTE Risk Mitigation (From admission, onward)         Ordered     Place sequential compression device  Until discontinued      01/01/20 2204     IP VTE HIGH RISK PATIENT  Once      01/01/20 2204                      Michelle Theodore MD  Department of Hospital Medicine   [facility]

## 2020-01-03 NOTE — CARE UPDATE
01/02/20 1923   Patient Assessment/Suction   Level of Consciousness (AVPU) alert   Respiratory Effort Unlabored   Expansion/Accessory Muscles/Retractions expansion symmetric;no retractions   FELICIA Breath Sounds crackles, fine;diminished   Rhythm/Pattern, Respiratory unlabored   Cough Frequency infrequent   Cough Type congested;good;nonproductive   PRE-TX-O2   O2 Device (Oxygen Therapy) Oxymask   Flow (L/min) 5   SpO2 (!) 92 %   Pulse Oximetry Type Continuous   Pulse 83   Resp (!) 24   Aerosol Therapy   $ Aerosol Therapy Charges Aerosol Treatment   Daily Review of Necessity (SVN) completed   Respiratory Treatment Status (SVN) given;mouth rinsed post treatment   Treatment Route (SVN) mask;oxygen   Patient Position (SVN) Paul's   Post Treatment Assessment (SVN) increased aeration   Signs of Intolerance (SVN) none   Breath Sounds Post-Respiratory Treatment   Throughout All Fields Post-Treatment All Fields   Throughout All Fields Post-Treatment aeration increased   Post-treatment Heart Rate (beats/min) 86   Post-treatment Resp Rate (breaths/min) 24   Respiratory Interventions   Cough And Deep Breathing done with encouragement   Breathing Techniques/Airway Clearance deep/controlled cough encouraged;diaphragmatic breathing promoted   Respiratory Evaluation   $ Care Plan Tech Time 15 min   Admitting Diagnosis Pneumonia, Heart Failure, NSTEMI   Cardiac Diagnosis Heart Failure   Home Oxygen   Has Home Oxygen? No   Home Aerosol, MDI, DPI, and Other Treatments/Therapies   Home Respiratory Therapy Per Patient/Review of Chart Yes   Aerosol Home Meds/Freq Unknown/prn   Pt on oxymask due to mouth breathing. Sats 92% improves with deep diaphragmatic deep breathing. Instructed Pt to do proper deep breaths on his own.

## 2020-01-03 NOTE — ASSESSMENT & PLAN NOTE
· Agree with empiric coverage for community-acquired respiratory pathogens.  Consider transitioning to an oral antibiotic.  · Agree with continued diuresis.  · I suspect that his acute deterioration since admission secondary to is zealous crystalloid resuscitation rather than progression of his respiratory infection.  · Cardiology following, and I will defer to their expertise regarding management of his cardiac comorbidities.  · Titrate supplemental oxygen to maintain an SpO2 greater than 90%.

## 2020-01-03 NOTE — HPI
Mr. Halley Rodrigues is an 89-year-old gentleman with past medical history of coronary artery disease, systolic heart failure, and dementia who was admitted to Hospital Medicine service a couple days ago with suspected community-acquired pneumonia is been receiving IV antibiotics and IV fluids for the past 24 hr, this morning his oxygen requirement noticed to be increasing prompting a chest x-ray which demonstrated increasing bilateral hazy airspace opacities.  His troponin is also trending.  According to the medical record Mr. Rodrigues's chief complaint presentation was cough and shortness of breath for 3 days.  However, he denies any respiratory complaints prior to presentation, but I suspect this is due to his dementia.  This morning when I spoke with him his chief complaint was diarrhea preceding his presentation to the ER.  This morning he denied any respiratory complaints he said he had no shortness of breath, cough, wheezing, orthopnea, pleurisy, or other chest pain. Actually, he reported being back at his baseline.

## 2020-01-03 NOTE — CONSULTS
This 89-year-old white gentleman is admitted with diarrhea.  Review of the chart and information that was obtained from the son states that he has been coughing and has had shortness of breath as well.    The patient reports that he had 5 or 6 bouts of diarrhea on 01/01/2020; he was weak and then admitted to the hospital.  He has had severe cough and chest congestion for the last 3 days PTA.  There is mild fever and chills.  The patient denies chest pain or tightness; there is no sputum expectoration.  Chest x-ray revealed left mid and lower lung confluent alveolar infiltrates especially in the left lung.  Repeat chest x-ray this morning reveals considerable worsening of the infiltrates especially in the left lung.  Troponin is elevated at 0.864 at admit and now 9.64; the patient denies chest pain tightness nausea vomiting or diaphoreses at admit on the prior week or 2.  The patient's health has been rapidly declining over the mars 1-2 months.  He has lost approximately 13 lb over the last 5 months.  He was admitted to this hospital 11/21/2019 with syncope a, fall and hematoma of the scalp-patient was unaware of a fall.  He was then transferred  to [place] on 12/23/2019..  Is over intake has been poor; he has had generalized weakness and a rattling cough  There is apparently no prior history of myocardial infarction.  The patient underwent 3 vessel coronary artery bypass graft surgery at [facility] in 1998.  He had a negative stress test post CABG; last stress test 02/11/2018 did not reveal any ischemia.  LVEF was estimated at 71%.  Echocardiogram 02/11/2018 revealed LVEF of 65% borderline LVH severely dilated left atrium normal LV size mild-to-moderate mitral regurgitation; PA systolic pressure was estimated at 39 mmHg.    Past history:  Anemia.  GERD.  Hypertension.  Neuropathy.  Dementia.  Left leg surgery-fracture.    Social history:  The patient is .  He stopped smoking 2 months ago.  He is  to be pack per day smoker for 70 ordered years.  He does not drink any alcohol.  He was a     Family history:  There is no history of heart disease.    Review of systems:  Recent weight loss.  The patient denies hematemesis. hematochezia melena.  He states that he had been feeling cold; he appears to be having a chill presently.     Medication:  DuoNeb every 6 hr.  Amlodipine 10 mg daily.  Aspirin 81 mg daily.  Donepezil 10 mg daily.  Ago calciferol 48050 units weekly Lasix 20 mg daily Norco 10/325 every 8 hr p.r.n. Atrovent nasal spray Lyrica 150 mg b.i.d. metoprolol ER 25 mg daily Protonix 40 mg daily sertraline 100 mg q.p.m. simvastatin 40 mg q.p.m.    Physical examination:  This is a fairly well-built white gentleman who is tachypneic.  He has a wet sounding cough.  He has anO2 facial mask     blood pressure 143/61 respirations 24 temperature 80° F 99° F O2 sats 93%    Eyes:  No conjunctival pallor or scleral icterus.   Neck:  Jugular venous pressure appears to be elevated.  Positive hepatic jugular reflex.  Carotids without bruits.  Chest:  There is extensive bilateral wheezing especially in the posterior lung fields.  Coarse rales are audible over the anterior and medial chest.  Abdomen:  Soft; nontender.  No hepatomegaly  Extremities:  No clubbing cyanosis or edema                                                                                                                                                          ECG reveals sinus rhythm nonspecific ST-T abnormality on 01/01/2020.  QT C4 82 MS hemoglobin 11.2 med crit 33.7 WBC count 4213 5730.  Platelet count 75459 granulocytes 86% lymphocytes 7% sodium 140 potassium 3.7 BUN 26 in 27 creatinine 1.17 x 2.  Magnesium 1.4 and 1.8 total protein 6.5 albumin 3.4 AST ALT within normal limits.   MB 1.9  troponin 0.864, 5.88, 9. 64.      Imaging Results   X-Ray Chest AP Portable (Final result)  Result time 01/01/20 19:17:40     Final  result by Oziel Jeffries MD (01/01/20 19:17:40)                Impression:       Development of left mid and lower lung zone confluent alveolar opacities suggesting pneumonia in the appropriate clinical setting.  Unilateral pulmonary edema or alveolar hemorrhage felt less likely.  Radiographic follow-up suggested in 6-8 weeks to document resolution.      Electronically signed by: Oziel Jeffries MD  Date:    01/01/2020  Time:    19:17           Narrative:     EXAMINATION:  XR CHEST AP PORTABLE    CLINICAL HISTORY:  Cough    FINDINGS:  Portable chest at 1900 compared with 11/21/2019 shows normal cardiomediastinal silhouette with postsurgical changes of CABG.    Since the prior exam, alveolar opacities have developed in left mid and lower lung zone.  Right lung remains clear.  No pleural effusion or pneumothorax.  No acute osseous abnormality.    Impression:  1.  Bronchopneumonia; bronchitis; COPD; diarrhea   3.  Elevated troponin; non-STEMI; LVEF 43%, moderate to severe mitral regurgitation on echo 11/2019  4.  Coronary artery bypass graft surgery in 1998  5.  Dementia; failure to thrive; recent fall-possible syncope; anemia , thrombocytopenia    Intravenous fluids will be held for now.  The patient is on Lasix 20 mg IV b.i.d..  Procalcitonin will be checked.  A limited 2D echo will be repeated to assess wall motion and EF.  Patient had echocardiogram 11/22/2019 which revealed LVEF of 43% diastolic dysfunction, severe left atrial enlargement, moderate to severe mitral regurgitation with estimated PA systolic pressure of 37 mmHg.  The basal anteroseptal, inferoseptal, mid anteroseptal and inferoseptal segments were hypokinetic.  His management is problematic considering his advanced age, dementia and CKD.  Thank you for asking me to evaluate Mr. Rodrigues and I will follow him along with you.

## 2020-01-03 NOTE — PLAN OF CARE
Problem: Wound  Goal: Optimal Wound Healing  Outcome: Ongoing, Progressing     Problem: Adult Inpatient Plan of Care  Goal: Plan of Care Review  Outcome: Ongoing, Progressing  Goal: Patient-Specific Goal (Individualization)  Outcome: Ongoing, Progressing  Goal: Absence of Hospital-Acquired Illness or Injury  Outcome: Ongoing, Progressing  Goal: Optimal Comfort and Wellbeing  Outcome: Ongoing, Progressing  Goal: Readiness for Transition of Care  Outcome: Ongoing, Progressing  Goal: Rounds/Family Conference  Outcome: Ongoing, Progressing     Problem: Fall Injury Risk  Goal: Absence of Fall and Fall-Related Injury  Outcome: Ongoing, Progressing     Problem: Infection  Goal: Infection Symptom Resolution  Outcome: Ongoing, Progressing     Problem: Skin Injury Risk Increased  Goal: Skin Health and Integrity  Outcome: Ongoing, Progressing     Problem: Oral Intake Inadequate  Goal: Improved Oral Intake  Outcome: Ongoing, Progressing

## 2020-01-03 NOTE — CONSULTS
[facility]  Pulmonology   Consult Note    Consults   Subjective     Chief Complaint   Patient presents with    Cough     x3 days with SOB      History of Present Illness:  Mr. Halley Rodrigues is an 89-year-old gentleman with past medical history of coronary artery disease, systolic heart failure, and dementia who was admitted to Hospital Medicine service a couple days ago with suspected community-acquired pneumonia is been receiving IV antibiotics and IV fluids for the past 24 hr, this morning his oxygen requirement noticed to be increasing prompting a chest x-ray which demonstrated increasing bilateral hazy airspace opacities.  His troponin is also trending.  According to the medical record Mr. Rodrigues's chief complaint presentation was cough and shortness of breath for 3 days.  However, he denies any respiratory complaints prior to presentation, but I suspect this is due to his dementia.  This morning when I spoke with him his chief complaint was diarrhea preceding his presentation to the ER.  This morning he denied any respiratory complaints he said he had no shortness of breath, cough, wheezing, orthopnea, pleurisy, or other chest pain. Actually, he reported being back at his baseline.     Past Medical History:   Diagnosis Date    Anemia     Anemia due to chronic blood loss 7/25/2018    Anemia, chronic disease 7/25/2018    Dementia     GERD (gastroesophageal reflux disease)     Heart disease     Hypertension     Neuropathy      Past Surgical History:   Procedure Laterality Date    CORONARY ARTERY BYPASS GRAFT       Family History   Problem Relation Age of Onset    Dementia Father       Social History     Tobacco Use    Smoking status: Current Every Day Smoker     Types: Cigarettes, Vaping with nicotine    Smokeless tobacco: Never Used    Tobacco comment: e cigarettes   Substance and Sexual Activity    Alcohol use: No    Drug use: No    Sexual activity: Not on file       Allergies:  Eucalyptus containing products     Facility-Administered Medications as of 1/2/2020   Medication Route Frequency    acetaminophen tablet 650 mg Oral Q4H PRN    albuterol nebulizer solution 2.5 mg Nebulization Q4H PRN    [COMPLETED] albuterol-ipratropium 2.5 mg-0.5 mg/3 mL nebulizer solution 3 mL Nebulization ED 1 Time    albuterol-ipratropium 2.5 mg-0.5 mg/3 mL nebulizer solution 3 mL Nebulization Q6H WAKE    amLODIPine tablet 10 mg Oral Daily    aspirin chewable tablet 81 mg Oral Daily    calcium chloride 1g in sodium chloride 0.9% 100mL (ready to mix system) Intravenous PRN    calcium chloride 1g in sodium chloride 0.9% 100mL (ready to mix system) Intravenous PRN    calcium chloride 1g in sodium chloride 0.9% 100mL (ready to mix system) Intravenous PRN    donepezil tablet 10 mg Oral Daily    furosemide injection 20 mg Intravenous BID    HYDROcodone-acetaminophen  mg per tablet 1 tablet Oral Q8H PRN    [START ON 1/3/2020] levoFLOXacin tablet 250 mg Oral Before breakfast    [COMPLETED] levoFLOXacin tablet 500 mg Oral Once    magnesium oxide tablet 800 mg Oral PRN    magnesium sulfate 2g in water 50mL IVPB (premix) Intravenous PRN    magnesium sulfate 2g in water 50mL IVPB (premix) Intravenous PRN    magnesium sulfate 2g in water 50mL IVPB (premix) Intravenous PRN    magnesium sulfate in dextrose IVPB (premix) 1 g Intravenous PRN    [COMPLETED] magnesium sulfate in dextrose IVPB (premix) 1 g Intravenous Once    metoprolol succinate (TOPROL-XL) 24 hr tablet 25 mg Oral Daily    pantoprazole EC tablet 40 mg Oral Daily    piperacillin-tazobactam 3.375 g in dextrose 5 % 50 mL IVPB (ready to mix system) Intravenous Q8H    potassium chloride 10 mEq in 100 mL IVPB Intravenous PRN    potassium chloride 10 mEq in 100 mL IVPB Intravenous PRN    potassium chloride 10 mEq in 100 mL IVPB Intravenous PRN    potassium chloride 10 mEq in 100 mL IVPB Intravenous PRN    potassium chloride  SA CR tablet 20 mEq Oral PRN    potassium chloride SA CR tablet 20 mEq Oral PRN    potassium chloride SA CR tablet 40 mEq Oral PRN    potassium chloride SA CR tablet 40 mEq Oral PRN    [COMPLETED] potassium chloride SA CR tablet 40 mEq Oral Once    pregabalin capsule 150 mg Oral BID    sertraline tablet 100 mg Oral QHS    simvastatin tablet 40 mg Oral QHS    sodium chloride 0.9% flush 10 mL Intravenous PRN    sodium phosphate 15 mmol in dextrose 5 % 250 mL IVPB Intravenous PRN    sodium phosphate 15 mmol in dextrose 5 % 250 mL IVPB Intravenous PRN    sodium phosphate 20.01 mmol in dextrose 5 % 250 mL IVPB Intravenous PRN    sodium phosphate 20.01 mmol in dextrose 5 % 250 mL IVPB Intravenous PRN    sodium phosphate 30 mmol in dextrose 5 % 250 mL IVPB Intravenous PRN    [COMPLETED] vancomycin (VANCOCIN) 1,500 mg in dextrose 5 % 500 mL IVPB Intravenous Once    vancomycin - pharmacy to dose Intravenous pharmacy to manage frequency     Outpatient Medications as of 1/2/2020   Medication    albuterol-ipratropium (DUO-NEB) 2.5 mg-0.5 mg/3 mL nebulizer solution    amLODIPine (NORVASC) 10 MG tablet    aspirin 81 MG Chew    donepezil (ARICEPT) 10 MG tablet    ergocalciferol (ERGOCALCIFEROL) 50,000 unit Cap    furosemide (LASIX) 20 MG tablet    HYDROcodone-acetaminophen (NORCO)  mg per tablet    ipratropium (ATROVENT) 42 mcg (0.06 %) nasal spray    LYRICA 150 mg capsule    metoprolol succinate (TOPROL-XL) 25 MG 24 hr tablet    pantoprazole (PROTONIX) 40 MG tablet    sertraline (ZOLOFT) 100 MG tablet    simvastatin (ZOCOR) 40 MG tablet      Review of Systems   Constitutional: Negative for fever, chills, weight loss and night sweats.   HENT: Negative for postnasal drip, rhinorrhea, sinus pressure and congestion.    Eyes: Negative for redness and itching.   Respiratory: Negative for cough, shortness of breath, wheezing and orthopnea.    Cardiovascular: Negative for chest pain, palpitations and leg  swelling.   Genitourinary: Negative for difficulty urinating and hematuria.   Endocrine: Negative for polydipsia, polyphagia and polyuria.    Musculoskeletal: Negative for gait problem, joint swelling and myalgias.   Skin: Negative for rash.   Gastrointestinal: Negative for nausea, vomiting, abdominal pain and acid reflux.        Positive for diarrhea   Neurological: Negative for syncope, weakness and headaches.   Hematological: Negative for adenopathy. Does not bruise/bleed easily and no excessive bruising.   Psychiatric/Behavioral: Negative for confusion and sleep disturbance. The patient is not nervous/anxious.    All other systems reviewed and are negative.     I have personally reviewed the following: active problem list, medication list, allergies, family history, social history, health maintenance, notes from last encounter, lab results, imaging and nursing/provider documentation .    Objective     VS: Temp:  [97.4 °F (36.3 °C)-99 °F (37.2 °C)]   Pulse:  []   Resp:  [18-24]   BP: (101-143)/(52-69)   SpO2:  [88 %-97 %]    Estimated body mass index is 20.94 kg/m² as calculated from the following:    Height as of this encounter: 6' (1.829 m).    Weight as of this encounter: 70 kg (154 lb 6.4 oz).   Ideal body weight: 77.6 kg (171 lb 1.2 oz)   I/O:   Intake/Output Summary (Last 24 hours) at 1/2/2020 2040  Last data filed at 1/2/2020 1100  Gross per 24 hour   Intake 50 ml   Output 200 ml   Net -150 ml        Vent: SpO2 (!) 92% on 5 liters/minute by face mask   PE Physical Exam   Constitutional: He is oriented to person, place, and time. He appears well-developed and well-nourished. He appears not cachectic.  Non-toxic appearance. No distress. He is not obese.   HENT:   Head: Normocephalic and atraumatic.   Right Ear: External ear normal.   Left Ear: External ear normal.   Nose: Nose normal.   Mouth/Throat: Uvula is midline, oropharynx is clear and moist and mucous membranes are normal. Mallampati Score: II.    Neck: Trachea normal and normal range of motion. Neck supple. No JVD present. No tracheal deviation present. No thyromegaly present.   Cardiovascular: Normal rate, regular rhythm, normal heart sounds and intact distal pulses. Exam reveals no gallop and no friction rub.   No murmur heard.  Pulmonary/Chest: Normal expansion, hyperinflation, symmetric chest wall expansion and effort normal. No stridor. He has no wheezes. He has no rhonchi. He has rales (At both bases).   Abdominal: Soft. Bowel sounds are normal. He exhibits no distension. There is no tenderness. There is no guarding.   Musculoskeletal: Normal range of motion. He exhibits no edema, tenderness or deformity.   Lymphadenopathy: No supraclavicular adenopathy is present.     He has no cervical adenopathy.     He has no axillary adenopathy.   Neurological: He is alert and oriented to person, place, and time. He has normal strength. No cranial nerve deficit or sensory deficit. He exhibits normal muscle tone. GCS eye subscore is 4. GCS verbal subscore is 5. GCS motor subscore is 6.   Skin: Skin is warm, dry and intact. No rash noted. He is not diaphoretic. No cyanosis. Nails show no clubbing.   Psychiatric: He has a normal mood and affect. His speech is normal and behavior is normal.   Nursing note and vitals reviewed.       Recent Diagnostic Studies:  Labs I have personally reviewed and interpreted all recent labs / diagnostic studies, and noted the following relevant results:  Recent Labs   Lab 01/02/20  0201 01/02/20  0446  01/02/20  1540   WBC 5.73  --   --   --    RBC 3.56*  --   --   --    HGB 11.2*  --   --   --    HCT 33.7*  --   --   --    PLT 58*  --   --   --    MCV 95  --   --   --    MCH 31.5*  --   --   --    MCHC 33.2  --   --   --     140  --   --    K 3.5 3.7  --   --     104  --   --    CO2 22* 21*  --   --    BUN 25* 27*  --   --    CREATININE 1.6* 1.7*  --   --    MG 1.7 1.8  --   --    TROPONINI 0.696*  --    < > 9.640*    < >  = values in this interval not displayed.     BNP:  3 1 1  Serum lactate:  1.5  Urine drug screen:  Negative for any drugs of abuse.  Amylase 31, lipase 39  Rapid influenza swab:  Negative for both influenza a and B  Urinalysis unremarkable  Sodium:  28  Creatinine:  71   Imaging I have personally reviewed and interpreted all available images and reviewed the associated Radiology reports.  My personal impression of the relevant studies is as follows:  Renal ultrasound:  Bilateral renal cysts  No hydronephrosis    Chest x-ray:  2020  Left-sided mid and lower lung zone predominant hazy opacities.    Chest x-ray:  2020  Increasing left-sided hazy opacities with some opacification of the right hemithorax compared to 1 day prior.    TTE:  2019  · Mild left ventricular enlargement.  · Decreased left ventricular systolic function. The estimated ejection fraction is 43%  · Local segmental wall motion abnormalities.  · Grade I (mild) left ventricular diastolic dysfunction consistent with impaired relaxation.  · Mild right ventricular enlargement.  · Low normal right ventricular systolic function.  · Severe left atrial enlargement.  · Mild right atrial enlargement.  · Moderate-to-severe mitral regurgitation.  · Mild tricuspid regurgitation.  · Normal central venous pressure (3 mm Hg).  · The estimated PA systolic pressure is 37 mm Hg    EK2020  · Normal sinus rhythm without any ischemic changes.     Micro I have personally reviewed and interpreted the available culture data.  Relevant results are as follows.  Blood Culture   Lab Results   Component Value Date    LABBLOO No Growth to date 2020   , Sputum Culture   Lab Results   Component Value Date    GSRESP <10 epithelial cells per low power field. 2020    GSRESP Many WBC's 2020    GSRESP Many Gram positive cocci 2020    and Urine Culture  No results found for: LABURIN       Assessment       Active Hospital Problems     Diagnosis    *Pneumonia    Acute respiratory failure with hypoxia    Stage 3 chronic kidney disease    Pneumonia due to infectious organism    Hypokalemia    Thrombocytopenia, unspecified    Hypomagnesemia    Essential hypertension    Dementia    NSTEMI (non-ST elevated myocardial infarction)    Chronic combined systolic and diastolic heart failure    Anemia, chronic disease        My Impression:  Community-acquired pneumonia with subsequent iatrogenic cardiogenic pulmonary edema and NSTEMI.    Plan     Pulmonary  · Agree with empiric coverage for community-acquired respiratory pathogens.  · Agree with diuresis and discontinuation of IV fluids.  · I suspect that his acute deterioration since admission secondary to is zealous crystalloid resuscitation rather than progression of his respiratory infection.  · Cardiology following, and I will defer to their expertise regarding management of his cardiac comorbidities.  · Titrate supplemental oxygen to maintain an SpO2 greater than 90%.     Thank you for your consult. I will continue to follow along with you.  Please do not hesitate to call with any additional questions or concerns.    Ortiz Senior MD  Pulmonary / Critical Care Medicine  [facility]

## 2020-01-03 NOTE — PLAN OF CARE
01/03/20 1620   Discharge Reassessment   Assessment Type Discharge Planning Reassessment   Anticipated Discharge Disposition Home-Health   Patient has [facility]. Dr Theodore was notified that  This patient will need a [facility] order at [facility].

## 2020-01-04 PROBLEM — R19.5 LOOSE STOOLS: Status: ACTIVE | Noted: 2020-01-04

## 2020-01-04 LAB
ANION GAP SERPL CALC-SCNC: 11 MMOL/L (ref 8–16)
BACTERIA SPEC AEROBE CULT: NORMAL
BASOPHILS # BLD AUTO: 0 K/UL (ref 0–0.2)
BASOPHILS NFR BLD: 0 % (ref 0–1.9)
BUN SERPL-MCNC: 30 MG/DL (ref 8–23)
CALCIUM SERPL-MCNC: 8.3 MG/DL (ref 8.7–10.5)
CHLORIDE SERPL-SCNC: 102 MMOL/L (ref 95–110)
CO2 SERPL-SCNC: 26 MMOL/L (ref 23–29)
CREAT SERPL-MCNC: 1.6 MG/DL (ref 0.5–1.4)
DIFFERENTIAL METHOD: ABNORMAL
EOSINOPHIL # BLD AUTO: 0 K/UL (ref 0–0.5)
EOSINOPHIL NFR BLD: 0.2 % (ref 0–8)
ERYTHROCYTE [DISTWIDTH] IN BLOOD BY AUTOMATED COUNT: 14.2 % (ref 11.5–14.5)
EST. GFR  (AFRICAN AMERICAN): 43.5 ML/MIN/1.73 M^2
EST. GFR  (NON AFRICAN AMERICAN): 37.6 ML/MIN/1.73 M^2
GLUCOSE SERPL-MCNC: 103 MG/DL (ref 70–110)
GRAM STN SPEC: NORMAL
HCT VFR BLD AUTO: 29.2 % (ref 40–54)
HGB BLD-MCNC: 10 G/DL (ref 14–18)
IMM GRANULOCYTES # BLD AUTO: 0.03 K/UL (ref 0–0.04)
IMM GRANULOCYTES NFR BLD AUTO: 0.6 % (ref 0–0.5)
LABCORP MISC TEST CODE: NORMAL
LABCORP MISC TEST NAME: NORMAL
LABCORP MISCELLANEOUS TEST: NORMAL
LYMPHOCYTES # BLD AUTO: 0.7 K/UL (ref 1–4.8)
LYMPHOCYTES NFR BLD: 13.6 % (ref 18–48)
MAGNESIUM SERPL-MCNC: 1.7 MG/DL (ref 1.6–2.6)
MCH RBC QN AUTO: 31.8 PG (ref 27–31)
MCHC RBC AUTO-ENTMCNC: 34.2 G/DL (ref 32–36)
MCV RBC AUTO: 93 FL (ref 82–98)
MONOCYTES # BLD AUTO: 0.4 K/UL (ref 0.3–1)
MONOCYTES NFR BLD: 8 % (ref 4–15)
NEUTROPHILS # BLD AUTO: 4.1 K/UL (ref 1.8–7.7)
NEUTROPHILS NFR BLD: 77.6 % (ref 38–73)
NRBC BLD-RTO: 0 /100 WBC
PLATELET # BLD AUTO: 63 K/UL (ref 150–350)
PMV BLD AUTO: 13.2 FL (ref 9.2–12.9)
POTASSIUM SERPL-SCNC: 3.3 MMOL/L (ref 3.5–5.1)
RBC # BLD AUTO: 3.14 M/UL (ref 4.6–6.2)
SODIUM SERPL-SCNC: 139 MMOL/L (ref 136–145)
VANCOMYCIN SERPL-MCNC: 15.7 UG/ML
WBC # BLD AUTO: 5.23 K/UL (ref 3.9–12.7)

## 2020-01-04 PROCEDURE — 89055 LEUKOCYTE ASSESSMENT FECAL: CPT

## 2020-01-04 PROCEDURE — 63600175 PHARM REV CODE 636 W HCPCS: Performed by: INTERNAL MEDICINE

## 2020-01-04 PROCEDURE — 87324 CLOSTRIDIUM AG IA: CPT

## 2020-01-04 PROCEDURE — 87046 STOOL CULTR AEROBIC BACT EA: CPT

## 2020-01-04 PROCEDURE — 83735 ASSAY OF MAGNESIUM: CPT

## 2020-01-04 PROCEDURE — 85025 COMPLETE CBC W/AUTO DIFF WBC: CPT

## 2020-01-04 PROCEDURE — 93005 ELECTROCARDIOGRAM TRACING: CPT

## 2020-01-04 PROCEDURE — 87449 NOS EACH ORGANISM AG IA: CPT

## 2020-01-04 PROCEDURE — 87045 FECES CULTURE AEROBIC BACT: CPT

## 2020-01-04 PROCEDURE — 25000003 PHARM REV CODE 250: Performed by: INTERNAL MEDICINE

## 2020-01-04 PROCEDURE — 80048 BASIC METABOLIC PNL TOTAL CA: CPT

## 2020-01-04 PROCEDURE — 21000000 HC CCU ICU ROOM CHARGE

## 2020-01-04 PROCEDURE — 80202 ASSAY OF VANCOMYCIN: CPT

## 2020-01-04 RX ORDER — FUROSEMIDE 20 MG/1
20 TABLET ORAL DAILY
Status: DISCONTINUED | OUTPATIENT
Start: 2020-01-05 | End: 2020-01-06 | Stop reason: HOSPADM

## 2020-01-04 RX ADMIN — SERTRALINE HYDROCHLORIDE 100 MG: 50 TABLET ORAL at 08:01

## 2020-01-04 RX ADMIN — PREGABALIN 150 MG: 75 CAPSULE ORAL at 09:01

## 2020-01-04 RX ADMIN — FUROSEMIDE 20 MG: 10 INJECTION, SOLUTION INTRAMUSCULAR; INTRAVENOUS at 09:01

## 2020-01-04 RX ADMIN — METOPROLOL SUCCINATE 25 MG: 25 TABLET, EXTENDED RELEASE ORAL at 09:01

## 2020-01-04 RX ADMIN — POTASSIUM CHLORIDE 40 MEQ: 20 TABLET, EXTENDED RELEASE ORAL at 06:01

## 2020-01-04 RX ADMIN — PANTOPRAZOLE SODIUM 40 MG: 40 TABLET, DELAYED RELEASE ORAL at 05:01

## 2020-01-04 RX ADMIN — ASPIRIN 81 MG 81 MG: 81 TABLET ORAL at 09:01

## 2020-01-04 RX ADMIN — AMLODIPINE BESYLATE 10 MG: 5 TABLET ORAL at 09:01

## 2020-01-04 RX ADMIN — PIPERACILLIN SODIUM AND TAZOBACTAM SODIUM 3.38 G: 3; .375 INJECTION, POWDER, LYOPHILIZED, FOR SOLUTION INTRAVENOUS at 12:01

## 2020-01-04 RX ADMIN — LEVOFLOXACIN 250 MG: 250 TABLET, FILM COATED ORAL at 05:01

## 2020-01-04 RX ADMIN — DONEPEZIL HYDROCHLORIDE 10 MG: 5 TABLET, FILM COATED ORAL at 09:01

## 2020-01-04 RX ADMIN — SIMVASTATIN 40 MG: 40 TABLET, FILM COATED ORAL at 08:01

## 2020-01-04 RX ADMIN — MAGNESIUM OXIDE 800 MG: 400 TABLET ORAL at 06:01

## 2020-01-04 RX ADMIN — PREGABALIN 150 MG: 75 CAPSULE ORAL at 08:01

## 2020-01-04 RX ADMIN — PIPERACILLIN SODIUM AND TAZOBACTAM SODIUM 3.38 G: 3; .375 INJECTION, POWDER, LYOPHILIZED, FOR SOLUTION INTRAVENOUS at 09:01

## 2020-01-04 RX ADMIN — LACTOBACILLUS TAB 4 TABLET: TAB at 06:01

## 2020-01-04 NOTE — ASSESSMENT & PLAN NOTE
Left middle and lower lobe pneumonia on imaging- worsening on repeat imaging 1/2.  Unchanged on CXR 1/3.  Supplemental oxygen, increased requirements 1/2/20- better 1/3 and now back to 2L NC  Concern for hospital-acquired pneumonia in the setting of recent hospital and skilled nursing facility stay  Ordered respiratory viral panel, MRSA PCR and influenza testing  Follow sputum and blood cultures  Will cover with vancomycin and piperacillin-tazobactam. Levaquin added to cover for any legionella.  Pulmonary consulted given worsening state- following

## 2020-01-04 NOTE — PLAN OF CARE
01/03/20 1918   Patient Assessment/Suction   Level of Consciousness (AVPU) alert   Respiratory Effort Normal;Unlabored   Expansion/Accessory Muscles/Retractions no use of accessory muscles   All Lung Fields Breath Sounds coarse;crackles   Cough Frequency infrequent   Cough Type productive   Secretions Amount small   Secretions Color red-streaked   PRE-TX-O2   O2 Device (Oxygen Therapy) Oxymask   Flow (L/min) 4   SpO2 96 %   Pulse Oximetry Type Continuous   $ Pulse Oximetry - Multiple Charge Pulse Oximetry - Multiple   Pulse 82   Resp (!) 24   Aerosol Therapy   $ Aerosol Therapy Charges Aerosol Treatment   Daily Review of Necessity (SVN) completed   Respiratory Treatment Status (SVN) given   Treatment Route (SVN) mask   Patient Position (SVN) semi-Paul's   Post Treatment Assessment (SVN) increased aeration   Signs of Intolerance (SVN) none   Breath Sounds Post-Respiratory Treatment   Throughout All Fields Post-Treatment All Fields   Throughout All Fields Post-Treatment aeration increased   Post-treatment Heart Rate (beats/min) 80   Post-treatment Resp Rate (breaths/min) 22   continue tx.as ordered

## 2020-01-04 NOTE — PROGRESS NOTES
INPATIENT NEPHROLOGY CONSULT   [facility] NEPHROLOGY    Halley Singerjosi  01/04/2020    Reason for consultation:    sergio    Chief Complaint:   Chief Complaint   Patient presents with    Cough     x3 days with SOB          History of Present Illness:    Per H and P    Patient is a 89-year-old  male with known history of CAD status post CABG, dementia, chronic combined systolic and diastolic congestive heart failure and anemia of chronic disease who was recently discharged from our facility after being admitted for syncope and eventually transferred to skilled nursing facility presents with chief complaint of shortness of breath and cough.  History is limited secondary to patient's dementia and is supplemented by his son at bedside.     Patient was discharged from [facility] on 12/23.  According to the patient's son patient was noted to be more weak over the last 3 days.  He was noted to have diarrhea which was nonbloody and watery over the last 2 days.  Reported poor oral intake and generalized weakness.  Patient was also noted to have a rattling cough.  Son denies known choking episodes or aspiration events in the past.  Patient denies chest pain but admits to shortness of breath is worse with exertion and better with rest.  Symptoms are moderate in nature.  No lightheadedness, lower extremity edema, palpitations, abdominal pain, nausea/vomiting or urinary problems. Patient is prone to frequent falls; noncompliant with walker.     In the ER:  Hemodynamically stable.  Patient was noted to be hypoxic with O2 sats in the 80s on room air which improved to greater than 90% on supplemental oxygen.  He was also noted to be tachypneic.  Laboratory workup revealed chronic normocytic anemia, thrombocytopenia, hypokalemia, CKD stage 3, elevated BNP and troponin.  EKG revealed normal sinus rhythm with nonspecific ST-T wave changes and prolonged QT interval.  It is unchanged from EKG  dated 11/26/2019.  Chest x-ray revealed left mid and lower lung zone alveolar opacities concerning for pneumonia.  Admitted for further management.    1/2/20  Consulted for GARRETT.  No nausea, chest pain, sob, fever, urinary or bowel complaint, new neurologic symptoms, new joint pain,    1/3/20  No nausea, chest pain, sob, fever, urinary or bowel complaint, new neurologic symptoms, new joint pain,  1/4  Renal function improving.  Hypokalemic, has prn repletion orders.  Denies chest pain, SOB, n/v/d/c    Plan of Care:       Assessment:    Acute kidney injury likely hemodynamcially mediated with h/o several days of diarrhea and poor po intake     --renal/bladder ultrasound negative for obstruction     --no nsaids, coxibs, iv contrast     Mild metabolic acidosis (gap and nongap)  --no intervention at this time     Anemia  --blood products per primary    Pneumonia  --abx per primary  --avoid Vancomycin toxicity      Hypokalemia--replete per prn orders      Hold diuretics if creatinine starts to rise      Thank you for allowing us to participate in this patient's care. We will continue to follow.    Vital Signs:  Temp Readings from Last 3 Encounters:   01/04/20 98.3 °F (36.8 °C) (Oral)   12/03/19 97.7 °F (36.5 °C)   09/09/19 98.1 °F (36.7 °C)       Pulse Readings from Last 3 Encounters:   01/04/20 79   12/03/19 64   10/21/19 (!) 54       BP Readings from Last 3 Encounters:   01/04/20 (!) 108/59   12/03/19 (!) 143/62   10/21/19 (!) 154/78       Weight:  Wt Readings from Last 3 Encounters:   01/04/20 66.2 kg (146 lb)   01/03/20 69.6 kg (153 lb 7 oz)   12/03/19 63.9 kg (140 lb 14 oz)       Past Medical & Surgical History:  Past Medical History:   Diagnosis Date    Anemia     Anemia due to chronic blood loss 7/25/2018    Anemia, chronic disease 7/25/2018    Dementia     GERD (gastroesophageal reflux disease)     Heart disease     Hypertension     Neuropathy        Past Surgical History:   Procedure Laterality Date     CORONARY ARTERY BYPASS GRAFT         Past Social History:  Social History     Socioeconomic History    Marital status:      Spouse name: Not on file    Number of children: Not on file    Years of education: Not on file    Highest education level: Not on file   Occupational History    Not on file   Social Needs    Financial resource strain: Not on file    Food insecurity:     Worry: Not on file     Inability: Not on file    Transportation needs:     Medical: Not on file     Non-medical: Not on file   Tobacco Use    Smoking status: Current Every Day Smoker     Types: Cigarettes, Vaping with nicotine    Smokeless tobacco: Never Used    Tobacco comment: e cigarettes   Substance and Sexual Activity    Alcohol use: No    Drug use: No    Sexual activity: Not on file   Lifestyle    Physical activity:     Days per week: Not on file     Minutes per session: Not on file    Stress: Not on file   Relationships    Social connections:     Talks on phone: Not on file     Gets together: Not on file     Attends Buddhism service: Not on file     Active member of club or organization: Not on file     Attends meetings of clubs or organizations: Not on file     Relationship status: Not on file   Other Topics Concern    Not on file   Social History Narrative    Not on file       Medications:  No current facility-administered medications on file prior to encounter.      Current Outpatient Medications on File Prior to Encounter   Medication Sig Dispense Refill    albuterol-ipratropium (DUO-NEB) 2.5 mg-0.5 mg/3 mL nebulizer solution Take 3 mLs by nebulization every 6 (six) hours as needed for Wheezing or Shortness of Breath. 1 Box 0    amLODIPine (NORVASC) 10 MG tablet Take 1 tablet (10 mg total) by mouth once daily. 30 tablet 0    aspirin 81 MG Chew Take 81 mg by mouth once daily.       donepezil (ARICEPT) 10 MG tablet TAKE 1 TABLET BY MOUTH ONCE DAILY (Patient taking differently: Take 10 mg by mouth once  daily. ) 90 tablet 1    ergocalciferol (ERGOCALCIFEROL) 50,000 unit Cap TAKE ONE CAPSULE BY MOUTH EVERY WEEK 12 capsule 0    furosemide (LASIX) 20 MG tablet Take 20 mg by mouth once daily.       HYDROcodone-acetaminophen (NORCO)  mg per tablet Take 1 tablet by mouth every 8 (eight) hours as needed (pain). Medically necessary for greater than 7 days for chronic pain 90 tablet 0    ipratropium (ATROVENT) 42 mcg (0.06 %) nasal spray 1 spray by Nasal route 2 (two) times daily.  0    LYRICA 150 mg capsule Take 150 mg by mouth 2 (two) times daily.       metoprolol succinate (TOPROL-XL) 25 MG 24 hr tablet Take 1 tablet (25 mg total) by mouth once daily. 30 tablet 0    pantoprazole (PROTONIX) 40 MG tablet Take 40 mg by mouth once daily.       sertraline (ZOLOFT) 100 MG tablet Take 100 mg by mouth every evening.       simvastatin (ZOCOR) 40 MG tablet Take 40 mg by mouth every evening.        Scheduled Meds:   albuterol-ipratropium  3 mL Nebulization Q6H WAKE    amLODIPine  10 mg Oral Daily    aspirin  81 mg Oral Daily    donepezil  10 mg Oral Daily    furosemide  20 mg Intravenous BID    levoFLOXacin  250 mg Oral Before breakfast    metoprolol succinate  25 mg Oral Daily    pantoprazole  40 mg Oral Daily    piperacillin-tazobactam (ZOSYN) IVPB  3.375 g Intravenous Q8H    pregabalin  150 mg Oral BID    sertraline  100 mg Oral QHS    simvastatin  40 mg Oral QHS     Continuous Infusions:  PRN Meds:.acetaminophen, albuterol sulfate, calcium chloride IVPB, calcium chloride IVPB, calcium chloride IVPB, HYDROcodone-acetaminophen, magnesium oxide, magnesium sulfate IVPB, magnesium sulfate IVPB, magnesium sulfate IVPB, magnesium sulfate IVPB, potassium chloride in water, potassium chloride in water, potassium chloride in water, potassium chloride in water, potassium chloride, potassium chloride, potassium chloride, potassium chloride, sodium chloride 0.9%, sodium phosphate IVPB, sodium phosphate IVPB,  sodium phosphate IVPB, sodium phosphate IVPB, sodium phosphate IVPB, Pharmacy to dose Vancomycin consult **AND** vancomycin - pharmacy to dose    Allergies:  Eucalyptus containing products    Past Family History:  Reviewed; refer to Hospitalist Admission Note    Review of Systems:  Review of Systems - All 14 systems reviewed and negative, except as noted in HPI    Physical Exam:    BP (!) 108/59   Pulse 79   Temp 98.3 °F (36.8 °C) (Oral)   Resp (!) 44   Ht 6' (1.829 m)   Wt 66.2 kg (146 lb)   SpO2 96%   BMI 19.80 kg/m²     General Appearance:    Alert, cooperative, no distress, appears stated age   Head:    Normocephalic, without obvious abnormality, atraumatic   Eyes:    PER, conjunctiva/corneas clear, EOM's intact in both eyes        Throat:   Lips, mucosa, and tongue normal; teeth and gums normal   Back:     Symmetric, no curvature, ROM normal, no CVA tenderness   Lungs:     Exp wheezes    Chest wall:    No tenderness or deformity   Heart:    Regular rate and rhythm, S1 and S2 normal, no murmur, rub   or gallop   Abdomen:     Soft, non-tender, bowel sounds active all four quadrants,     no masses, no organomegaly   Extremities:   Extremities normal, atraumatic, no cyanosis or edema   Pulses:   2+ and symmetric all extremities   MSK:   No joint or muscle swelling, tenderness or deformity   Skin:   Skin color, texture, turgor normal, no rashes or lesions   Neurologic:   CNII-XII intact, normal strength and sensation       Throughout.  No flap     Results:  Lab Results   Component Value Date     01/04/2020    K 3.3 (L) 01/04/2020     01/04/2020    CO2 26 01/04/2020    BUN 30 (H) 01/04/2020    CREATININE 1.6 (H) 01/04/2020    CALCIUM 8.3 (L) 01/04/2020    ANIONGAP 11 01/04/2020    ESTGFRAFRICA 43.5 (A) 01/04/2020    EGFRNONAA 37.6 (A) 01/04/2020       Lab Results   Component Value Date    CALCIUM 8.3 (L) 01/04/2020    PHOS 3.1 01/03/2020       Recent Labs   Lab 01/04/20  0739   WBC 5.23   RBC 3.14*    HGB 10.0*   HCT 29.2*   PLT 63*   MCV 93   MCH 31.8*   MCHC 34.2       I have personally reviewed pertinent radiological imaging and reports.

## 2020-01-04 NOTE — ASSESSMENT & PLAN NOTE
Denies chest pain   Likely demand ischemia in the setting of pneumonia and HF but given that troponin has risen to 9, I do suspect underlying disease   Goal directed therapy  Cardiology following. Not a good candidate for invasive treatment given age and co morbidities  Telemetry monitoring.

## 2020-01-04 NOTE — PROGRESS NOTES
VANCOMYCIN PHARMACOKINETIC NOTE:  Vancomycin Day #3    Objective:    89 y.o., male, Actual Body Weight = 66.2 kg (146 lb)    Diagnosis/Indication for Vancomycin: Pneumonia  Desired Vancomycin Peak:  30-35 mcg/ml; Desired Trough: 10-15 mcg/ml    Current Vancomycin Regimen:  Vancomcyin 1250 mg IV Intermittent dosing per random levels.   Antibiotics (From admission, onward)      Start     Stop Route Frequency Ordered    01/04/20 1730  vancomycin (VANCOCIN) 1,250 mg in dextrose 5 % 250 mL IVPB      -- IV Once 01/04/20 1123    01/03/20 0600  levoFLOXacin tablet 250 mg     Note to Pharmacy:  Ht: 6' (1.829 m)  Wt: 70 kg (154 lb 6.4 oz)  Estimated Creatinine Clearance: 29.2 mL/min (A) (based on SCr of 1.7 mg/dL (H)).       -- Oral Before breakfast 01/02/20 1755    01/02/20 0900  piperacillin-tazobactam 3.375 g in dextrose 5 % 50 mL IVPB (ready to mix system)  (piperacillin-tazobactam 4.5 G / tobra 5mg/kg IV with lab panel / vancomycin IV with lab panel)      -- IV Every 8 hours (non-standard times) 01/02/20 0630    01/01/20 2301  vancomycin - pharmacy to dose  (piperacillin-tazobactam 4.5 G / tobra 5mg/kg IV with lab panel / vancomycin IV with lab panel)      -- IV pharmacy to manage frequency 01/01/20 2204             The patient has the following labs:     1/4/2020 Estimated Creatinine Clearance: 29.3 mL/min (A) (based on SCr of 1.6 mg/dL (H)). Lab Results   Component Value Date    BUN 30 (H) 01/04/2020       Lab Results   Component Value Date    WBC 5.23 01/04/2020          Random vancomycin:  15.7 mcg/mL collected 1/4/2020 23 hours after Dose #2      Assessment:  Vancomycin dose =  15 mg/kg  Plan:  Will schedule next dose for 1/4/2020 at 17:30, approximately 32.5 hours since last dose  Random Vanco scheduled 1/5/2020 @ 16:30.  Pharmacy will continue to manage vancomycin therapy and adjust regimen as necessary.    Thank you for allowing us to participate in this patient's care.     Alma Garcia 1/4/2020 11:29  AM  Department of Pharmacy  Ext [phone]

## 2020-01-04 NOTE — PROGRESS NOTES
[facility]  Progress Note    Patient Name: Halley Rodrigues  MRN: [id number]  Patient Class: IP- Inpatient   Admission Date: 1/1/2020  Length of Stay: 1 days  Attending Physician: Michelle Theodore MD  Primary Care Provider: Scott Payne MD        Subjective:     Principal Problem:Pneumonia        HPI:  Patient is a 89-year-old  male with known history of CAD status post CABG, dementia, chronic combined systolic and diastolic congestive heart failure and anemia of chronic disease who was recently discharged from our facility after being admitted for syncope and eventually transferred to skilled nursing facility presents with chief complaint of shortness of breath and cough.  History is limited secondary to patient's dementia and is supplemented by his son at bedside.    Patient was discharged from [facility] on 12/23.  According to the patient's son patient was noted to be more weak over the last 3 days.  He was noted to have diarrhea which was nonbloody and watery over the last 2 days.  Reported poor oral intake and generalized weakness.  Patient was also noted to have a rattling cough.  Son denies known choking episodes or aspiration events in the past.  Patient denies chest pain but admits to shortness of breath is worse with exertion and better with rest.  Symptoms are moderate in nature.  No lightheadedness, lower extremity edema, palpitations, abdominal pain, nausea/vomiting or urinary problems. Patient is prone to frequent falls; noncompliant with walker.    In the ER:  Hemodynamically stable.  Patient was noted to be hypoxic with O2 sats in the 80s on room air which improved to greater than 90% on supplemental oxygen.  He was also noted to be tachypneic.  Laboratory workup revealed chronic normocytic anemia, thrombocytopenia, hypokalemia, CKD stage 3, elevated BNP and troponin.  EKG revealed normal sinus rhythm with nonspecific ST-T  wave changes and prolonged QT interval.  It is unchanged from EKG dated 11/26/2019.  Chest x-ray revealed left mid and lower lung zone alveolar opacities concerning for pneumonia.  Admitted for further management.    Rest of the 10 point review of systems is negative except as mentioned above.      Overview/Hospital Course:  Patient admitted with acute hypoxic respiratory failure suspected to be pneumonia.  IV abx started.  Troponin mildly elevated and thus he was admitted to cardiology.  1/2/20 CXR with worsening infiltrate and patient with increased O2 needs- oxymask is working better than NC.  Torponin has continued to climb.  He has no complaints but has dementia and family reports he is not a complainer. Patient moved to [facility] for continuous cardiac monitoring.  Cardiology consulted on admission.  Pulmonary consulted given worsening infiltrate. IV lasix ordered in the event this could be pulmonary edema.  Poor urine output in response and thus Renal consulted for oliguria and is following.  1/3/19 patient looks much improved but still requiring supplemental O2 to keep saturations greater than 88%. Echo done and EF 34-42%.  EF 43% November 2019 but otherwise unchanged.    Interval History: Pleasantly confused.  Sitting up in chair on room air eating lunch.  Denies chest pain or SOB.  Participating with therapy.    Review of Systems   Reason unable to perform ROS: Dementia.     Objective:     Vital Signs (Most Recent):  Temp: 98.8 °F (37.1 °C) (01/03/20 1942)  Pulse: 82 (01/03/20 1918)  Resp: (!) 24 (01/03/20 1918)  BP: 136/87 (01/03/20 1942)  SpO2: 96 % (01/03/20 1918) Vital Signs (24h Range):  Temp:  [98 °F (36.7 °C)-99 °F (37.2 °C)] 98.8 °F (37.1 °C)  Pulse:  [79-97] 82  Resp:  [20-24] 24  SpO2:  [86 %-96 %] 96 %  BP: (113-142)/(55-87) 136/87     Weight: 69.6 kg (153 lb 8 oz)  Body mass index is 20.82 kg/m².    Intake/Output Summary (Last 24 hours) at 1/3/2020 2016  Last data filed at 1/3/2020 0500  Gross per  24 hour   Intake 755 ml   Output 600 ml   Net 155 ml      Physical Exam   Constitutional: He appears well-developed. No distress.   Sitting up in chair   HENT:   Head: Normocephalic and atraumatic.   Mouth/Throat: Oropharynx is clear and moist.   Eyes: Pupils are equal, round, and reactive to light. EOM are normal.   Neck: Normal range of motion.   Cardiovascular: Regular rhythm.   No murmur heard.  Pulmonary/Chest: Effort normal and breath sounds normal. He has no wheezes.   Abdominal: Soft. He exhibits no distension. There is no tenderness. There is no rebound and no guarding.   Musculoskeletal: Normal range of motion.   Neurological: He is alert. No cranial nerve deficit or sensory deficit.   Skin: No rash noted.   Psychiatric: He has a normal mood and affect.   Nursing note and vitals reviewed.      Significant Labs:   CBC:   Recent Labs   Lab 01/01/20 2022 01/02/20  0201 01/03/20  0341   WBC 4.23 5.73 5.03  5.03   HGB 10.9* 11.2* 9.7*  9.7*   HCT 32.3* 33.7* 28.9*  28.9*   PLT 59* 58* 58*  58*     CMP:   Recent Labs   Lab 01/01/20 2022 01/02/20  0446 01/03/20  0341 01/03/20  1341      < > 140 139  139 137   K 3.1*   < > 3.7 3.1*  3.1* 3.4*      < > 104 103  103 99   CO2 24   < > 21* 24  24 25      < > 135* 109  109 98   BUN 26*   < > 27* 32*  32* 32*   CREATININE 1.7*   < > 1.7* 1.7*  1.7* 1.8*   CALCIUM 8.4*   < > 8.4* 8.0*  8.0* 8.5*   PROT 6.5  --   --   --   --    ALBUMIN 3.4*  --   --  2.9*  --    BILITOT 1.2*  --   --   --   --    ALKPHOS 52*  --   --   --   --    AST 30  --   --   --   --    ALT 16  --   --   --   --    ANIONGAP 14   < > 15 12  12 13   EGFRNONAA 35.0*   < > 35.0* 35.0*  35.0* 32.6*    < > = values in this interval not displayed.     Cardiac Markers:   Recent Labs   Lab 01/03/20  0342   *     Troponin:   Recent Labs   Lab 01/02/20  0201 01/02/20  0814 01/02/20  1540   TROPONINI 0.696* 5.882* 9.640*       Significant Imaging: Tele personally  reviewed and NSR      Assessment/Plan:      * Pneumonia  Left middle and lower lobe pneumonia on imaging- worsening on repeat imaging 1/2.  Unchanged on CXR 1/3.  Supplemental oxygen, increased requirements 1/2/20- better 1/3 and now back to 2L NC  Concern for hospital-acquired pneumonia in the setting of recent hospital and skilled nursing facility stay  Ordered respiratory viral panel, MRSA PCR and influenza testing  Follow sputum and blood cultures  Will cover with vancomycin and piperacillin-tazobactam. Levaquin added to cover for any legionella.  Pulmonary consulted given worsening state- following          Hypomagnesemia  Replete and monitor daily as per sliding scale      Thrombocytopenia, unspecified  Chronic issue  However platelet count is lower than his baseline  Daily CBC  Avoid heparin or related products      Hypokalemia  Replete as per sliding scale and monitor daily      Pneumonia due to infectious organism        Stage 3 chronic kidney disease  Patient with poor response to IV lasix.  Renal consulted.  Discussed with Dr. Garcia and will continue the gentle IVFs and IV lasix at this time.  If worsening renal function, will need to stop the IV lasix.      Acute respiratory failure with hypoxia  Due to pneumonia and HF  Weaning O2 support as able      Essential hypertension  Stable  Continue home amlodipine and metoprolol succinate      Dementia  Discussed with family who reports he is at his mental status baseline  Continue home donepezil       NSTEMI (non-ST elevated myocardial infarction)  Denies chest pain   Likely demand ischemia in the setting of pneumonia and HF but given that troponin has risen to 9, I do suspect underlying disease   Goal directed therapy  Cardiology following. Not a good candidate for invasive treatment given age and co morbidities  Telemetry monitoring.     Chronic combined systolic and diastolic heart failure  Aware  Reviewed recent echo from November 2019 with EF of 43% and  grade 1 diastolic dysfunction. Repeat Echo with EF 34-42%.  IV lasix in the event CXR also represents pulmonary edema, which suspect is likely.  Following renal and cardiology recommendations.       Anemia, chronic disease  Aware  Hb lower than baseline; no report of blood loss  Trend CBC daily         VTE Risk Mitigation (From admission, onward)         Ordered     Place sequential compression device  Until discontinued      01/01/20 2204     IP VTE HIGH RISK PATIENT  Once      01/01/20 2204                      Michelle Theodore MD  Department of Hospital Medicine   [facility] hospital

## 2020-01-04 NOTE — PT/OT/SLP PROGRESS
Physical Therapy      Patient Name:  Halley Rodrigues   MRN:  [id number]    Patient not seen today secondary to Unavailable (Comment)(Pt out of room. PTA unable to return for pm attempt. ). Will follow-up tomorrow .    Hillary Hammant, PTA

## 2020-01-04 NOTE — ASSESSMENT & PLAN NOTE
Aware  Reviewed recent echo from November 2019 with EF of 43% and grade 1 diastolic dysfunction. Repeat Echo with EF 34-42%.  IV lasix in the event CXR also represents pulmonary edema, which suspect is likely.  Following renal and cardiology recommendations.

## 2020-01-04 NOTE — SUBJECTIVE & OBJECTIVE
Interval History: Pleasantly confused.  Sitting up in chair on room air eating lunch.  Denies chest pain or SOB.  Participating with therapy.    Review of Systems   Reason unable to perform ROS: Dementia.     Objective:     Vital Signs (Most Recent):  Temp: 98.8 °F (37.1 °C) (01/03/20 1942)  Pulse: 82 (01/03/20 1918)  Resp: (!) 24 (01/03/20 1918)  BP: 136/87 (01/03/20 1942)  SpO2: 96 % (01/03/20 1918) Vital Signs (24h Range):  Temp:  [98 °F (36.7 °C)-99 °F (37.2 °C)] 98.8 °F (37.1 °C)  Pulse:  [79-97] 82  Resp:  [20-24] 24  SpO2:  [86 %-96 %] 96 %  BP: (113-142)/(55-87) 136/87     Weight: 69.6 kg (153 lb 8 oz)  Body mass index is 20.82 kg/m².    Intake/Output Summary (Last 24 hours) at 1/3/2020 2016  Last data filed at 1/3/2020 0500  Gross per 24 hour   Intake 755 ml   Output 600 ml   Net 155 ml      Physical Exam   Constitutional: He appears well-developed. No distress.   Sitting up in chair   HENT:   Head: Normocephalic and atraumatic.   Mouth/Throat: Oropharynx is clear and moist.   Eyes: Pupils are equal, round, and reactive to light. EOM are normal.   Neck: Normal range of motion.   Cardiovascular: Regular rhythm.   No murmur heard.  Pulmonary/Chest: Effort normal and breath sounds normal. He has no wheezes.   Abdominal: Soft. He exhibits no distension. There is no tenderness. There is no rebound and no guarding.   Musculoskeletal: Normal range of motion.   Neurological: He is alert. No cranial nerve deficit or sensory deficit.   Skin: No rash noted.   Psychiatric: He has a normal mood and affect.   Nursing note and vitals reviewed.      Significant Labs:   CBC:   Recent Labs   Lab 01/01/20 2022 01/02/20  0201 01/03/20  0341   WBC 4.23 5.73 5.03  5.03   HGB 10.9* 11.2* 9.7*  9.7*   HCT 32.3* 33.7* 28.9*  28.9*   PLT 59* 58* 58*  58*     CMP:   Recent Labs   Lab 01/01/20 2022 01/02/20  0446 01/03/20  0341 01/03/20  1341      < > 140 139  139 137   K 3.1*   < > 3.7 3.1*  3.1* 3.4*      < >  104 103  103 99   CO2 24   < > 21* 24  24 25      < > 135* 109  109 98   BUN 26*   < > 27* 32*  32* 32*   CREATININE 1.7*   < > 1.7* 1.7*  1.7* 1.8*   CALCIUM 8.4*   < > 8.4* 8.0*  8.0* 8.5*   PROT 6.5  --   --   --   --    ALBUMIN 3.4*  --   --  2.9*  --    BILITOT 1.2*  --   --   --   --    ALKPHOS 52*  --   --   --   --    AST 30  --   --   --   --    ALT 16  --   --   --   --    ANIONGAP 14   < > 15 12  12 13   EGFRNONAA 35.0*   < > 35.0* 35.0*  35.0* 32.6*    < > = values in this interval not displayed.     Cardiac Markers:   Recent Labs   Lab 01/03/20  0342   *     Troponin:   Recent Labs   Lab 01/02/20  0201 01/02/20  0814 01/02/20  1540   TROPONINI 0.696* 5.882* 9.640*       Significant Imaging: Tele personally reviewed and NSR

## 2020-01-04 NOTE — PROGRESS NOTES
Progress Note  Cardiology    Admit Date: 1/1/2020   LOS: 2 days     Follow-up For:  CHF; pneumonia    Scheduled Meds:   albuterol-ipratropium  3 mL Nebulization Q6H WAKE    amLODIPine  10 mg Oral Daily    aspirin  81 mg Oral Daily    donepezil  10 mg Oral Daily    furosemide  20 mg Intravenous BID    levoFLOXacin  250 mg Oral Before breakfast    metoprolol succinate  25 mg Oral Daily    pantoprazole  40 mg Oral Daily    piperacillin-tazobactam (ZOSYN) IVPB  3.375 g Intravenous Q8H    pregabalin  150 mg Oral BID    sertraline  100 mg Oral QHS    simvastatin  40 mg Oral QHS     Continuous Infusions:  PRN Meds:acetaminophen, albuterol sulfate, calcium chloride IVPB, calcium chloride IVPB, calcium chloride IVPB, HYDROcodone-acetaminophen, magnesium oxide, magnesium sulfate IVPB, magnesium sulfate IVPB, magnesium sulfate IVPB, magnesium sulfate IVPB, potassium chloride in water, potassium chloride in water, potassium chloride in water, potassium chloride in water, potassium chloride, potassium chloride, potassium chloride, potassium chloride, sodium chloride 0.9%, sodium phosphate IVPB, sodium phosphate IVPB, sodium phosphate IVPB, sodium phosphate IVPB, sodium phosphate IVPB, Pharmacy to dose Vancomycin consult **AND** vancomycin - pharmacy to dose    Review of patient's allergies indicates:   Allergen Reactions    Eucalyptus containing products Palpitations       SUBJECTIVE:     Interval History: Patient has complaints cough with expectoration of brownish red sputum.  He denies shortness of breath.    Review of Systems  Respiratory: positive for cough and sputum, negative for wheezing  Cardiovascular: negative for chest pressure/discomfort, dyspnea, palpitations and paroxysmal nocturnal dyspnea    OBJECTIVE:     Vital Signs (Most Recent)  Temp: 98.3 °F (36.8 °C) (01/04/20 0740)  Pulse: 79 (01/04/20 0740)  Resp: (!) 44 (01/04/20 0740)  BP: (!) 108/59 (01/04/20 0740)  SpO2: 96 % (01/04/20 0740)    Vital  Signs Range (Last 24H):  Temp:  [98.1 °F (36.7 °C)-99.1 °F (37.3 °C)]   Pulse:  [79-91]   Resp:  [20-44]   BP: (108-136)/(54-87)   SpO2:  [86 %-97 %]       Physical Exam:  Neck: JVD - 3 cm above sternal notch, no carotid bruit and supple, symmetrical, trachea midline  Lungs: clear to auscultation bilaterally, normal respiratory effort  Heart: regular rate and rhythm, S1, S2 normal, no murmur, click, rub or gallop and Intermittent PACs on monitor  Abdomen: soft, non-tender; bowel sounds normal; no masses,  no organomegaly  Extremities: Extremities normal, atraumatic, no cyanosis, clubbing, or edema    Recent Results (from the past 24 hour(s))   Basic metabolic panel    Collection Time: 01/03/20  1:41 PM   Result Value Ref Range    Sodium 137 136 - 145 mmol/L    Potassium 3.4 (L) 3.5 - 5.1 mmol/L    Chloride 99 95 - 110 mmol/L    CO2 25 23 - 29 mmol/L    Glucose 98 70 - 110 mg/dL    BUN, Bld 32 (H) 8 - 23 mg/dL    Creatinine 1.8 (H) 0.5 - 1.4 mg/dL    Calcium 8.5 (L) 8.7 - 10.5 mg/dL    Anion Gap 13 8 - 16 mmol/L    eGFR if African American 37.7 (A) >60 mL/min/1.73 m^2    eGFR if non  32.6 (A) >60 mL/min/1.73 m^2   Magnesium    Collection Time: 01/03/20  1:41 PM   Result Value Ref Range    Magnesium 1.6 1.6 - 2.6 mg/dL   Basic Metabolic Panel (BMP)    Collection Time: 01/04/20  7:39 AM   Result Value Ref Range    Sodium 139 136 - 145 mmol/L    Potassium 3.3 (L) 3.5 - 5.1 mmol/L    Chloride 102 95 - 110 mmol/L    CO2 26 23 - 29 mmol/L    Glucose 103 70 - 110 mg/dL    BUN, Bld 30 (H) 8 - 23 mg/dL    Creatinine 1.6 (H) 0.5 - 1.4 mg/dL    Calcium 8.3 (L) 8.7 - 10.5 mg/dL    Anion Gap 11 8 - 16 mmol/L    eGFR if African American 43.5 (A) >60 mL/min/1.73 m^2    eGFR if non  37.6 (A) >60 mL/min/1.73 m^2   Magnesium    Collection Time: 01/04/20  7:39 AM   Result Value Ref Range    Magnesium 1.7 1.6 - 2.6 mg/dL   CBC auto differential    Collection Time: 01/04/20  7:39 AM   Result Value Ref  Range    WBC 5.23 3.90 - 12.70 K/uL    RBC 3.14 (L) 4.60 - 6.20 M/uL    Hemoglobin 10.0 (L) 14.0 - 18.0 g/dL    Hematocrit 29.2 (L) 40.0 - 54.0 %    Mean Corpuscular Volume 93 82 - 98 fL    Mean Corpuscular Hemoglobin 31.8 (H) 27.0 - 31.0 pg    Mean Corpuscular Hemoglobin Conc 34.2 32.0 - 36.0 g/dL    RDW 14.2 11.5 - 14.5 %    Platelets 63 (L) 150 - 350 K/uL    MPV 13.2 (H) 9.2 - 12.9 fL    Immature Granulocytes 0.6 (H) 0.0 - 0.5 %    Gran # (ANC) 4.1 1.8 - 7.7 K/uL    Immature Grans (Abs) 0.03 0.00 - 0.04 K/uL    Lymph # 0.7 (L) 1.0 - 4.8 K/uL    Mono # 0.4 0.3 - 1.0 K/uL    Eos # 0.0 0.0 - 0.5 K/uL    Baso # 0.00 0.00 - 0.20 K/uL    nRBC 0 0 /100 WBC    Gran% 77.6 (H) 38.0 - 73.0 %    Lymph% 13.6 (L) 18.0 - 48.0 %    Mono% 8.0 4.0 - 15.0 %    Eosinophil% 0.2 0.0 - 8.0 %    Basophil% 0.0 0.0 - 1.9 %    Differential Method Automated    Vancomycin, random    Collection Time: 01/04/20  7:39 AM   Result Value Ref Range    Vancomycin, Random 15.7 Not established ug/mL       Diagnostic Results:  Labs: Reviewed  ECG: Reviewed  X-Ray: Reviewed    ASSESSMENT/PLAN:     CHF.  Chest x-ray is much improved; there is persistent left lung infiltrate.  LVEF is depressed; enlarged left ventricle; septal hypokinesia.  The patient has sustained a non-STEMI.  Moderate to severe mitral regurgitation; pulmonary hypertension.  Continue IV diuretics; transition to furosemide orally in 1-2 days.  The patient is probably best managed medically at this time, in view of advanced age; CKD; mitral regurgitation.  EKG is unremarkable.

## 2020-01-04 NOTE — PLAN OF CARE
Confused, requires reorientation to time. Pt continues on IV zosyn for pneumonia. C/o productive cough, red ting sputum noted. Oxy mask in use 2-4L. 02 sats remains >90%. Denies any pain. Remains free from falls. Bed in low position, call light within reach, bed alarm remains on. Pt verbalized understanding of plan of care, requires reinforcement.

## 2020-01-05 LAB
ANION GAP SERPL CALC-SCNC: 11 MMOL/L (ref 8–16)
BASOPHILS # BLD AUTO: 0.01 K/UL (ref 0–0.2)
BASOPHILS NFR BLD: 0.2 % (ref 0–1.9)
BNP SERPL-MCNC: 620 PG/ML (ref 0–99)
BUN SERPL-MCNC: 24 MG/DL (ref 8–23)
C DIFF GDH STL QL: NEGATIVE
C DIFF TOX A+B STL QL IA: NEGATIVE
CALCIUM SERPL-MCNC: 8.5 MG/DL (ref 8.7–10.5)
CHLORIDE SERPL-SCNC: 105 MMOL/L (ref 95–110)
CO2 SERPL-SCNC: 25 MMOL/L (ref 23–29)
CREAT SERPL-MCNC: 1.3 MG/DL (ref 0.5–1.4)
DIFFERENTIAL METHOD: ABNORMAL
EOSINOPHIL # BLD AUTO: 0 K/UL (ref 0–0.5)
EOSINOPHIL NFR BLD: 0.3 % (ref 0–8)
ERYTHROCYTE [DISTWIDTH] IN BLOOD BY AUTOMATED COUNT: 13.9 % (ref 11.5–14.5)
EST. GFR  (AFRICAN AMERICAN): 55.9 ML/MIN/1.73 M^2
EST. GFR  (NON AFRICAN AMERICAN): 48.4 ML/MIN/1.73 M^2
GLUCOSE SERPL-MCNC: 111 MG/DL (ref 70–110)
HCT VFR BLD AUTO: 30.1 % (ref 40–54)
HGB BLD-MCNC: 10.1 G/DL (ref 14–18)
IMM GRANULOCYTES # BLD AUTO: 0.02 K/UL (ref 0–0.04)
IMM GRANULOCYTES NFR BLD AUTO: 0.3 % (ref 0–0.5)
LYMPHOCYTES # BLD AUTO: 0.7 K/UL (ref 1–4.8)
LYMPHOCYTES NFR BLD: 11 % (ref 18–48)
MAGNESIUM SERPL-MCNC: 1.8 MG/DL (ref 1.6–2.6)
MCH RBC QN AUTO: 31.4 PG (ref 27–31)
MCHC RBC AUTO-ENTMCNC: 33.6 G/DL (ref 32–36)
MCV RBC AUTO: 94 FL (ref 82–98)
MONOCYTES # BLD AUTO: 0.5 K/UL (ref 0.3–1)
MONOCYTES NFR BLD: 7.5 % (ref 4–15)
NEUTROPHILS # BLD AUTO: 5.3 K/UL (ref 1.8–7.7)
NEUTROPHILS NFR BLD: 80.7 % (ref 38–73)
NRBC BLD-RTO: 0 /100 WBC
PLATELET # BLD AUTO: 78 K/UL (ref 150–350)
PMV BLD AUTO: 12.9 FL (ref 9.2–12.9)
POTASSIUM SERPL-SCNC: 3.7 MMOL/L (ref 3.5–5.1)
RBC # BLD AUTO: 3.22 M/UL (ref 4.6–6.2)
SODIUM SERPL-SCNC: 141 MMOL/L (ref 136–145)
TROPONIN I SERPL DL<=0.01 NG/ML-MCNC: 2.71 NG/ML
WBC # BLD AUTO: 6.52 K/UL (ref 3.9–12.7)
WBC #/AREA STL HPF: NORMAL /[HPF]

## 2020-01-05 PROCEDURE — 85025 COMPLETE CBC W/AUTO DIFF WBC: CPT

## 2020-01-05 PROCEDURE — 25000003 PHARM REV CODE 250: Performed by: INTERNAL MEDICINE

## 2020-01-05 PROCEDURE — 25000242 PHARM REV CODE 250 ALT 637 W/ HCPCS: Performed by: INTERNAL MEDICINE

## 2020-01-05 PROCEDURE — 83735 ASSAY OF MAGNESIUM: CPT

## 2020-01-05 PROCEDURE — 94640 AIRWAY INHALATION TREATMENT: CPT

## 2020-01-05 PROCEDURE — 83880 ASSAY OF NATRIURETIC PEPTIDE: CPT

## 2020-01-05 PROCEDURE — 97116 GAIT TRAINING THERAPY: CPT

## 2020-01-05 PROCEDURE — 97530 THERAPEUTIC ACTIVITIES: CPT

## 2020-01-05 PROCEDURE — 36415 COLL VENOUS BLD VENIPUNCTURE: CPT

## 2020-01-05 PROCEDURE — 94761 N-INVAS EAR/PLS OXIMETRY MLT: CPT

## 2020-01-05 PROCEDURE — 84484 ASSAY OF TROPONIN QUANT: CPT

## 2020-01-05 PROCEDURE — 27000221 HC OXYGEN, UP TO 24 HOURS

## 2020-01-05 PROCEDURE — 80048 BASIC METABOLIC PNL TOTAL CA: CPT

## 2020-01-05 PROCEDURE — 21000000 HC CCU ICU ROOM CHARGE

## 2020-01-05 PROCEDURE — 99900035 HC TECH TIME PER 15 MIN (STAT)

## 2020-01-05 RX ADMIN — ASPIRIN 81 MG 81 MG: 81 TABLET ORAL at 09:01

## 2020-01-05 RX ADMIN — AMLODIPINE BESYLATE 10 MG: 5 TABLET ORAL at 09:01

## 2020-01-05 RX ADMIN — FUROSEMIDE 20 MG: 20 TABLET ORAL at 09:01

## 2020-01-05 RX ADMIN — PANTOPRAZOLE SODIUM 40 MG: 40 TABLET, DELAYED RELEASE ORAL at 05:01

## 2020-01-05 RX ADMIN — PREGABALIN 150 MG: 75 CAPSULE ORAL at 09:01

## 2020-01-05 RX ADMIN — LACTOBACILLUS TAB 4 TABLET: TAB at 09:01

## 2020-01-05 RX ADMIN — SIMVASTATIN 40 MG: 40 TABLET, FILM COATED ORAL at 08:01

## 2020-01-05 RX ADMIN — POTASSIUM CHLORIDE 20 MEQ: 20 TABLET, EXTENDED RELEASE ORAL at 09:01

## 2020-01-05 RX ADMIN — METOPROLOL SUCCINATE 25 MG: 25 TABLET, EXTENDED RELEASE ORAL at 09:01

## 2020-01-05 RX ADMIN — PREGABALIN 150 MG: 75 CAPSULE ORAL at 08:01

## 2020-01-05 RX ADMIN — IPRATROPIUM BROMIDE AND ALBUTEROL SULFATE 3 ML: .5; 3 SOLUTION RESPIRATORY (INHALATION) at 07:01

## 2020-01-05 RX ADMIN — LACTOBACILLUS TAB 4 TABLET: TAB at 06:01

## 2020-01-05 RX ADMIN — SERTRALINE HYDROCHLORIDE 100 MG: 50 TABLET ORAL at 08:01

## 2020-01-05 RX ADMIN — DONEPEZIL HYDROCHLORIDE 10 MG: 5 TABLET, FILM COATED ORAL at 09:01

## 2020-01-05 RX ADMIN — LEVOFLOXACIN 250 MG: 250 TABLET, FILM COATED ORAL at 05:01

## 2020-01-05 NOTE — ASSESSMENT & PLAN NOTE
Aware  Reviewed recent echo from November 2019 with EF of 43% and grade 1 diastolic dysfunction. Repeat Echo with EF 34-42%.  IV lasix in the event CXR also represents pulmonary edema, which suspect is likely given rapid clearance 1/4 on CXR.  Following renal and cardiology recommendations.   Changing IV lasix to po

## 2020-01-05 NOTE — PROGRESS NOTES
[facility] Medicine  Progress Note    Patient Name: Halley Rodrigues  MRN: [id number]  Patient Class: IP- Inpatient   Admission Date: 1/1/2020  Length of Stay: 2 days  Attending Physician: Michelle Theodore MD  Primary Care Provider: Scott Payne MD        Subjective:     Principal Problem:Pneumonia        HPI:  Patient is a 89-year-old  male with known history of CAD status post CABG, dementia, chronic combined systolic and diastolic congestive heart failure and anemia of chronic disease who was recently discharged from our facility after being admitted for syncope and eventually transferred to skilled nursing facility presents with chief complaint of shortness of breath and cough.  History is limited secondary to patient's dementia and is supplemented by his son at bedside.    Patient was discharged from [facility] on 12/23.  According to the patient's son patient was noted to be more weak over the last 3 days.  He was noted to have diarrhea which was nonbloody and watery over the last 2 days.  Reported poor oral intake and generalized weakness.  Patient was also noted to have a rattling cough.  Son denies known choking episodes or aspiration events in the past.  Patient denies chest pain but admits to shortness of breath is worse with exertion and better with rest.  Symptoms are moderate in nature.  No lightheadedness, lower extremity edema, palpitations, abdominal pain, nausea/vomiting or urinary problems. Patient is prone to frequent falls; noncompliant with walker.    In the ER:  Hemodynamically stable.  Patient was noted to be hypoxic with O2 sats in the 80s on room air which improved to greater than 90% on supplemental oxygen.  He was also noted to be tachypneic.  Laboratory workup revealed chronic normocytic anemia, thrombocytopenia, hypokalemia, CKD stage 3, elevated BNP and troponin.  EKG revealed normal sinus rhythm with nonspecific ST-T  wave changes and prolonged QT interval.  It is unchanged from EKG dated 11/26/2019.  Chest x-ray revealed left mid and lower lung zone alveolar opacities concerning for pneumonia.  Admitted for further management.    Rest of the 10 point review of systems is negative except as mentioned above.      Overview/Hospital Course:  Patient admitted with acute hypoxic respiratory failure suspected to be pneumonia.  IV abx started.  Troponin mildly elevated and thus he was admitted to cardiology.  1/2/20 CXR with worsening infiltrate and patient with increased O2 needs- oxymask is working better than NC.  Torponin has continued to climb.  He has no complaints but has dementia and family reports he is not a complainer. Patient moved to [facility] for continuous cardiac monitoring.  Cardiology consulted on admission.  Pulmonary consulted given worsening infiltrate. IV lasix ordered in the event this could be pulmonary edema.  Poor urine output in response and thus Renal consulted for oliguria and is following.  1/3/19 patient looks much improved but still requiring supplemental O2 to keep saturations greater than 88%. Echo done and EF 34-42%.  EF 43% November 2019 but otherwise unchanged. 1/4 improved infiltrate on CXR suggesting pulmonary edema.  Changing IV lasix to po.  Supplemental O2 weaned at this time but will monitor.  Mobilizing with PT/OT.  Having loose stool.     Interval History: Patient with dementia.  Denies chest pain or SOB.  Denies abdominal pain.  Does tell me he is having loose stool- he was having this at home.     Review of Systems   Reason unable to perform ROS: dementia.     Objective:     Vital Signs (Most Recent):  Temp: 98.4 °F (36.9 °C) (01/04/20 1916)  Pulse: 86 (01/04/20 1916)  Resp: (!) 50 (01/04/20 1916)  BP: (!) 141/67 (01/04/20 1916)  SpO2: (!) 90 % (01/04/20 1916) Vital Signs (24h Range):  Temp:  [97.9 °F (36.6 °C)-99.1 °F (37.3 °C)] 98.4 °F (36.9 °C)  Pulse:  [79-95] 86  Resp:  [22-50] 50  SpO2:   [90 %-97 %] 90 %  BP: (108-143)/(54-87) 141/67     Weight: 66.2 kg (146 lb)  Body mass index is 19.8 kg/m².    Intake/Output Summary (Last 24 hours) at 1/4/2020 1931  Last data filed at 1/4/2020 1654  Gross per 24 hour   Intake 240 ml   Output 900 ml   Net -660 ml      Physical Exam   Constitutional: He appears well-developed. No distress.   HENT:   Head: Normocephalic and atraumatic.   Mouth/Throat: Oropharynx is clear and moist.   Eyes: Pupils are equal, round, and reactive to light. EOM are normal.   Neck: Normal range of motion.   Cardiovascular: Regular rhythm.   No murmur heard.  Pulmonary/Chest: Effort normal and breath sounds normal. He has no wheezes.   Abdominal: Soft. He exhibits no distension. There is no tenderness. There is no rebound and no guarding.   Musculoskeletal: Normal range of motion.   Neurological: He is alert. No cranial nerve deficit or sensory deficit.   Oriented to self only.  Pleasantly confused   Skin: No rash noted.   Psychiatric: He has a normal mood and affect.   Nursing note and vitals reviewed.      Significant Labs:   CBC:   Recent Labs   Lab 01/03/20  0341 01/04/20  0739   WBC 5.03  5.03 5.23   HGB 9.7*  9.7* 10.0*   HCT 28.9*  28.9* 29.2*   PLT 58*  58* 63*     CMP:   Recent Labs   Lab 01/03/20  0341 01/03/20  1341 01/04/20  0739     139 137 139   K 3.1*  3.1* 3.4* 3.3*     103 99 102   CO2 24  24 25 26     109 98 103   BUN 32*  32* 32* 30*   CREATININE 1.7*  1.7* 1.8* 1.6*   CALCIUM 8.0*  8.0* 8.5* 8.3*   ALBUMIN 2.9*  --   --    ANIONGAP 12  12 13 11   EGFRNONAA 35.0*  35.0* 32.6* 37.6*       Significant Imaging: CXR: I have reviewed all pertinent results/findings within the past 24 hours and my personal findings are:  Improved left infiltrate       Assessment/Plan:      * Pneumonia  Left middle and lower lobe pneumonia on imaging- worsening on repeat imaging 1/2.  Unchanged on CXR 1/3.  Supplemental oxygen, increased requirements 1/2/20-  better 1/3 and now back to [facility]  Concern for hospital-acquired pneumonia in the setting of recent hospital and skilled nursing facility stay  Ordered respiratory viral panel, MRSA PCR and influenza testing  Follow sputum and blood cultures  Will cover with vancomycin and piperacillin-tazobactam. Levaquin added to cover for any legionella.  Pulmonary consulted given worsening state- following          Loose stools  Reports loose stool 1/4. Re ordering stool studies.  Abdominal exam benign.       Hypomagnesemia  Replete and monitor daily as per sliding scale      Thrombocytopenia, unspecified  Chronic issue  However platelet count is lower than his baseline  Daily CBC  Avoid heparin or related products      Hypokalemia  Replete as per sliding scale and monitor daily      Pneumonia due to infectious organism        Stage 3 chronic kidney disease  Patient with poor response to IV lasix.  Renal consulted.  Discussed with Dr. Garcia and will continue the gentle IVFs and IV lasix at this time.  If worsening renal function, will need to stop the IV lasix.      Acute respiratory failure with hypoxia  Due to pneumonia and HF  Weaning O2 support as able      Essential hypertension  Stable  Continue home amlodipine and metoprolol succinate      Dementia  Discussed with family who reports he is at his mental status baseline  Continue home donepezil       NSTEMI (non-ST elevated myocardial infarction)  Denies chest pain   Likely demand ischemia in the setting of pneumonia and HF but given that troponin has risen to 9, I do suspect underlying disease   Goal directed therapy  Cardiology following. Not a good candidate for invasive treatment given age and co morbidities  Telemetry monitoring.     Chronic combined systolic and diastolic heart failure  Aware  Reviewed recent echo from November 2019 with EF of 43% and grade 1 diastolic dysfunction. Repeat Echo with EF 34-42%.  IV lasix in the event CXR also represents pulmonary edema,  which suspect is likely given rapid clearance 1/4 on CXR.  Following renal and cardiology recommendations.   Changing IV lasix to po    Anemia, chronic disease  Aware  Hb lower than baseline; no report of blood loss  Trend CBC daily         VTE Risk Mitigation (From admission, onward)         Ordered     Place sequential compression device  Until discontinued      01/01/20 2204     IP VTE HIGH RISK PATIENT  Once      01/01/20 2204                      Michelle Theodore MD  Department of Hospital Medicine   [facility]

## 2020-01-05 NOTE — SUBJECTIVE & OBJECTIVE
Interval History: Patient  Pleasantly confused and thus unclear how accurate history is but denies chest pain or SOB.  I removed his oxy mask that he had on for comfort and O2 saturations remained 92% and above.      Review of Systems   Reason unable to perform ROS: Dementia.     Objective:     Vital Signs (Most Recent):  Temp: 97.8 °F (36.6 °C) (01/05/20 1150)  Pulse: 79 (01/05/20 1308)  Resp: (!) 29 (01/05/20 1308)  BP: 118/77 (01/05/20 1150)  SpO2: (!) 85 % (01/05/20 1308) Vital Signs (24h Range):  Temp:  [97.8 °F (36.6 °C)-98.9 °F (37.2 °C)] 97.8 °F (36.6 °C)  Pulse:  [71-86] 79  Resp:  [19-51] 29  SpO2:  [79 %-98 %] 85 %  BP: (113-141)/(58-77) 118/77     Weight: 65 kg (143 lb 4.8 oz)  Body mass index is 19.43 kg/m².    Intake/Output Summary (Last 24 hours) at 1/5/2020 1530  Last data filed at 1/5/2020 0230  Gross per 24 hour   Intake --   Output 1300 ml   Net -1300 ml      Physical Exam   Constitutional: He appears well-developed. No distress.   Sitting up in chair   HENT:   Head: Normocephalic and atraumatic.   Mouth/Throat: Oropharynx is clear and moist.   Eyes: Pupils are equal, round, and reactive to light. EOM are normal.   Neck: Normal range of motion.   Cardiovascular: Regular rhythm.   No murmur heard.  Pulmonary/Chest: Effort normal and breath sounds normal. He has no wheezes.   Abdominal: Soft. He exhibits no distension. There is no tenderness. There is no rebound and no guarding.   Musculoskeletal: Normal range of motion.   Neurological: He is alert. No cranial nerve deficit or sensory deficit.   Pleasantly confused  Witnessed him ambulating around the [place] with PT   Skin: No rash noted.   Psychiatric: He has a normal mood and affect.   Nursing note and vitals reviewed.      Significant Labs:   CBC:   Recent Labs   Lab 01/04/20  0739 01/05/20  0442   WBC 5.23 6.52   HGB 10.0* 10.1*   HCT 29.2* 30.1*   PLT 63* 78*     CMP:   Recent Labs   Lab 01/04/20  0739 01/05/20  0442    141   K 3.3* 3.7     105   CO2 26 25    111*   BUN 30* 24*   CREATININE 1.6* 1.3   CALCIUM 8.3* 8.5*   ANIONGAP 11 11   EGFRNONAA 37.6* 48.4*       Significant Imaging: tele personally reviewed and NSR

## 2020-01-05 NOTE — PLAN OF CARE
01/05/20 0728   Patient Assessment/Suction   Level of Consciousness (AVPU) alert   Respiratory Effort Unlabored;Normal   Expansion/Accessory Muscles/Retractions no use of accessory muscles;no retractions;expansion symmetric   All Lung Fields Breath Sounds wheezes, expiratory   Rhythm/Pattern, Respiratory unlabored;pattern regular;depth regular   Cough Frequency infrequent   Cough Type good   PRE-TX-O2   O2 Device (Oxygen Therapy) Oxymask   $ Is the patient on Low Flow Oxygen? Yes   Flow (L/min) 3   SpO2 96 %   Pulse Oximetry Type Continuous   $ Pulse Oximetry - Multiple Charge Pulse Oximetry - Multiple   Pulse 71   Resp (!) 22   BP (!) 113/58   Aerosol Therapy   $ Aerosol Therapy Charges Aerosol Treatment   Daily Review of Necessity (SVN) completed   Respiratory Treatment Status (SVN) given   Treatment Route (SVN) mask   Patient Position (SVN) semi-Paul's   Post Treatment Assessment (SVN) increased aeration   Signs of Intolerance (SVN) none   Breath Sounds Post-Respiratory Treatment   Throughout All Fields Post-Treatment All Fields   Throughout All Fields Post-Treatment aeration increased   Post-treatment Heart Rate (beats/min) 78   Post-treatment Resp Rate (breaths/min) 20

## 2020-01-05 NOTE — ASSESSMENT & PLAN NOTE
Reviewed recent echo from November 2019 with EF of 43% and grade 1 diastolic dysfunction. Repeat Echo with EF 34-42%.  IV lasix in the event CXR also represents pulmonary edema, which suspect is likely given rapid clearance 1/4 on CXR.  Following renal and cardiology recommendations.   Changed IV lasix to po lasix 1/4  No longer appears to be in acute failure

## 2020-01-05 NOTE — PT/OT/SLP PROGRESS
Physical Therapy Treatment    Patient Name:  Halley Rodrigues   MRN:  [id number]    Recommendations:     Discharge Recommendations:  home health PT   Discharge Equipment Recommendations: none   Barriers to discharge: None    Assessment:     Halley Rodrigues is a 89 y.o. male admitted with a medical diagnosis of Pneumonia.  He presents with the following impairments/functional limitations:  weakness, gait instability, impaired self care skills, impaired functional mobilty.  Pt had productive coughs. Pt t/f'ed OOB with Min A and extra time and ambulated in the [place] 200 ft with RW and CGA with 3 L 02  without requiring a rest.  Has flexed trunk and flexed knee posture. Pt returned to room and t/f'ed to chair with alarm engaged,  Appropriate for return home with son and HH PT.    Rehab Prognosis: Good; patient would benefit from acute skilled PT services to address these deficits and reach maximum level of function.    Recent Surgery: * No surgery found *      Plan:     During this hospitalization, patient to be seen daily to address the identified rehab impairments via gait training, therapeutic activities, therapeutic exercises and progress toward the following goals:    · Plan of Care Expires:  01/04/20    Subjective     Chief Complaint: Elevated K.    Patient/Family Comments/goals: Home with son.  Pain/Comfort:  ·        Objective:     Communicated with nurse prior to session.  Patient found supine with peripheral IV, pulse ox (continuous), oxygen upon PT entry to room.     General Precautions: Standard, fall   Orthopedic Precautions:N/A   Braces:       Functional Mobility:  · Bed Mobility:     · Supine to Sit: minimum assistance  · Transfers:     · Sit to Stand:  minimum assistance with hand-held assist  · Gait: 200 ft RW and  CGA      AM-PAC 6 CLICK MOBILITY  Sitting down on and standing up from a chair with arms (e.g., wheelchair, bedside commode, etc.): 3  Moving from lying on back to sitting on the  side of the bed?: 3  Moving to and from a bed to a chair (including a wheelchair)?: 3  Need to walk in hospital room?: 3  Climbing 3-5 steps with a railing?: 2       Therapeutic Activities and Exercises:   Pt participated in bed mobility and t/f training requiring extra time for advancing B LE. Ambulated in the [place]  With good endurance.    Patient left up in chair with call button in reach, chair alarm on and nurse present..    GOALS:   Multidisciplinary Problems     Physical Therapy Goals        Problem: Physical Therapy Goal    Goal Priority Disciplines Outcome Goal Variances Interventions   Physical Therapy Goal     PT, PT/OT      Description:  Goals to be met by: 2020     Patient will increase functional independence with mobility by performin. Supine to sit with MInimal Assistance  2. Sit to stand transfer with Supervision  3. Bed to chair transfer with Supervision using Rolling Walker  4. Gait  x 100 feet with Minimal Assistance using Rolling Walker.                       Time Tracking:     PT Received On: 20  PT Start Time: 1000     PT Stop Time: 1031  PT Total Time (min): 31 min     Billable Minutes: Gait Training 10 minutes Therapeutic Activity 21 minutes    Treatment Type: Treatment  PT/PTA: PT     PTA Visit Number: 0     Gabriella Garcia, PERFECTO  2020

## 2020-01-05 NOTE — ASSESSMENT & PLAN NOTE
Left middle and lower lobe pneumonia on imaging- worsening on repeat imaging 1/2.  CXR cleared 1/4  Supplemental oxygen, increased requirements 1/2/20- better 1/3 and now weaned to room air 1/5  Concern for hospital-acquired pneumonia in the setting of recent hospital and skilled nursing facility stay  Ordered respiratory viral panel, MRSA PCR and influenza testing- negative  Follow sputum and blood cultures- NGTD  Covered initially with vancomycin and piperacillin-tazobactam. Levaquin added to cover for any legionella. De escalated to Levaquin only.  Pulmonary consulted given worsening state- following  Patient with great improvement

## 2020-01-05 NOTE — ASSESSMENT & PLAN NOTE
I spoke to family with updates and that if patient continues to do well, I expect discharge 1/6.  Patient has improved and is participating well with therapy but we did discuss that his dementia makes staying at home alone not the safest.  Patient currently lives with his son and daughter in law.  I discussed long term options including NH placement (which patient does not want), Assisted living in a memory unit type setting, sitters at home, continued care by family at home.  I am resuming his home health services and will ask CM/SW to provide information on long term options for family to decide in the future.  Current discharge disposition is home with home health.

## 2020-01-05 NOTE — SUBJECTIVE & OBJECTIVE
Interval History: Patient with dementia.  Denies chest pain or SOB.  Denies abdominal pain.  Does tell me he is having loose stool- he was having this at home.     Review of Systems   Reason unable to perform ROS: dementia.     Objective:     Vital Signs (Most Recent):  Temp: 98.4 °F (36.9 °C) (01/04/20 1916)  Pulse: 86 (01/04/20 1916)  Resp: (!) 50 (01/04/20 1916)  BP: (!) 141/67 (01/04/20 1916)  SpO2: (!) 90 % (01/04/20 1916) Vital Signs (24h Range):  Temp:  [97.9 °F (36.6 °C)-99.1 °F (37.3 °C)] 98.4 °F (36.9 °C)  Pulse:  [79-95] 86  Resp:  [22-50] 50  SpO2:  [90 %-97 %] 90 %  BP: (108-143)/(54-87) 141/67     Weight: 66.2 kg (146 lb)  Body mass index is 19.8 kg/m².    Intake/Output Summary (Last 24 hours) at 1/4/2020 1931  Last data filed at 1/4/2020 1654  Gross per 24 hour   Intake 240 ml   Output 900 ml   Net -660 ml      Physical Exam   Constitutional: He appears well-developed. No distress.   HENT:   Head: Normocephalic and atraumatic.   Mouth/Throat: Oropharynx is clear and moist.   Eyes: Pupils are equal, round, and reactive to light. EOM are normal.   Neck: Normal range of motion.   Cardiovascular: Regular rhythm.   No murmur heard.  Pulmonary/Chest: Effort normal and breath sounds normal. He has no wheezes.   Abdominal: Soft. He exhibits no distension. There is no tenderness. There is no rebound and no guarding.   Musculoskeletal: Normal range of motion.   Neurological: He is alert. No cranial nerve deficit or sensory deficit.   Oriented to self only.  Pleasantly confused   Skin: No rash noted.   Psychiatric: He has a normal mood and affect.   Nursing note and vitals reviewed.      Significant Labs:   CBC:   Recent Labs   Lab 01/03/20  0341 01/04/20  0739   WBC 5.03  5.03 5.23   HGB 9.7*  9.7* 10.0*   HCT 28.9*  28.9* 29.2*   PLT 58*  58* 63*     CMP:   Recent Labs   Lab 01/03/20  0341 01/03/20  1341 01/04/20  0739     139 137 139   K 3.1*  3.1* 3.4* 3.3*     103 99 102   CO2 24  24 25  26     109 98 103   BUN 32*  32* 32* 30*   CREATININE 1.7*  1.7* 1.8* 1.6*   CALCIUM 8.0*  8.0* 8.5* 8.3*   ALBUMIN 2.9*  --   --    ANIONGAP 12  12 13 11   EGFRNONAA 35.0*  35.0* 32.6* 37.6*       Significant Imaging: CXR: I have reviewed all pertinent results/findings within the past 24 hours and my personal findings are:  Improved left infiltrate

## 2020-01-05 NOTE — PLAN OF CARE
01/05/20 0001   PRE-TX-O2   O2 Device (Oxygen Therapy) Oxymask   Flow (L/min) 4   SpO2 96 %   Pulse 74   Resp 19   Aerosol Therapy   $ Aerosol Therapy Charges Aerosol Treatment  (not given/busy in er)   Respiratory Evaluation   $ Care Plan Tech Time 15 min   Evaluation For   (care plan)   continue tx. As ordered

## 2020-01-05 NOTE — PROGRESS NOTES
INPATIENT NEPHROLOGY PROGRESS   [facility] NEPHROLOGY    Halley Rodrigues  01/05/2020    Reason for consultation:    sergio    Chief Complaint:   Chief Complaint   Patient presents with    Cough     x3 days with SOB          History of Present Illness:    Per H and P    Patient is a 89-year-old  male with known history of CAD status post CABG, dementia, chronic combined systolic and diastolic congestive heart failure and anemia of chronic disease who was recently discharged from our facility after being admitted for syncope and eventually transferred to skilled nursing facility presents with chief complaint of shortness of breath and cough.  History is limited secondary to patient's dementia and is supplemented by his son at bedside.     Patient was discharged from [facility] on 12/23.  According to the patient's son patient was noted to be more weak over the last 3 days.  He was noted to have diarrhea which was nonbloody and watery over the last 2 days.  Reported poor oral intake and generalized weakness.  Patient was also noted to have a rattling cough.  Son denies known choking episodes or aspiration events in the past.  Patient denies chest pain but admits to shortness of breath is worse with exertion and better with rest.  Symptoms are moderate in nature.  No lightheadedness, lower extremity edema, palpitations, abdominal pain, nausea/vomiting or urinary problems. Patient is prone to frequent falls; noncompliant with walker.     In the ER:  Hemodynamically stable.  Patient was noted to be hypoxic with O2 sats in the 80s on room air which improved to greater than 90% on supplemental oxygen.  He was also noted to be tachypneic.  Laboratory workup revealed chronic normocytic anemia, thrombocytopenia, hypokalemia, CKD stage 3, elevated BNP and troponin.  EKG revealed normal sinus rhythm with nonspecific ST-T wave changes and prolonged QT interval.  It is unchanged from EKG  dated 11/26/2019.  Chest x-ray revealed left mid and lower lung zone alveolar opacities concerning for pneumonia.  Admitted for further management.    1/2/20  Consulted for GARRETT.  No nausea, chest pain, sob, fever, urinary or bowel complaint, new neurologic symptoms, new joint pain,    1/3/20  No nausea, chest pain, sob, fever, urinary or bowel complaint, new neurologic symptoms, new joint pain,  1/4  Renal function improving.  Hypokalemic, has prn repletion orders.  Denies chest pain, SOB, n/v/d/c  1/5  K+ at goal.  Renal function improving, Scr now wnl.  No new complaints.  VSS, UOP 1.9L.    Plan of Care:       Assessment:    Acute kidney injury likely hemodynamcially mediated with h/o several days of diarrhea and poor po intake     --renal/bladder ultrasound negative for obstruction     --no nsaids, coxibs, iv contrast     Mild metabolic acidosis (gap and nongap)  --no intervention at this time     Anemia  --blood products per primary    Pneumonia  --abx per primary  --avoid Vancomycin toxicity      Hypokalemia--replete per prn orders      Hold diuretics if creatinine starts to rise      Thank you for allowing us to participate in this patient's care. We will continue to follow.    Vital Signs:  Temp Readings from Last 3 Encounters:   01/05/20 98.1 °F (36.7 °C)   12/03/19 97.7 °F (36.5 °C)   09/09/19 98.1 °F (36.7 °C)       Pulse Readings from Last 3 Encounters:   01/05/20 71   12/03/19 64   10/21/19 (!) 54       BP Readings from Last 3 Encounters:   01/05/20 (!) 113/58   12/03/19 (!) 143/62   10/21/19 (!) 154/78       Weight:  Wt Readings from Last 3 Encounters:   01/05/20 65 kg (143 lb 4.8 oz)   01/03/20 69.6 kg (153 lb 7 oz)   12/03/19 63.9 kg (140 lb 14 oz)       Past Medical & Surgical History:  Past Medical History:   Diagnosis Date    Anemia     Anemia due to chronic blood loss 7/25/2018    Anemia, chronic disease 7/25/2018    Dementia     GERD (gastroesophageal reflux disease)     Heart disease      Hypertension     Neuropathy        Past Surgical History:   Procedure Laterality Date    CORONARY ARTERY BYPASS GRAFT         Past Social History:  Social History     Socioeconomic History    Marital status:      Spouse name: Not on file    Number of children: Not on file    Years of education: Not on file    Highest education level: Not on file   Occupational History    Not on file   Social Needs    Financial resource strain: Not on file    Food insecurity:     Worry: Not on file     Inability: Not on file    Transportation needs:     Medical: Not on file     Non-medical: Not on file   Tobacco Use    Smoking status: Current Every Day Smoker     Types: Cigarettes, Vaping with nicotine    Smokeless tobacco: Never Used    Tobacco comment: e cigarettes   Substance and Sexual Activity    Alcohol use: No    Drug use: No    Sexual activity: Not on file   Lifestyle    Physical activity:     Days per week: Not on file     Minutes per session: Not on file    Stress: Not on file   Relationships    Social connections:     Talks on phone: Not on file     Gets together: Not on file     Attends Protestant service: Not on file     Active member of club or organization: Not on file     Attends meetings of clubs or organizations: Not on file     Relationship status: Not on file   Other Topics Concern    Not on file   Social History Narrative    Not on file       Medications:  No current facility-administered medications on file prior to encounter.      Current Outpatient Medications on File Prior to Encounter   Medication Sig Dispense Refill    albuterol-ipratropium (DUO-NEB) 2.5 mg-0.5 mg/3 mL nebulizer solution Take 3 mLs by nebulization every 6 (six) hours as needed for Wheezing or Shortness of Breath. 1 Box 0    amLODIPine (NORVASC) 10 MG tablet Take 1 tablet (10 mg total) by mouth once daily. 30 tablet 0    aspirin 81 MG Chew Take 81 mg by mouth once daily.       donepezil (ARICEPT) 10 MG tablet  TAKE 1 TABLET BY MOUTH ONCE DAILY (Patient taking differently: Take 10 mg by mouth once daily. ) 90 tablet 1    ergocalciferol (ERGOCALCIFEROL) 50,000 unit Cap TAKE ONE CAPSULE BY MOUTH EVERY WEEK 12 capsule 0    furosemide (LASIX) 20 MG tablet Take 20 mg by mouth once daily.       HYDROcodone-acetaminophen (NORCO)  mg per tablet Take 1 tablet by mouth every 8 (eight) hours as needed (pain). Medically necessary for greater than 7 days for chronic pain 90 tablet 0    ipratropium (ATROVENT) 42 mcg (0.06 %) nasal spray 1 spray by Nasal route 2 (two) times daily.  0    LYRICA 150 mg capsule Take 150 mg by mouth 2 (two) times daily.       metoprolol succinate (TOPROL-XL) 25 MG 24 hr tablet Take 1 tablet (25 mg total) by mouth once daily. 30 tablet 0    pantoprazole (PROTONIX) 40 MG tablet Take 40 mg by mouth once daily.       sertraline (ZOLOFT) 100 MG tablet Take 100 mg by mouth every evening.       simvastatin (ZOCOR) 40 MG tablet Take 40 mg by mouth every evening.        Scheduled Meds:   albuterol-ipratropium  3 mL Nebulization Q6H WAKE    amLODIPine  10 mg Oral Daily    aspirin  81 mg Oral Daily    donepezil  10 mg Oral Daily    furosemide  20 mg Oral Daily    Lactobacillus acidoph-L.bulgar  4 tablet Oral TID WM    levoFLOXacin  250 mg Oral Before breakfast    metoprolol succinate  25 mg Oral Daily    pantoprazole  40 mg Oral Daily    pregabalin  150 mg Oral BID    sertraline  100 mg Oral QHS    simvastatin  40 mg Oral QHS     Continuous Infusions:  PRN Meds:.acetaminophen, albuterol sulfate, calcium chloride IVPB, calcium chloride IVPB, calcium chloride IVPB, HYDROcodone-acetaminophen, magnesium oxide, magnesium sulfate IVPB, magnesium sulfate IVPB, magnesium sulfate IVPB, magnesium sulfate IVPB, potassium chloride in water, potassium chloride in water, potassium chloride in water, potassium chloride in water, potassium chloride, potassium chloride, potassium chloride, potassium chloride,  sodium chloride 0.9%, sodium phosphate IVPB, sodium phosphate IVPB, sodium phosphate IVPB, sodium phosphate IVPB, sodium phosphate IVPB    Allergies:  Eucalyptus containing products    Past Family History:  Reviewed; refer to Hospitalist Admission Note    Review of Systems:  Review of Systems - All 14 systems reviewed and negative, except as noted in HPI    Physical Exam:    BP (!) 113/58   Pulse 71   Temp 98.1 °F (36.7 °C)   Resp (!) 22   Ht 6' (1.829 m)   Wt 65 kg (143 lb 4.8 oz)   SpO2 96%   BMI 19.43 kg/m²     General Appearance:    Alert, cooperative, no distress, appears stated age   Head:    Normocephalic, without obvious abnormality, atraumatic   Eyes:    PER, conjunctiva/corneas clear, EOM's intact in both eyes        Throat:   Lips, mucosa, and tongue normal; teeth and gums normal   Back:     Symmetric, no curvature, ROM normal, no CVA tenderness   Lungs:     Exp wheezes    Chest wall:    No tenderness or deformity   Heart:    Regular rate and rhythm, S1 and S2 normal, no murmur, rub   or gallop   Abdomen:     Soft, non-tender, bowel sounds active all four quadrants,     no masses, no organomegaly   Extremities:   Extremities normal, atraumatic, no cyanosis or edema   Pulses:   2+ and symmetric all extremities   MSK:   No joint or muscle swelling, tenderness or deformity   Skin:   Skin color, texture, turgor normal, no rashes or lesions   Neurologic:   CNII-XII intact, normal strength and sensation       Throughout.  No flap     Results:  Lab Results   Component Value Date     01/05/2020    K 3.7 01/05/2020     01/05/2020    CO2 25 01/05/2020    BUN 24 (H) 01/05/2020    CREATININE 1.3 01/05/2020    CALCIUM 8.5 (L) 01/05/2020    ANIONGAP 11 01/05/2020    ESTGFRAFRICA 55.9 (A) 01/05/2020    EGFRNONAA 48.4 (A) 01/05/2020       Lab Results   Component Value Date    CALCIUM 8.5 (L) 01/05/2020    PHOS 3.1 01/03/2020       Recent Labs   Lab 01/05/20  0442   WBC 6.52   RBC 3.22*   HGB 10.1*    HCT 30.1*   PLT 78*   MCV 94   MCH 31.4*   MCHC 33.6       I have personally reviewed pertinent radiological imaging and reports.

## 2020-01-05 NOTE — ASSESSMENT & PLAN NOTE
Reports loose stool 1/4. Re ordered stool studies.  Abdominal exam benign. Stool studies unrevealing of infection thus far.

## 2020-01-05 NOTE — PROGRESS NOTES
[facility]  Progress Note    Patient Name: Halley Rodrigues  MRN: [id number]  Patient Class: IP- Inpatient   Admission Date: 1/1/2020  Length of Stay: 3 days  Attending Physician: Michelle Theodore MD  Primary Care Provider: Scott Payne MD        Subjective:     Principal Problem:Pneumonia        HPI:  Patient is a 89-year-old  male with known history of CAD status post CABG, dementia, chronic combined systolic and diastolic congestive heart failure and anemia of chronic disease who was recently discharged from our facility after being admitted for syncope and eventually transferred to skilled nursing facility presents with chief complaint of shortness of breath and cough.  History is limited secondary to patient's dementia and is supplemented by his son at bedside.    Patient was discharged from [facility] on 12/23.  According to the patient's son patient was noted to be more weak over the last 3 days.  He was noted to have diarrhea which was nonbloody and watery over the last 2 days.  Reported poor oral intake and generalized weakness.  Patient was also noted to have a rattling cough.  Son denies known choking episodes or aspiration events in the past.  Patient denies chest pain but admits to shortness of breath is worse with exertion and better with rest.  Symptoms are moderate in nature.  No lightheadedness, lower extremity edema, palpitations, abdominal pain, nausea/vomiting or urinary problems. Patient is prone to frequent falls; noncompliant with walker.    In the ER:  Hemodynamically stable.  Patient was noted to be hypoxic with O2 sats in the 80s on room air which improved to greater than 90% on supplemental oxygen.  He was also noted to be tachypneic.  Laboratory workup revealed chronic normocytic anemia, thrombocytopenia, hypokalemia, CKD stage 3, elevated BNP and troponin.  EKG revealed normal sinus rhythm with nonspecific ST-T  wave changes and prolonged QT interval.  It is unchanged from EKG dated 11/26/2019.  Chest x-ray revealed left mid and lower lung zone alveolar opacities concerning for pneumonia.  Admitted for further management.    Rest of the 10 point review of systems is negative except as mentioned above.      Overview/Hospital Course:  Patient admitted with acute hypoxic respiratory failure suspected to be pneumonia.  IV abx started.  Troponin mildly elevated and thus he was admitted to cardiology.  1/2/20 CXR with worsening infiltrate and patient with increased O2 needs- oxymask is working better than NC.  Torponin has continued to climb.  He has no complaints but has dementia and family reports he is not a complainer. Patient moved to [facility] for continuous cardiac monitoring.  Cardiology consulted on admission.  Pulmonary consulted given worsening infiltrate. IV lasix ordered in the event this could be pulmonary edema.  Poor urine output in response and thus Renal consulted for oliguria and is following.  1/3/19 patient looks much improved but still requiring supplemental O2 to keep saturations greater than 88%. Echo done and EF 34-42%.  EF 43% November 2019 but otherwise unchanged. 1/4 improved infiltrate on CXR suggesting pulmonary edema.  Changing IV lasix to po.  Supplemental O2 weaned at this time but will monitor.  Mobilizing with PT/OT.  Having loose stool- stool studies thus far unrevealing of infection.  Probiotics started.      Interval History: Patient  Pleasantly confused and thus unclear how accurate history is but denies chest pain or SOB.  I removed his oxy mask that he had on for comfort and O2 saturations remained 92% and above.      Review of Systems   Reason unable to perform ROS: Dementia.     Objective:     Vital Signs (Most Recent):  Temp: 97.8 °F (36.6 °C) (01/05/20 1150)  Pulse: 79 (01/05/20 1308)  Resp: (!) 29 (01/05/20 1308)  BP: 118/77 (01/05/20 1150)  SpO2: (!) 85 % (01/05/20 1308) Vital Signs  (24h Range):  Temp:  [97.8 °F (36.6 °C)-98.9 °F (37.2 °C)] 97.8 °F (36.6 °C)  Pulse:  [71-86] 79  Resp:  [19-51] 29  SpO2:  [79 %-98 %] 85 %  BP: (113-141)/(58-77) 118/77     Weight: 65 kg (143 lb 4.8 oz)  Body mass index is 19.43 kg/m².    Intake/Output Summary (Last 24 hours) at 1/5/2020 1530  Last data filed at 1/5/2020 0230  Gross per 24 hour   Intake --   Output 1300 ml   Net -1300 ml      Physical Exam   Constitutional: He appears well-developed. No distress.   Sitting up in chair   HENT:   Head: Normocephalic and atraumatic.   Mouth/Throat: Oropharynx is clear and moist.   Eyes: Pupils are equal, round, and reactive to light. EOM are normal.   Neck: Normal range of motion.   Cardiovascular: Regular rhythm.   No murmur heard.  Pulmonary/Chest: Effort normal and breath sounds normal. He has no wheezes.   Abdominal: Soft. He exhibits no distension. There is no tenderness. There is no rebound and no guarding.   Musculoskeletal: Normal range of motion.   Neurological: He is alert. No cranial nerve deficit or sensory deficit.   Pleasantly confused  Witnessed him ambulating around the [place] with PT   Skin: No rash noted.   Psychiatric: He has a normal mood and affect.   Nursing note and vitals reviewed.      Significant Labs:   CBC:   Recent Labs   Lab 01/04/20  0739 01/05/20  0442   WBC 5.23 6.52   HGB 10.0* 10.1*   HCT 29.2* 30.1*   PLT 63* 78*     CMP:   Recent Labs   Lab 01/04/20  0739 01/05/20  0442    141   K 3.3* 3.7    105   CO2 26 25    111*   BUN 30* 24*   CREATININE 1.6* 1.3   CALCIUM 8.3* 8.5*   ANIONGAP 11 11   EGFRNONAA 37.6* 48.4*       Significant Imaging: tele personally reviewed and NSR      Assessment/Plan:      * Pneumonia  Left middle and lower lobe pneumonia on imaging- worsening on repeat imaging 1/2.  CXR cleared 1/4  Supplemental oxygen, increased requirements 1/2/20- better 1/3 and now weaned to room air 1/5  Concern for hospital-acquired pneumonia in the setting of recent  hospital and skilled nursing facility stay  Ordered respiratory viral panel, MRSA PCR and influenza testing- negative  Follow sputum and blood cultures- NGTD  Covered initially with vancomycin and piperacillin-tazobactam. Levaquin added to cover for any legionella. De escalated to Levaquin only.  Pulmonary consulted given worsening state- following  Patient with great improvement          Loose stools  Reports loose stool 1/4. Re ordered stool studies.  Abdominal exam benign. Stool studies unrevealing of infection thus far.      Hypomagnesemia  Replete and monitor daily as per sliding scale      Thrombocytopenia, unspecified  Chronic issue  Stable over serial checks      Hypokalemia  Replete as per sliding scale and monitor daily      Pneumonia due to infectious organism        Stage 3 chronic kidney disease  GARRETT after lasix.  Now resolved and back to baseline.      Acute respiratory failure with hypoxia  Due to pneumonia and HF  Weaned to room air currently      Discharge planning issues  I spoke to family with updates and that if patient continues to do well, I expect discharge 1/6.  Patient has improved and is participating well with therapy but we did discuss that his dementia makes staying at home alone not the safest.  Patient currently lives with his son and daughter in law.  I discussed long term options including NH placement (which patient does not want), Assisted living in a memory unit type setting, sitters at home, continued care by family at home.  I am resuming his home health services and will ask CM/SW to provide information on long term options for family to decide in the future.  Current discharge disposition is home with home health.       Essential hypertension  Stable  Continue home amlodipine and metoprolol succinate      Dementia  Discussed with family who reports he is at his mental status baseline  Continue home donepezil       NSTEMI (non-ST elevated myocardial infarction)  Denies chest  pain   Likely demand ischemia in the setting of pneumonia and HF but given that troponin has risen to 9, I do suspect underlying disease   Goal directed therapy  Cardiology following. Not a good candidate for invasive treatment given age and co morbidities and thus plan to treat medically.  Dr. Hall recommends follow up with Dr. Oseguera in one week.  Telemetry monitoring.     Chronic combined systolic and diastolic heart failure  Reviewed recent echo from November 2019 with EF of 43% and grade 1 diastolic dysfunction. Repeat Echo with EF 34-42%.  IV lasix in the event CXR also represents pulmonary edema, which suspect is likely given rapid clearance 1/4 on CXR.  Following renal and cardiology recommendations.   Changed IV lasix to po lasix 1/4  No longer appears to be in acute failure    Anemia, chronic disease  Aware  H/H has been stable without any signs of overt loss.          VTE Risk Mitigation (From admission, onward)         Ordered     Place sequential compression device  Until discontinued      01/01/20 2204     IP VTE HIGH RISK PATIENT  Once      01/01/20 2204                      Michelle Theodore MD  Department of Hospital Medicine   [facility]

## 2020-01-05 NOTE — ASSESSMENT & PLAN NOTE
Denies chest pain   Likely demand ischemia in the setting of pneumonia and HF but given that troponin has risen to 9, I do suspect underlying disease   Goal directed therapy  Cardiology following. Not a good candidate for invasive treatment given age and co morbidities and thus plan to treat medically.  Dr. Hall recommends follow up with Dr. Oseguera in one week.  Telemetry monitoring.

## 2020-01-06 VITALS
RESPIRATION RATE: 22 BRPM | BODY MASS INDEX: 19.41 KG/M2 | WEIGHT: 143.31 LBS | HEART RATE: 70 BPM | HEIGHT: 72 IN | OXYGEN SATURATION: 95 % | DIASTOLIC BLOOD PRESSURE: 54 MMHG | SYSTOLIC BLOOD PRESSURE: 121 MMHG | TEMPERATURE: 98 F

## 2020-01-06 PROBLEM — Z75.8 DISCHARGE PLANNING ISSUES: Status: RESOLVED | Noted: 2019-11-30 | Resolved: 2020-01-06

## 2020-01-06 PROBLEM — J96.01 ACUTE RESPIRATORY FAILURE WITH HYPOXIA: Status: RESOLVED | Noted: 2020-01-01 | Resolved: 2020-01-06

## 2020-01-06 PROBLEM — R19.5 LOOSE STOOLS: Status: RESOLVED | Noted: 2020-01-04 | Resolved: 2020-01-06

## 2020-01-06 PROBLEM — E87.6 HYPOKALEMIA: Status: RESOLVED | Noted: 2020-01-01 | Resolved: 2020-01-06

## 2020-01-06 PROBLEM — E83.42 HYPOMAGNESEMIA: Status: RESOLVED | Noted: 2020-01-01 | Resolved: 2020-01-06

## 2020-01-06 LAB
BACTERIA BLD CULT: NORMAL
BACTERIA BLD CULT: NORMAL
STOOL CULTURE: NORMAL
STOOL CULTURE: NORMAL

## 2020-01-06 PROCEDURE — 97116 GAIT TRAINING THERAPY: CPT

## 2020-01-06 PROCEDURE — 25000003 PHARM REV CODE 250: Performed by: INTERNAL MEDICINE

## 2020-01-06 PROCEDURE — 94761 N-INVAS EAR/PLS OXIMETRY MLT: CPT

## 2020-01-06 PROCEDURE — 25000242 PHARM REV CODE 250 ALT 637 W/ HCPCS: Performed by: INTERNAL MEDICINE

## 2020-01-06 PROCEDURE — 94640 AIRWAY INHALATION TREATMENT: CPT

## 2020-01-06 RX ADMIN — METOPROLOL SUCCINATE 25 MG: 25 TABLET, EXTENDED RELEASE ORAL at 09:01

## 2020-01-06 RX ADMIN — AMLODIPINE BESYLATE 10 MG: 5 TABLET ORAL at 10:01

## 2020-01-06 RX ADMIN — IPRATROPIUM BROMIDE AND ALBUTEROL SULFATE 3 ML: .5; 3 SOLUTION RESPIRATORY (INHALATION) at 01:01

## 2020-01-06 RX ADMIN — LEVOFLOXACIN 250 MG: 250 TABLET, FILM COATED ORAL at 05:01

## 2020-01-06 RX ADMIN — FUROSEMIDE 20 MG: 20 TABLET ORAL at 10:01

## 2020-01-06 RX ADMIN — DONEPEZIL HYDROCHLORIDE 10 MG: 5 TABLET, FILM COATED ORAL at 10:01

## 2020-01-06 RX ADMIN — IPRATROPIUM BROMIDE AND ALBUTEROL SULFATE 3 ML: .5; 3 SOLUTION RESPIRATORY (INHALATION) at 08:01

## 2020-01-06 RX ADMIN — LACTOBACILLUS TAB 4 TABLET: TAB at 10:01

## 2020-01-06 RX ADMIN — ASPIRIN 81 MG 81 MG: 81 TABLET ORAL at 10:01

## 2020-01-06 RX ADMIN — PANTOPRAZOLE SODIUM 40 MG: 40 TABLET, DELAYED RELEASE ORAL at 05:01

## 2020-01-06 NOTE — PLAN OF CARE
01/06/20 1226   Discharge Reassessment   Assessment Type Discharge Planning Reassessment   Anticipated Discharge Disposition Home     SW received VM from pt's daughter-in-law, Erica [phone], regarding pt's d/c. CHAPARRO passed on the information to assigned CM.

## 2020-01-06 NOTE — PLAN OF CARE
01/06/20 1148   Discharge Reassessment   Assessment Type Discharge Planning Reassessment   Anticipated Discharge Disposition Home-Health   Post-Acute Status   Post-Acute Authorization Home Health/Hospice   Home Health/Hospice Status Referrals Sent  (DC oreders were faxed to [facility] via [facility] and [facility]  via [employer])   I called [facility]  [phone] opt#3, spoke with Rosa Elena, informed her of the dc orders and that the patient is current with [facility].    Nurse Amanda informed me that the dtr in law Erica called to inform us that the family wants the  Patient placed in a NH. I informed Dr Theodore of this request and was informed by her that she spoke with the family at length on yesterday about the dc plan and the plan has not changed. I called Erica to inform her that Dr Theodore has informed me that they discussed the dc plan and that plan has not changed. While on the phone with her, she got another call and told me that it was Dr Theodore and she would call me back.

## 2020-01-06 NOTE — PT/OT/SLP PROGRESS
Physical Therapy Treatment    Patient Name:  Halley Rodrigues   MRN:  [id number]    Recommendations:     Discharge Recommendations:  home health PT   Discharge Equipment Recommendations: none   Barriers to discharge: None    Assessment:     Halley Rodrigues is a 89 y.o. male admitted with a medical diagnosis of Pneumonia.  He presents with the following impairments/functional limitations:  weakness, gait instability, impaired self care skills, impaired functional mobilty. Patient able to ambulate today with RW and Min Assist for RW management. Pt ambulated with B flexed knees and trunk. Pt with noted L knee pain towards end of gait training. No SOB or LOB noted. Continue with PT and POC.    Rehab Prognosis: Good; patient would benefit from acute skilled PT services to address these deficits and reach maximum level of function.    Recent Surgery: * No surgery found *      Plan:     During this hospitalization, patient to be seen daily to address the identified rehab impairments via gait training, therapeutic activities, therapeutic exercises and progress toward the following goals:    · Plan of Care Expires:  01/04/20    Subjective     Chief Complaint: L knee pain  Patient/Family Comments/goals:   Pain/Comfort:  · Pain Rating 1: 0/10  · Pain Rating Post-Intervention 1: 0/10      Objective:     Communicated with nursing prior to session.  Patient found HOB elevated with telemetry, blood pressure cuff, pulse ox (continuous) upon PT entry to room.     General Precautions: Standard, fall   Orthopedic Precautions:N/A   Braces:       Functional Mobility:  · Bed Mobility:     · Supine to Sit: stand by assistance  · Transfers:     · Sit to Stand:  minimum assistance with rolling walker  · Gait: 175ft with RW and Min Assist for RW management      AM-PAC 6 CLICK MOBILITY          Therapeutic Activities and Exercises:   bed mobility; sitting EOB for trunk control and midline orientation; sit <> stands; transfer  training; gait training    Patient left HOB elevated with all lines intact, call button in reach and bed alarm on..    GOALS:   Multidisciplinary Problems     Physical Therapy Goals        Problem: Physical Therapy Goal    Goal Priority Disciplines Outcome Goal Variances Interventions   Physical Therapy Goal     PT, PT/OT Ongoing, Progressing     Description:  Goals to be met by: 2020     Patient will increase functional independence with mobility by performin. Supine to sit with MInimal Assistance  2. Sit to stand transfer with Supervision  3. Bed to chair transfer with Supervision using Rolling Walker  4. Gait  x 100 feet with Minimal Assistance using Rolling Walker.                        Time Tracking:     PT Received On: 20  PT Start Time: 1357     PT Stop Time: 1407  PT Total Time (min): 10 min     Billable Minutes: Gait Training 10    Treatment Type: Treatment  PT/PTA: PTA     PTA Visit Number: Miki Gomez PTA  2020

## 2020-01-06 NOTE — PLAN OF CARE
01/06/20 0838   Patient Assessment/Suction   Level of Consciousness (AVPU) alert   Respiratory Effort Normal;Unlabored   Expansion/Accessory Muscles/Retractions no use of accessory muscles   All Lung Fields Breath Sounds crackles;clear;diminished   PRE-TX-O2   O2 Device (Oxygen Therapy) room air   SpO2 95 %   Pulse Oximetry Type Continuous   $ Pulse Oximetry - Multiple Charge Pulse Oximetry - Multiple   Pulse 76   Resp 20   Aerosol Therapy   $ Aerosol Therapy Charges Aerosol Treatment   Daily Review of Necessity (SVN) completed   Respiratory Treatment Status (SVN) given   Treatment Route (SVN) mask   Patient Position (SVN) semi-Paul's   Post Treatment Assessment (SVN) increased aeration   Signs of Intolerance (SVN) none   Breath Sounds Post-Respiratory Treatment   Throughout All Fields Post-Treatment All Fields   Throughout All Fields Post-Treatment aeration increased   Post-treatment Heart Rate (beats/min) 73   Post-treatment Resp Rate (breaths/min) 20

## 2020-01-06 NOTE — PLAN OF CARE
01/05/20 1920   Patient Assessment/Suction   Level of Consciousness (AVPU) alert   Respiratory Effort Normal;Unlabored   Expansion/Accessory Muscles/Retractions no use of accessory muscles   All Lung Fields Breath Sounds crackles  (noted in the bases)   Cough Frequency infrequent   Cough Type nonproductive   PRE-TX-O2   O2 Device (Oxygen Therapy) room air   SpO2 (!) 92 %   Pulse Oximetry Type Continuous   $ Pulse Oximetry - Multiple Charge Pulse Oximetry - Multiple   Pulse 78   Resp 17   Aerosol Therapy   $ Aerosol Therapy Charges Aerosol Treatment   Daily Review of Necessity (SVN) completed   Respiratory Treatment Status (SVN) given   Treatment Route (SVN) mask   Patient Position (SVN) semi-Paul's   Post Treatment Assessment (SVN) increased aeration   Signs of Intolerance (SVN) none   Breath Sounds Post-Respiratory Treatment   Throughout All Fields Post-Treatment All Fields   Throughout All Fields Post-Treatment aeration increased   Post-treatment Heart Rate (beats/min) 82   Post-treatment Resp Rate (breaths/min) 26   continue tx. As ordered

## 2020-01-06 NOTE — PROGRESS NOTES
INPATIENT NEPHROLOGY PROGRESS   [facility] NEPHROLOGY    Halley Rodrigues  01/06/2020    Reason for consultation:    sergio    Chief Complaint:   Chief Complaint   Patient presents with    Cough     x3 days with SOB          History of Present Illness:    Per H and P    Patient is a 89-year-old  male with known history of CAD status post CABG, dementia, chronic combined systolic and diastolic congestive heart failure and anemia of chronic disease who was recently discharged from our facility after being admitted for syncope and eventually transferred to skilled nursing facility presents with chief complaint of shortness of breath and cough.  History is limited secondary to patient's dementia and is supplemented by his son at bedside.     Patient was discharged from [facility] on 12/23.  According to the patient's son patient was noted to be more weak over the last 3 days.  He was noted to have diarrhea which was nonbloody and watery over the last 2 days.  Reported poor oral intake and generalized weakness.  Patient was also noted to have a rattling cough.  Son denies known choking episodes or aspiration events in the past.  Patient denies chest pain but admits to shortness of breath is worse with exertion and better with rest.  Symptoms are moderate in nature.  No lightheadedness, lower extremity edema, palpitations, abdominal pain, nausea/vomiting or urinary problems. Patient is prone to frequent falls; noncompliant with walker.     In the ER:  Hemodynamically stable.  Patient was noted to be hypoxic with O2 sats in the 80s on room air which improved to greater than 90% on supplemental oxygen.  He was also noted to be tachypneic.  Laboratory workup revealed chronic normocytic anemia, thrombocytopenia, hypokalemia, CKD stage 3, elevated BNP and troponin.  EKG revealed normal sinus rhythm with nonspecific ST-T wave changes and prolonged QT interval.  It is unchanged from EKG  dated 11/26/2019.  Chest x-ray revealed left mid and lower lung zone alveolar opacities concerning for pneumonia.  Admitted for further management.    1/2/20  Consulted for GARRETT.  No nausea, chest pain, sob, fever, urinary or bowel complaint, new neurologic symptoms, new joint pain,  1/3/20  No nausea, chest pain, sob, fever, urinary or bowel complaint, new neurologic symptoms, new joint pain,  1/4  Renal function improving.  Hypokalemic, has prn repletion orders.  Denies chest pain, SOB, n/v/d/c  1/5  K+ at goal.  Renal function improving, Scr now wnl.  No new complaints.  VSS, UOP 1.9L.  1/6  VSS, on RA, UOp 600cc; BNP elevated so now off NS IVFs; no cp, dyspnea, abd pain, edema    Plan of Care:    Acute kidney injury likely hemodynamcially mediated with h/o several days of diarrhea and poor po intake  --GARRETT is resolving  --d/c NS IVFs  --no nsaids, coxibs, iv contrast    Hypokalemia--replete per prn orders    HTN/CHF--stable with PO regime including diuretic    Anemia-- stable    Thank you for allowing us to participate in this patient's care. We will continue to follow.    Vital Signs:  Temp Readings from Last 3 Encounters:   01/06/20 98.7 °F (37.1 °C) (Oral)   12/03/19 97.7 °F (36.5 °C)   09/09/19 98.1 °F (36.7 °C)       Pulse Readings from Last 3 Encounters:   01/06/20 76   12/03/19 64   10/21/19 (!) 54       BP Readings from Last 3 Encounters:   01/06/20 (!) 117/57   12/03/19 (!) 143/62   10/21/19 (!) 154/78       Weight:  Wt Readings from Last 3 Encounters:   01/05/20 65 kg (143 lb 4.8 oz)   01/03/20 69.6 kg (153 lb 7 oz)   12/03/19 63.9 kg (140 lb 14 oz)       Medications:  No current facility-administered medications on file prior to encounter.      Current Outpatient Medications on File Prior to Encounter   Medication Sig Dispense Refill    albuterol-ipratropium (DUO-NEB) 2.5 mg-0.5 mg/3 mL nebulizer solution Take 3 mLs by nebulization every 6 (six) hours as needed for Wheezing or Shortness of Breath. 1  Box 0    amLODIPine (NORVASC) 10 MG tablet Take 1 tablet (10 mg total) by mouth once daily. 30 tablet 0    aspirin 81 MG Chew Take 81 mg by mouth once daily.       donepezil (ARICEPT) 10 MG tablet TAKE 1 TABLET BY MOUTH ONCE DAILY (Patient taking differently: Take 10 mg by mouth once daily. ) 90 tablet 1    ergocalciferol (ERGOCALCIFEROL) 50,000 unit Cap TAKE ONE CAPSULE BY MOUTH EVERY WEEK 12 capsule 0    furosemide (LASIX) 20 MG tablet Take 20 mg by mouth once daily.       HYDROcodone-acetaminophen (NORCO)  mg per tablet Take 1 tablet by mouth every 8 (eight) hours as needed (pain). Medically necessary for greater than 7 days for chronic pain 90 tablet 0    ipratropium (ATROVENT) 42 mcg (0.06 %) nasal spray 1 spray by Nasal route 2 (two) times daily.  0    LYRICA 150 mg capsule Take 150 mg by mouth 2 (two) times daily.       metoprolol succinate (TOPROL-XL) 25 MG 24 hr tablet Take 1 tablet (25 mg total) by mouth once daily. 30 tablet 0    pantoprazole (PROTONIX) 40 MG tablet Take 40 mg by mouth once daily.       sertraline (ZOLOFT) 100 MG tablet Take 100 mg by mouth every evening.       simvastatin (ZOCOR) 40 MG tablet Take 40 mg by mouth every evening.        Scheduled Meds:   albuterol-ipratropium  3 mL Nebulization Q6H WAKE    amLODIPine  10 mg Oral Daily    aspirin  81 mg Oral Daily    donepezil  10 mg Oral Daily    furosemide  20 mg Oral Daily    Lactobacillus acidoph-L.bulgar  4 tablet Oral TID WM    levoFLOXacin  250 mg Oral Before breakfast    metoprolol succinate  25 mg Oral Daily    pantoprazole  40 mg Oral Daily    pregabalin  150 mg Oral BID    sertraline  100 mg Oral QHS    simvastatin  40 mg Oral QHS     Continuous Infusions:  PRN Meds:.acetaminophen, albuterol sulfate, calcium chloride IVPB, calcium chloride IVPB, calcium chloride IVPB, HYDROcodone-acetaminophen, magnesium oxide, magnesium sulfate IVPB, magnesium sulfate IVPB, magnesium sulfate IVPB, magnesium sulfate  IVPB, potassium chloride in water, potassium chloride in water, potassium chloride in water, potassium chloride in water, potassium chloride, potassium chloride, potassium chloride, potassium chloride, sodium chloride 0.9%, sodium phosphate IVPB, sodium phosphate IVPB, sodium phosphate IVPB, sodium phosphate IVPB, sodium phosphate IVPB    Review of Systems:  Review of Systems - All 14 systems reviewed and negative, except as noted in HPI    Physical Exam:    BP (!) 117/57   Pulse 76   Temp 98.7 °F (37.1 °C) (Oral)   Resp 20   Ht 6' (1.829 m)   Wt 65 kg (143 lb 4.8 oz)   SpO2 95%   BMI 19.43 kg/m²     Constitutional: nad, aao x 3  Heart: rrr, no m/r/g, wwp, no edema  Lungs: ctab, no w/r/r/c, no lb  Abdomen: s/nt/nd, +BS    Results:  Lab Results   Component Value Date     01/05/2020    K 3.7 01/05/2020     01/05/2020    CO2 25 01/05/2020    BUN 24 (H) 01/05/2020    CREATININE 1.3 01/05/2020    CALCIUM 8.5 (L) 01/05/2020    ANIONGAP 11 01/05/2020    ESTGFRAFRICA 55.9 (A) 01/05/2020    EGFRNONAA 48.4 (A) 01/05/2020       Lab Results   Component Value Date    CALCIUM 8.5 (L) 01/05/2020    PHOS 3.1 01/03/2020       No results for input(s): WBC, RBC, HGB, HCT, PLT, MCV, MCH, MCHC in the last 24 hours.    I have personally reviewed pertinent radiological imaging and reports.    Mariam Baeza MD MPH  [facility]  [address] [place] [id number]  [phone] (p)  [phone] (f)

## 2020-01-06 NOTE — PLAN OF CARE
Problem: Physical Therapy Goal  Goal: Physical Therapy Goal  Description  Goals to be met by: 2020     Patient will increase functional independence with mobility by performin. Supine to sit with MInimal Assistance  2. Sit to stand transfer with Supervision  3. Bed to chair transfer with Supervision using Rolling Walker  4. Gait  x 100 feet with Minimal Assistance using Rolling Walker.       2020 1522 by Amina Gomez PTA  Outcome: Ongoing, Progressing  2020 152 by Amina Gomez PTA  Reactivated   Pt continues to progress towards goals.

## 2020-01-06 NOTE — PLAN OF CARE
01/06/20 1116   Discharge Reassessment   Assessment Type Discharge Planning Reassessment   Anticipated Discharge Disposition Home-Health   Patient choice form signed by patient/caregiver   (Completed with patient today at 1111. He is with [facility]  and would like to resume HH with them.)

## 2020-01-06 NOTE — DISCHARGE INSTRUCTIONS
If Mr. Rodrigues continues with loose stool, I suggest trying an over the counter probiotic of your choice to take three times a day with meals.

## 2020-01-06 NOTE — PLAN OF CARE
Plan of care,  medications and safety reviewed with patient.       Problem: Wound  Goal: Optimal Wound Healing  Outcome: Ongoing, Progressing     Problem: Adult Inpatient Plan of Care  Goal: Plan of Care Review  Outcome: Ongoing, Progressing  Goal: Patient-Specific Goal (Individualization)  Outcome: Ongoing, Progressing  Goal: Absence of Hospital-Acquired Illness or Injury  Outcome: Ongoing, Progressing  Goal: Optimal Comfort and Wellbeing  Outcome: Ongoing, Progressing  Goal: Readiness for Transition of Care  Outcome: Ongoing, Progressing  Goal: Rounds/Family Conference  Outcome: Ongoing, Progressing     Problem: Fall Injury Risk  Goal: Absence of Fall and Fall-Related Injury  Outcome: Ongoing, Progressing     Problem: Infection  Goal: Infection Symptom Resolution  Outcome: Ongoing, Progressing     Problem: Skin Injury Risk Increased  Goal: Skin Health and Integrity  Outcome: Ongoing, Progressing     Problem: Oral Intake Inadequate  Goal: Improved Oral Intake  Outcome: Ongoing, Progressing     Problem: Fluid Imbalance (Pneumonia)  Goal: Fluid Balance  Outcome: Ongoing, Progressing     Problem: Infection (Pneumonia)  Goal: Resolution of Infection Signs/Symptoms  Outcome: Ongoing, Progressing     Problem: Respiratory Compromise (Pneumonia)  Goal: Effective Oxygenation and Ventilation  Outcome: Ongoing, Progressing     Problem: Pain Acute  Goal: Optimal Pain Control  Outcome: Ongoing, Progressing

## 2020-01-06 NOTE — PROGRESS NOTES
[facility]  Adult Nutrition   Progress Note (Follow-Up)    SUMMARY     Recommendations/Interventions:  1. Continue current diet as tolerated, encourage intake.   2. Added Ensure Enlive TID to aid in meeting estimated needs due to Hx of moderate malnutrition upon last admission and no change in weight status.  Goals:   1. Pt to meet at least 75% of estimated needs via PO intake of meals and supplements.  Nutrition Goal Status: progressing towards goal    Reason for Assessment    Reason For Assessment: RD follow-up  Diagnosis: (Pneumonia )  Relevant Medical History: anemia, dementia, GERD, HTN  Interdisciplinary Rounds: attended    Nutrition Risk Screen    Nutrition Risk Screen: no indicators present     MST Score: 2  Have you recently lost weight without trying?: Yes: 2-13 lbs  Weight loss score: 1  Have you been eating poorly because of a decreased appetite?: Yes  Appetite score: 1     Nutrition/Diet History    Patient Reported Diet/Restrictions/Preferences: general  Food Allergies: NKFA  Factors Affecting Nutritional Intake: None identified at this time    Anthropometrics    Temp: 98.7 °F (37.1 °C)  Height Method: Stated  Height: 6' (182.9 cm)  Height (inches): 72 in  Weight Method: Bed Scale  Weight: 65 kg (143 lb 4.8 oz)  Weight (lb): 143.3 lb  Ideal Body Weight (IBW), Male: 178 lb  % Ideal Body Weight, Male (lb): 86.74 %  BMI (Calculated): 19.4  BMI Grade: 18.5-24.9 - normal  Weight Loss: unintentional     Weight History:  Wt Readings from Last 10 Encounters:   01/05/20 65 kg (143 lb 4.8 oz)   01/03/20 69.6 kg (153 lb 7 oz)   12/03/19 63.9 kg (140 lb 14 oz)   10/21/19 73.5 kg (162 lb)   09/09/19 74.8 kg (165 lb)   08/26/19 73.5 kg (162 lb)   07/30/19 73.5 kg (162 lb)   05/02/19 73.8 kg (162 lb 9.6 oz)   04/25/19 70.8 kg (156 lb)   01/31/19 70.8 kg (156 lb)     Lab/Procedures/Meds: Pertinent Labs Reviewed  Clinical Chemistry:  Recent Labs   Lab 01/01/20 2022 01/03/20  0341 01/05/20  0442     139  139 141   K 3.1* 3.1*  3.1* 3.7    103  103 105   CO2 24 24  24 25    109  109 111*   BUN 26* 32*  32* 24*   CREATININE 1.7* 1.7*  1.7* 1.3   CALCIUM 8.4* 8.0*  8.0* 8.5*   PROT 6.5  --   --    ALBUMIN 3.4* 2.9*  --    BILITOT 1.2*  --   --    ALKPHOS 52*  --   --    AST 30  --   --    ALT 16  --   --    ANIONGAP 14 12  12 11   ESTGFRAFRICA 40.4* 40.4*  40.4* 55.9*   EGFRNONAA 35.0* 35.0*  35.0* 48.4*   MG 1.4* 1.6 1.8   PHOS  --  3.1  --    AMYLASE 39  --   --    LIPASE 31  --   --     < > = values in this interval not displayed.     CBC:   Recent Labs   Lab 01/05/20  0442   WBC 6.52   RBC 3.22*   HGB 10.1*   HCT 30.1*   PLT 78*   MCV 94   MCH 31.4*   MCHC 33.6     Cardiac Profile:  Recent Labs   Lab 01/01/20 2022 01/02/20  0814 01/02/20  1540 01/02/20  2226 01/03/20  0342 01/05/20  0442   *  --   --   --  837* 620*     --   --  711*  --   --    CPKMB 1.9  --   --  28.3*  --   --    TROPONINI 0.864* 5.882* 9.640*  --   --  2.711*    < > = values in this interval not displayed.     Medications: Pertinent Medications reviewed  Scheduled Meds:   albuterol-ipratropium  3 mL Nebulization Q6H WAKE    amLODIPine  10 mg Oral Daily    aspirin  81 mg Oral Daily    donepezil  10 mg Oral Daily    furosemide  20 mg Oral Daily    Lactobacillus acidoph-L.bulgar  4 tablet Oral TID WM    levoFLOXacin  250 mg Oral Before breakfast    metoprolol succinate  25 mg Oral Daily    pantoprazole  40 mg Oral Daily    pregabalin  150 mg Oral BID    sertraline  100 mg Oral QHS    simvastatin  40 mg Oral QHS     Continuous Infusions:  PRN Meds:.acetaminophen, albuterol sulfate, calcium chloride IVPB, calcium chloride IVPB, calcium chloride IVPB, HYDROcodone-acetaminophen, magnesium oxide, magnesium sulfate IVPB, magnesium sulfate IVPB, magnesium sulfate IVPB, magnesium sulfate IVPB, potassium chloride in water, potassium chloride in water, potassium chloride in water, potassium chloride in  water, potassium chloride, potassium chloride, potassium chloride, potassium chloride, sodium chloride 0.9%, sodium phosphate IVPB, sodium phosphate IVPB, sodium phosphate IVPB, sodium phosphate IVPB, sodium phosphate IVPB    Estimated/Assessed Needs    Weight Used For Calorie Calculations: 65 kg (143 lb 4.8 oz)  Energy Calorie Requirements (kcal): 8155-5695 kcal/day (25-30 kcals/kg)   Energy Need Method: Kcal/kg  Protein Requirements: 70-91 g/day (1.0-1.3 g/kg)  Weight Used For Protein Calculations: 70 kg (154 lb 5.2 oz)     Estimated Fluid Requirement Method: RDA Method    Nutrition Prescription Ordered    Current Diet Order: Cardiac Diet     Evaluation of Received Nutrient/Fluid Intake    Energy Calories Required: not meeting needs  Protein Required: not meeting needs  Fluid Required: meeting needs  Tolerance: tolerating  % Intake of Estimated Energy Needs: 25 - 50 %  % Meal Intake: 25 - 50 %    Intake/Output Summary (Last 24 hours) at 1/6/2020 1303  Last data filed at 1/5/2020 1905  Gross per 24 hour   Intake 240 ml   Output 600 ml   Net -360 ml      Nutrition Risk    Level of Risk/Frequency of Follow-up: moderate     Dietitian Rounds Brief:  · Patient with hx of dementia. Added Ensure Enlive TID. Awaiting NH placement.    Monitor and Evaluation    Food and Nutrient Intake: energy intake, food and beverage intake  Food and Nutrient Adminstration: diet order  Physical Activity and Function: nutrition-related ADLs and IADLs  Anthropometric Measurements: weight, weight change  Biochemical Data, Medical Tests and Procedures: electrolyte and renal panel, inflammatory profile, lipid profile, gastrointestinal profile, glucose/endocrine profile  Nutrition-Focused Physical Findings: overall appearance     Nutrition Follow-Up    RD Follow-up?: Yes  Vy Genao RD 01/06/2020 1:04 PM

## 2020-01-07 NOTE — PT/OT/SLP DISCHARGE
Occupational Therapy Discharge Summary    Halley Rodrigues  MRN: [id number]   Principal Problem: Pneumonia      Patient Discharged from acute Occupational Therapy on 1/6/2020.  Please refer to prior OT note dated 1/3/2020 for functional status.    Assessment:      Patient has not met goals.    Objective:     GOALS:   Multidisciplinary Problems     Occupational Therapy Goals     Not on file          Multidisciplinary Problems (Resolved)        Problem: Occupational Therapy Goal    Goal Priority Disciplines Outcome Interventions   Occupational Therapy Goal   (Resolved)     OT, PT/OT Met    Description:  Goals to be met by: discharge     Patient will increase functional independence with ADLs by performing:    UE Dressing with Supervision.  LE Dressing with Supervision.  Grooming while standing with Supervision.  Toileting from toilet with Supervision for hygiene and clothing management.   Toilet transfer to toilet with Supervision.                      Reasons for Discontinuation of Therapy Services  Patient d/c home.      Plan:     Patient Discharged to: Home no OT services needed    Abhi Benites, OT  1/7/2020

## 2020-01-08 ENCOUNTER — HOSPITAL ENCOUNTER (INPATIENT)
Facility: HOSPITAL | Age: 85
LOS: 6 days | Discharge: HOME | DRG: 189 | End: 2020-01-15
Attending: EMERGENCY MEDICINE | Admitting: INTERNAL MEDICINE
Payer: MEDICARE

## 2020-01-08 DIAGNOSIS — R79.89 ELEVATED D-DIMER: ICD-10-CM

## 2020-01-08 DIAGNOSIS — I99.8 ANGIECTASIS: ICD-10-CM

## 2020-01-08 DIAGNOSIS — J96.01 ACUTE RESPIRATORY FAILURE WITH HYPOXIA AND HYPERCARBIA: ICD-10-CM

## 2020-01-08 DIAGNOSIS — J96.02 ACUTE RESPIRATORY FAILURE WITH HYPOXIA AND HYPERCARBIA: ICD-10-CM

## 2020-01-08 DIAGNOSIS — R05.9 COUGH: ICD-10-CM

## 2020-01-08 DIAGNOSIS — J98.01 BRONCHOSPASM: Primary | ICD-10-CM

## 2020-01-08 DIAGNOSIS — R09.02 HYPOXIA: ICD-10-CM

## 2020-01-08 DIAGNOSIS — D63.8 ANEMIA, CHRONIC DISEASE: ICD-10-CM

## 2020-01-08 LAB
ABO + RH BLD: NORMAL
ALBUMIN SERPL BCP-MCNC: 3.2 G/DL (ref 3.5–5.2)
ALP SERPL-CCNC: 60 U/L (ref 55–135)
ALT SERPL W/O P-5'-P-CCNC: 24 U/L (ref 10–44)
AMPHET+METHAMPHET UR QL: NEGATIVE
AMYLASE SERPL-CCNC: 51 U/L (ref 20–110)
ANION GAP SERPL CALC-SCNC: 11 MMOL/L (ref 8–16)
APTT PPP: 75.1 SEC (ref 23.6–33.3)
AST SERPL-CCNC: 30 U/L (ref 10–40)
BARBITURATES UR QL SCN>200 NG/ML: NEGATIVE
BASOPHILS # BLD AUTO: 0.02 K/UL (ref 0–0.2)
BASOPHILS NFR BLD: 0.3 % (ref 0–1.9)
BENZODIAZ UR QL SCN>200 NG/ML: NEGATIVE
BILIRUB SERPL-MCNC: 0.9 MG/DL (ref 0.1–1)
BILIRUB UR QL STRIP: NEGATIVE
BLD GP AB SCN CELLS X3 SERPL QL: NORMAL
BNP SERPL-MCNC: 360 PG/ML (ref 0–99)
BUN SERPL-MCNC: 28 MG/DL (ref 8–23)
BZE UR QL SCN: NEGATIVE
CALCIUM SERPL-MCNC: 8.7 MG/DL (ref 8.7–10.5)
CANNABINOIDS UR QL SCN: NEGATIVE
CHLORIDE SERPL-SCNC: 106 MMOL/L (ref 95–110)
CK MB SERPL-MCNC: 2.1 NG/ML (ref 0.1–6.5)
CK SERPL-CCNC: 102 U/L (ref 20–200)
CLARITY UR: CLEAR
CO2 SERPL-SCNC: 25 MMOL/L (ref 23–29)
COLOR UR: YELLOW
CREAT SERPL-MCNC: 1.6 MG/DL (ref 0.5–1.4)
CREAT UR-MCNC: 119 MG/DL (ref 23–375)
D DIMER PPP IA.FEU-MCNC: 2.03 UG/ML FEU
DIFFERENTIAL METHOD: ABNORMAL
EOSINOPHIL # BLD AUTO: 0.3 K/UL (ref 0–0.5)
EOSINOPHIL NFR BLD: 3.8 % (ref 0–8)
ERYTHROCYTE [DISTWIDTH] IN BLOOD BY AUTOMATED COUNT: 13.8 % (ref 11.5–14.5)
EST. GFR  (AFRICAN AMERICAN): 43.5 ML/MIN/1.73 M^2
EST. GFR  (NON AFRICAN AMERICAN): 37.6 ML/MIN/1.73 M^2
GLUCOSE SERPL-MCNC: 115 MG/DL (ref 70–110)
GLUCOSE UR QL STRIP: NEGATIVE
HCT VFR BLD AUTO: 29.4 % (ref 40–54)
HGB BLD-MCNC: 9.7 G/DL (ref 14–18)
HGB UR QL STRIP: NEGATIVE
IMM GRANULOCYTES # BLD AUTO: 0.06 K/UL (ref 0–0.04)
IMM GRANULOCYTES NFR BLD AUTO: 0.8 % (ref 0–0.5)
INFLUENZA A, MOLECULAR: NEGATIVE
INFLUENZA B, MOLECULAR: NEGATIVE
INR PPP: 1.5
KETONES UR QL STRIP: NEGATIVE
LDH SERPL L TO P-CCNC: 1.12 MMOL/L (ref 0.5–2.2)
LEUKOCYTE ESTERASE UR QL STRIP: NEGATIVE
LIPASE SERPL-CCNC: 39 U/L (ref 4–60)
LYMPHOCYTES # BLD AUTO: 1.1 K/UL (ref 1–4.8)
LYMPHOCYTES NFR BLD: 13.3 % (ref 18–48)
MAGNESIUM SERPL-MCNC: 1.9 MG/DL (ref 1.6–2.6)
MCH RBC QN AUTO: 31.4 PG (ref 27–31)
MCHC RBC AUTO-ENTMCNC: 33 G/DL (ref 32–36)
MCV RBC AUTO: 95 FL (ref 82–98)
MONOCYTES # BLD AUTO: 0.6 K/UL (ref 0.3–1)
MONOCYTES NFR BLD: 7.7 % (ref 4–15)
NEUTROPHILS # BLD AUTO: 5.9 K/UL (ref 1.8–7.7)
NEUTROPHILS NFR BLD: 74.1 % (ref 38–73)
NITRITE UR QL STRIP: NEGATIVE
NRBC BLD-RTO: 0 /100 WBC
OPIATES UR QL SCN: NEGATIVE
PCP UR QL SCN>25 NG/ML: NEGATIVE
PH UR STRIP: 6 [PH] (ref 5–8)
PLATELET # BLD AUTO: 128 K/UL (ref 150–350)
PMV BLD AUTO: 12.9 FL (ref 9.2–12.9)
POTASSIUM SERPL-SCNC: 3.6 MMOL/L (ref 3.5–5.1)
PROT SERPL-MCNC: 7.1 G/DL (ref 6–8.4)
PROT UR QL STRIP: NEGATIVE
PROTHROMBIN TIME: 17.7 SEC (ref 10.6–14.8)
RBC # BLD AUTO: 3.09 M/UL (ref 4.6–6.2)
SAMPLE: NORMAL
SODIUM SERPL-SCNC: 142 MMOL/L (ref 136–145)
SP GR UR STRIP: 1.01 (ref 1–1.03)
SPECIMEN SOURCE: NORMAL
TOXICOLOGY INFORMATION: NORMAL
TROPONIN I SERPL DL<=0.01 NG/ML-MCNC: 0.59 NG/ML
TSH SERPL DL<=0.005 MIU/L-ACNC: 0.94 UIU/ML (ref 0.34–5.6)
URN SPEC COLLECT METH UR: NORMAL
UROBILINOGEN UR STRIP-ACNC: NEGATIVE EU/DL
WBC # BLD AUTO: 7.91 K/UL (ref 3.9–12.7)

## 2020-01-08 PROCEDURE — 80053 COMPREHEN METABOLIC PANEL: CPT

## 2020-01-08 PROCEDURE — 84484 ASSAY OF TROPONIN QUANT: CPT

## 2020-01-08 PROCEDURE — 82150 ASSAY OF AMYLASE: CPT

## 2020-01-08 PROCEDURE — 83690 ASSAY OF LIPASE: CPT

## 2020-01-08 PROCEDURE — 93005 ELECTROCARDIOGRAM TRACING: CPT

## 2020-01-08 PROCEDURE — 85730 THROMBOPLASTIN TIME PARTIAL: CPT

## 2020-01-08 PROCEDURE — 36415 COLL VENOUS BLD VENIPUNCTURE: CPT

## 2020-01-08 PROCEDURE — 82553 CREATINE MB FRACTION: CPT

## 2020-01-08 PROCEDURE — 85610 PROTHROMBIN TIME: CPT

## 2020-01-08 PROCEDURE — 83605 ASSAY OF LACTIC ACID: CPT

## 2020-01-08 PROCEDURE — 25000242 PHARM REV CODE 250 ALT 637 W/ HCPCS: Performed by: EMERGENCY MEDICINE

## 2020-01-08 PROCEDURE — 94640 AIRWAY INHALATION TREATMENT: CPT

## 2020-01-08 PROCEDURE — 83735 ASSAY OF MAGNESIUM: CPT

## 2020-01-08 PROCEDURE — 86850 RBC ANTIBODY SCREEN: CPT

## 2020-01-08 PROCEDURE — 27000221 HC OXYGEN, UP TO 24 HOURS

## 2020-01-08 PROCEDURE — 82550 ASSAY OF CK (CPK): CPT

## 2020-01-08 PROCEDURE — 85025 COMPLETE CBC W/AUTO DIFF WBC: CPT

## 2020-01-08 PROCEDURE — 84443 ASSAY THYROID STIM HORMONE: CPT

## 2020-01-08 PROCEDURE — 85379 FIBRIN DEGRADATION QUANT: CPT

## 2020-01-08 PROCEDURE — 83880 ASSAY OF NATRIURETIC PEPTIDE: CPT

## 2020-01-08 PROCEDURE — 99285 EMERGENCY DEPT VISIT HI MDM: CPT | Mod: 25

## 2020-01-08 PROCEDURE — 87502 INFLUENZA DNA AMP PROBE: CPT

## 2020-01-08 PROCEDURE — 81003 URINALYSIS AUTO W/O SCOPE: CPT

## 2020-01-08 PROCEDURE — 80307 DRUG TEST PRSMV CHEM ANLYZR: CPT

## 2020-01-08 PROCEDURE — 87040 BLOOD CULTURE FOR BACTERIA: CPT | Mod: 59

## 2020-01-08 RX ORDER — LEVALBUTEROL INHALATION SOLUTION 0.63 MG/3ML
0.63 SOLUTION RESPIRATORY (INHALATION)
Status: COMPLETED | OUTPATIENT
Start: 2020-01-08 | End: 2020-01-08

## 2020-01-08 RX ORDER — IPRATROPIUM BROMIDE 0.5 MG/2.5ML
0.5 SOLUTION RESPIRATORY (INHALATION) ONCE
Status: COMPLETED | OUTPATIENT
Start: 2020-01-08 | End: 2020-01-08

## 2020-01-08 RX ADMIN — IPRATROPIUM BROMIDE 0.5 MG: 0.5 SOLUTION RESPIRATORY (INHALATION) at 06:01

## 2020-01-08 RX ADMIN — LEVALBUTEROL HYDROCHLORIDE 0.63 MG: 0.63 SOLUTION RESPIRATORY (INHALATION) at 07:01

## 2020-01-08 RX ADMIN — LEVALBUTEROL HYDROCHLORIDE 0.63 MG: 0.63 SOLUTION RESPIRATORY (INHALATION) at 05:01

## 2020-01-09 PROBLEM — I26.99 ACUTE PULMONARY EMBOLISM: Status: ACTIVE | Noted: 2020-01-09

## 2020-01-09 PROBLEM — J96.02 ACUTE RESPIRATORY FAILURE WITH HYPOXIA AND HYPERCARBIA: Status: ACTIVE | Noted: 2020-01-09

## 2020-01-09 PROBLEM — J18.9 PNEUMONIA: Status: RESOLVED | Noted: 2020-01-01 | Resolved: 2020-01-09

## 2020-01-09 PROBLEM — J96.02 ACUTE RESPIRATORY FAILURE WITH HYPOXIA AND HYPERCARBIA: Status: RESOLVED | Noted: 2020-01-09 | Resolved: 2020-01-09

## 2020-01-09 PROBLEM — J18.9 PNEUMONIA DUE TO INFECTIOUS ORGANISM: Status: RESOLVED | Noted: 2020-01-01 | Resolved: 2020-01-09

## 2020-01-09 PROBLEM — I26.99 ACUTE PULMONARY EMBOLISM: Status: RESOLVED | Noted: 2020-01-09 | Resolved: 2020-01-09

## 2020-01-09 PROBLEM — J96.01 ACUTE RESPIRATORY FAILURE WITH HYPOXIA AND HYPERCARBIA: Status: ACTIVE | Noted: 2020-01-09

## 2020-01-09 PROBLEM — J96.01 ACUTE RESPIRATORY FAILURE WITH HYPOXIA AND HYPERCARBIA: Status: RESOLVED | Noted: 2020-01-09 | Resolved: 2020-01-09

## 2020-01-09 LAB
ALBUMIN SERPL BCP-MCNC: 2.9 G/DL (ref 3.5–5.2)
ALP SERPL-CCNC: 54 U/L (ref 55–135)
ALT SERPL W/O P-5'-P-CCNC: 20 U/L (ref 10–44)
ANION GAP SERPL CALC-SCNC: 11 MMOL/L (ref 8–16)
AST SERPL-CCNC: 23 U/L (ref 10–40)
BASOPHILS # BLD AUTO: 0.02 K/UL (ref 0–0.2)
BASOPHILS NFR BLD: 0.3 % (ref 0–1.9)
BILIRUB SERPL-MCNC: 1 MG/DL (ref 0.1–1)
BUN SERPL-MCNC: 23 MG/DL (ref 8–23)
C DIFF GDH STL QL: NEGATIVE
C DIFF TOX A+B STL QL IA: NEGATIVE
CALCIUM SERPL-MCNC: 8.2 MG/DL (ref 8.7–10.5)
CHLORIDE SERPL-SCNC: 105 MMOL/L (ref 95–110)
CO2 SERPL-SCNC: 25 MMOL/L (ref 23–29)
CREAT SERPL-MCNC: 1.3 MG/DL (ref 0.5–1.4)
DIFFERENTIAL METHOD: ABNORMAL
EOSINOPHIL # BLD AUTO: 0.2 K/UL (ref 0–0.5)
EOSINOPHIL NFR BLD: 3 % (ref 0–8)
ERYTHROCYTE [DISTWIDTH] IN BLOOD BY AUTOMATED COUNT: 13.7 % (ref 11.5–14.5)
EST. GFR  (AFRICAN AMERICAN): 55.9 ML/MIN/1.73 M^2
EST. GFR  (NON AFRICAN AMERICAN): 48.4 ML/MIN/1.73 M^2
GLUCOSE SERPL-MCNC: 104 MG/DL (ref 70–110)
HCT VFR BLD AUTO: 28.3 % (ref 40–54)
HGB BLD-MCNC: 9.3 G/DL (ref 14–18)
IMM GRANULOCYTES # BLD AUTO: 0.05 K/UL (ref 0–0.04)
IMM GRANULOCYTES NFR BLD AUTO: 0.7 % (ref 0–0.5)
LYMPHOCYTES # BLD AUTO: 0.9 K/UL (ref 1–4.8)
LYMPHOCYTES NFR BLD: 12.7 % (ref 18–48)
MCH RBC QN AUTO: 31.3 PG (ref 27–31)
MCHC RBC AUTO-ENTMCNC: 32.9 G/DL (ref 32–36)
MCV RBC AUTO: 95 FL (ref 82–98)
MONOCYTES # BLD AUTO: 0.6 K/UL (ref 0.3–1)
MONOCYTES NFR BLD: 8 % (ref 4–15)
NEUTROPHILS # BLD AUTO: 5.3 K/UL (ref 1.8–7.7)
NEUTROPHILS NFR BLD: 75.3 % (ref 38–73)
NRBC BLD-RTO: 0 /100 WBC
PLATELET # BLD AUTO: 106 K/UL (ref 150–350)
PMV BLD AUTO: 12.4 FL (ref 9.2–12.9)
POTASSIUM SERPL-SCNC: 3.5 MMOL/L (ref 3.5–5.1)
PROCALCITONIN SERPL IA-MCNC: <0.05 NG/ML (ref 0–0.5)
PROT SERPL-MCNC: 6.2 G/DL (ref 6–8.4)
RBC # BLD AUTO: 2.97 M/UL (ref 4.6–6.2)
SODIUM SERPL-SCNC: 141 MMOL/L (ref 136–145)
TROPONIN I SERPL DL<=0.01 NG/ML-MCNC: 0.42 NG/ML
TROPONIN I SERPL DL<=0.01 NG/ML-MCNC: 0.48 NG/ML
WBC # BLD AUTO: 7.03 K/UL (ref 3.9–12.7)

## 2020-01-09 PROCEDURE — 89055 LEUKOCYTE ASSESSMENT FECAL: CPT

## 2020-01-09 PROCEDURE — 99900035 HC TECH TIME PER 15 MIN (STAT)

## 2020-01-09 PROCEDURE — 94761 N-INVAS EAR/PLS OXIMETRY MLT: CPT

## 2020-01-09 PROCEDURE — 25000003 PHARM REV CODE 250: Performed by: NURSE PRACTITIONER

## 2020-01-09 PROCEDURE — 25000003 PHARM REV CODE 250: Performed by: EMERGENCY MEDICINE

## 2020-01-09 PROCEDURE — 87449 NOS EACH ORGANISM AG IA: CPT

## 2020-01-09 PROCEDURE — 85025 COMPLETE CBC W/AUTO DIFF WBC: CPT

## 2020-01-09 PROCEDURE — 84145 PROCALCITONIN (PCT): CPT

## 2020-01-09 PROCEDURE — 36415 COLL VENOUS BLD VENIPUNCTURE: CPT

## 2020-01-09 PROCEDURE — 25500020 PHARM REV CODE 255: Performed by: INTERNAL MEDICINE

## 2020-01-09 PROCEDURE — 99223 PR INITIAL HOSPITAL CARE,LEVL III: ICD-10-PCS | Mod: ,,, | Performed by: INTERNAL MEDICINE

## 2020-01-09 PROCEDURE — 25000003 PHARM REV CODE 250: Performed by: INTERNAL MEDICINE

## 2020-01-09 PROCEDURE — 87324 CLOSTRIDIUM AG IA: CPT

## 2020-01-09 PROCEDURE — 63600175 PHARM REV CODE 636 W HCPCS: Performed by: INTERNAL MEDICINE

## 2020-01-09 PROCEDURE — 87209 SMEAR COMPLEX STAIN: CPT

## 2020-01-09 PROCEDURE — 27000221 HC OXYGEN, UP TO 24 HOURS

## 2020-01-09 PROCEDURE — 21400001 HC TELEMETRY ROOM

## 2020-01-09 PROCEDURE — 99223 1ST HOSP IP/OBS HIGH 75: CPT | Mod: ,,, | Performed by: INTERNAL MEDICINE

## 2020-01-09 PROCEDURE — 84484 ASSAY OF TROPONIN QUANT: CPT | Mod: 91

## 2020-01-09 PROCEDURE — 80053 COMPREHEN METABOLIC PANEL: CPT

## 2020-01-09 PROCEDURE — 87015 SPECIMEN INFECT AGNT CONCNTJ: CPT

## 2020-01-09 PROCEDURE — 21000000 HC CCU ICU ROOM CHARGE

## 2020-01-09 RX ORDER — FUROSEMIDE 20 MG/1
20 TABLET ORAL DAILY
Status: DISCONTINUED | OUTPATIENT
Start: 2020-01-09 | End: 2020-01-09

## 2020-01-09 RX ORDER — FUROSEMIDE 10 MG/ML
40 INJECTION INTRAMUSCULAR; INTRAVENOUS DAILY
Status: COMPLETED | OUTPATIENT
Start: 2020-01-09 | End: 2020-01-11

## 2020-01-09 RX ORDER — POTASSIUM CHLORIDE 20 MEQ/15ML
40 SOLUTION ORAL ONCE
Status: COMPLETED | OUTPATIENT
Start: 2020-01-09 | End: 2020-01-09

## 2020-01-09 RX ORDER — POTASSIUM CHLORIDE 20 MEQ/1
40 TABLET, EXTENDED RELEASE ORAL ONCE
Status: DISCONTINUED | OUTPATIENT
Start: 2020-01-09 | End: 2020-01-09

## 2020-01-09 RX ORDER — SERTRALINE HYDROCHLORIDE 50 MG/1
100 TABLET, FILM COATED ORAL NIGHTLY
Status: DISCONTINUED | OUTPATIENT
Start: 2020-01-09 | End: 2020-01-15 | Stop reason: HOSPADM

## 2020-01-09 RX ORDER — DONEPEZIL HYDROCHLORIDE 5 MG/1
10 TABLET, FILM COATED ORAL DAILY
Status: DISCONTINUED | OUTPATIENT
Start: 2020-01-09 | End: 2020-01-15 | Stop reason: HOSPADM

## 2020-01-09 RX ORDER — SIMVASTATIN 40 MG/1
40 TABLET, FILM COATED ORAL NIGHTLY
Status: DISCONTINUED | OUTPATIENT
Start: 2020-01-09 | End: 2020-01-15 | Stop reason: HOSPADM

## 2020-01-09 RX ORDER — METOPROLOL SUCCINATE 25 MG/1
25 TABLET, EXTENDED RELEASE ORAL DAILY
Status: DISCONTINUED | OUTPATIENT
Start: 2020-01-09 | End: 2020-01-15 | Stop reason: HOSPADM

## 2020-01-09 RX ORDER — ONDANSETRON 2 MG/ML
4 INJECTION INTRAMUSCULAR; INTRAVENOUS EVERY 8 HOURS PRN
Status: DISCONTINUED | OUTPATIENT
Start: 2020-01-09 | End: 2020-01-15 | Stop reason: HOSPADM

## 2020-01-09 RX ORDER — SODIUM CHLORIDE 0.9 % (FLUSH) 0.9 %
10 SYRINGE (ML) INJECTION
Status: DISCONTINUED | OUTPATIENT
Start: 2020-01-09 | End: 2020-01-15 | Stop reason: HOSPADM

## 2020-01-09 RX ORDER — AMLODIPINE BESYLATE 5 MG/1
10 TABLET ORAL DAILY
Status: DISCONTINUED | OUTPATIENT
Start: 2020-01-09 | End: 2020-01-11

## 2020-01-09 RX ORDER — NAPROXEN SODIUM 220 MG/1
81 TABLET, FILM COATED ORAL DAILY
Status: DISCONTINUED | OUTPATIENT
Start: 2020-01-09 | End: 2020-01-15 | Stop reason: HOSPADM

## 2020-01-09 RX ORDER — PANTOPRAZOLE SODIUM 40 MG/1
40 TABLET, DELAYED RELEASE ORAL DAILY
Status: DISCONTINUED | OUTPATIENT
Start: 2020-01-09 | End: 2020-01-15 | Stop reason: HOSPADM

## 2020-01-09 RX ADMIN — SERTRALINE HYDROCHLORIDE 100 MG: 50 TABLET ORAL at 08:01

## 2020-01-09 RX ADMIN — FUROSEMIDE 20 MG: 20 TABLET ORAL at 08:01

## 2020-01-09 RX ADMIN — APIXABAN 5 MG: 5 TABLET, FILM COATED ORAL at 08:01

## 2020-01-09 RX ADMIN — AMLODIPINE BESYLATE 10 MG: 5 TABLET ORAL at 08:01

## 2020-01-09 RX ADMIN — ASPIRIN 81 MG 81 MG: 81 TABLET ORAL at 08:01

## 2020-01-09 RX ADMIN — FUROSEMIDE 40 MG: 10 INJECTION, SOLUTION INTRAMUSCULAR; INTRAVENOUS at 02:01

## 2020-01-09 RX ADMIN — METOPROLOL SUCCINATE 25 MG: 25 TABLET, EXTENDED RELEASE ORAL at 08:01

## 2020-01-09 RX ADMIN — PANTOPRAZOLE SODIUM 40 MG: 40 TABLET, DELAYED RELEASE ORAL at 06:01

## 2020-01-09 RX ADMIN — SIMVASTATIN 40 MG: 40 TABLET, FILM COATED ORAL at 08:01

## 2020-01-09 RX ADMIN — IOHEXOL 100 ML: 350 INJECTION, SOLUTION INTRAVENOUS at 10:01

## 2020-01-09 RX ADMIN — POTASSIUM CHLORIDE 40 MEQ: 20 SOLUTION ORAL at 02:01

## 2020-01-09 RX ADMIN — DONEPEZIL HYDROCHLORIDE 10 MG: 5 TABLET, FILM COATED ORAL at 08:01

## 2020-01-09 NOTE — PLAN OF CARE
Problem: Oral Intake Inadequate  Goal: Improved Oral Intake  Outcome: Ongoing, Progressing  Intervention: Promote and Optimize Oral Intake  Flowsheets (Taken 1/9/2020 1340)  Oral Nutrition Promotion: calorie dense liquids provided   Added Ensure Enlive TID to aid in meeting estimated needs due to hx of poor PO intake and malnutrition.   Encourage intake of meals and supplements and provide assistance when needed.

## 2020-01-09 NOTE — ED PROVIDER NOTES
Encounter Date: 1/8/2020       History     Chief Complaint   Patient presents with    Cough     decreased appetite, recent pneumonia, sent from Dr. Payne office for low blood pressure, cough      This is a pleasant 89-year-old male who presents from his physician's office for possible hospital readmission.  The patient has been home for the past 2 days after treatment for pneumonia.  He followed up with his primary care physician today as directed and after evaluation by Dr. canales seen a, Dr. johnson seen and made the decision to send the patient to the emergency room for possible readmission.  The son reports that he has had a persistent cough and has been short of breath at times.  His son does report he has been receiving breathing treatments.  He was not discharged on oxygen.  His oxygenation was noted to be low in the physician's office and there is also a report of hypotension in the physician office although he has not been hypotensive in the emergency room.  He has not had fever.  His son states he has not been eating or drinking well. He has been fatigued and tired but not lethargic or obtunded.  His son denies any trauma.  He has not had vomiting diarrhea GI bleeding or fever.  He has had a persistent cough.  He has had some noted respiratory distress.  He denies any pain.  The son and the patient deny any other complaints.        Review of patient's allergies indicates:   Allergen Reactions    Eucalyptus containing products Palpitations     Past Medical History:   Diagnosis Date    Anemia     Anemia due to chronic blood loss 7/25/2018    Anemia, chronic disease 7/25/2018    Dementia     GERD (gastroesophageal reflux disease)     Heart disease     Hypertension     Neuropathy      Past Surgical History:   Procedure Laterality Date    CORONARY ARTERY BYPASS GRAFT       Family History   Problem Relation Age of Onset    Dementia Father      Social History     Tobacco Use    Smoking status: Current  Every Day Smoker     Types: Cigarettes, Vaping with nicotine    Smokeless tobacco: Never Used    Tobacco comment: e cigarettes   Substance Use Topics    Alcohol use: No    Drug use: No     Review of Systems   Constitutional: Positive for activity change, appetite change and fatigue. Negative for chills, diaphoresis and fever.   HENT: Negative.  Negative for congestion, dental problem, ear pain, nosebleeds, postnasal drip, rhinorrhea, sinus pressure, sinus pain, sore throat, tinnitus, trouble swallowing and voice change.    Eyes: Negative.  Negative for pain and visual disturbance.   Respiratory: Positive for cough, shortness of breath and wheezing. Negative for choking, chest tightness and stridor.    Cardiovascular: Negative.  Negative for chest pain, palpitations and leg swelling.   Gastrointestinal: Negative.  Negative for abdominal distention, abdominal pain, anal bleeding, blood in stool, constipation, diarrhea, nausea, rectal pain and vomiting.   Endocrine: Negative.    Genitourinary: Positive for decreased urine volume. Negative for difficulty urinating, dysuria, flank pain, frequency, genital sores, penile pain, scrotal swelling, testicular pain and urgency.   Musculoskeletal: Negative.  Negative for arthralgias, back pain, gait problem, joint swelling, myalgias, neck pain and neck stiffness.   Skin: Negative.  Negative for color change, pallor and rash.   Neurological: Positive for weakness. Negative for dizziness, tremors, seizures, syncope, facial asymmetry, speech difficulty, light-headedness, numbness and headaches.        Patient has had generalized fatigue but no focal weakness   Hematological: Negative.  Does not bruise/bleed easily.   Psychiatric/Behavioral: Negative for agitation, behavioral problems and hallucinations.        Patient with baseline dementia and confusion.  No sudden change or worsening in his mental status.   All other systems reviewed and are negative.      Physical Exam      Initial Vitals [01/08/20 1551]   BP Pulse Resp Temp SpO2   (!) 141/60 83 18 97.5 °F (36.4 °C) 95 %      MAP       --         Physical Exam    Nursing note and vitals reviewed.  Constitutional: He is not diaphoretic. He is active.  Non-toxic appearance. He does not have a sickly appearance. He does not appear ill. No distress.   HENT:   Head: Normocephalic and atraumatic.   Nose: Nose normal.   Mouth/Throat: Oropharynx is clear and moist. No oropharyngeal exudate.   Eyes: Conjunctivae, EOM and lids are normal. Pupils are equal, round, and reactive to light.   Neck: Normal range of motion and full passive range of motion without pain. Neck supple. No thyromegaly present. No spinous process tenderness and no muscular tenderness present. No tracheal deviation, no edema, no erythema and normal range of motion present. No neck rigidity. No JVD present.   Cardiovascular: Normal rate, regular rhythm, normal heart sounds, intact distal pulses and normal pulses. Exam reveals no gallop and no friction rub.    No murmur heard.  Pulmonary/Chest: Effort normal. No stridor. No respiratory distress. He has wheezes. He has rhonchi. He has no rales.   Abdominal: Soft. Normal appearance and bowel sounds are normal. He exhibits no distension and no mass. There is no tenderness. There is no rebound and no guarding. No hernia.   Musculoskeletal: Normal range of motion. He exhibits no edema or tenderness.        Cervical back: Normal. He exhibits normal range of motion, no tenderness, no bony tenderness and no swelling.        Thoracic back: He exhibits normal range of motion, no tenderness, no bony tenderness and no swelling.        Lumbar back: Normal. He exhibits normal range of motion, no tenderness, no bony tenderness, no swelling and no edema.   Pulses are 2+ throughout, cap refill is less than 2 sec throughout, no edema noted, extremities are nontender throughout with full range of motion   Lymphadenopathy:     He has no  cervical adenopathy.   Neurological: He is alert and oriented to person, place, and time. He has normal strength. No cranial nerve deficit or sensory deficit. Coordination normal.   Skin: Skin is warm and dry. Capillary refill takes less than 2 seconds. No ecchymosis, no petechiae and no rash noted. No cyanosis or erythema. No pallor.   Psychiatric: He has a normal mood and affect. His speech is normal and behavior is normal. Thought content normal.         ED Course   Procedures  Labs Reviewed   TROPONIN I - Abnormal; Notable for the following components:       Result Value    Troponin I 0.587 (*)     All other components within normal limits    Narrative:       Troponin critical result(s) called and verbal readback obtained   from Timothy Borrero Rn/ER by AS2 01/08/2020 19:31   B-TYPE NATRIURETIC PEPTIDE - Abnormal; Notable for the following components:     (*)     All other components within normal limits   CBC W/ AUTO DIFFERENTIAL - Abnormal; Notable for the following components:    RBC 3.09 (*)     Hemoglobin 9.7 (*)     Hematocrit 29.4 (*)     Mean Corpuscular Hemoglobin 31.4 (*)     Platelets 128 (*)     Immature Granulocytes 0.8 (*)     Immature Grans (Abs) 0.06 (*)     Gran% 74.1 (*)     Lymph% 13.3 (*)     All other components within normal limits   COMPREHENSIVE METABOLIC PANEL - Abnormal; Notable for the following components:    Glucose 115 (*)     BUN, Bld 28 (*)     Creatinine 1.6 (*)     Albumin 3.2 (*)     eGFR if  43.5 (*)     eGFR if non  37.6 (*)     All other components within normal limits   D DIMER, QUANTITATIVE - Abnormal; Notable for the following components:    D-Dimer 2.03 (*)     All other components within normal limits   APTT - Abnormal; Notable for the following components:    aPTT 75.1 (*)     All other components within normal limits   PROTIME-INR - Abnormal; Notable for the following components:    PT 17.7 (*)     All other components within  normal limits   CULTURE, BLOOD   CULTURE, BLOOD   AMYLASE   LIPASE   MAGNESIUM   TSH   CK   CK-MB   INFLUENZA A AND B ANTIGEN    Narrative:     Specimen Source->Nasopharyngeal Swab   URINALYSIS   DRUG SCREEN PANEL, URINE EMERGENCY   TYPE & SCREEN   ISTAT LACTATE   POCT LACTATE          Imaging Results          US Lower Extremity Veins Bilateral (In process)                X-Ray Chest AP Portable (Final result)  Result time 01/08/20 19:17:16   Procedure changed from X-Ray Chest 1 View     Final result by Vinay Christiansen MD (01/08/20 19:17:16)                 Impression:      Interval improved aeration in the left lung, with nonspecific scattered interstitial opacities throughout both lungs.  No consolidated pneumonia.      Electronically signed by: Vinay Christiansen MD  Date:    01/08/2020  Time:    19:17             Narrative:    EXAMINATION:  XR CHEST AP PORTABLE    CLINICAL HISTORY:  Decreased appetite, pneumonia, cough    FINDINGS:  Portable chest radiograph at 18:12 hours compared to multiple prior exams shows median sternotomy wires and mediastinal surgical clips from prior CABG.  The cardiomediastinal silhouette and pulmonary vasculature are stable and within normal limits, with aortic vascular calcifications.    The lungs are normally expanded, with interval improved aeration in the left lung, with mild scattered reticulonodular and ground-glass opacities in both lungs.  There is no consolidation, large pleural effusion, evidence of pulmonary edema, or pneumothorax.  No acute osseous abnormality.                                 Medical Decision Making:   Clinical Tests:   Lab Tests: Reviewed  Radiological Study: Reviewed  Medical Tests: Reviewed  ED Management:  Patient with hypoxia--pulse ox 88% on room air prior to being placed on supplemental oxygen.  Pulse ox increased to 90% with 3 L of oxygen.  Patient with diffuse rhonchi on exam.  Has had 3 breathing treatments and still with diffuse rhonchi and patient  does desat to 89% without supplemental oxygen.  I have discussed the case with the hospitalist physician.  As the patient's D-dimer is elevated,she prefers to evaluate the patient after the V/Q scan and leg ultrasound order orders have been completed and resulted.  Awaiting these final test prior to admission  Patient with prolonged stay in the ED secondary to above.  He is currently hemodynamically stable.  Still with diffuse rhonchi although there is some improvement.  He is not in respiratory distress.  He does still require supplemental oxygen.  V/Q scan is intermediate to high probability--please see report.  Patient has chronic but not acute anemia.  Rectal exam was performed and is heme negative.                                 Clinical Impression:       ICD-10-CM ICD-9-CM   1. Bronchospasm J98.01 519.11   2. Cough R05 786.2   3. Elevated d-dimer R79.89 790.92   4. Hypoxia R09.02 799.02   5. Acute respiratory failure with hypoxia and hypercarbia J96.01 518.81    J96.02    6. Anemia, chronic disease D63.8 285.29   7. Angiectasis I99.8 459.89                             Lilliam Anderson MD  03/02/20 0311

## 2020-01-09 NOTE — H&P
[facility] Medicine History & Physical Examination   Patient Name: Halley Rodrigues  MRN: [id number]  Patient Class: IP- Inpatient   Admission Date: 1/8/2020  3:53 PM  Length of Stay: 0  Attending Physician:   Primary Care Provider: Scott Payne MD  Face-to-Face encounter date: 01/09/2020  Code Status:  MPOA:  Chief Complaint: Cough (decreased appetite, recent pneumonia, sent from Dr. Payne office for low blood pressure, cough )        Patient information was obtained from patient, past medical records and ER records.   HISTORY OF PRESENT ILLNESS:   Halley Rodrigues is a 89 y.o. old  male who  has a past medical history of Anemia, Anemia due to chronic blood loss (7/25/2018), Anemia, chronic disease (7/25/2018), Dementia, GERD (gastroesophageal reflux disease), Heart disease, Hypertension, and Neuropathy.. The patient presented to [facility] on 1/8/2020 with a primary complaint of Cough (decreased appetite, recent pneumonia, sent from Dr. Payne office for low blood pressure, cough )  .   89-year-old  male presents emergency room with worsening shortness of breath and a productive cough.      He was seen at Dr. Payne in his office his PCP and was instructed to come to the emergency room secondary to increased shortness of breath.        This patient was recently discharged from our facility about 2 days ago and was treated for pneumonia.  He was following up with his primary care doctor today and was directed to come to the emergency room secondary to a persistent cough and worsening shortness of breath.      The patient was not discharged home on oxygen.  In the PCPs office the patient was noted to have low oxygen saturations.      Upon arrival in emergency room the patient was found to be satting in the 80s and was moderately tachypneic.  A D-dimer was performed that was positive and a subsequent V/Q scan was performed that showed a probability  for a PE.      Ultrasounds of bilateral lower extremities was negative for DVT  REVIEW OF SYSTEMS:   10 Point Review of System was performed and was found to be negative except for that mentioned already in the HPI and   Review of Systems (Negative unless checked off)  Review of Systems   Constitutional: Positive for malaise/fatigue and weight loss.   HENT: Positive for hearing loss.    Eyes: Negative.    Respiratory: Positive for cough, shortness of breath and wheezing.    Cardiovascular: Negative.    Gastrointestinal: Negative.    Genitourinary: Positive for dysuria.   Musculoskeletal: Negative.    Skin: Negative.    Neurological: Positive for weakness.   Endo/Heme/Allergies: Negative.    Psychiatric/Behavioral: Positive for memory loss.           PAST MEDICAL HISTORY:     Past Medical History:   Diagnosis Date    Anemia     Anemia due to chronic blood loss 7/25/2018    Anemia, chronic disease 7/25/2018    Dementia     GERD (gastroesophageal reflux disease)     Heart disease     Hypertension     Neuropathy        PAST SURGICAL HISTORY:     Past Surgical History:   Procedure Laterality Date    CORONARY ARTERY BYPASS GRAFT         ALLERGIES:   Eucalyptus containing products    FAMILY HISTORY:     Family History   Problem Relation Age of Onset    Dementia Father        SOCIAL HISTORY:     Social History     Tobacco Use    Smoking status: Current Every Day Smoker     Types: Cigarettes, Vaping with nicotine    Smokeless tobacco: Never Used    Tobacco comment: e cigarettes   Substance Use Topics    Alcohol use: No        Social History     Substance and Sexual Activity   Sexual Activity Not on file        HOME MEDICATIONS:     Prior to Admission medications    Medication Sig Start Date End Date Taking? Authorizing Provider   amLODIPine (NORVASC) 10 MG tablet Take 1 tablet (10 mg total) by mouth once daily. 11/25/19 11/24/20 Yes Taya Randhawa MD   aspirin 81 MG Chew Take 81 mg by mouth once daily.    Yes  Historical Provider, MD   donepezil (ARICEPT) 10 MG tablet TAKE 1 TABLET BY MOUTH ONCE DAILY  Patient taking differently: Take 10 mg by mouth once daily.  11/10/17  Yes Mable Carvajal NP   furosemide (LASIX) 20 MG tablet Take 20 mg by mouth once daily.  8/19/19  Yes Historical Provider, MD   HYDROcodone-acetaminophen (NORCO)  mg per tablet Take 1 tablet by mouth every 8 (eight) hours as needed (pain). Medically necessary for greater than 7 days for chronic pain 12/18/19 1/17/20 Yes Shawn lEizabeth MD   ipratropium (ATROVENT) 42 mcg (0.06 %) nasal spray 1 spray by Nasal route 2 (two) times daily. 12/3/19  Yes Olvin Raines MD   LYRICA 150 mg capsule Take 150 mg by mouth 2 (two) times daily.  10/5/17  Yes Historical Provider, MD   metoprolol succinate (TOPROL-XL) 25 MG 24 hr tablet Take 1 tablet (25 mg total) by mouth once daily. 11/25/19 11/24/20 Yes Taya Randhawa MD   pantoprazole (PROTONIX) 40 MG tablet Take 40 mg by mouth once daily.  3/30/17  Yes Historical Provider, MD   simvastatin (ZOCOR) 40 MG tablet Take 40 mg by mouth every evening.  2/1/17  Yes Historical Provider, MD   albuterol-ipratropium (DUO-NEB) 2.5 mg-0.5 mg/3 mL nebulizer solution Take 3 mLs by nebulization every 6 (six) hours as needed for Wheezing or Shortness of Breath. 12/3/19   Olvin Raines MD   ergocalciferol (ERGOCALCIFEROL) 50,000 unit Cap TAKE ONE CAPSULE BY MOUTH EVERY WEEK  Patient taking differently: Take 50,000 Units by mouth every 7 days.  12/14/17   Alvarez Augustine MD   sertraline (ZOLOFT) 100 MG tablet Take 100 mg by mouth every evening.  7/11/17   Historical Provider, MD         PHYSICAL EXAM:   BP (!) 122/59   Pulse 70   Temp 98.1 °F (36.7 °C) (Oral)   Resp 19   Ht 6' (1.829 m)   Wt 68 kg (150 lb)   SpO2 (!) 91%   BMI 20.34 kg/m²   Vitals Reviewed  General appearance: Well-developed, well-nourished male in no apparent distress.  Skin: No Rash.   Neuro: Motor and sensory exams grossly intact. Good  tone. Power in all 4 extremities 5/5.   HENT: Atraumatic head. Moist mucous membranes of oral cavity.  Eyes: Normal extraocular movements.   Neck: Supple. No evidence of lymphadenopathy. No thyroidomegaly.  Lungs:  Moderate tachypnea, rhonchi to bases bilaterally faint expiratory wheezing throughout  Heart: Regular rate and rhythm. S1 and S2 present with + murmurs/gallop/rub. No pedal edema. No JVD present.   Abdomen: Soft, non-distended, non-tender. No rebound tenderness/guarding. No masses or organomegaly. Bowel sounds are normal. Bladder is not palpable.   Extremities: No cyanosis, clubbing, or edema.  Psych/mental status: Alert mild confusion Cooperative. Responds appropriately to questions.   EMERGENCY DEPARTMENT LABS AND IMAGING:   Following labs were Reviewed   Recent Labs   Lab 01/08/20 1847   WBC 7.91   HGB 9.7*   HCT 29.4*   *   CALCIUM 8.7   ALBUMIN 3.2*   PROT 7.1      K 3.6   CO2 25      BUN 28*   CREATININE 1.6*   ALKPHOS 60   ALT 24   AST 30   BILITOT 0.9         CMP   Recent Labs   Lab 01/08/20  1847      K 3.6      CO2 25   *   BUN 28*   CREATININE 1.6*   CALCIUM 8.7   PROT 7.1   ALBUMIN 3.2*   BILITOT 0.9   ALKPHOS 60   AST 30   ALT 24   ANIONGAP 11   ESTGFRAFRICA 43.5*   EGFRNONAA 37.6*   , CBC   Recent Labs   Lab 01/08/20 1847   WBC 7.91   HGB 9.7*   HCT 29.4*   *   , INR   Lab Results   Component Value Date    INR 1.5 01/08/2020    INR 1.2 01/01/2020    INR 1.2 11/21/2019   , Lipid Panel No results found for: CHOL, HDL, LDLCALC, TRIG, CHOLHDL, Troponin   Recent Labs   Lab 01/08/20 1847   TROPONINI 0.587*   , A1C: No results for input(s): HGBA1C in the last 4320 hours. and All labs within the past 24 hours have been reviewed  Microbiology Results (last 7 days)     Procedure Component Value Units Date/Time    Blood culture #2 **CANNOT BE ORDERED STAT** [[id number]] Collected:  01/08/20 1857    Order Status:  Sent Specimen:  Blood from Peripheral,  Antecubital, Left Updated:  01/08/20 1921    Blood culture #1 **CANNOT BE ORDERED STAT** [[id number]] Collected:  01/08/20 1842    Order Status:  Sent Specimen:  Blood from AV Fistula, Left Updated:  01/08/20 1854        NM Lung Scan Ventilation Perfusion   Final Result      US Lower Extremity Veins Bilateral   Final Result      X-Ray Chest AP Portable   Final Result      Interval improved aeration in the left lung, with nonspecific scattered interstitial opacities throughout both lungs.  No consolidated pneumonia.         Electronically signed by: Vinay Christiansen MD   Date:    01/08/2020   Time:    19:17        Twelve lead EKG reveals a normal sinus rhythm with sinus arrhythmia a normal axis poor R-wave progression atrial enlargement ST flattening throughout most pronounced in the inferior lateral leads rate 77  milliseconds  ASSESSMENT & PLAN:   Halley Rodrigues is a 89 y.o. male admitted for shortness of breath hypoxia    1.  Acute hypoxemic respiratory insufficiency  -supplemental O2  -Eliquis  -aerosol nebulizer treatments  -consult pulmonology    2.  High probability for PE per V/Q scan  -will give oral Eliquis for now as the patient has thrombocytopenia  -consult heme oncology Dr. Santoro    3.  Chronic kidney disease stage 3    4.  Recent pneumonia  -still present but improved for x-rays    5.  Essential hypertension    6.  Hyperlipidemia    7.  Advanced age    8. Chronic Anemia  - appears stable    9.  QTC prolongation  -check magnesium level    DVT Prophylaxis: will be placed on Eliquis for DVT prophylaxis and will be advised to be as mobile as possible and sit in a chair as tolerated.   ________________________________________________________________________________    Discharge Planning and Disposition: No mobility needs. Ambulating well. Good social support system. Patient will be discharged in   Face-to-Face encounter date: 01/09/2020  Encounter included review of the medical records,  interviewing and examining the patient face-to-face, discussion with family and other health care providers including emergency medicine physician, admission orders, interpreting lab/test results and formulating a plan of care.   Medical Decision Making during this encounter was  [_] Low Complexity  [_] Moderate Complexity  [x] High Complexity  _________________________________________________________________________________    INPATIENT LIST OF MEDICATIONS     Current Facility-Administered Medications:     amLODIPine tablet 10 mg, 10 mg, Oral, Daily, Tanya Matute NP    apixaban tablet 5 mg, 5 mg, Oral, BID, Lilliam Anderson MD    aspirin chewable tablet 81 mg, 81 mg, Oral, Daily, Tanya Mtaute NP    donepezil tablet 10 mg, 10 mg, Oral, Daily, Tanya Matute NP    furosemide tablet 20 mg, 20 mg, Oral, Daily, Tanya Matute NP    metoprolol succinate (TOPROL-XL) 24 hr tablet 25 mg, 25 mg, Oral, Daily, Tanya Matute NP    ondansetron injection 4 mg, 4 mg, Intravenous, Q8H PRN, Tanya Matute NP    pantoprazole EC tablet 40 mg, 40 mg, Oral, Daily, Tanya Matute NP    sertraline tablet 100 mg, 100 mg, Oral, QHS, Tanya Matute NP    simvastatin tablet 40 mg, 40 mg, Oral, QHS, Tanya Matute NP    sodium chloride 0.9% flush 10 mL, 10 mL, Intravenous, PRN, Tanya Matute NP    Current Outpatient Medications:     amLODIPine (NORVASC) 10 MG tablet, Take 1 tablet (10 mg total) by mouth once daily., Disp: 30 tablet, Rfl: 0    aspirin 81 MG Chew, Take 81 mg by mouth once daily. , Disp: , Rfl:     donepezil (ARICEPT) 10 MG tablet, TAKE 1 TABLET BY MOUTH ONCE DAILY (Patient taking differently: Take 10 mg by mouth once daily. ), Disp: 90 tablet, Rfl: 1    furosemide (LASIX) 20 MG tablet, Take 20 mg by mouth once daily. , Disp: , Rfl:     HYDROcodone-acetaminophen (NORCO)  mg per tablet, Take 1 tablet by mouth every 8 (eight) hours as needed (pain). Medically necessary for greater than 7 days for chronic  pain, Disp: 90 tablet, Rfl: 0    ipratropium (ATROVENT) 42 mcg (0.06 %) nasal spray, 1 spray by Nasal route 2 (two) times daily., Disp: , Rfl: 0    LYRICA 150 mg capsule, Take 150 mg by mouth 2 (two) times daily. , Disp: , Rfl:     metoprolol succinate (TOPROL-XL) 25 MG 24 hr tablet, Take 1 tablet (25 mg total) by mouth once daily., Disp: 30 tablet, Rfl: 0    pantoprazole (PROTONIX) 40 MG tablet, Take 40 mg by mouth once daily. , Disp: , Rfl:     simvastatin (ZOCOR) 40 MG tablet, Take 40 mg by mouth every evening. , Disp: , Rfl:     albuterol-ipratropium (DUO-NEB) 2.5 mg-0.5 mg/3 mL nebulizer solution, Take 3 mLs by nebulization every 6 (six) hours as needed for Wheezing or Shortness of Breath., Disp: 1 Box, Rfl: 0    ergocalciferol (ERGOCALCIFEROL) 50,000 unit Cap, TAKE ONE CAPSULE BY MOUTH EVERY WEEK (Patient taking differently: Take 50,000 Units by mouth every 7 days. ), Disp: 12 capsule, Rfl: 0    sertraline (ZOLOFT) 100 MG tablet, Take 100 mg by mouth every evening. , Disp: , Rfl:       Scheduled Meds:   amLODIPine  10 mg Oral Daily    apixaban  5 mg Oral BID    aspirin  81 mg Oral Daily    donepezil  10 mg Oral Daily    furosemide  20 mg Oral Daily    metoprolol succinate  25 mg Oral Daily    pantoprazole  40 mg Oral Daily    sertraline  100 mg Oral QHS    simvastatin  40 mg Oral QHS     Continuous Infusions:  PRN Meds:.ondansetron, sodium chloride 0.9%      Tanya Matute  [facility] Hospitalist NP  01/09/2020

## 2020-01-09 NOTE — PROGRESS NOTES
Hospitalist Interval Progress Note    Patient admitted early this morning to the hospitalist service for shortness of breath and hypoxemic respiratory failure team secondary to pulmonary embolus.  Patient had high probability V/Q scan but negative bilateral lower extremity ultrasound.  D-dimer greater than 2.  Patient also had some mild acute kidney injury although creatinine is improved to 1.3.  Anemia and thrombocytopenia with platelet count 106.  Therefore IV heparin has been discontinued in light of thrombocytopenia with initiation of oral Eliquis.  I discussed the case with Pulmonary Dr. Senior who questions the diagnosis of pulmonary embolus and is pursuing CTA to further elucidate.  Patient does have elevation of BNP and troponin and certainly CHF or pulmonary edema is a consideration as well as infectious or atypical inflammatory pneumonia.  Will follow CT results and discuss with Pulmonary for further management.  Of note,  reported that the family requested nursing home placement for this patient.  GI consultation due to progressing anemia with consideration for GI bleed.  Appreciate Hematology evaluation and pulmonary evaluation.  Repeat a.m. Labs.  Patient seen and examined at bedside today; case discussed with Nursing.    Interval addendum 2:41 pm:  CTA report reviewed and results discussed with Pulmonary Dr. Senior; no evidence of pulmonary embolus.  Will stop anticoagulant as this patient has risk of GI bleeding.  CT results consistent with pulmonary edema.  Clinical picture consistent with mild acute exacerbation of combined systolic and diastolic CHF.  Will start IV Lasix. Will consult Cardiology Dr. Hall who evaluated the patient on recent hospitalization. Will check a.m. labs including BNP and repeat troponin.  Will monitor fluid status and urine output.  Repeat chest imaging in 24-48 hours to ensure improvement.  If persistent infiltrates consider alternative etiology including  residual or recurrent pneumonia possibly atypical versus inflammatory etiology.      Cristiano Baltazar MD  [facility]ist

## 2020-01-09 NOTE — PLAN OF CARE
Patient seen and examined.  Imaging reviewed.  I see no significant filling defect on his V/Q scan and I do not believe that he has a pulmonary embolism.  Recommend obtaining a CT a to definitively determine whether not he actually has a pulmonary embolism.    Full consult to follow    Ortiz Senior MD  [facility]  .ninfa  2:31 PM

## 2020-01-09 NOTE — PLAN OF CARE
Dt in law, Erica spoke with me concerning NH placement. She and I will speak with the patient about placement, once she arrives here today. She is headed here from [place].     01/09/20 1041   Discharge Assessment   Assessment Type Discharge Planning Assessment   Confirmed/corrected address and phone number on facesheet? Yes   Assessment information obtained from? Caregiver  (Erica dtr in law [phone])   Prior to hospitilization cognitive status:   (Oriented to person. Oriented to place and time intermittenty.)   Prior to hospitalization functional status: Needs Assistance  (ADLs at times)   Current cognitive status:   (Oriented to person. Oriented to place and time intermittenty)   Current Functional Status: Needs Assistance  (ADLs at times)   Facility Arrived From: Dr Payne's office   Lives With child(ryan), adult   Able to Return to Prior Arrangements no   Is patient able to care for self after discharge? No   Who are your caregiver(s) and their phone number(s)? Moises cho and Erica Ferraropeter dtr in law [phone]   Patient's perception of discharge disposition home health   Readmission Within the Last 30 Days current reason for admission unrelated to previous admission   If yes, most recent facility name: [facility]   Patient currently being followed by outpatient case management? No   Patient currently receives any other outside agency services? No   Equipment Currently Used at Home none   Part D Coverage [employer]   Do you have any problems affording any of your prescribed medications? No   Is the patient taking medications as prescribed? yes   Does the patient have transportation home? Yes   Transportation Anticipated family or friend will provide   Dialysis Name and Scheduled days N/A   Does the patient receive services at the [facility]? No   Discharge Plan A New Nursing Home placement - [facility] care facility;Other  (Spoke with Dtr in law Erica [phone]. She want NH placement for the  patient,)   DME Needed Upon Discharge  none   Patient/Family in Agreement with Plan yes   Readmission Questionnaire   At the time of your discharge, did someone talk to you about what your health problems were? Yes   At the time of discharge, did someone talk to you about what to watch out for regarding worsening of your health problem? Yes   At the time of discharge, did someone talk to you about what to do if you experienced worsening of your health problem? Yes   At the time of discharge, did someone talk to you about which medication to take when you left the hospital and which ones to stop taking? Yes   At the time of discharge, did someone talk to you about when and where to follow up with a doctor after you left the hospital? Yes   What do you believe caused you to be sick enough to be re-admitted? SOB, Productive cough. Sent here from Dr Payne's office   How often do you need to have someone help you when you read instructions, pamphlets, or other written material from your doctor or pharmacy? Often   Do you have problems taking your medications as prescribed? Yes   Do you have any problems affording any of  your prescribed medications? No   Do you have problems obtaining/receiving your medications? No   Does the patient have transportation to healthcare appointments? Yes   Living Arrangements house   Does the patient have family/friends to help with healtcare needs after discharge? other (comments);yes  (Family would like him placed in a NH.)   Does your caregiver provide all the help you need? Yes   Are you currently feeling confused? Yes  (sometimes)   Are you currently having problems thinking? Yes  (sometimes)   Are you currently having memory problems? Yes  (sometimes)   Have you felt down, depressed, or hopeless? 0   Have you felt little interest or pleasure in doing things? 0   In the last 7 days, my sleep quality was: fair

## 2020-01-09 NOTE — HPI
Mr. Halley Rodrigues is an 89-year-old gentleman with past medical history of congestive heart failure and dementia was brought in to the emergency department by his family who noticed that he was more confused and lethargic than usual and his appetite had decreased.  There is some question about whether not he had had a cough and some shortness of breath.  He had recently been discharged from [facility] after being treated for pneumonia.  When I examined Mr. Rodrigues this morning he categorically denied any cough, shortness of breath, sputum production, wheezing, orthopnea.  Actually, he reported feeling well and in his usual state of health.  He had no specific complaints and categorically denied any respiratory symptoms now or recently.  A V/Q scan was obtained in the emergency department interpreted as high to intermediate probability and I have been consulted to help interpret these findings.

## 2020-01-09 NOTE — PLAN OF CARE
01/09/20 1604   Discharge Reassessment   Assessment Type Discharge Planning Reassessment   Anticipated Discharge Disposition Long Term   Patient choice form signed by patient/caregiver List from [employer]  (Completed with dtr in law, Kate today at 1417. They chose [facility].)   Post-Acute Status   Post-Acute Authorization Placement   Post-Acute Placement Status Referrals Sent  (Referrals sent to [facility] via  [facility].)   Winter with [facility] was notified of the referral and I left a message with Marquita Best informing her of the referral.

## 2020-01-09 NOTE — DISCHARGE SUMMARY
[facility] Medicine  Discharge Summary      Patient Name: Halley Rodrigues  MRN: [id number]  Admission Date: 1/1/2020  Hospital Length of Stay: 4 days  Discharge Date and Time: 1/6/2020  3:28 PM  Attending Physician: No att. providers found   Discharging Provider: Michelle Theodore MD  Primary Care Provider: Scott Payne MD      HPI:   Patient is a 89-year-old  male with known history of CAD status post CABG, dementia, chronic combined systolic and diastolic congestive heart failure and anemia of chronic disease who was recently discharged from our facility after being admitted for syncope and eventually transferred to skilled nursing facility presents with chief complaint of shortness of breath and cough.  History is limited secondary to patient's dementia and is supplemented by his son at bedside.    Patient was discharged from [facility] on 12/23.  According to the patient's son patient was noted to be more weak over the last 3 days.  He was noted to have diarrhea which was nonbloody and watery over the last 2 days.  Reported poor oral intake and generalized weakness.  Patient was also noted to have a rattling cough.  Son denies known choking episodes or aspiration events in the past.  Patient denies chest pain but admits to shortness of breath is worse with exertion and better with rest.  Symptoms are moderate in nature.  No lightheadedness, lower extremity edema, palpitations, abdominal pain, nausea/vomiting or urinary problems. Patient is prone to frequent falls; noncompliant with walker.    In the ER:  Hemodynamically stable.  Patient was noted to be hypoxic with O2 sats in the 80s on room air which improved to greater than 90% on supplemental oxygen.  He was also noted to be tachypneic.  Laboratory workup revealed chronic normocytic anemia, thrombocytopenia, hypokalemia, CKD stage 3, elevated BNP and troponin.  EKG revealed normal sinus rhythm  with nonspecific ST-T wave changes and prolonged QT interval.  It is unchanged from EKG dated 11/26/2019.  Chest x-ray revealed left mid and lower lung zone alveolar opacities concerning for pneumonia.  Admitted for further management.    Rest of the 10 point review of systems is negative except as mentioned above.      * No surgery found *      Hospital Course:   Patient admitted with acute hypoxic respiratory failure suspected to be pneumonia.  IV abx started.  Troponin mildly elevated and thus he was admitted to cardiology.  1/2/20 CXR with worsening infiltrate and patient with increased O2 needs- oxymask is working better than NC.  Torponin has continued to climb.  He has no complaints but has dementia and family reports he is not a complainer. Patient moved to [facility] for continuous cardiac monitoring.  Cardiology consulted on admission.  Pulmonary consulted given worsening infiltrate. IV lasix ordered in the event this could be pulmonary edema.  Poor urine output in response and thus Renal consulted for oliguria and is following.  1/3/19 patient looks much improved but still requiring supplemental O2 to keep saturations greater than 88%. Echo done and EF 34-42%.  EF 43% November 2019 but otherwise unchanged. 1/4 improved infiltrate on CXR suggesting pulmonary edema.  Changing IV lasix to po.  Supplemental O2 weaned at this time and he is on room air with no home O2 needs.  Renal function normalized and GARRETT resolved. Mobilizing with PT/OT and ambulating over 200 feet.  Having loose stool- stool studies thus far unrevealing of infection.  Probiotics started.  Loose stool better.  Patient cleared for discharge home by consultants.  CM consulted to arrange home health services.  Patient will follow up with cardiology in one week. I discussed plan with family.  Family is interested in long term senior care placement for patient.  CM consulted to provide them information on all of their options including sitters at  home, assisted living, [facility] NH.  He physically can perform ADLs but his memory deficits make it a risk for him to stay home alone.  Return precautions given.  Patient completed his course of abx during his hospital stay. Patient examined on day of discharge and RRR, CTA B.  This is a discharge summary for date 1/6/20.     Consults:   Consults (From admission, onward)        Status Ordering Provider     Inpatient consult to Pulmonology  Once     Provider:  Ortiz Senior MD    Completed MACKENZIE HOOD.     Inpatient consult to Social Work/Case Management  Once     Provider:  (Not yet assigned)    Completed MACKENZIE HOOD.     Inpatient consult to Social Work/Case Management  Once     Provider:  (Not yet assigned)    Completed MACKENZIE HOOD.          No new Assessment & Plan notes have been filed under this hospital service since the last note was generated.  Service: Hospital Medicine    Final Active Diagnoses:    Diagnosis Date Noted POA    PRINCIPAL PROBLEM:  Pneumonia [J18.9] 01/01/2020 Yes    Stage 3 chronic kidney disease [N18.3] 01/01/2020 Yes    Pneumonia due to infectious organism [J18.9] 01/01/2020 Yes    Thrombocytopenia, unspecified [D69.6] 01/01/2020 Yes    Essential hypertension [I10] 11/27/2019 Yes    Dementia [F03.90] 11/21/2019 Yes    NSTEMI (non-ST elevated myocardial infarction) [I21.4] 11/21/2019 Yes    Chronic combined systolic and diastolic heart failure [I50.42] 11/21/2019 Yes    Anemia, chronic disease [D63.8] 07/25/2018 Yes      Problems Resolved During this Admission:    Diagnosis Date Noted Date Resolved POA    Loose stools [R19.5] 01/04/2020 01/06/2020 No    Acute respiratory failure with hypoxia [J96.01] 01/01/2020 01/06/2020 Yes    Hypokalemia [E87.6] 01/01/2020 01/06/2020 Yes    Hypomagnesemia [E83.42] 01/01/2020 01/06/2020 Yes    Discharge planning issues [Z02.9] 11/30/2019 01/06/2020 Not Applicable       Discharged Condition: good    Disposition: Home-Health Care  [facility]    Follow Up:   Contact information for follow-up providers     Scott Payne MD In 2 weeks.    Specialty:  Internal Medicine  Contact information:  [address] [place] [address] [address]  [place] [address]  [phone]             Ella Oseguera MD In 1 week.    Specialty:  Cardiology  Contact information:  [address]  [place] [address]  [phone]                   Contact information for after-discharge care     Home Medical Care     [facility] .    Service:  Home Health Services  Contact information:  [address] [facility] [address] [facility] [address]  [phone]                           Patient Instructions:      Referral to Home health   Referral Priority: Routine Referral Type: Home Health   Referral Reason: Specialty Services Required   Requested Specialty: Home Health Services   Number of Visits Requested: 1     Diet Cardiac     Notify your health care provider if you experience any of the following:  temperature >100.4     Notify your health care provider if you experience any of the following:  persistent nausea and vomiting or diarrhea     Notify your health care provider if you experience any of the following:  severe uncontrolled pain     Notify your health care provider if you experience any of the following:  difficulty breathing or increased cough     Activity as tolerated       Significant Diagnostic Studies: Labs:   CMP   Recent Labs   Lab 01/08/20  1847      K 3.6      CO2 25   *   BUN 28*   CREATININE 1.6*   CALCIUM 8.7   PROT 7.1   ALBUMIN 3.2*   BILITOT 0.9   ALKPHOS 60   AST 30   ALT 24   ANIONGAP 11   ESTGFRAFRICA 43.5*   EGFRNONAA 37.6*    and CBC No results for input(s): WBC, HGB, HCT, PLT in the last 48 hours.    Pending Diagnostic Studies:     None         Medications:  Reconciled Home Medications:      Medication List      CONTINUE taking these medications    albuterol-ipratropium 2.5 mg-0.5 mg/3 mL nebulizer  solution  Commonly known as:  DUO-NEB  Take 3 mLs by nebulization every 6 (six) hours as needed for Wheezing or Shortness of Breath.     amLODIPine 10 MG tablet  Commonly known as:  NORVASC  Take 1 tablet (10 mg total) by mouth once daily.     aspirin 81 MG Chew  Take 81 mg by mouth once daily.     donepezil 10 MG tablet  Commonly known as:  ARICEPT  TAKE 1 TABLET BY MOUTH ONCE DAILY     ergocalciferol 50,000 unit Cap  Commonly known as:  ERGOCALCIFEROL  TAKE ONE CAPSULE BY MOUTH EVERY WEEK     furosemide 20 MG tablet  Commonly known as:  LASIX  Take 20 mg by mouth once daily.     HYDROcodone-acetaminophen  mg per tablet  Commonly known as:  NORCO  Take 1 tablet by mouth every 8 (eight) hours as needed (pain). Medically necessary for greater than 7 days for chronic pain     ipratropium 42 mcg (0.06 %) nasal spray  Commonly known as:  ATROVENT  1 spray by Nasal route 2 (two) times daily.     Lyrica 150 MG capsule  Generic drug:  pregabalin  Take 150 mg by mouth 2 (two) times daily.     metoprolol succinate 25 MG 24 hr tablet  Commonly known as:  TOPROL-XL  Take 1 tablet (25 mg total) by mouth once daily.     pantoprazole 40 MG tablet  Commonly known as:  PROTONIX  Take 40 mg by mouth once daily.     sertraline 100 MG tablet  Commonly known as:  ZOLOFT  Take 100 mg by mouth every evening.     simvastatin 40 MG tablet  Commonly known as:  ZOCOR  Take 40 mg by mouth every evening.            Indwelling Lines/Drains at time of discharge:   Lines/Drains/Airways     None                 Time spent on the discharge of patient: 33 minutes  Patient was seen and examined on the date of discharge and determined to be suitable for discharge.         Michelle Theodore MD  Department of Hospital Medicine  [facility]

## 2020-01-09 NOTE — PLAN OF CARE
01/08/20 1816   Patient Assessment/Suction   Level of Consciousness (AVPU) alert   Respiratory Effort Normal;Mild   Expansion/Accessory Muscles/Retractions no use of accessory muscles;no retractions;expansion symmetric   All Lung Fields Breath Sounds clear;diminished   Rhythm/Pattern, Respiratory unlabored;pattern regular;depth regular   Cough Frequency infrequent   Cough Type good   PRE-TX-O2   O2 Device (Oxygen Therapy) nasal cannula   $ Is the patient on Low Flow Oxygen? Yes   Flow (L/min) 2   SpO2 95 %   Pulse 76   Resp (!) 21   Aerosol Therapy   $ Aerosol Therapy Charges Aerosol Treatment   Daily Review of Necessity (SVN) completed   Respiratory Treatment Status (SVN) given   Treatment Route (SVN) mask   Patient Position (SVN) semi-Paul's   Post Treatment Assessment (SVN) increased aeration   Signs of Intolerance (SVN) none   Breath Sounds Post-Respiratory Treatment   Throughout All Fields Post-Treatment All Fields   Throughout All Fields Post-Treatment aeration increased   Post-treatment Heart Rate (beats/min) 79   Post-treatment Resp Rate (breaths/min) 20

## 2020-01-09 NOTE — CONSULTS
[facility]   Hematology/Oncology  Inpatient Consult Note          Patient Name: Halley Rodrigues  MRN: [id number]  Admission Date: 1/8/2020  Hospital Length of Stay: 0 days  Code Status: Full Code   Attending Provider: Javon Baltazar MD  Referring Provider: Self, Aaareferral  Consulting Provider: Alvarez Augustine MD  Primary Care Physician: Scott Payne MD  Principal Problem:Acute respiratory failure with hypoxia and hypercarbia    Consults  Subjective:     Chief Complaint: Cough (decreased appetite, recent pneumonia, sent from Dr. Payne office for low blood pressure, cough )          History Present Illness:  89 y.o. male with diagnosis of chronic thrombocytopenia known to my hematology service. He presented to the ED at [facility] from his PCP's office with persistent cough and SOB. He has been admitted to the hospitalist service with upper respiratory infection and concerns for a possible pulmonary emboli on VQ . He last showed for a hematology clinic appointment in May 2019. I requested a pulmonary consult and he has been seen by Dr Ortiz Senior with pulmonary who reviewed the VQ and he disagrees with the report and does not think the patient has any pulmonary emboli. Patient has been having diarrhea for several weeks and is noticeably dehydrated and deconditioned as a result. He has been seen by Dr Blackman and I discussed with him by phone. C. Diff studies have been ordered and contact precautions have been ordered. He is laying in bed. He is awake and alert, and recognized me. No CP, HA's or N/V. There are no family members at bedside. I discussed with his floor nurse.       Past Medical/Surgical History:  Past Medical History:   Diagnosis Date    Anemia     Anemia due to chronic blood loss 7/25/2018    Anemia, chronic disease 7/25/2018    Dementia     GERD (gastroesophageal reflux disease)     Heart disease     Hypertension     Neuropathy      Past Surgical History:   Procedure  From: Cande Haley  To: Sue Smith MD  Sent: 3/11/2019 8:30 PM CDT  Subject: Non-Urgent Medical Question    Dr. Luis Barnhart,  This coming, Wednesday, I'm going to see if I can have a pessary put in. This is for the time I'm in [facility] from March 31st to April 7th. Is that okay with you?  Sincerely,  Cande Haley   Laterality Date    CORONARY ARTERY BYPASS GRAFT           Allergies:  Review of patient's allergies indicates:   Allergen Reactions    Eucalyptus containing products Palpitations       Social/Family History:  Social History     Socioeconomic History    Marital status:      Spouse name: Not on file    Number of children: Not on file    Years of education: Not on file    Highest education level: Not on file   Occupational History    Not on file   Social Needs    Financial resource strain: Not on file    Food insecurity:     Worry: Not on file     Inability: Not on file    Transportation needs:     Medical: Not on file     Non-medical: Not on file   Tobacco Use    Smoking status: Current Every Day Smoker     Types: Cigarettes, Vaping with nicotine    Smokeless tobacco: Never Used    Tobacco comment: e cigarettes   Substance and Sexual Activity    Alcohol use: No    Drug use: No    Sexual activity: Not on file   Lifestyle    Physical activity:     Days per week: Not on file     Minutes per session: Not on file    Stress: Not on file   Relationships    Social connections:     Talks on phone: Not on file     Gets together: Not on file     Attends Protestant service: Not on file     Active member of club or organization: Not on file     Attends meetings of clubs or organizations: Not on file     Relationship status: Not on file   Other Topics Concern    Not on file   Social History Narrative    Not on file     Family History   Problem Relation Age of Onset    Dementia Father          ROS:    GEN: generalized weakness, malaise and deconditioning  HEENT: normal with no HA's, sore throat, stiff neck, changes in vision  CV: normal with no CP , PND, NIETO or orthopnea  PULM: cough, sputum, SOB  GI: diarrhea for several weeks  : normal with no hematuria, dysuria  BREAST: normal with no mass, discharge, pain  SKIN: normal with no rash, erythema, bruising, or swelling        Medications:  Continuous  Infusions:  Scheduled Meds:   amLODIPine  10 mg Oral Daily    apixaban  5 mg Oral BID    aspirin  81 mg Oral Daily    donepezil  10 mg Oral Daily    furosemide  20 mg Oral Daily    metoprolol succinate  25 mg Oral Daily    pantoprazole  40 mg Oral Daily    sertraline  100 mg Oral QHS    simvastatin  40 mg Oral QHS     PRN Meds:ondansetron, sodium chloride 0.9%         Objective:       Physical Exam:    Vitals:  Blood pressure 119/70, pulse 81, temperature 98.4 °F (36.9 °C), temperature source Oral, resp. rate (!) 24, height 6' (1.829 m), weight 66.6 kg (146 lb 13.2 oz), SpO2 (!) 93 %.    GEN: no apparent distress, but deconditioned elderly gentleman; AAOx3  HEAD: atraumatic and normocephalic  EYES: no pallor, no icterus, PERRLA  ENT: OMM, no pharyngeal erythema, external ears WNL; no nasal discharge; no thrush  NECK: no masses, thyroid normal, trachea midline, no LAD/LN's, supple  CV: RRR with no murmur; normal pulse; normal S1 and S2; no pedal edema  CHEST: Normal respiratory effort; CTAB; coarse BS diffuse bilaterally  ABDOM: nontender and nondistended; soft; normal bowel sounds; no rebound/guarding  MUSC/Skeletal: ROM normal; no crepitus; joints normal; no deformities or arthropathy  EXTREM: no clubbing, cyanosis, inflammation or swelling  SKIN: no rashes, lesions, ulcers, petechiae or subcutaneous nodules;  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  : no dickey  NEURO: generalized weakness; AAOx3; no tremors  PSYCH: normal mood, affect and behavior  LYMPH: normal cervical, supraclavicular, axillary and groin LN's      Lab Review:   Lab Results   Component Value Date    WBC 7.03 01/09/2020    HGB 9.3 (L) 01/09/2020    HCT 28.3 (L) 01/09/2020    MCV 95 01/09/2020     (L) 01/09/2020       CMP  Sodium   Date Value Ref Range Status   01/09/2020 141 136 - 145 mmol/L Final     Potassium   Date Value Ref Range Status   01/09/2020 3.5 3.5 - 5.1 mmol/L Final     Chloride   Date Value Ref Range Status    01/09/2020 105 95 - 110 mmol/L Final     CO2   Date Value Ref Range Status   01/09/2020 25 23 - 29 mmol/L Final     Glucose   Date Value Ref Range Status   01/09/2020 104 70 - 110 mg/dL Final     BUN, Bld   Date Value Ref Range Status   01/09/2020 23 8 - 23 mg/dL Final     BUN   Date Value Ref Range Status   04/16/2018 28 (H) 7 - 21 mg/dL Final     Creatinine   Date Value Ref Range Status   01/09/2020 1.3 0.5 - 1.4 mg/dL Final     Calcium   Date Value Ref Range Status   01/09/2020 8.2 (L) 8.7 - 10.5 mg/dL Final     Total Protein   Date Value Ref Range Status   01/09/2020 6.2 6.0 - 8.4 g/dL Final     Albumin   Date Value Ref Range Status   01/09/2020 2.9 (L) 3.5 - 5.2 g/dL Final     Total Bilirubin   Date Value Ref Range Status   01/09/2020 1.0 0.1 - 1.0 mg/dL Final     Comment:     For infants and newborns, interpretation of results should be based  on gestational age, weight and in agreement with clinical  observations.  Premature Infant recommended reference ranges:  Up to 24 hours.............<8.0 mg/dL  Up to 48 hours............<12.0 mg/dL  3-5 days..................<15.0 mg/dL  6-29 days.................<15.0 mg/dL       Alkaline Phosphatase   Date Value Ref Range Status   01/09/2020 54 (L) 55 - 135 U/L Final     AST   Date Value Ref Range Status   01/09/2020 23 10 - 40 U/L Final     ALT   Date Value Ref Range Status   01/09/2020 20 10 - 44 U/L Final     Anion Gap   Date Value Ref Range Status   01/09/2020 11 8 - 16 mmol/L Final   04/16/2018 16 9 - 18 mEq/L Final     eGFR if    Date Value Ref Range Status   01/09/2020 55.9 (A) >60 mL/min/1.73 m^2 Final     eGFR if non    Date Value Ref Range Status   01/09/2020 48.4 (A) >60 mL/min/1.73 m^2 Final     Comment:     Calculation used to obtain the estimated glomerular filtration  rate (eGFR) is the CKD-EPI equation.            Diagnostic Results:  NM Lung Scan Ventilation Perfusion [[id number]] Collected: 01/08/20 2201   Order  Status: Completed Updated: 01/09/20 0155   Narrative:         EXAM: NUCLEAR MEDICINE V/Q SCAN.    CLINICAL DATA: Elevated d-dimer. Smoked for about 70 years, quit about 4 years  ago.    TECHNIQUE: Nuclear medicine VQ scan was performed with 11.1 mCi of Xe-133 for  the ventilation study and for a 5.5 millicuries of technetium 99m MAA for the  perfusion study.    PRIOR STUDIES: No prior studies submitted    FINDINGS:  PERFUSION IMAGES  Segmental defect in the right upper lobe is seen.  A second segmental defect in the lateral segment of the right middle lobe is  seen.    VENTILATION IMAGES  Ventilation images demonstrate no defect.    IMPRESSION:  1. Ventilation/perfusion mismatch is seen in the right upper lobe and right  middle lobe suggesting intermediate to high probability of pulmonary embolism.     US Lower Extremity Veins Bilateral [[id number]] Collected: 01/08/20 2055   Order Status: Completed Updated: 01/08/20 2158   Narrative:         Exam: BILATERAL LOWER EXTREMITY VENOUS DUPLEX DOPPLER    Clinical data: Elevated D-dimer.    Technique: Real time ultrasound and color Doppler imaging of the veins of the  bilateral lower extremities were performed using grayscale, color flow and  duplex Doppler. Vessels imaged: common femoral veins, superficial femoral veins,  popliteal veins.    Prior studies: No prior studies submitted.    Findings: The lower extremity veins demonstrate no evidence of intraluminal  echogenicity with normal compressibility, spontaneous blood flow, augmentation  and respiratory phasicity. No dilated veins or wall thickening seen. The  superficial veins visualized and soft tissues are unremarkable.    No thrombus.    IMPRESSION: No evidence of deep venous thrombosis in the bilateral lower  extremity veins.    Recommendation:   Follow up as clinically indicated.         Assessment/Plan:     IMPRESSION:  (1) 89 y.o. male with diagnosis of chronic thrombocytopenia known to my hematology service. He  "presented to the ED at [facility] from his PCP's office with persistent cough and SOB. He has been admitted to the hospitalist service with working diagnosis of "possible" pulmonary emboli  - he had VQ scan with RUL and RML deficits  - I requested a pulmonary consult for evaluation and he has been seen by Dr Ortiz Senior  - Dr Senior reviewed the VQ scan and disagrees with the interpretation of the V/Q scan, stating "(the patient) does not have a pulmonary embolus.... He appears to have some component cardiogenic pulmonary edema versus residual abnormalities from his recent lower respiratory tract infection"  - patient is on antibiotics    Brief Hematology Summary:  - previously referred by Dr Payne for hematologic evaluation and recommendation.   - patient was last previously seen in Dec 2015 after which he had changed to Dr PANKAJ Junior; he subsequently changed by to my care and last showed for a hematology clinic appointment in May 2019  - prior suspected platelet clumping issues      - latest platelets at 106,000 and stable        (2) Chronic tobacco use     (3) Chronic anemia - NCNC parameters; mild; possible alpha-thalassemia  - history of gastric ulcers in past due to consumption of NSAIDs and subsequent GIB  - latest hgb at 9.3     (4) Lower extremity/pedal neuropathy - previously followed by Dr Leiva with neurology and Dr Elizabeth with pain management     (5) Prior Garcia's esophagus - followed by Dr Hawkins with GI in past and now sees Dr Dale with GI     (6) Dementia     (7) CRI - stage III - followed by Dr Sanchez with neph     (8) Vit D deficiency - on supplements in past     (9) Hx/of Skin cancer (SCCA) 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 - re-excision on 2/28/2019 negative for residual cancer     (10) Persistent diarrhea of several week duration coinceding with recent oral antibiotic usage - C diff studies an precautions have been ordered  - he has been seen by Dr Blackman with GI    (11) Noncompliance with requested blood work " and hematology clinic follow-up which hinders my ability to provide him with hematologic care        Active Diagnoses:    Diagnosis Date Noted POA    PRINCIPAL PROBLEM:  Acute respiratory failure with hypoxia  [J96.01, J96.02] 01/09/2020 Yes    Acute pulmonary embolism [I26.99] 01/09/2020 Yes    Stage 3 chronic kidney disease [N18.3] 01/01/2020 Yes    Essential hypertension [I10] 11/27/2019 Yes    Chronic combined systolic and diastolic heart failure [I50.42] 11/21/2019 Yes    Thrombocytopenia, secondary [D69.59] 12/14/2017 Yes      Problems Resolved During this Admission:           PLAN:   1. Appreciate pulmonary consult, evaluation and recs  2. lovenox prophylaxis  3. Appreciate GI evaluation   4. Check iron panel, B12/foalte, retic, etc  5. Monitor labs  6. C diff studies and precautions ordered  6. Will follow with you        Thank you for your consult.      Alvarez Augustine MD  Hematology/Oncology  [facility]

## 2020-01-09 NOTE — CONSULTS
[facility]  Pulmonology   Consult Note    Inpatient consult to Pulmonology  Consult performed by: Ortiz Senoir MD  Consult ordered by: Alvarez Augustine MD  Reason for consult: Rule out PE  Assessment/Recommendations: He does not have a pulmonary embolus and I disagree with the interpretation of the V/Q scan.  He appears to have some component cardiogenic pulmonary edema versus residual abnormalities from his recent lower respiratory tract infection.  -  check serum procalcitonin.  -  gentle diuresis.    Inpatient consult to Pulmonology  Consult performed by: Ortiz Senior MD  Consult ordered by: Tanya Matute NP         Subjective     Chief Complaint   Patient presents with    Cough     decreased appetite, recent pneumonia, sent from Dr. Payne office for low blood pressure, cough       History of Present Illness:  Mr. Halley Rodrigues is an 89-year-old gentleman with past medical history of congestive heart failure and dementia was brought in to the emergency department by his family who noticed that he was more confused and lethargic than usual and his appetite had decreased.  There is some question about whether not he had had a cough and some shortness of breath.  He had recently been discharged from [facility] after being treated for pneumonia.  When I examined Mr. Rodrigues this morning he categorically denied any cough, shortness of breath, sputum production, wheezing, orthopnea.  Actually, he reported feeling well and in his usual state of health.  He had no specific complaints and categorically denied any respiratory symptoms now or recently.  A V/Q scan was obtained in the emergency department interpreted as high to intermediate probability and I have been consulted to help interpret these findings.     Past Medical History:   Diagnosis Date    Anemia     Anemia due to chronic blood loss 7/25/2018    Anemia, chronic disease 7/25/2018    Dementia     GERD  (gastroesophageal reflux disease)     Heart disease     Hypertension     Neuropathy      Past Surgical History:   Procedure Laterality Date    CORONARY ARTERY BYPASS GRAFT       Family History   Problem Relation Age of Onset    Dementia Father       Social History     Tobacco Use    Smoking status: Current Every Day Smoker     Types: Cigarettes, Vaping with nicotine    Smokeless tobacco: Never Used    Tobacco comment: e cigarettes   Substance and Sexual Activity    Alcohol use: No    Drug use: No    Sexual activity: Not on file      Allergies:  Eucalyptus containing products     Facility-Administered Medications as of 1/9/2020   Medication Route Frequency    amLODIPine tablet 10 mg Oral Daily    aspirin chewable tablet 81 mg Oral Daily    donepezil tablet 10 mg Oral Daily    furosemide injection 40 mg Intravenous Daily    [COMPLETED] iohexol (OMNIPAQUE 350) injection 100 mL Intravenous ONCE PRN    [COMPLETED] ipratropium 0.02 % nebulizer solution 0.5 mg Nebulization Once    [COMPLETED] levalbuterol nebulizer solution 0.63 mg Nebulization ED 1 Time    [COMPLETED] levalbuterol nebulizer solution 0.63 mg Nebulization ED 1 Time    metoprolol succinate (TOPROL-XL) 24 hr tablet 25 mg Oral Daily    ondansetron injection 4 mg Intravenous Q8H PRN    pantoprazole EC tablet 40 mg Oral Daily    [COMPLETED] potassium chloride 10% oral solution 40 mEq Oral Once    sertraline tablet 100 mg Oral QHS    simvastatin tablet 40 mg Oral QHS    sodium chloride 0.9% flush 10 mL Intravenous PRN     Outpatient Medications as of 1/9/2020   Medication    amLODIPine (NORVASC) 10 MG tablet    aspirin 81 MG Chew    donepezil (ARICEPT) 10 MG tablet    furosemide (LASIX) 20 MG tablet    HYDROcodone-acetaminophen (NORCO)  mg per tablet    ipratropium (ATROVENT) 42 mcg (0.06 %) nasal spray    LYRICA 150 mg capsule    metoprolol succinate (TOPROL-XL) 25 MG 24 hr tablet    pantoprazole (PROTONIX) 40 MG tablet     simvastatin (ZOCOR) 40 MG tablet    albuterol-ipratropium (DUO-NEB) 2.5 mg-0.5 mg/3 mL nebulizer solution    ergocalciferol (ERGOCALCIFEROL) 50,000 unit Cap    sertraline (ZOLOFT) 100 MG tablet      Review of Systems   Constitutional: Negative for fever, chills, weight loss and night sweats.   HENT: Negative for postnasal drip, rhinorrhea, sinus pressure and congestion.    Eyes: Negative for redness and itching.   Respiratory: Negative for cough, shortness of breath, wheezing and orthopnea.    Cardiovascular: Negative for chest pain, palpitations and leg swelling.   Genitourinary: Negative for difficulty urinating and hematuria.   Endocrine: Negative for polydipsia, polyphagia and polyuria.    Musculoskeletal: Negative for gait problem, joint swelling and myalgias.   Skin: Negative for rash.   Gastrointestinal: Negative for nausea, vomiting, abdominal pain and acid reflux.   Neurological: Negative for syncope, weakness and headaches.   Hematological: Negative for adenopathy. Does not bruise/bleed easily and no excessive bruising.   Psychiatric/Behavioral: Negative for confusion and sleep disturbance. The patient is not nervous/anxious.    All other systems reviewed and are negative.     I have personally reviewed the following: active problem list, medication list, allergies, family history, social history, health maintenance, notes from last encounter, lab results, endoscopy procedure notes, imaging and nursing/provider documentation .    Objective     VS: Temp:  [98.1 °F (36.7 °C)-98.5 °F (36.9 °C)]   Pulse:  [61-86]   Resp:  [18-36]   BP: (100-138)/(53-70)   SpO2:  [91 %-98 %]    Estimated body mass index is 19.91 kg/m² as calculated from the following:    Height as of this encounter: 6' (1.829 m).    Weight as of this encounter: 66.6 kg (146 lb 13.2 oz).   Ideal body weight: 77.6 kg (171 lb 1.2 oz)   I/O:   Intake/Output Summary (Last 24 hours) at 1/9/2020 1806  Last data filed at 1/9/2020 1801  Gross per  24 hour   Intake 960 ml   Output 1540 ml   Net -580 ml        Vent: SpO2 (!) 92% on room air   PE Physical Exam   Constitutional: He is oriented to person, place, and time. He appears well-developed and well-nourished. He appears not cachectic.  Non-toxic appearance. No distress. He is not obese.   HENT:   Head: Normocephalic and atraumatic.   Right Ear: External ear normal.   Left Ear: External ear normal.   Nose: Nose normal.   Mouth/Throat: Uvula is midline, oropharynx is clear and moist and mucous membranes are normal. Mallampati Score: II.   Neck: Trachea normal and normal range of motion. Neck supple. No JVD present. No tracheal deviation present. No thyromegaly present.   Cardiovascular: Normal rate, regular rhythm, normal heart sounds and intact distal pulses. Exam reveals no gallop and no friction rub.   No murmur heard.  Pulmonary/Chest: Normal expansion, hyperinflation, symmetric chest wall expansion and effort normal. No stridor. He has no wheezes. He has no rhonchi. He has no rales.   Abdominal: Soft. Bowel sounds are normal. He exhibits no distension. There is no tenderness. There is no guarding.   Musculoskeletal: Normal range of motion. He exhibits no edema, tenderness or deformity.   Lymphadenopathy: No supraclavicular adenopathy is present.     He has no cervical adenopathy.     He has no axillary adenopathy.   Neurological: He is alert and oriented to person, place, and time. He has normal strength. No cranial nerve deficit or sensory deficit. He exhibits normal muscle tone. GCS eye subscore is 4. GCS verbal subscore is 5. GCS motor subscore is 6.   Skin: Skin is warm, dry and intact. No rash noted. He is not diaphoretic. No cyanosis. Nails show no clubbing.   Psychiatric: He has a normal mood and affect. His speech is normal and behavior is normal.   Nursing note and vitals reviewed.       Recent Diagnostic Studies:  Labs I have personally reviewed and interpreted all recent labs / diagnostic  studies, and noted the following relevant results:  All pertinent labs within the past 24 hours have been reviewed.  Recent Labs   Lab 01/08/20  1847 01/09/20  0403 01/09/20  1011   WBC 7.91 7.03  --    RBC 3.09* 2.97*  --    HGB 9.7* 9.3*  --    HCT 29.4* 28.3*  --    * 106*  --    MCV 95 95  --    MCH 31.4* 31.3*  --    MCHC 33.0 32.9  --     141  --    K 3.6 3.5  --     105  --    CO2 25 25  --    BUN 28* 23  --    CREATININE 1.6* 1.3  --    MG 1.9  --   --    ALT 24 20  --    AST 30 23  --    ALKPHOS 60 54*  --    BILITOT 0.9 1.0  --    PROT 7.1 6.2  --    ALBUMIN 3.2* 2.9*  --    INR 1.5  --   --    APTT 75.1*  --   --      --   --    CPKMB 2.1  --   --    TROPONINI 0.587* 0.483* 0.422*     Serum BNP:  360  Serum troponin:  0.422  Serum procalcitonin:  Less than 0.05     Imaging I have personally reviewed and interpreted all available images and reviewed the associated Radiology reports.  My personal impression of the relevant studies is as follows:  CTA Chest:  1. Negative for pulmonary thromboembolism.  2. Findings of bilateral multifocal bronchiolitis, most significantly in the left lower lobe, presumably of an acute infectious etiology.  Atypical mycobacterial infection could have this appearance.  3. Additional small consolidative opacities and geographic interstitial opacities in both lungs, nonspecific.  Multifocal atypical or viral pneumonia, and or interstitial lung disease of numerous potential etiologies could have this appearance.  4. Several prominent to mildly enlarged mediastinal lymph nodes, nonspecific but presumably reactive due to lung disease.  5. Extensive 3 vessel coronary arterial vascular calcifications, and moderate aortic vascular calcifications.  My impression:  Faint hazy bilateral airspace opacities consistent cardiogenic pulmonary edema.    V/Q scan:  Radiology report:  1. Ventilation/perfusion mismatch is seen in the right upper lobe and right middle lobe  suggesting intermediate to high probability of pulmonary embolism.  My impression:  No filling defects nor any V/Q mismatch.    Chest x-ray:  Interval improved aeration in the left lung, with nonspecific scattered interstitial opacities throughout both lungs.  No consolidated pneumonia.       Micro I have personally reviewed and interpreted the available culture data.  Relevant results are as follows.  Blood Culture   Lab Results   Component Value Date    LABBLOO No Growth to date 01/08/2020   , Sputum Culture   Lab Results   Component Value Date    GSRESP <10 epithelial cells per low power field. 01/01/2020    GSRESP Many WBC's 01/01/2020    GSRESP Many Gram positive cocci 01/01/2020    RESPIRATORYC Normal respiratory erendira 01/01/2020    and Urine Culture  No results found for: LABURIN     Assessment       Active Hospital Problems    Diagnosis    Stage 3 chronic kidney disease    Essential hypertension    Chronic combined systolic and diastolic heart failure    Thrombocytopenia, secondary      My Impression:  No evidence of VTE or pulmonary embolism, nor is there really any evidence of acute respiratory failure.  Appears that he is brought to the emergency department by his family for symptoms associated with his dementia, and all of his radiographic pulmonary abnormalities were incidentally discovered.    Plan     Pulmonary  · Discontinue anticoagulation.  · Discontinue supplemental oxygen less it is actually necessary.  · Consider some gentle diuresis if he develops symptoms or hypoxemia.  · Will defer to primary service regarding placement issues.     Thank you for your consult. I will sign off.  Please feel free to contact me if any additional questions or concerns.    Ortiz Senior MD  Pulmonary / Critical Care Medicine  [facility]

## 2020-01-09 NOTE — CONSULTS
GASTROENTEROLOGY INPATIENT CONSULT NOTE  Patient Name: Halley Rodrigues  Patient MRN: [id number]  Patient : 1930    Admit Date: 2020  Service date: 2020    Reason for Consult: anemia    PCP: Scott Payne MD    Chief Complaint   Patient presents with    Cough     decreased appetite, recent pneumonia, sent from Dr. Payne office for low blood pressure, cough        HPI: Patient is a 89 y.o. male with PMHx anemia, GERd, HTN, CAD / CABG, HLD, memory issues, CKD, recent PNA presents for evaluation of SOB / weakness. Acute / subacute, intermittent, non-resovling. Took abx over past weeks w/o resolution. Now has diarrhea w/ possible dark stools but no overt bleeding. On admission, elevated d-diver suspicious for PE and but CTA was negative but did shown PNA.     CHART REVIEW;   Hg 9.3 (Baseline 12ish)  Ferrtin 49 in  (17 in ); Nml b12 in '15  Colon  - small polyps and ICV AVM (not ablated)  EGD  - small HH / gastritis      Past Medical History:  Past Medical History:   Diagnosis Date    Anemia     Anemia due to chronic blood loss 2018    Anemia, chronic disease 2018    Dementia     GERD (gastroesophageal reflux disease)     Heart disease     Hypertension     Neuropathy         Past Surgical History:  Past Surgical History:   Procedure Laterality Date    CORONARY ARTERY BYPASS GRAFT          Home Medications:  Medications Prior to Admission   Medication Sig Dispense Refill Last Dose    amLODIPine (NORVASC) 10 MG tablet Take 1 tablet (10 mg total) by mouth once daily. 30 tablet 0 2020 at 11:00    aspirin 81 MG Chew Take 81 mg by mouth once daily.    2020 at 11:00    donepezil (ARICEPT) 10 MG tablet TAKE 1 TABLET BY MOUTH ONCE DAILY (Patient taking differently: Take 10 mg by mouth once daily. ) 90 tablet 1 2020 at 20:00    furosemide (LASIX) 20 MG tablet Take 20 mg by mouth once daily.    2020 at 11:00    HYDROcodone-acetaminophen (NORCO)   mg per tablet Take 1 tablet by mouth every 8 (eight) hours as needed (pain). Medically necessary for greater than 7 days for chronic pain 90 tablet 0 Past Month at Unknown time    ipratropium (ATROVENT) 42 mcg (0.06 %) nasal spray 1 spray by Nasal route 2 (two) times daily.  0 1/7/2020 at 12:00    LYRICA 150 mg capsule Take 150 mg by mouth 2 (two) times daily.    1/8/2020 at 11:00    metoprolol succinate (TOPROL-XL) 25 MG 24 hr tablet Take 1 tablet (25 mg total) by mouth once daily. 30 tablet 0 1/8/2020 at 11:00    pantoprazole (PROTONIX) 40 MG tablet Take 40 mg by mouth once daily.    1/7/2020 at 20:00    simvastatin (ZOCOR) 40 MG tablet Take 40 mg by mouth every evening.    1/7/2020 at 20:00    albuterol-ipratropium (DUO-NEB) 2.5 mg-0.5 mg/3 mL nebulizer solution Take 3 mLs by nebulization every 6 (six) hours as needed for Wheezing or Shortness of Breath. 1 Box 0 Unknown at Unknown time    ergocalciferol (ERGOCALCIFEROL) 50,000 unit Cap TAKE ONE CAPSULE BY MOUTH EVERY WEEK (Patient taking differently: Take 50,000 Units by mouth every 7 days. ) 12 capsule 0 1/6/2020    sertraline (ZOLOFT) 100 MG tablet Take 100 mg by mouth every evening.    Unknown at Unknown time       Inpatient Medications:   amLODIPine  10 mg Oral Daily    apixaban  5 mg Oral BID    aspirin  81 mg Oral Daily    donepezil  10 mg Oral Daily    furosemide  20 mg Oral Daily    metoprolol succinate  25 mg Oral Daily    pantoprazole  40 mg Oral Daily    sertraline  100 mg Oral QHS    simvastatin  40 mg Oral QHS     ondansetron, sodium chloride 0.9%    Review of patient's allergies indicates:   Allergen Reactions    Eucalyptus containing products Palpitations       Social History:   Social History     Occupational History    Not on file   Tobacco Use    Smoking status: Current Every Day Smoker     Types: Cigarettes, Vaping with nicotine    Smokeless tobacco: Never Used    Tobacco comment: e cigarettes   Substance and Sexual  Activity    Alcohol use: No    Drug use: No    Sexual activity: Not on file       Family History:   Family History   Problem Relation Age of Onset    Dementia Father        Review of Systems:  A 10 point review of systems was performed and was normal, except as mentioned in the HPI, including constitutional, HEENT, heme, lymph, cardiovascular, respiratory, gastrointestinal, genitourinary, neurologic, endocrine, psychiatric and musculoskeletal.      OBJECTIVE:    Physical Exam:  24 Hour Vital Sign Ranges: Temp:  [97.5 °F (36.4 °C)-98.5 °F (36.9 °C)] 98.3 °F (36.8 °C)  Pulse:  [61-86] 77  Resp:  [18-24] 24  SpO2:  [91 %-98 %] 93 %  BP: (100-141)/(53-70) 107/59  Most recent vitals: BP (!) 107/59   Pulse 77   Temp 98.3 °F (36.8 °C) (Oral)   Resp (!) 24   Ht 6' (1.829 m)   Wt 66.6 kg (146 lb 13.2 oz)   SpO2 (!) 93%   BMI 19.91 kg/m²    GEN: well-developed, well-nourished, awake and alert, non-toxic appearing adult  HEENT: PERRL, sclera anicteric, oral mucosa pink and moist without lesion; on nasal cannula  NECK: trachea midline; Good ROM  CV: regular rate and rhythm, no murmurs or gallops  RESP: min coarse; moving air;   ABD: soft, non-tender, non-distended, normal bowel sounds  EXT: no swelling or edema, 2+ pulses distally  SKIN: no rashes or jaundice  PSYCH: normal affect    Labs:   Recent Labs     01/08/20 1847 01/09/20  0403   WBC 7.91 7.03   MCV 95 95   * 106*     Recent Labs     01/08/20  1847 01/09/20  0403    141   K 3.6 3.5    105   CO2 25 25   BUN 28* 23   * 104     No results for input(s): ALB in the last 72 hours.    Invalid input(s): ALKP, SGOT, SGPT, TBIL, DBIL, TPRO  Recent Labs     01/08/20  1847   INR 1.5         Radiology Review:  CTA Chest Non Coronary   Final Result      1. Negative for pulmonary thromboembolism.   2. Findings of bilateral multifocal bronchiolitis, most significantly in the left lower lobe, presumably of an acute infectious etiology.  Atypical  mycobacterial infection could have this appearance.   3. Additional small consolidative opacities and geographic interstitial opacities in both lungs, nonspecific.  Multifocal atypical or viral pneumonia, and or interstitial lung disease of numerous potential etiologies could have this appearance.   4. Several prominent to mildly enlarged mediastinal lymph nodes, nonspecific but presumably reactive due to lung disease.   5. Extensive 3 vessel coronary arterial vascular calcifications, and moderate aortic vascular calcifications.   .         Electronically signed by: Vinay Christiansen MD   Date:    01/09/2020   Time:    10:40      NM Lung Scan Ventilation Perfusion   Final Result      US Lower Extremity Veins Bilateral   Final Result      X-Ray Chest AP Portable   Final Result      Interval improved aeration in the left lung, with nonspecific scattered interstitial opacities throughout both lungs.  No consolidated pneumonia.         Electronically signed by: Vinay Christiansen MD   Date:    01/08/2020   Time:    19:17            IMPRESSION / RECOMMENDATIONS:  89 y.o. male with PMHx anemia, GERd, HTN, CAD / CABG, HLD, memory issues, CKD, recent PNA presents for evaluation of SOB / weakness w/ diarrhea and progressive anemia.     - C. Diff studies given diarrhea and recent abx use  - Push / colon to further evaluate pending clinical course especially to tx ICV AVM    Thank you for this consult.    Aleks CAMP Dauterive III  1/9/2020  2:12 PM

## 2020-01-09 NOTE — PROGRESS NOTES
[facility]  Adult Nutrition   Progress Note (Initial Assessment)     SUMMARY     Recommendations/Interventions:  1. Continue current diet as tolerated, encourage intake.   2. Added Ensure Enlive TID to aid in meeting estimated needs due to hx of poor PO intake and malnutrition.   3. Encourage intake of meals and supplements and provide assistance when needed.  Goals:   1. Pt to meet at least 75% of estimated needs via PO intake of meals and supplements.   2. Meal assistance provided when needed.  Nutrition Goal Status: new    Reason for Assessment    Reason For Assessment: identified at risk by screening criteria  Diagnosis: (SOB)  Relevant Medical History: Dementia, GERD, Heart disease, HTN  Interdisciplinary Rounds: attended    Nutrition Risk Screen    Nutrition Risk Screen: no indicators present     MST Score: 2  Have you recently lost weight without trying?: Unsure  Weight loss score: 2  Have you been eating poorly because of a decreased appetite?: No  Appetite score: 0       Nutrition/Diet History    Food Allergies: NKFA  Factors Affecting Nutritional Intake: decreased appetite    Anthropometrics    Temp: 98.3 °F (36.8 °C)  Height Method: Stated  Height: 6' (182.9 cm)  Height (inches): 72 in  Weight Method: Standard Scale  Weight: 66.6 kg (146 lb 13.2 oz)  Weight (lb): 146.83 lb  Ideal Body Weight (IBW), Male: 178 lb  % Ideal Body Weight, Male (lb): 84.27 %  BMI (Calculated): 19.9  BMI Grade: 18.5-24.9 - normal     Weight History:  Wt Readings from Last 10 Encounters:   01/09/20 66.6 kg (146 lb 13.2 oz)   01/05/20 65 kg (143 lb 4.8 oz)   01/03/20 69.6 kg (153 lb 7 oz)   12/03/19 63.9 kg (140 lb 14 oz)   10/21/19 73.5 kg (162 lb)   09/09/19 74.8 kg (165 lb)   08/26/19 73.5 kg (162 lb)   07/30/19 73.5 kg (162 lb)   05/02/19 73.8 kg (162 lb 9.6 oz)   04/25/19 70.8 kg (156 lb)     Lab/Procedures/Meds: Pertinent Labs Reviewed  Clinical Chemistry:  Recent Labs   Lab 01/03/20  0341 01/08/20  2580  01/09/20  0403     139 142 141   K 3.1*  3.1* 3.6 3.5     103 106 105   CO2 24  24 25 25     109 115* 104   BUN 32*  32* 28* 23   CREATININE 1.7*  1.7* 1.6* 1.3   CALCIUM 8.0*  8.0* 8.7 8.2*   PROT  --  7.1 6.2   ALBUMIN 2.9* 3.2* 2.9*   BILITOT  --  0.9 1.0   ALKPHOS  --  60 54*   AST  --  30 23   ALT  --  24 20   ANIONGAP 12  12 11 11   ESTGFRAFRICA 40.4*  40.4* 43.5* 55.9*   EGFRNONAA 35.0*  35.0* 37.6* 48.4*   MG 1.6 1.9  --    PHOS 3.1  --   --    AMYLASE  --  51  --    LIPASE  --  39  --     < > = values in this interval not displayed.     CBC:   Recent Labs   Lab 01/09/20  0403   WBC 7.03   RBC 2.97*   HGB 9.3*   HCT 28.3*   *   MCV 95   MCH 31.3*   MCHC 32.9     Cardiac Profile:  Recent Labs   Lab 01/02/20  2226 01/03/20  0342 01/05/20  0442 01/08/20  1847 01/09/20  0403 01/09/20  1011   BNP  --  837* 620* 360*  --   --    *  --   --  102  --   --    CPKMB 28.3*  --   --  2.1  --   --    TROPONINI  --   --  2.711* 0.587* 0.483* 0.422*     Thyroid & Parathyroid:  Recent Labs   Lab 01/08/20  1847   TSH 0.940       Medications: Pertinent Medications reviewed  Scheduled Meds:   amLODIPine  10 mg Oral Daily    apixaban  5 mg Oral BID    aspirin  81 mg Oral Daily    donepezil  10 mg Oral Daily    furosemide  20 mg Oral Daily    metoprolol succinate  25 mg Oral Daily    pantoprazole  40 mg Oral Daily    sertraline  100 mg Oral QHS    simvastatin  40 mg Oral QHS     Continuous Infusions:  PRN Meds:.ondansetron, sodium chloride 0.9%    Estimated/Assessed Needs    Weight Used For Calorie Calculations: 66.6 kg (146 lb 13.2 oz)  Energy Calorie Requirements (kcal): 5957-2379 kcals/day (30-35 kcals/kg)  Energy Need Method: Kcal/kg  Protein Requirements:  g/day (1.2-1.5 g/kg)  Weight Used For Protein Calculations: 66.6 kg (146 lb 13.2 oz)     Estimated Fluid Requirement Method: RDA Method    Nutrition Prescription Ordered    Current Diet Order: Cardiac Diet      Evaluation of Received Nutrient/Fluid Intake    Energy Calories Required: not meeting needs  Protein Required: not meeting needs  Fluid Required: meeting needs  Tolerance: tolerating  % Intake of Estimated Energy Needs: 0 - 25 %  % Meal Intake: 0 - 25 %    Intake/Output Summary (Last 24 hours) at 1/9/2020 1337  Last data filed at 1/9/2020 1305  Gross per 24 hour   Intake 840 ml   Output 540 ml   Net 300 ml      Nutrition Risk    Level of Risk/Frequency of Follow-up: moderate - high     Dietitian Rounds Brief:  · Seen 2' identified at risk. I have seen this patient personally multiple times and recommended diagnosis of malnutrition 2 visits ago. Patient has a hx of dementia and poor oral intake. Patient was discharged on 1/6, however presented to ER on 1/8 with worsening SOB. Added Ensure to aid in meeting estimated needs. Will continue to monitor intake and labs.    Monitor and Evaluation    Food and Nutrient Intake: energy intake, food and beverage intake  Food and Nutrient Adminstration: diet order  Physical Activity and Function: nutrition-related ADLs and IADLs  Anthropometric Measurements: weight, weight change  Biochemical Data, Medical Tests and Procedures: electrolyte and renal panel, lipid profile, gastrointestinal profile, glucose/endocrine profile, inflammatory profile  Nutrition-Focused Physical Findings: overall appearance     Nutrition Follow-Up    RD Follow-up?: Yes  Vy Genao RD 01/09/2020 1:40 PM

## 2020-01-09 NOTE — CONSULTS
This 88yo patient was admitted with sx of increasing SOB, coughing, and weakness. He was recently discharged from this hospital. He had underlying COPD and probably bronchopneumonia. Probably NSTEMI, LVEF 43%. Congestive heart failure. Today D-Dimer was elevated, he had a V/Q lung scan for PE. CTA is negative for PE as well.     Past Medical History:   Diagnosis Date    Anemia     Anemia due to chronic blood loss 7/25/2018    Anemia, chronic disease 7/25/2018    Dementia     GERD (gastroesophageal reflux disease)     Heart disease     Hypertension     Neuropathy        Past Surgical History:   Procedure Laterality Date    CORONARY ARTERY BYPASS GRAFT         Review of patient's allergies indicates:   Allergen Reactions    Eucalyptus containing products Palpitations     Family History   Problem Relation Age of Onset    Dementia Father        No current facility-administered medications on file prior to encounter.      Current Outpatient Medications on File Prior to Encounter   Medication Sig Dispense Refill    amLODIPine (NORVASC) 10 MG tablet Take 1 tablet (10 mg total) by mouth once daily. 30 tablet 0    aspirin 81 MG Chew Take 81 mg by mouth once daily.       donepezil (ARICEPT) 10 MG tablet TAKE 1 TABLET BY MOUTH ONCE DAILY (Patient taking differently: Take 10 mg by mouth once daily. ) 90 tablet 1    furosemide (LASIX) 20 MG tablet Take 20 mg by mouth once daily.       HYDROcodone-acetaminophen (NORCO)  mg per tablet Take 1 tablet by mouth every 8 (eight) hours as needed (pain). Medically necessary for greater than 7 days for chronic pain 90 tablet 0    ipratropium (ATROVENT) 42 mcg (0.06 %) nasal spray 1 spray by Nasal route 2 (two) times daily.  0    LYRICA 150 mg capsule Take 150 mg by mouth 2 (two) times daily.       metoprolol succinate (TOPROL-XL) 25 MG 24 hr tablet Take 1 tablet (25 mg total) by mouth once daily. 30 tablet 0    pantoprazole (PROTONIX) 40 MG tablet Take 40 mg by  mouth once daily.       simvastatin (ZOCOR) 40 MG tablet Take 40 mg by mouth every evening.       albuterol-ipratropium (DUO-NEB) 2.5 mg-0.5 mg/3 mL nebulizer solution Take 3 mLs by nebulization every 6 (six) hours as needed for Wheezing or Shortness of Breath. 1 Box 0    ergocalciferol (ERGOCALCIFEROL) 50,000 unit Cap TAKE ONE CAPSULE BY MOUTH EVERY WEEK (Patient taking differently: Take 50,000 Units by mouth every 7 days. ) 12 capsule 0    sertraline (ZOLOFT) 100 MG tablet Take 100 mg by mouth every evening.          Social History     Socioeconomic History    Marital status:      Spouse name: Not on file    Number of children: Not on file    Years of education: Not on file    Highest education level: Not on file   Occupational History    Not on file   Social Needs    Financial resource strain: Not on file    Food insecurity:     Worry: Not on file     Inability: Not on file    Transportation needs:     Medical: Not on file     Non-medical: Not on file   Tobacco Use    Smoking status: Current Every Day Smoker     Types: Cigarettes, Vaping with nicotine    Smokeless tobacco: Never Used    Tobacco comment: e cigarettes   Substance and Sexual Activity    Alcohol use: No    Drug use: No    Sexual activity: Not on file   Lifestyle    Physical activity:     Days per week: Not on file     Minutes per session: Not on file    Stress: Not on file   Relationships    Social connections:     Talks on phone: Not on file     Gets together: Not on file     Attends Islam service: Not on file     Active member of club or organization: Not on file     Attends meetings of clubs or organizations: Not on file     Relationship status: Not on file   Other Topics Concern    Not on file   Social History Narrative    Not on file         PHYSICAL EXAMINATION  Average built male patient with noisy breathing in no acute distress. He answers questions.  ENT: mild JVD, carotid without bruit, sclera  nonicteric  CARDIAC: regular rhythm, systolic murmur,  LUNGS: coarse breath sounds B/L  ABDOMEN: soft, nontender, +BS  EXTREMITIES: no edema B/L    EKG: normal sinus rhythm, nonspecific ST-T changes    CXR: cardiac sillhouette borderline, bilateral infiltrates        Recent Labs   Lab 01/09/20  1011   TROPONINI 0.422*           Lab Results   Component Value Date    WBC 7.03 01/09/2020    HGB 9.3 (L) 01/09/2020    HCT 28.3 (L) 01/09/2020    MCV 95 01/09/2020     (L) 01/09/2020       CMP  Sodium   Date Value Ref Range Status   01/09/2020 141 136 - 145 mmol/L Final     Potassium   Date Value Ref Range Status   01/09/2020 3.5 3.5 - 5.1 mmol/L Final     Chloride   Date Value Ref Range Status   01/09/2020 105 95 - 110 mmol/L Final     CO2   Date Value Ref Range Status   01/09/2020 25 23 - 29 mmol/L Final     Glucose   Date Value Ref Range Status   01/09/2020 104 70 - 110 mg/dL Final     BUN, Bld   Date Value Ref Range Status   01/09/2020 23 8 - 23 mg/dL Final     BUN   Date Value Ref Range Status   04/16/2018 28 (H) 7 - 21 mg/dL Final     Creatinine   Date Value Ref Range Status   01/09/2020 1.3 0.5 - 1.4 mg/dL Final     Calcium   Date Value Ref Range Status   01/09/2020 8.2 (L) 8.7 - 10.5 mg/dL Final     Total Protein   Date Value Ref Range Status   01/09/2020 6.2 6.0 - 8.4 g/dL Final     Albumin   Date Value Ref Range Status   01/09/2020 2.9 (L) 3.5 - 5.2 g/dL Final     Total Bilirubin   Date Value Ref Range Status   01/09/2020 1.0 0.1 - 1.0 mg/dL Final     Comment:     For infants and newborns, interpretation of results should be based  on gestational age, weight and in agreement with clinical  observations.  Premature Infant recommended reference ranges:  Up to 24 hours.............<8.0 mg/dL  Up to 48 hours............<12.0 mg/dL  3-5 days..................<15.0 mg/dL  6-29 days.................<15.0 mg/dL       Alkaline Phosphatase   Date Value Ref Range Status   01/09/2020 54 (L) 55 - 135 U/L Final     AST    Date Value Ref Range Status   01/09/2020 23 10 - 40 U/L Final     ALT   Date Value Ref Range Status   01/09/2020 20 10 - 44 U/L Final     Anion Gap   Date Value Ref Range Status   01/09/2020 11 8 - 16 mmol/L Final   04/16/2018 16 9 - 18 mEq/L Final     eGFR if    Date Value Ref Range Status   01/09/2020 55.9 (A) >60 mL/min/1.73 m^2 Final     eGFR if non    Date Value Ref Range Status   01/09/2020 48.4 (A) >60 mL/min/1.73 m^2 Final     Comment:     Calculation used to obtain the estimated glomerular filtration  rate (eGFR) is the CKD-EPI equation.          IMPRESSION  1. Congestive heart failure with underlying LV dysfunction = LVEF +/- 40%  2. Status post-bypass graft surgery  3. Hypertension  4. Chronic Kidney Disease  5. COPD/Pneumonia    RECOMMENDATIONS  Continue with current medications, including IV diuretics. And will followup with you. Thank you.

## 2020-01-10 LAB
ALBUMIN SERPL BCP-MCNC: 3 G/DL (ref 3.5–5.2)
ALP SERPL-CCNC: 58 U/L (ref 55–135)
ALT SERPL W/O P-5'-P-CCNC: 18 U/L (ref 10–44)
ANION GAP SERPL CALC-SCNC: 10 MMOL/L (ref 8–16)
AST SERPL-CCNC: 20 U/L (ref 10–40)
BASOPHILS # BLD AUTO: 0.02 K/UL (ref 0–0.2)
BASOPHILS NFR BLD: 0.2 % (ref 0–1.9)
BILIRUB SERPL-MCNC: 1 MG/DL (ref 0.1–1)
BNP SERPL-MCNC: 689 PG/ML (ref 0–99)
BUN SERPL-MCNC: 25 MG/DL (ref 8–23)
CALCIUM SERPL-MCNC: 8.6 MG/DL (ref 8.7–10.5)
CHLORIDE SERPL-SCNC: 104 MMOL/L (ref 95–110)
CO2 SERPL-SCNC: 26 MMOL/L (ref 23–29)
CREAT SERPL-MCNC: 1.5 MG/DL (ref 0.5–1.4)
DIFFERENTIAL METHOD: ABNORMAL
EOSINOPHIL # BLD AUTO: 0.2 K/UL (ref 0–0.5)
EOSINOPHIL NFR BLD: 2.2 % (ref 0–8)
ERYTHROCYTE [DISTWIDTH] IN BLOOD BY AUTOMATED COUNT: 13.3 % (ref 11.5–14.5)
EST. GFR  (AFRICAN AMERICAN): 47 ML/MIN/1.73 M^2
EST. GFR  (NON AFRICAN AMERICAN): 40.7 ML/MIN/1.73 M^2
GLUCOSE SERPL-MCNC: 122 MG/DL (ref 70–110)
HCT VFR BLD AUTO: 30 % (ref 40–54)
HGB BLD-MCNC: 10.1 G/DL (ref 14–18)
IMM GRANULOCYTES # BLD AUTO: 0.06 K/UL (ref 0–0.04)
IMM GRANULOCYTES NFR BLD AUTO: 0.6 % (ref 0–0.5)
LYMPHOCYTES # BLD AUTO: 0.9 K/UL (ref 1–4.8)
LYMPHOCYTES NFR BLD: 9.4 % (ref 18–48)
MCH RBC QN AUTO: 31.4 PG (ref 27–31)
MCHC RBC AUTO-ENTMCNC: 33.7 G/DL (ref 32–36)
MCV RBC AUTO: 93 FL (ref 82–98)
MONOCYTES # BLD AUTO: 0.8 K/UL (ref 0.3–1)
MONOCYTES NFR BLD: 7.5 % (ref 4–15)
NEUTROPHILS # BLD AUTO: 8 K/UL (ref 1.8–7.7)
NEUTROPHILS NFR BLD: 80.1 % (ref 38–73)
NRBC BLD-RTO: 0 /100 WBC
PLATELET # BLD AUTO: 150 K/UL (ref 150–350)
PMV BLD AUTO: 12.6 FL (ref 9.2–12.9)
POTASSIUM SERPL-SCNC: 3.9 MMOL/L (ref 3.5–5.1)
PROT SERPL-MCNC: 6.7 G/DL (ref 6–8.4)
RBC # BLD AUTO: 3.22 M/UL (ref 4.6–6.2)
SODIUM SERPL-SCNC: 140 MMOL/L (ref 136–145)
TROPONIN I SERPL DL<=0.01 NG/ML-MCNC: 0.36 NG/ML
WBC # BLD AUTO: 10.01 K/UL (ref 3.9–12.7)
WBC #/AREA STL HPF: ABNORMAL /[HPF]

## 2020-01-10 PROCEDURE — 99900035 HC TECH TIME PER 15 MIN (STAT)

## 2020-01-10 PROCEDURE — 36415 COLL VENOUS BLD VENIPUNCTURE: CPT

## 2020-01-10 PROCEDURE — 84484 ASSAY OF TROPONIN QUANT: CPT

## 2020-01-10 PROCEDURE — 21400001 HC TELEMETRY ROOM

## 2020-01-10 PROCEDURE — 25000003 PHARM REV CODE 250: Performed by: NURSE PRACTITIONER

## 2020-01-10 PROCEDURE — 99232 PR SUBSEQUENT HOSPITAL CARE,LEVL II: ICD-10-PCS | Mod: ,,, | Performed by: INTERNAL MEDICINE

## 2020-01-10 PROCEDURE — 86580 TB INTRADERMAL TEST: CPT | Performed by: INTERNAL MEDICINE

## 2020-01-10 PROCEDURE — 25000003 PHARM REV CODE 250: Performed by: SPECIALIST

## 2020-01-10 PROCEDURE — 99232 SBSQ HOSP IP/OBS MODERATE 35: CPT | Mod: ,,, | Performed by: INTERNAL MEDICINE

## 2020-01-10 PROCEDURE — 21000000 HC CCU ICU ROOM CHARGE

## 2020-01-10 PROCEDURE — 80053 COMPREHEN METABOLIC PANEL: CPT

## 2020-01-10 PROCEDURE — 83880 ASSAY OF NATRIURETIC PEPTIDE: CPT

## 2020-01-10 PROCEDURE — 27000221 HC OXYGEN, UP TO 24 HOURS

## 2020-01-10 PROCEDURE — 63600175 PHARM REV CODE 636 W HCPCS: Performed by: INTERNAL MEDICINE

## 2020-01-10 PROCEDURE — 94761 N-INVAS EAR/PLS OXIMETRY MLT: CPT

## 2020-01-10 PROCEDURE — 85025 COMPLETE CBC W/AUTO DIFF WBC: CPT

## 2020-01-10 PROCEDURE — 30200315 PPD INTRADERMAL TEST REV CODE 302: Performed by: INTERNAL MEDICINE

## 2020-01-10 PROCEDURE — 25000003 PHARM REV CODE 250: Performed by: INTERNAL MEDICINE

## 2020-01-10 RX ORDER — ENOXAPARIN SODIUM 100 MG/ML
40 INJECTION SUBCUTANEOUS EVERY 24 HOURS
Status: DISCONTINUED | OUTPATIENT
Start: 2020-01-10 | End: 2020-01-12

## 2020-01-10 RX ORDER — PREGABALIN 75 MG/1
150 CAPSULE ORAL 2 TIMES DAILY
Status: DISCONTINUED | OUTPATIENT
Start: 2020-01-10 | End: 2020-01-15 | Stop reason: HOSPADM

## 2020-01-10 RX ORDER — LISINOPRIL 10 MG/1
10 TABLET ORAL DAILY
Status: DISCONTINUED | OUTPATIENT
Start: 2020-01-10 | End: 2020-01-11

## 2020-01-10 RX ADMIN — SIMVASTATIN 40 MG: 40 TABLET, FILM COATED ORAL at 08:01

## 2020-01-10 RX ADMIN — PREGABALIN 150 MG: 75 CAPSULE ORAL at 11:01

## 2020-01-10 RX ADMIN — AMLODIPINE BESYLATE 10 MG: 5 TABLET ORAL at 09:01

## 2020-01-10 RX ADMIN — SERTRALINE HYDROCHLORIDE 100 MG: 50 TABLET ORAL at 08:01

## 2020-01-10 RX ADMIN — DONEPEZIL HYDROCHLORIDE 10 MG: 5 TABLET, FILM COATED ORAL at 09:01

## 2020-01-10 RX ADMIN — ENOXAPARIN SODIUM 40 MG: 100 INJECTION SUBCUTANEOUS at 05:01

## 2020-01-10 RX ADMIN — FUROSEMIDE 40 MG: 10 INJECTION, SOLUTION INTRAMUSCULAR; INTRAVENOUS at 09:01

## 2020-01-10 RX ADMIN — LISINOPRIL 10 MG: 10 TABLET ORAL at 01:01

## 2020-01-10 RX ADMIN — TUBERCULIN PURIFIED PROTEIN DERIVATIVE 5 UNITS: 5 INJECTION, SOLUTION INTRADERMAL at 01:01

## 2020-01-10 RX ADMIN — METOPROLOL SUCCINATE 25 MG: 25 TABLET, EXTENDED RELEASE ORAL at 09:01

## 2020-01-10 RX ADMIN — ASPIRIN 81 MG 81 MG: 81 TABLET ORAL at 09:01

## 2020-01-10 NOTE — PROGRESS NOTES
[facility]   Hematology/Oncology  Inpatient Progress Note          Patient Name: Halley Rodrigues  MRN: [id number]  Admission Date: 1/8/2020  Hospital Length of Stay: 1 days  Code Status: Full Code   Attending Provider: Javon Baltazar MD  Consulting Provider: Alvarez Augustine MD  Primary Care Physician: Scott Payne MD  Principal Problem:Acute respiratory failure with hypoxia and hypercarbia      Subjective:       Patient ID: Halley Rodrigues is a 89 y.o. male.    Chief Complaint: Cough (decreased appetite, recent pneumonia, sent from Dr. Payne office for low blood pressure, cough )        History Present Illness:    Patient has been seen by Dr Senior and Dr Blackman; he is laying in bed; he appears to be comfortable but is weak and fatigued with overall deconditioning; he denies any current CP, SOB, HA's or N/V; persistent diarrhea; there are no family members at bedside; I discussed with floor nursing staff      Review of Systems:  GEN: generalized weakness, malaise and deconditioning  HEENT: normal with no HA's, sore throat, stiff neck, changes in vision  CV: normal with no CP , PND, NIETO or orthopnea  PULM: cough,with no current sputum  GI: diarrhea for several weeks  : normal with no hematuria, dysuria  BREAST: normal with no mass, discharge, pain  SKIN: normal with no rash, erythema, bruising, or swelling    Objective:     Vitals:  Blood pressure 135/63, pulse 68, temperature 98.3 °F (36.8 °C), temperature source Oral, resp. rate 18, height 6' (1.829 m), weight 64.5 kg (142 lb 3.2 oz), SpO2 95 %.    Physical Exam:  GEN: no apparent distress, but deconditioned elderly gentleman; AAOx3  HEAD: atraumatic and normocephalic  EYES: no pallor, no icterus, PERRLA  ENT: OMM, no pharyngeal erythema, external ears WNL; no nasal discharge; no thrush  NECK: no masses, thyroid normal, trachea midline, no LAD/LN's, supple  CV: RRR with no murmur; normal pulse; normal S1 and S2; no  pedal edema  CHEST: Normal respiratory effort; CTAB; coarse BS diffuse bilaterally  ABDOM: nontender and nondistended; soft; normal bowel sounds; no rebound/guarding  MUSC/Skeletal: ROM normal; no crepitus; joints normal; no deformities or arthropathy  EXTREM: no clubbing, cyanosis, inflammation or swelling  SKIN: no rashes, lesions, ulcers, petechiae or subcutaneous nodules;  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  : no dickey  NEURO: generalized weakness; AAOx3; no tremors  PSYCH: normal mood, affect and behavior  LYMPH: normal cervical, supraclavicular, axillary and groin LN's               Lab Review:        Lab Results   Component Value Date    WBC 10.01 01/10/2020    HGB 10.1 (L) 01/10/2020    HCT 30.0 (L) 01/10/2020    MCV 93 01/10/2020     01/10/2020     CMP  Sodium   Date Value Ref Range Status   01/10/2020 140 136 - 145 mmol/L Final     Potassium   Date Value Ref Range Status   01/10/2020 3.9 3.5 - 5.1 mmol/L Final     Chloride   Date Value Ref Range Status   01/10/2020 104 95 - 110 mmol/L Final     CO2   Date Value Ref Range Status   01/10/2020 26 23 - 29 mmol/L Final     Glucose   Date Value Ref Range Status   01/10/2020 122 (H) 70 - 110 mg/dL Final     BUN, Bld   Date Value Ref Range Status   01/10/2020 25 (H) 8 - 23 mg/dL Final     BUN   Date Value Ref Range Status   04/16/2018 28 (H) 7 - 21 mg/dL Final     Creatinine   Date Value Ref Range Status   01/10/2020 1.5 (H) 0.5 - 1.4 mg/dL Final     Calcium   Date Value Ref Range Status   01/10/2020 8.6 (L) 8.7 - 10.5 mg/dL Final     Total Protein   Date Value Ref Range Status   01/10/2020 6.7 6.0 - 8.4 g/dL Final     Albumin   Date Value Ref Range Status   01/10/2020 3.0 (L) 3.5 - 5.2 g/dL Final     Total Bilirubin   Date Value Ref Range Status   01/10/2020 1.0 0.1 - 1.0 mg/dL Final     Comment:     For infants and newborns, interpretation of results should be based  on gestational age, weight and in agreement with clinical  observations.  Premature  "Infant recommended reference ranges:  Up to 24 hours.............<8.0 mg/dL  Up to 48 hours............<12.0 mg/dL  3-5 days..................<15.0 mg/dL  6-29 days.................<15.0 mg/dL       Alkaline Phosphatase   Date Value Ref Range Status   01/10/2020 58 55 - 135 U/L Final     AST   Date Value Ref Range Status   01/10/2020 20 10 - 40 U/L Final     ALT   Date Value Ref Range Status   01/10/2020 18 10 - 44 U/L Final     Anion Gap   Date Value Ref Range Status   01/10/2020 10 8 - 16 mmol/L Final   04/16/2018 16 9 - 18 mEq/L Final     eGFR if    Date Value Ref Range Status   01/10/2020 47.0 (A) >60 mL/min/1.73 m^2 Final     eGFR if non    Date Value Ref Range Status   01/10/2020 40.7 (A) >60 mL/min/1.73 m^2 Final     Comment:     Calculation used to obtain the estimated glomerular filtration  rate (eGFR) is the CKD-EPI equation.        Lab Results   Component Value Date    IRON 96 04/30/2019    TIBC 313 04/30/2019    FERRITIN 49 04/30/2019     Lab Results   Component Value Date    [id number] 356 12/08/2015         Radiology Diagnostic Studies:           Assessment:     IMPRESSION:    (1) 89 y.o. male with diagnosis of chronic thrombocytopenia known to my hematology service. He presented to the ED at [facility] from his PCP's office with persistent cough and SOB. He has been admitted to the hospitalist service with working diagnosis of "possible" pulmonary emboli  - he had VQ scan with RUL and RML deficits  - I requested a pulmonary consult for evaluation and he has been seen by Dr Ortiz Senior  - Dr Senior reviewed the VQ scan and disagrees with the interpretation of the V/Q scan, stating "(the patient) does not have a pulmonary embolus.... He appears to have some component cardiogenic pulmonary edema versus residual abnormalities from his recent lower respiratory tract infection"  - patient is on antibiotics     Brief Hematology Summary:  - previously referred by Dr Payne for " hematologic evaluation and recommendation.   - patient was last previously seen in Dec 2015 after which he had changed to Dr PANKAJ Gutierrez; he subsequently changed by to my care and last showed for a hematology clinic appointment in May 2019  - prior suspected platelet clumping issues        - latest platelets at 150,000 and stable if not better        (2) Chronic tobacco use     (3) Chronic anemia - NCNC parameters; mild; possible alpha-thalassemia  - history of gastric ulcers in past due to consumption of NSAIDs and subsequent GIB  - latest hgb at 10.1 and better     (4) Lower extremity/pedal neuropathy - previously followed by Dr Leiva with neurology and Dr Elizabeht with pain management     (5) Prior Garcia's esophagus - followed by Dr Hawkins with GI in past and now sees Dr Dale with GI     (6) Dementia     (7) CRI - stage III - followed by Dr Sanchez with neph     (8) Vit D deficiency - on supplements in past     (9) 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 Bronchospasm    2. Cough    3. Elevated d-dimer    4. Hypoxia    5. Acute respiratory failure with hypoxia and hypercarbia    6. Anemia, chronic disease           Plan:     PLAN:          1. Appreciate pulmonary consult, evaluation and recs  2. lovenox prophylaxis  3. Appreciate GI evaluation   4. ordered iron panel, B12/foalte, retic, etc  5. Monitor labs  6. C diff studies and precautions ordered  6. Will follow with you - Dr PANKAJ Gutierrez is on call for me over the weekend; please call him should you need anything               Alvarez Augustine,  MD  Hematology/Oncology  [facility]

## 2020-01-10 NOTE — PLAN OF CARE
01/09/20 1938   PRE-TX-O2   O2 Device (Oxygen Therapy) nasal cannula   Flow (L/min) 2   SpO2 95 %   Pulse Oximetry Type Continuous   $ Pulse Oximetry - Multiple Charge Pulse Oximetry - Multiple   Pulse 86   Resp (!) 34   Respiratory Evaluation   $ Care Plan Tech Time 15 min   Evaluation For New Orders   Admitting Diagnosis cough

## 2020-01-10 NOTE — PROGRESS NOTES
[facility]  Pulmonology  Progress Note    Subjective     No major issues overnight.  Still has no respiratory complaints.  Doing well this morning without any complaints whatsoever.     Review of Systems   Constitutional: Negative for fever, chills and night sweats.   Respiratory: Negative for cough, hemoptysis, sputum production, shortness of breath, wheezing and orthopnea.    Cardiovascular: Negative for chest pain, palpitations and leg swelling.   Gastrointestinal: Negative for nausea, vomiting and abdominal pain.   Neurological: Negative for headaches.   All other systems reviewed and are negative.     I have personally reviewed the following during today's evaluation:  past medical history, ROS, family history, social history, surgical history, current inpatient medications,drug allergies, vital signs over the past 24 hours, results of relevant diagnostic studies and nursing/provider documentation from the past 24 hours.     Objective     VS Temp:  [98 °F (36.7 °C)-98.7 °F (37.1 °C)]   Pulse:  [68-86]   Resp:  [17-36]   BP: (123-135)/(58-63)   SpO2:  [92 %-97 %]   Ideal body weight: 77.6 kg (171 lb 1.2 oz)   I/O   Intake/Output Summary (Last 24 hours) at 1/10/2020 1459  Last data filed at 1/10/2020 1200  Gross per 24 hour   Intake 360 ml   Output 2200 ml   Net -1840 ml        Vent SpO2 95% on room air   PE Physical Exam   Constitutional: He is oriented to person, place, and time. He appears well-developed and well-nourished. He appears not cachectic.  Non-toxic appearance. No distress. He is not obese.   HENT:   Head: Normocephalic and atraumatic.   Right Ear: External ear normal.   Left Ear: External ear normal.   Nose: Nose normal.   Mouth/Throat: Uvula is midline, oropharynx is clear and moist and mucous membranes are normal. Mallampati Score: II.   Neck: Trachea normal and normal range of motion. Neck supple. No JVD present. No tracheal deviation present. No thyromegaly present.    Cardiovascular: Normal rate, regular rhythm, normal heart sounds and intact distal pulses. Exam reveals no gallop and no friction rub.   No murmur heard.  Pulmonary/Chest: Normal expansion, hyperinflation, symmetric chest wall expansion and effort normal. No stridor. He has no wheezes. He has no rhonchi. He has no rales.   Abdominal: Soft. Bowel sounds are normal. He exhibits no distension. There is no tenderness. There is no guarding.   Musculoskeletal: Normal range of motion. He exhibits no edema, tenderness or deformity.   Lymphadenopathy: No supraclavicular adenopathy is present.     He has no cervical adenopathy.     He has no axillary adenopathy.   Neurological: He is alert and oriented to person, place, and time. He has normal strength. No cranial nerve deficit or sensory deficit. He exhibits normal muscle tone. GCS eye subscore is 4. GCS verbal subscore is 5. GCS motor subscore is 6.   Skin: Skin is warm, dry and intact. No rash noted. He is not diaphoretic. No cyanosis. Nails show no clubbing.   Psychiatric: He has a normal mood and affect. His speech is normal and behavior is normal.   Nursing note and vitals reviewed.        Labs I have personally reviewed and interpreted all labs / diagnostic studies obtained over the past 24 hours, and relevant results are as follows:  Recent Labs   Lab 01/10/20  0333 01/10/20  0334   WBC  --  10.01   RBC  --  3.22*   HGB  --  10.1*   HCT  --  30.0*   PLT  --  150   MCV  --  93   MCH  --  31.4*   MCHC  --  33.7     --    K 3.9  --      --    CO2 26  --    BUN 25*  --    CREATININE 1.5*  --    ALT 18  --    AST 20  --    ALKPHOS 58  --    BILITOT 1.0  --    PROT 6.7  --    ALBUMIN 3.0*  --    TROPONINI 0.365*  --       Imaging I have personally reviewed and interpreted the following images and reviewed the associated Radiology report.  I have reviewed and interpreted all pertinent imaging results/findings within the past 24 hours.     Micro I have  personally reviewed and interpreted the available culture data.  Relevant results are as follows.  Blood Culture   Lab Results   Component Value Date    LABBLOO No Growth to date 01/08/2020    LABBLOO No Growth to date 01/08/2020   , Sputum Culture   Lab Results   Component Value Date    GSRESP <10 epithelial cells per low power field. 01/01/2020    GSRESP Many WBC's 01/01/2020    GSRESP Many Gram positive cocci 01/01/2020    RESPIRATORYC Normal respiratory erendira 01/01/2020    and Urine Culture  No results found for: LABURIN   Medications Scheduled    amLODIPine  10 mg Oral Daily    aspirin  81 mg Oral Daily    donepezil  10 mg Oral Daily    enoxaparin  40 mg Subcutaneous Daily    furosemide  40 mg Intravenous Daily    lisinopril  10 mg Oral Daily    metoprolol succinate  25 mg Oral Daily    pantoprazole  40 mg Oral Daily    sertraline  100 mg Oral QHS    simvastatin  40 mg Oral QHS      Continuous Infusions:      PRN   ondansetron, sodium chloride 0.9%        Assessment       Active Hospital Problems    Diagnosis    Stage 3 chronic kidney disease    Essential hypertension    Chronic combined systolic and diastolic heart failure    Thrombocytopenia, secondary      My Impression:  No intrinsic pulmonary pathology.  Maybe has a little bit of cardiogenic pulmonary edema that is improving with diuresis.    Plan     Pulmonary  · Discontinue anticoagulation.  · Discontinue supplemental oxygen less it is actually necessary.  · Consider some gentle diuresis if he develops symptoms or hypoxemia.  · Will defer to primary service regarding placement issues.       Ortiz Senior MD  Pulmonary / Critical Care Medicine  [facility]

## 2020-01-10 NOTE — PROGRESS NOTES
[facility] Medicine  Progress Note  Patient Name: Halley Rodrigues  MRN: [id number]  Patient Class: IP- Inpatient   Admission Date: 1/8/2020  3:53 PM   Attending Physician:  Dr. Baltazar  Primary Care Provider: Scott Payne MD  Face-to-Face encounter date: 01/10/2020    HPI:   Halley Rodrigues is a 89 y.o. old  male who  has a past medical history of Anemia, Anemia due to chronic blood loss (7/25/2018), Anemia, chronic disease (7/25/2018), Dementia, GERD (gastroesophageal reflux disease), Heart disease, Hypertension, and Neuropathy.. The patient presented to [facility] on 1/8/2020 with a primary complaint of Cough (decreased appetite, recent pneumonia, sent from Dr. Payne office for low blood pressure, cough )     89-year-old  male presents emergency room with worsening shortness of breath and a productive cough. He was seen at Dr. Payne in his office his PCP and was instructed to come to the emergency room secondary to increased shortness of breath. This patient was recently discharged from our facility about 2 days ago and was treated for pneumonia.  He was following up with his primary care doctor today and was directed to come to the emergency room secondary to a persistent cough and worsening shortness of breath. The patient was not discharged home on oxygen.  In the PCPs office the patient was noted to have low oxygen saturations. Upon arrival in emergency room the patient was found to be satting in the 80s and was moderately tachypneic.  A D-dimer was performed that was positive and a subsequent V/Q scan was performed that showed a probability for a PE.    Ultrasounds of bilateral lower extremities was negative for DVT     Interval history:  The patient reports feeling better today with persistent shortness of breath, mild intensity, constant timing, improving with medical treatment.  Minimal associated cough.  No hemoptysis.  No fever or  chills.  No chest pain. No nausea or vomiting.    Physical exam:  Vitals Reviewed  General appearance: Well-developed, well-nourished male in no apparent distress.  Skin:  Dry and warm no jaundice  Neuro:  Nonfocal motor exam, fluent speech, alert and oriented  HENT: Atraumatic head. Moist mucous membranes of oral cavity.  Eyes:  Anicteric sclerae, no conjunctival discharge  Lungs:  Diminished breath sounds diffusely, comfortable work of breathing  Cardiovascular:  RRR, 2+ radial pulses  Abdomen: Soft, non-distended, non-tender  Psych:  Mood good, affect normal    Laboratory data:  Creatinine 1.5    Troponin 0.36  H&H 10/30  Platelets 150    Chest x-ray personally reviewed:  No focal consolidation or significant pulmonary edema    CTA Chest Non Coronary   Final Result      1. Negative for pulmonary thromboembolism.   2. Findings of bilateral multifocal bronchiolitis, most significantly in the left lower lobe, presumably of an acute infectious etiology.  Atypical mycobacterial infection could have this appearance.   3. Additional small consolidative opacities and geographic interstitial opacities in both lungs, nonspecific.  Multifocal atypical or viral pneumonia, and or interstitial lung disease of numerous potential etiologies could have this appearance.   4. Several prominent to mildly enlarged mediastinal lymph nodes, nonspecific but presumably reactive due to lung disease.   5. Extensive 3 vessel coronary arterial vascular calcifications, and moderate aortic vascular calcifications.   .         Electronically signed by: Vinay Christiansen MD   Date:    01/09/2020   Time:    10:40      NM Lung Scan Ventilation Perfusion   Final Result      US Lower Extremity Veins Bilateral   Final Result      X-Ray Chest AP Portable   Final Result      Interval improved aeration in the left lung, with nonspecific scattered interstitial opacities throughout both lungs.  No consolidated pneumonia.         Electronically signed  by: Vinay Christiansen MD   Date:    01/08/2020   Time:    19:17        Twelve lead EKG: reveals a normal sinus rhythm with sinus arrhythmia a normal axis poor R-wave progression atrial enlargement ST flattening throughout most pronounced in the inferior lateral leads rate 77  milliseconds    Assessment and plan update today:  Continue care.  Appreciate consultants.  I discussed the case with Pulmonary Dr. Senior.  CTA negative for pulmonary embolus.  Will continue prophylactic dose Lovenox only.  Procalcitonin negative.  Likely the patient had mild cardiogenic pulmonary edema that improved with diuresis that caused his shortness of breath symptoms  Continue Lasix.  Monitor fluid status. Repeat a.m. chest x-ray.  GI consultation.  C diff negative.  Endoscopy in future.  Continue medications for chronic issues as able  Repeat daily labs  Mobilize with PT/OT as able  Disposition:  nursing home placement   High risk secondary to severe exacerbation of chronic illness    1.  Acute hypoxemic respiratory insufficiency  -supplemental O2  -Eliquis  -aerosol nebulizer treatments  -consult pulmonology    2.  High probability for PE per V/Q scan  -will give oral Eliquis for now as the patient has thrombocytopenia  -consult heme oncology Dr. Santoro    3.  Chronic kidney disease stage 3    4.  Recent pneumonia  -still present but improved for x-rays    5.  Essential hypertension    6.  Hyperlipidemia    7.  Advanced age    8. Chronic Anemia  - appears stable    9.  QTC prolongation  -check magnesium level    10.  Peripheral neuropathy  11.  Dementia    Javon Baltazar  [facility] Hospitalist NP  01/10/2020

## 2020-01-10 NOTE — PROGRESS NOTES
Progress Note  Cardiology    Admit Date: 1/8/2020   LOS: 1 day     Follow-up For:  cardiology    Scheduled Meds:   amLODIPine  10 mg Oral Daily    aspirin  81 mg Oral Daily    donepezil  10 mg Oral Daily    furosemide  40 mg Intravenous Daily    metoprolol succinate  25 mg Oral Daily    pantoprazole  40 mg Oral Daily    sertraline  100 mg Oral QHS    simvastatin  40 mg Oral QHS    tuberculin  5 Units Intradermal Once     Continuous Infusions:  PRN Meds:ondansetron, sodium chloride 0.9%    Review of patient's allergies indicates:   Allergen Reactions    Eucalyptus containing products Palpitations       SUBJECTIVE:     Interval History: Patient has developed diarrhea.  No complaints of chest pain or shortness of breath..      OBJECTIVE:     Vital Signs (Most Recent)  Temp: 98 °F (36.7 °C) (01/10/20 1057)  Pulse: 75 (01/10/20 1057)  Resp: 17 (01/10/20 1057)  BP: (!) 133/58 (01/10/20 1057)  SpO2: 95 % (01/10/20 1057)    Vital Signs Range (Last 24H):  Temp:  [98 °F (36.7 °C)-98.7 °F (37.1 °C)]   Pulse:  [68-86]   Resp:  [17-36]   BP: (123-135)/(58-63)   SpO2:  [92 %-97 %]       Physical Exam:  Neck: no carotid bruit and no JVD  Lungs: diminished breath sounds bibasilar  Heart: regular rate and rhythm  Extremities: Extremities normal, atraumatic, no cyanosis, clubbing, or edema    Recent Results (from the past 24 hour(s))   Procalcitonin    Collection Time: 01/09/20  2:51 PM   Result Value Ref Range    Procalcitonin <0.05 0.00 - 0.50 ng/mL   Clostridium difficile EIA    Collection Time: 01/09/20  4:33 PM   Result Value Ref Range    C. diff Antigen Negative Negative    C difficile Toxins A+B, EIA Negative Negative   WBC, Stool    Collection Time: 01/09/20 11:24 PM   Result Value Ref Range    Stool WBC Occ neutrophils seen (A) No neutrophils seen   Comprehensive metabolic panel    Collection Time: 01/10/20  3:33 AM   Result Value Ref Range    Sodium 140 136 - 145 mmol/L    Potassium 3.9 3.5 - 5.1 mmol/L     Chloride 104 95 - 110 mmol/L    CO2 26 23 - 29 mmol/L    Glucose 122 (H) 70 - 110 mg/dL    BUN, Bld 25 (H) 8 - 23 mg/dL    Creatinine 1.5 (H) 0.5 - 1.4 mg/dL    Calcium 8.6 (L) 8.7 - 10.5 mg/dL    Total Protein 6.7 6.0 - 8.4 g/dL    Albumin 3.0 (L) 3.5 - 5.2 g/dL    Total Bilirubin 1.0 0.1 - 1.0 mg/dL    Alkaline Phosphatase 58 55 - 135 U/L    AST 20 10 - 40 U/L    ALT 18 10 - 44 U/L    Anion Gap 10 8 - 16 mmol/L    eGFR if African American 47.0 (A) >60 mL/min/1.73 m^2    eGFR if non  40.7 (A) >60 mL/min/1.73 m^2   Brain natriuretic peptide    Collection Time: 01/10/20  3:33 AM   Result Value Ref Range     (H) 0 - 99 pg/mL   Troponin I    Collection Time: 01/10/20  3:33 AM   Result Value Ref Range    Troponin I 0.365 (HH) <=0.040 ng/mL   CBC auto differential    Collection Time: 01/10/20  3:34 AM   Result Value Ref Range    WBC 10.01 3.90 - 12.70 K/uL    RBC 3.22 (L) 4.60 - 6.20 M/uL    Hemoglobin 10.1 (L) 14.0 - 18.0 g/dL    Hematocrit 30.0 (L) 40.0 - 54.0 %    Mean Corpuscular Volume 93 82 - 98 fL    Mean Corpuscular Hemoglobin 31.4 (H) 27.0 - 31.0 pg    Mean Corpuscular Hemoglobin Conc 33.7 32.0 - 36.0 g/dL    RDW 13.3 11.5 - 14.5 %    Platelets 150 150 - 350 K/uL    MPV 12.6 9.2 - 12.9 fL    Immature Granulocytes 0.6 (H) 0.0 - 0.5 %    Gran # (ANC) 8.0 (H) 1.8 - 7.7 K/uL    Immature Grans (Abs) 0.06 (H) 0.00 - 0.04 K/uL    Lymph # 0.9 (L) 1.0 - 4.8 K/uL    Mono # 0.8 0.3 - 1.0 K/uL    Eos # 0.2 0.0 - 0.5 K/uL    Baso # 0.02 0.00 - 0.20 K/uL    nRBC 0 0 /100 WBC    Gran% 80.1 (H) 38.0 - 73.0 %    Lymph% 9.4 (L) 18.0 - 48.0 %    Mono% 7.5 4.0 - 15.0 %    Eosinophil% 2.2 0.0 - 8.0 %    Basophil% 0.2 0.0 - 1.9 %    Differential Method Automated        Diagnostic Results:  Labs: Reviewed    ASSESSMENT/PLAN:     CVS status improved.  Will give a trial of  ACEI.    Plan: Continue Current.

## 2020-01-10 NOTE — PLAN OF CARE
Problem: Adult Inpatient Plan of Care  Goal: Plan of Care Review  Outcome: Ongoing, Progressing     Problem: Fall Injury Risk  Goal: Absence of Fall and Fall-Related Injury  Outcome: Ongoing, Progressing     Problem: Adult Inpatient Plan of Care  Goal: Optimal Comfort and Wellbeing  Outcome: Ongoing, Progressing

## 2020-01-10 NOTE — PLAN OF CARE
01/10/20 1038   Discharge Reassessment   Assessment Type Discharge Planning Reassessment   Anticipated Discharge Disposition Long Term   LOCET called in, PASRR completed and faxed to [employer] [phone].Waiting for  142.    1500--142 rec'skyler Best with [facility] called to inform of decline of referral 2/2 insurance;he would be medicaid pending.

## 2020-01-10 NOTE — ASSESSMENT & PLAN NOTE
· Discontinue anticoagulation.  · Discontinue supplemental oxygen less it is actually necessary.  · Consider some gentle diuresis if he develops symptoms or hypoxemia.  · Will defer to primary service regarding placement issues.

## 2020-01-10 NOTE — CARE UPDATE
This note also relates to the following rows which could not be included:  SpO2 - Cannot attach notes to unvalidated device data  Pulse - Cannot attach notes to unvalidated device data       01/10/20 0817   Patient Assessment/Suction   Level of Consciousness (AVPU) alert   Respiratory Effort Normal;Unlabored   PRE-TX-O2   O2 Device (Oxygen Therapy) nasal cannula  (placed on sb -  was 2lpm)   $ Is the patient on Low Flow Oxygen? Yes   Flow (L/min) 0  (was 2 lpm - turned off - SB)   Pulse Oximetry Type Continuous   $ Pulse Oximetry - Multiple Charge Pulse Oximetry - Multiple

## 2020-01-11 LAB
ANION GAP SERPL CALC-SCNC: 11 MMOL/L (ref 8–16)
BNP SERPL-MCNC: 261 PG/ML (ref 0–99)
BUN SERPL-MCNC: 32 MG/DL (ref 8–23)
CALCIUM SERPL-MCNC: 8.9 MG/DL (ref 8.7–10.5)
CHLORIDE SERPL-SCNC: 103 MMOL/L (ref 95–110)
CO2 SERPL-SCNC: 27 MMOL/L (ref 23–29)
CREAT SERPL-MCNC: 1.5 MG/DL (ref 0.5–1.4)
ERYTHROCYTE [DISTWIDTH] IN BLOOD BY AUTOMATED COUNT: 13.5 % (ref 11.5–14.5)
EST. GFR  (AFRICAN AMERICAN): 47 ML/MIN/1.73 M^2
EST. GFR  (NON AFRICAN AMERICAN): 40.7 ML/MIN/1.73 M^2
GLUCOSE SERPL-MCNC: 101 MG/DL (ref 70–110)
HCT VFR BLD AUTO: 31 % (ref 40–54)
HGB BLD-MCNC: 10.5 G/DL (ref 14–18)
MAGNESIUM SERPL-MCNC: 2 MG/DL (ref 1.6–2.6)
MCH RBC QN AUTO: 31.6 PG (ref 27–31)
MCHC RBC AUTO-ENTMCNC: 33.9 G/DL (ref 32–36)
MCV RBC AUTO: 93 FL (ref 82–98)
PLATELET # BLD AUTO: 152 K/UL (ref 150–350)
PMV BLD AUTO: 12.2 FL (ref 9.2–12.9)
POTASSIUM SERPL-SCNC: 3.6 MMOL/L (ref 3.5–5.1)
RBC # BLD AUTO: 3.32 M/UL (ref 4.6–6.2)
SODIUM SERPL-SCNC: 141 MMOL/L (ref 136–145)
WBC # BLD AUTO: 8.71 K/UL (ref 3.9–12.7)

## 2020-01-11 PROCEDURE — 85027 COMPLETE CBC AUTOMATED: CPT

## 2020-01-11 PROCEDURE — 36415 COLL VENOUS BLD VENIPUNCTURE: CPT

## 2020-01-11 PROCEDURE — 25000003 PHARM REV CODE 250: Performed by: INTERNAL MEDICINE

## 2020-01-11 PROCEDURE — 25000003 PHARM REV CODE 250: Performed by: SPECIALIST

## 2020-01-11 PROCEDURE — 94761 N-INVAS EAR/PLS OXIMETRY MLT: CPT

## 2020-01-11 PROCEDURE — 83735 ASSAY OF MAGNESIUM: CPT

## 2020-01-11 PROCEDURE — 99900035 HC TECH TIME PER 15 MIN (STAT)

## 2020-01-11 PROCEDURE — 25000003 PHARM REV CODE 250: Performed by: NURSE PRACTITIONER

## 2020-01-11 PROCEDURE — 83880 ASSAY OF NATRIURETIC PEPTIDE: CPT

## 2020-01-11 PROCEDURE — 80048 BASIC METABOLIC PNL TOTAL CA: CPT

## 2020-01-11 PROCEDURE — 21400001 HC TELEMETRY ROOM

## 2020-01-11 PROCEDURE — 63600175 PHARM REV CODE 636 W HCPCS: Performed by: INTERNAL MEDICINE

## 2020-01-11 RX ORDER — LISINOPRIL 10 MG/1
10 TABLET ORAL DAILY
Status: DISCONTINUED | OUTPATIENT
Start: 2020-01-12 | End: 2020-01-14

## 2020-01-11 RX ORDER — FUROSEMIDE 40 MG/1
40 TABLET ORAL DAILY
Status: DISCONTINUED | OUTPATIENT
Start: 2020-01-12 | End: 2020-01-15 | Stop reason: HOSPADM

## 2020-01-11 RX ORDER — AMLODIPINE BESYLATE 5 MG/1
5 TABLET ORAL DAILY
Status: DISCONTINUED | OUTPATIENT
Start: 2020-01-12 | End: 2020-01-15 | Stop reason: HOSPADM

## 2020-01-11 RX ADMIN — ASPIRIN 81 MG 81 MG: 81 TABLET ORAL at 08:01

## 2020-01-11 RX ADMIN — PANTOPRAZOLE SODIUM 40 MG: 40 TABLET, DELAYED RELEASE ORAL at 05:01

## 2020-01-11 RX ADMIN — ENOXAPARIN SODIUM 40 MG: 100 INJECTION SUBCUTANEOUS at 05:01

## 2020-01-11 RX ADMIN — SERTRALINE HYDROCHLORIDE 100 MG: 50 TABLET ORAL at 08:01

## 2020-01-11 RX ADMIN — AMLODIPINE BESYLATE 10 MG: 5 TABLET ORAL at 08:01

## 2020-01-11 RX ADMIN — LISINOPRIL 10 MG: 10 TABLET ORAL at 08:01

## 2020-01-11 RX ADMIN — PREGABALIN 150 MG: 75 CAPSULE ORAL at 08:01

## 2020-01-11 RX ADMIN — SIMVASTATIN 40 MG: 40 TABLET, FILM COATED ORAL at 08:01

## 2020-01-11 RX ADMIN — METOPROLOL SUCCINATE 25 MG: 25 TABLET, EXTENDED RELEASE ORAL at 08:01

## 2020-01-11 RX ADMIN — DONEPEZIL HYDROCHLORIDE 10 MG: 5 TABLET, FILM COATED ORAL at 08:01

## 2020-01-11 RX ADMIN — FUROSEMIDE 40 MG: 10 INJECTION, SOLUTION INTRAMUSCULAR; INTRAVENOUS at 08:01

## 2020-01-11 NOTE — PROGRESS NOTES
[facility] Medicine  Progress Note  Patient Name: Halley Rodrigues  MRN: [id number]  Patient Class: IP- Inpatient   Admission Date: 1/8/2020  3:53 PM   Attending Physician:  Dr. Baltazar  Primary Care Provider: Scott Payne MD  Face-to-Face encounter date: 01/11/2020    HPI:   Halley Rodrigues is a 89 y.o. old  male who  has a past medical history of Anemia, Anemia due to chronic blood loss (7/25/2018), Anemia, chronic disease (7/25/2018), Dementia, GERD (gastroesophageal reflux disease), Heart disease, Hypertension, and Neuropathy.. The patient presented to [facility] on 1/8/2020 with a primary complaint of Cough (decreased appetite, recent pneumonia, sent from Dr. Payne office for low blood pressure, cough )     89-year-old  male presents emergency room with worsening shortness of breath and a productive cough. He was seen at Dr. Payne in his office his PCP and was instructed to come to the emergency room secondary to increased shortness of breath. This patient was recently discharged from our facility about 2 days ago and was treated for pneumonia.  He was following up with his primary care doctor today and was directed to come to the emergency room secondary to a persistent cough and worsening shortness of breath. The patient was not discharged home on oxygen.  In the PCPs office the patient was noted to have low oxygen saturations. Upon arrival in emergency room the patient was found to be satting in the 80s and was moderately tachypneic.  A D-dimer was performed that was positive and a subsequent V/Q scan was performed that showed a probability for a PE.    Ultrasounds of bilateral lower extremities was negative for DVT     Interval history:  The patient reports feeling better with minimal shortness of breath, improving with medical treatment.  He denies chest pain or fever.  No abdominal pain, nausea, vomiting.    Physical exam:  Vitals  Reviewed  General appearance: Well-developed, well-nourished male in no apparent distress.  Skin:  Dry and warm no jaundice  Neuro:  Nonfocal motor exam, fluent speech, alert and oriented  HENT: Atraumatic head. Moist mucous membranes of oral cavity.  Eyes:  Anicteric sclerae, no conjunctival discharge  Lungs:  Diminished breath sounds diffusely, comfortable work of breathing  Cardiovascular:  RRR, 2+ radial pulses  Abdomen: Soft, non-distended, non-tender  Psych:  Mood good, affect normal    Laboratory data:  Creatinine 1.5  Hemoglobin 10  Hematocrit 31      Chest x-ray personally reviewed:  No focal consolidation or significant pulmonary edema    CTA Chest Non Coronary   Final Result      1. Negative for pulmonary thromboembolism.   2. Findings of bilateral multifocal bronchiolitis, most significantly in the left lower lobe, presumably of an acute infectious etiology.  Atypical mycobacterial infection could have this appearance.   3. Additional small consolidative opacities and geographic interstitial opacities in both lungs, nonspecific.  Multifocal atypical or viral pneumonia, and or interstitial lung disease of numerous potential etiologies could have this appearance.   4. Several prominent to mildly enlarged mediastinal lymph nodes, nonspecific but presumably reactive due to lung disease.   5. Extensive 3 vessel coronary arterial vascular calcifications, and moderate aortic vascular calcifications.   .         Electronically signed by: Vinay Christiansen MD   Date:    01/09/2020   Time:    10:40      NM Lung Scan Ventilation Perfusion   Final Result      US Lower Extremity Veins Bilateral   Final Result      X-Ray Chest AP Portable   Final Result      Interval improved aeration in the left lung, with nonspecific scattered interstitial opacities throughout both lungs.  No consolidated pneumonia.         Electronically signed by: Vinay Christiansen MD   Date:    01/08/2020   Time:    19:17        Twelve lead EKG:  reveals a normal sinus rhythm with sinus arrhythmia a normal axis poor R-wave progression atrial enlargement ST flattening throughout most pronounced in the inferior lateral leads rate 77  milliseconds    Assessment and plan update today:  Continue care.  Appreciate consultants.  CTA negative for pulmonary embolus.  Procalcitonin negative.    Continue IV Lasix and transition to oral Lasix tomorrow.  Monitor fluid status. Repeat a.m. chest x-ray.  GI consultation.  C diff negative.  Endoscopy planned for tomorrow.  NPO after midnight.  Continue medications for chronic issues as able  Repeat daily labs  Mobilize with PT/OT as able  Disposition:  nursing home placement   Moderate risk secondary to multiple medical comorbid conditions; prescription drug management; IV fluids with additives; planned endoscopy procedure    1.  Acute hypoxemic respiratory insufficiency due to Acute cardiogenic pulmonary edema, resolved  -supplemental O2  -Eliquis  -aerosol nebulizer treatments  -consult pulmonology    2.  High probability for PE per V/Q scan, CTA negative for PE  -will give oral Eliquis for now as the patient has thrombocytopenia  -consult heme oncology Dr. Santoro    3.  Chronic kidney disease stage 3    4.  Recent pneumonia  -still present but improved for x-rays    5.  Essential hypertension    6.  Hyperlipidemia    7.  Advanced age    8. Chronic Anemia  - appears stable    9.  QTC prolongation  -check magnesium level    10.  Peripheral neuropathy  11.  Dementia    Javon Baltazar  [facility] Hospitalist NP  01/10/2020

## 2020-01-11 NOTE — NURSING
Dr Oseguera reviewed the patient tele strips --- the inverted P wave is ok it is ectopic in nature. Do nothing else for this. RBVO

## 2020-01-11 NOTE — NURSING
1140 BP = 93/49 HR = 69  1200 BP = 89/44 HR = 63  1226 BP = 85/56 HR = 72    Patient sleeping and when I wake patient up States he feels fine.    Per Dr Baltazar-- Patient has low EF so BP can run low normal.

## 2020-01-11 NOTE — NURSING
Per Dr Oseguera --- Decrease Amilodine 10mg to Amilodipine 5mg Q 0900                                Do not give Amilodine 5mg and Lisinopril 10mg together give the Lisinopril 3 hours                                   after the Amilodipine. RBVO

## 2020-01-11 NOTE — PROGRESS NOTES
Progress Note  Cardiology    Admit Date: 1/8/2020   LOS: 2 days     Follow-up For:  Cardiology    Scheduled Meds:   [START ON 1/12/2020] amLODIPine  5 mg Oral Daily    aspirin  81 mg Oral Daily    donepezil  10 mg Oral Daily    enoxaparin  40 mg Subcutaneous Daily    [START ON 1/12/2020] lisinopril  10 mg Oral Daily    metoprolol succinate  25 mg Oral Daily    pantoprazole  40 mg Oral Daily    pregabalin  150 mg Oral BID    sertraline  100 mg Oral QHS    simvastatin  40 mg Oral QHS     Continuous Infusions:  PRN Meds:ondansetron, sodium chloride 0.9%    Review of patient's allergies indicates:   Allergen Reactions    Eucalyptus containing products Palpitations       SUBJECTIVE:     Interval History: Patient has no complaints...          OBJECTIVE:     Vital Signs (Most Recent)  Temp: 98.2 °F (36.8 °C) (01/11/20 1226)  Pulse: 70 (01/11/20 1226)  Resp: (!) 32 (01/11/20 1226)  BP: (!) 85/56 (01/11/20 1226)  SpO2: (!) 87 % (01/11/20 1226)    Vital Signs Range (Last 24H):  Temp:  [97.8 °F (36.6 °C)-98.3 °F (36.8 °C)]   Pulse:  [69-75]   Resp:  [18-32]   BP: ()/(54-64)   SpO2:  [87 %-95 %]       Physical Exam:  Neck: no carotid bruit and no JVD  Lungs: diminished breath sounds bibasilar  Heart: regular rate and rhythm  Extremities: Negative for: edema    Recent Results (from the past 24 hour(s))   CBC Without Differential    Collection Time: 01/11/20  4:42 AM   Result Value Ref Range    WBC 8.71 3.90 - 12.70 K/uL    RBC 3.32 (L) 4.60 - 6.20 M/uL    Hemoglobin 10.5 (L) 14.0 - 18.0 g/dL    Hematocrit 31.0 (L) 40.0 - 54.0 %    Mean Corpuscular Volume 93 82 - 98 fL    Mean Corpuscular Hemoglobin 31.6 (H) 27.0 - 31.0 pg    Mean Corpuscular Hemoglobin Conc 33.9 32.0 - 36.0 g/dL    RDW 13.5 11.5 - 14.5 %    Platelets 152 150 - 350 K/uL    MPV 12.2 9.2 - 12.9 fL   Basic metabolic panel    Collection Time: 01/11/20  4:42 AM   Result Value Ref Range    Sodium 141 136 - 145 mmol/L    Potassium 3.6 3.5 - 5.1 mmol/L     Chloride 103 95 - 110 mmol/L    CO2 27 23 - 29 mmol/L    Glucose 101 70 - 110 mg/dL    BUN, Bld 32 (H) 8 - 23 mg/dL    Creatinine 1.5 (H) 0.5 - 1.4 mg/dL    Calcium 8.9 8.7 - 10.5 mg/dL    Anion Gap 11 8 - 16 mmol/L    eGFR if African American 47.0 (A) >60 mL/min/1.73 m^2    eGFR if non  40.7 (A) >60 mL/min/1.73 m^2   Magnesium    Collection Time: 01/11/20  4:42 AM   Result Value Ref Range    Magnesium 2.0 1.6 - 2.6 mg/dL   Brain natriuretic peptide    Collection Time: 01/11/20  4:42 AM   Result Value Ref Range     (H) 0 - 99 pg/mL       Diagnostic Results:  Labs: Reviewed    ASSESSMENT/PLAN:     .  p atient is stable, improving.  BUN 32 creatinine 1.5.  Will decrease amlodipine to 5 mg daily.  And avoid giving two  Antihypertensive  at the same time.  Also switched to p.o. diuretics..

## 2020-01-11 NOTE — CARE UPDATE
This note also relates to the following rows which could not be included:  Pulse - Cannot attach notes to unvalidated device data  Resp - Cannot attach notes to unvalidated device data       01/10/20 6419   Patient Assessment/Suction   Level of Consciousness (AVPU) alert   Respiratory Effort Normal;Unlabored   Expansion/Accessory Muscles/Retractions expansion symmetric;no retractions;no use of accessory muscles   All Lung Fields Breath Sounds diminished   Rhythm/Pattern, Respiratory no shortness of breath reported;pattern regular;unlabored   PRE-TX-O2   O2 Device (Oxygen Therapy) nasal cannula   Flow (L/min) 0   SpO2 (!) 93 %   Pulse Oximetry Type Continuous   Respiratory Interventions   Cough And Deep Breathing done with encouragement   Breathing Techniques/Airway Clearance deep/controlled cough encouraged;diaphragmatic breathing promoted   Respiratory Evaluation   $ Care Plan Tech Time 15 min   Admitting Diagnosis Thrombocytopnea   Cardiac Diagnosis CHF   Home Oxygen   Has Home Oxygen? No   Home Aerosol, MDI, DPI, and Other Treatments/Therapies   Home Respiratory Therapy Per Patient/Review of Chart No   Instructed and encouraged deep diaphragmatic breathing and cough exercises.

## 2020-01-11 NOTE — PROGRESS NOTES
"Gastroenterology    CC: anemia    S: 89 M with CKD, h/o chronic thrombocytopenia, ICV nonbleeding avm with chronic persistent normocytic anemia. Pt stable from respiratory standpoint       Past Medical History:   Diagnosis Date    Anemia     Anemia due to chronic blood loss 7/25/2018    Anemia, chronic disease 7/25/2018    Dementia     GERD (gastroesophageal reflux disease)     Heart disease     Hypertension     Neuropathy        Review of Systems  General ROS: negative for chills, fever or weight loss  Cardiovascular ROS: no chest pain or dyspnea on exertion  Gastrointestinal ROS: no abdominal pain, change in bowel habits, or black/ bloody stools    Physical Examination  /60 mmHg  Pulse 67  Temp(Src) 97.9 °F (36.6 °C) (Oral)  Resp 15  Ht 5' 8" (1.727 m)  Wt 74.9 kg (165 lb 2 oz)  BMI 25.11 kg/m2  SpO2 100%  LMP  (LMP Unknown)  Breastfeeding? No  General appearance: alert, cooperative, no distress  HENT: Normocephalic, atraumatic, neck symmetrical, no nasal discharge   Lungs: clear to auscultation bilaterally, no dullness to percussion bilaterally  Heart: regular rate and rhythm without rub; no displacement of the PMI   Abdomen: soft, non-tender; bowel sounds normoactive; no organomegaly  Extremities: extremities symmetric; no clubbing, cyanosis, or edema  Neurologic: Alert and oriented X 3, normal strength, normal coordination and gait    Labs:  Recent Results (from the past 336 hour(s))   CBC auto differential    Collection Time: 01/10/20  3:34 AM   Result Value Ref Range    WBC 10.01 3.90 - 12.70 K/uL    Hemoglobin 10.1 (L) 14.0 - 18.0 g/dL    Hematocrit 30.0 (L) 40.0 - 54.0 %    Platelets 150 150 - 350 K/uL   CBC auto differential    Collection Time: 01/09/20  4:03 AM   Result Value Ref Range    WBC 7.03 3.90 - 12.70 K/uL    Hemoglobin 9.3 (L) 14.0 - 18.0 g/dL    Hematocrit 28.3 (L) 40.0 - 54.0 %    Platelets 106 (L) 150 - 350 K/uL   CBC auto differential    Collection Time: 01/08/20  6:47 " PM   Result Value Ref Range    WBC 7.91 3.90 - 12.70 K/uL    Hemoglobin 9.7 (L) 14.0 - 18.0 g/dL    Hematocrit 29.4 (L) 40.0 - 54.0 %    Platelets 128 (L) 150 - 350 K/uL     Normal iron stores 4/2019    Imaging:    Assessment:   89 y.o. male with PMHx anemia, GERd, HTN, CAD / CABG, HLD, memory issues, CKD, recent PNA presents for evaluation of SOB / weakness w/ diarrhea and progressive anemia.      Plan:    - C. Diff studies given diarrhea and recent abx use  - Push / colon Monday with MAC if patient up for it  - Clears tomorrow    Be Peggy  [facility]  Cell: [phone]

## 2020-01-11 NOTE — PLAN OF CARE
Plan of care reviewed with the pt. Cardiac, vital signs, and lab monitoring. Bed alarm on and safety precautions maintained. Breathing treatments and adjust oxygen as needed. Strict I&O. Daily weights.

## 2020-01-12 LAB
ANION GAP SERPL CALC-SCNC: 12 MMOL/L (ref 8–16)
BNP SERPL-MCNC: 137 PG/ML (ref 0–99)
BUN SERPL-MCNC: 48 MG/DL (ref 8–23)
CALCIUM SERPL-MCNC: 8.8 MG/DL (ref 8.7–10.5)
CHLORIDE SERPL-SCNC: 100 MMOL/L (ref 95–110)
CO2 SERPL-SCNC: 27 MMOL/L (ref 23–29)
CREAT SERPL-MCNC: 1.7 MG/DL (ref 0.5–1.4)
ERYTHROCYTE [DISTWIDTH] IN BLOOD BY AUTOMATED COUNT: 13.5 % (ref 11.5–14.5)
EST. GFR  (AFRICAN AMERICAN): 40.4 ML/MIN/1.73 M^2
EST. GFR  (NON AFRICAN AMERICAN): 35 ML/MIN/1.73 M^2
GLUCOSE SERPL-MCNC: 103 MG/DL (ref 70–110)
HCT VFR BLD AUTO: 30.9 % (ref 40–54)
HGB BLD-MCNC: 10.2 G/DL (ref 14–18)
MAGNESIUM SERPL-MCNC: 2.1 MG/DL (ref 1.6–2.6)
MCH RBC QN AUTO: 30.9 PG (ref 27–31)
MCHC RBC AUTO-ENTMCNC: 33 G/DL (ref 32–36)
MCV RBC AUTO: 94 FL (ref 82–98)
PLATELET # BLD AUTO: 167 K/UL (ref 150–350)
PMV BLD AUTO: 11.9 FL (ref 9.2–12.9)
POTASSIUM SERPL-SCNC: 4.1 MMOL/L (ref 3.5–5.1)
RBC # BLD AUTO: 3.3 M/UL (ref 4.6–6.2)
SODIUM SERPL-SCNC: 139 MMOL/L (ref 136–145)
TB INDURATION 48 - 72 HR READ: 0 MM
WBC # BLD AUTO: 8.82 K/UL (ref 3.9–12.7)

## 2020-01-12 PROCEDURE — 25000003 PHARM REV CODE 250: Performed by: SPECIALIST

## 2020-01-12 PROCEDURE — 85027 COMPLETE CBC AUTOMATED: CPT

## 2020-01-12 PROCEDURE — 63600175 PHARM REV CODE 636 W HCPCS: Performed by: INTERNAL MEDICINE

## 2020-01-12 PROCEDURE — 94761 N-INVAS EAR/PLS OXIMETRY MLT: CPT

## 2020-01-12 PROCEDURE — 25000003 PHARM REV CODE 250: Performed by: INTERNAL MEDICINE

## 2020-01-12 PROCEDURE — 21400001 HC TELEMETRY ROOM

## 2020-01-12 PROCEDURE — 80048 BASIC METABOLIC PNL TOTAL CA: CPT

## 2020-01-12 PROCEDURE — 25000003 PHARM REV CODE 250: Performed by: NURSE PRACTITIONER

## 2020-01-12 PROCEDURE — 99900035 HC TECH TIME PER 15 MIN (STAT)

## 2020-01-12 PROCEDURE — 83880 ASSAY OF NATRIURETIC PEPTIDE: CPT

## 2020-01-12 PROCEDURE — 83735 ASSAY OF MAGNESIUM: CPT

## 2020-01-12 PROCEDURE — 36415 COLL VENOUS BLD VENIPUNCTURE: CPT

## 2020-01-12 RX ORDER — POLYETHYLENE GLYCOL 3350, SODIUM CHLORIDE, SODIUM BICARBONATE, POTASSIUM CHLORIDE 420; 11.2; 5.72; 1.48 G/4L; G/4L; G/4L; G/4L
4000 POWDER, FOR SOLUTION ORAL ONCE
Status: COMPLETED | OUTPATIENT
Start: 2020-01-12 | End: 2020-01-12

## 2020-01-12 RX ORDER — ENOXAPARIN SODIUM 100 MG/ML
30 INJECTION SUBCUTANEOUS EVERY 24 HOURS
Status: DISCONTINUED | OUTPATIENT
Start: 2020-01-12 | End: 2020-01-15 | Stop reason: HOSPADM

## 2020-01-12 RX ORDER — BISACODYL 5 MG
5 TABLET, DELAYED RELEASE (ENTERIC COATED) ORAL ONCE
Status: COMPLETED | OUTPATIENT
Start: 2020-01-12 | End: 2020-01-12

## 2020-01-12 RX ADMIN — METOPROLOL SUCCINATE 25 MG: 25 TABLET, EXTENDED RELEASE ORAL at 10:01

## 2020-01-12 RX ADMIN — BISACODYL 5 MG: 5 TABLET, COATED ORAL at 03:01

## 2020-01-12 RX ADMIN — ENOXAPARIN SODIUM 30 MG: 100 INJECTION SUBCUTANEOUS at 05:01

## 2020-01-12 RX ADMIN — PANTOPRAZOLE SODIUM 40 MG: 40 TABLET, DELAYED RELEASE ORAL at 10:01

## 2020-01-12 RX ADMIN — DONEPEZIL HYDROCHLORIDE 10 MG: 5 TABLET, FILM COATED ORAL at 10:01

## 2020-01-12 RX ADMIN — ASPIRIN 81 MG 81 MG: 81 TABLET ORAL at 10:01

## 2020-01-12 RX ADMIN — SIMVASTATIN 40 MG: 40 TABLET, FILM COATED ORAL at 09:01

## 2020-01-12 RX ADMIN — AMLODIPINE BESYLATE 5 MG: 5 TABLET ORAL at 10:01

## 2020-01-12 RX ADMIN — PREGABALIN 150 MG: 75 CAPSULE ORAL at 10:01

## 2020-01-12 RX ADMIN — POLYETHYLENE GLYCOL 3350, SODIUM CHLORIDE, SODIUM BICARBONATE AND POTASSIUM CHLORIDE 4000 ML: KIT ORAL at 04:01

## 2020-01-12 RX ADMIN — FUROSEMIDE 40 MG: 40 TABLET ORAL at 10:01

## 2020-01-12 RX ADMIN — SERTRALINE HYDROCHLORIDE 100 MG: 50 TABLET ORAL at 09:01

## 2020-01-12 RX ADMIN — LISINOPRIL 10 MG: 10 TABLET ORAL at 03:01

## 2020-01-12 RX ADMIN — PREGABALIN 150 MG: 75 CAPSULE ORAL at 09:01

## 2020-01-12 NOTE — PROGRESS NOTES
Pharmacist Renal Dose Adjustment Note    Halley Rodrigues is a 89 y.o. male being treated with the medication Lovenox.    Patient Data:    Vital Signs (Most Recent):  Temp: 98.7 °F (37.1 °C) (01/12/20 1100)  Pulse: 72 (01/12/20 1100)  Resp: 19 (01/12/20 1100)  BP: (!) 113/54 (01/12/20 1100)  SpO2: (!) 94 % (01/12/20 1100)   Vital Signs (72h Range):  Temp:  [97.8 °F (36.6 °C)-98.7 °F (37.1 °C)]   Pulse:  [66-86]   Resp:  [15-36]   BP: ()/(42-64)   SpO2:  [87 %-97 %]      Recent Labs   Lab 01/10/20  0333 01/11/20  0442 01/12/20  0459   CREATININE 1.5* 1.5* 1.7*     Serum creatinine: 1.7 mg/dL (H) 01/12/20 0459  Estimated creatinine clearance: 27.9 mL/min (A) (CrCl < 30 mL/min)  BMI < 40    Lovenox 40 mg subq daily will be changed to Lovenox 30 mg subq daily per pharmacy renal dose adjustment protocol.     Pharmacist's Name: Radha Alba  Pharmacist's Extension: [phone]

## 2020-01-12 NOTE — PLAN OF CARE
Problem: Fall Injury Risk  Goal: Absence of Fall and Fall-Related Injury  Outcome: Ongoing, Progressing     Problem: Adult Inpatient Plan of Care  Goal: Plan of Care Review  Outcome: Ongoing, Progressing  Goal: Patient-Specific Goal (Individualization)  Outcome: Ongoing, Progressing  Goal: Absence of Hospital-Acquired Illness or Injury  Outcome: Ongoing, Progressing  Goal: Optimal Comfort and Wellbeing  Outcome: Ongoing, Progressing  Goal: Readiness for Transition of Care  Outcome: Ongoing, Progressing  Goal: Rounds/Family Conference  Outcome: Ongoing, Progressing     Problem: Oral Intake Inadequate  Goal: Improved Oral Intake  Outcome: Ongoing, Progressing     Problem: Skin Injury Risk Increased  Goal: Skin Health and Integrity  Outcome: Ongoing, Progressing

## 2020-01-12 NOTE — CARE UPDATE
This note also relates to the following rows which could not be included:  SpO2 - Cannot attach notes to unvalidated device data  Pulse - Cannot attach notes to unvalidated device data       01/11/20 2122   Patient Assessment/Suction   Level of Consciousness (AVPU) alert   Respiratory Effort Normal;Unlabored   Expansion/Accessory Muscles/Retractions expansion symmetric;no retractions;no use of accessory muscles   All Lung Fields Breath Sounds diminished   Rhythm/Pattern, Respiratory no shortness of breath reported;pattern regular;unlabored   Cough Type dry;good;nonproductive   PRE-TX-O2   O2 Device (Oxygen Therapy) nasal cannula   Flow (L/min) 0   Pulse Oximetry Type Continuous   Resp 16   Respiratory Interventions   Cough And Deep Breathing done with encouragement   Breathing Techniques/Airway Clearance deep/controlled cough encouraged;diaphragmatic breathing promoted   Respiratory Evaluation   $ Care Plan Tech Time 15 min   Instructed and encouraged deep diaphragmatic breathing and cough exercises.

## 2020-01-12 NOTE — PLAN OF CARE
Vital signs, cardiac and lab monitoring.  Possible discharge in am . MD consult in am. Increase activity as tolerated. Bed alarm on. Watch for falls. Watch for sign and symptoms of bleeding. Breathing treatments and adjust oxygen as needed. Strict I & O. Daily weights.

## 2020-01-13 ENCOUNTER — ANESTHESIA EVENT (OUTPATIENT)
Dept: SURGERY | Facility: HOSPITAL | Age: 85
DRG: 189 | End: 2020-01-13
Payer: MEDICARE

## 2020-01-13 ENCOUNTER — ANESTHESIA (OUTPATIENT)
Dept: SURGERY | Facility: HOSPITAL | Age: 85
DRG: 189 | End: 2020-01-13
Payer: MEDICARE

## 2020-01-13 LAB
ANION GAP SERPL CALC-SCNC: 10 MMOL/L (ref 8–16)
BNP SERPL-MCNC: 203 PG/ML (ref 0–99)
BUN SERPL-MCNC: 38 MG/DL (ref 8–23)
CALCIUM SERPL-MCNC: 9 MG/DL (ref 8.7–10.5)
CHLORIDE SERPL-SCNC: 104 MMOL/L (ref 95–110)
CO2 SERPL-SCNC: 26 MMOL/L (ref 23–29)
CREAT SERPL-MCNC: 1.6 MG/DL (ref 0.5–1.4)
ERYTHROCYTE [DISTWIDTH] IN BLOOD BY AUTOMATED COUNT: 13.5 % (ref 11.5–14.5)
EST. GFR  (AFRICAN AMERICAN): 43.5 ML/MIN/1.73 M^2
EST. GFR  (NON AFRICAN AMERICAN): 37.6 ML/MIN/1.73 M^2
GLUCOSE SERPL-MCNC: 101 MG/DL (ref 70–110)
HCT VFR BLD AUTO: 33.6 % (ref 40–54)
HGB BLD-MCNC: 11 G/DL (ref 14–18)
MAGNESIUM SERPL-MCNC: 2.2 MG/DL (ref 1.6–2.6)
MCH RBC QN AUTO: 30.8 PG (ref 27–31)
MCHC RBC AUTO-ENTMCNC: 32.7 G/DL (ref 32–36)
MCV RBC AUTO: 94 FL (ref 82–98)
PLATELET # BLD AUTO: 187 K/UL (ref 150–350)
PMV BLD AUTO: 12.5 FL (ref 9.2–12.9)
POTASSIUM SERPL-SCNC: 3.9 MMOL/L (ref 3.5–5.1)
RBC # BLD AUTO: 3.57 M/UL (ref 4.6–6.2)
SODIUM SERPL-SCNC: 140 MMOL/L (ref 136–145)
WBC # BLD AUTO: 8.62 K/UL (ref 3.9–12.7)

## 2020-01-13 PROCEDURE — 27000284 HC CANNULA NASAL: Performed by: ANESTHESIOLOGY

## 2020-01-13 PROCEDURE — 27200043 HC FORCEPS, BIOPSY: Performed by: INTERNAL MEDICINE

## 2020-01-13 PROCEDURE — 80048 BASIC METABOLIC PNL TOTAL CA: CPT

## 2020-01-13 PROCEDURE — 37000008 HC ANESTHESIA 1ST 15 MINUTES: Performed by: INTERNAL MEDICINE

## 2020-01-13 PROCEDURE — 63600175 PHARM REV CODE 636 W HCPCS: Performed by: NURSE ANESTHETIST, CERTIFIED REGISTERED

## 2020-01-13 PROCEDURE — 27201114 HC TRAP (ANY): Performed by: INTERNAL MEDICINE

## 2020-01-13 PROCEDURE — 63600175 PHARM REV CODE 636 W HCPCS: Performed by: INTERNAL MEDICINE

## 2020-01-13 PROCEDURE — 88305 TISSUE EXAM BY PATHOLOGIST: CPT | Mod: TC

## 2020-01-13 PROCEDURE — 27000654 HC CATH IV JELCO: Performed by: ANESTHESIOLOGY

## 2020-01-13 PROCEDURE — 83880 ASSAY OF NATRIURETIC PEPTIDE: CPT

## 2020-01-13 PROCEDURE — 27201089 HC SNARE, DISP (ANY): Performed by: INTERNAL MEDICINE

## 2020-01-13 PROCEDURE — 36415 COLL VENOUS BLD VENIPUNCTURE: CPT

## 2020-01-13 PROCEDURE — 85027 COMPLETE CBC AUTOMATED: CPT

## 2020-01-13 PROCEDURE — 37000009 HC ANESTHESIA EA ADD 15 MINS: Performed by: INTERNAL MEDICINE

## 2020-01-13 PROCEDURE — 25000003 PHARM REV CODE 250: Performed by: NURSE PRACTITIONER

## 2020-01-13 PROCEDURE — 45385 COLONOSCOPY W/LESION REMOVAL: CPT | Performed by: INTERNAL MEDICINE

## 2020-01-13 PROCEDURE — 83735 ASSAY OF MAGNESIUM: CPT

## 2020-01-13 PROCEDURE — 25000003 PHARM REV CODE 250: Performed by: INTERNAL MEDICINE

## 2020-01-13 PROCEDURE — 94761 N-INVAS EAR/PLS OXIMETRY MLT: CPT

## 2020-01-13 PROCEDURE — 27202049 HC PROBE, APC ERBE: Performed by: INTERNAL MEDICINE

## 2020-01-13 PROCEDURE — 27000673 HC TUBING BLOOD Y: Performed by: ANESTHESIOLOGY

## 2020-01-13 PROCEDURE — 44378 SMALL BOWEL ENDOSCOPY: CPT | Performed by: INTERNAL MEDICINE

## 2020-01-13 PROCEDURE — 99900035 HC TECH TIME PER 15 MIN (STAT)

## 2020-01-13 PROCEDURE — 21400001 HC TELEMETRY ROOM

## 2020-01-13 PROCEDURE — 25000003 PHARM REV CODE 250: Performed by: SPECIALIST

## 2020-01-13 RX ORDER — PROPOFOL 10 MG/ML
VIAL (ML) INTRAVENOUS
Status: DISCONTINUED | OUTPATIENT
Start: 2020-01-13 | End: 2020-01-13

## 2020-01-13 RX ORDER — SODIUM CHLORIDE 9 MG/ML
INJECTION, SOLUTION INTRAVENOUS CONTINUOUS PRN
Status: DISCONTINUED | OUTPATIENT
Start: 2020-01-13 | End: 2020-01-13

## 2020-01-13 RX ADMIN — SIMVASTATIN 40 MG: 40 TABLET, FILM COATED ORAL at 08:01

## 2020-01-13 RX ADMIN — PROPOFOL 30 MG: 10 INJECTION, EMULSION INTRAVENOUS at 07:01

## 2020-01-13 RX ADMIN — ENOXAPARIN SODIUM 30 MG: 100 INJECTION SUBCUTANEOUS at 04:01

## 2020-01-13 RX ADMIN — AMLODIPINE BESYLATE 5 MG: 5 TABLET ORAL at 10:01

## 2020-01-13 RX ADMIN — PROPOFOL 30 MG: 10 INJECTION, EMULSION INTRAVENOUS at 08:01

## 2020-01-13 RX ADMIN — PREGABALIN 150 MG: 75 CAPSULE ORAL at 08:01

## 2020-01-13 RX ADMIN — FUROSEMIDE 40 MG: 40 TABLET ORAL at 10:01

## 2020-01-13 RX ADMIN — SERTRALINE HYDROCHLORIDE 100 MG: 50 TABLET ORAL at 08:01

## 2020-01-13 RX ADMIN — ASPIRIN 81 MG 81 MG: 81 TABLET ORAL at 10:01

## 2020-01-13 RX ADMIN — LISINOPRIL 10 MG: 10 TABLET ORAL at 10:01

## 2020-01-13 RX ADMIN — DONEPEZIL HYDROCHLORIDE 10 MG: 5 TABLET, FILM COATED ORAL at 10:01

## 2020-01-13 RX ADMIN — PANTOPRAZOLE SODIUM 40 MG: 40 TABLET, DELAYED RELEASE ORAL at 06:01

## 2020-01-13 RX ADMIN — SODIUM CHLORIDE: 900 INJECTION INTRAVENOUS at 07:01

## 2020-01-13 RX ADMIN — METOPROLOL SUCCINATE 25 MG: 25 TABLET, EXTENDED RELEASE ORAL at 10:01

## 2020-01-13 RX ADMIN — PREGABALIN 150 MG: 75 CAPSULE ORAL at 10:01

## 2020-01-13 NOTE — TRANSFER OF CARE
Anesthesia Transfer of Care Note    Patient: Halley Rodrigues    Procedure(s) Performed: Procedure(s) (LRB):  EGD (ESOPHAGOGASTRODUODENOSCOPY) (Left)  COLONOSCOPY (Left)    Patient location: GI    Anesthesia Type: MAC    Transport from OR: Transported from OR on room air with adequate spontaneous ventilation    Post pain: adequate analgesia    Post assessment: no apparent anesthetic complications    Post vital signs: stable    Level of consciousness: awake    Nausea/Vomiting: no nausea/vomiting    Complications: none    Transfer of care protocol was followed      Last vitals:   Visit Vitals  BP (!) 141/65 (BP Location: Right arm)   Pulse 63   Temp 36.7 °C (98.1 °F)   Resp (!) 23   Ht 6' (1.829 m)   Wt 66.9 kg (147 lb 7.8 oz)   SpO2 100%   BMI 20.00 kg/m²

## 2020-01-13 NOTE — [employer] PATIENT INSTRUCTIONS
Discharge Summary/Instructions after an Endoscopic Procedure  Patient Name: Halley Rodrigues  Patient MRN: [id number]  Patient YOB: 1930 Monday, January 13, 2020  Be Woods MD  RESTRICTIONS:  During your procedure today, you received medications for sedation.  These   medications may affect your judgment, balance and coordination.  Therefore,   for 24 hours, you have the following restrictions:   - DO NOT drive a car, operate machinery, make legal/financial decisions,   sign important papers or drink alcohol.    ACTIVITY:  Today: no heavy lifting, straining or running due to procedural   sedation/anesthesia.  The following day: return to full activity including work.  DIET:  Eat and drink normally unless instructed otherwise.     TREATMENT FOR COMMON SIDE EFFECTS:  - Mild abdominal pain, nausea, belching, bloating or excessive gas:  rest,   eat lightly and use a heating pad.  - Sore Throat: treat with throat lozenges and/or gargle with warm salt   water.  - Because air was used during the procedure, expelling large amounts of air   from your rectum or belching is normal.  - If a bowel prep was taken, you may not have a bowel movement for 1-3 days.    This is normal.  SYMPTOMS TO WATCH FOR AND REPORT TO YOUR PHYSICIAN:  1. Abdominal pain or bloating, other than gas cramps.  2. Chest pain.  3. Back pain.  4. Signs of infection such as: chills or fever occurring within 24 hours   after the procedure.  5. Rectal bleeding, which would show as bright red, maroon, or black stools.   (A tablespoon of blood from the rectum is not serious, especially if   hemorrhoids are present.)  6. Vomiting.  7. Weakness or dizziness.  GO DIRECTLY TO THE NEAREST EMERGENCY ROOM IF YOU HAVE ANY OF THE FOLLOWING:      Difficulty breathing              Chills and/or fever over 101 F   Persistent vomiting and/or vomiting blood   Severe abdominal pain   Severe chest pain   Black, tarry stools   Bleeding- more than one  tablespoon   Any other symptom or condition that you feel may need urgent attention  Your doctor recommends these additional instructions:  If any biopsies were taken, your doctors clinic will contact you in 1 to 2   weeks with any results.  - Patient has a contact number available for emergencies.  The signs and   symptoms of potential delayed complications were discussed with the   patient.  Return to normal activities tomorrow.  Written discharge   instructions were provided to the patient.   - Resume previous diet.   - Continue present medications.   - Await pathology results.   - No repeat exam planned  - Return patient to hospital [facility] for ongoing care.  For questions, problems or results please call your physician - Be Woods MD at Work:  [phone].  Counts include 234 beds at the [facility], EMERGENCY ROOM PHONE NUMBER: [phone]  IF A COMPLICATION OR EMERGENCY SITUATION ARISES AND YOU ARE UNABLE TO REACH   YOUR PHYSICIAN - GO DIRECTLY TO THE EMERGENCY ROOM.  MD Be Prado MD  1/13/2020 8:44:14 AM  This report has been verified and signed electronically.  [employer]

## 2020-01-13 NOTE — PROGRESS NOTES
[facility]   Hematology/Oncology  Inpatient Progress Note          Patient Name: Halley Rodrigues  MRN: [id number]  Admission Date: 1/8/2020  Hospital Length of Stay: 4 days  Code Status: Full Code   Attending Provider: Javon Baltazar MD  Consulting Provider: Alvarez Augustine MD  Primary Care Physician: Scott Payne MD  Principal Problem:Acute respiratory failure with hypoxia and hypercarbia      Subjective:       Patient ID: Halley Rodrigues is a 89 y.o. male.    Chief Complaint: Cough (decreased appetite, recent pneumonia, sent from Dr. Payne office for low blood pressure, cough )        History Present Illness:    Patient is laying in bed; he seems to be in good spirits; diarrhea has since resolved; no CP, SOB, HA's or N/V; he is awaiting discharge to skilled nursing facility; I discussed with floor nursing staff      Review of Systems:  GEN: generalized weakness, malaise and deconditioning but improved  HEENT: normal with no HA's, sore throat, stiff neck, changes in vision  CV: normal with no CP , PND, NIETO or orthopnea  PULM: no cough or current sputum  GI: diarrhea for several weeks since resolved  : normal with no hematuria, dysuria  BREAST: normal with no mass, discharge, pain  SKIN: normal with no rash, erythema, bruising, or swelling    Objective:     Vitals:  Blood pressure (!) 143/54, pulse (!) 55, temperature 97.7 °F (36.5 °C), temperature source Oral, resp. rate 18, height 6' (1.829 m), weight 66.9 kg (147 lb 7.8 oz), SpO2 97 %.    Physical Exam:  GEN: no apparent distress, but deconditioned elderly gentleman; AAOx3  HEAD: atraumatic and normocephalic  EYES: no pallor, no icterus, PERRLA  ENT: OMM, no pharyngeal erythema, external ears WNL; no nasal discharge; no thrush  NECK: no masses, thyroid normal, trachea midline, no LAD/LN's, supple  CV: RRR with no murmur; normal pulse; normal S1 and S2; no pedal edema  CHEST: Normal respiratory effort; CTAB; coarse BS  diffuse bilaterally improved and now mild  ABDOM: nontender and nondistended; soft; normal bowel sounds; no rebound/guarding  MUSC/Skeletal: ROM normal; no crepitus; joints normal; no deformities or arthropathy  EXTREM: no clubbing, cyanosis, inflammation or swelling  SKIN: no rashes, lesions, ulcers, petechiae or subcutaneous nodules;  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  : no dickey  NEURO: generalized weakness; AAOx3; no tremors  PSYCH: normal mood, affect and behavior  LYMPH: normal cervical, supraclavicular, axillary and groin LN's               Lab Review:        Lab Results   Component Value Date    WBC 8.62 01/13/2020    HGB 11.0 (L) 01/13/2020    HCT 33.6 (L) 01/13/2020    MCV 94 01/13/2020     01/13/2020       CMP  Sodium   Date Value Ref Range Status   01/13/2020 140 136 - 145 mmol/L Final     Potassium   Date Value Ref Range Status   01/13/2020 3.9 3.5 - 5.1 mmol/L Final     Chloride   Date Value Ref Range Status   01/13/2020 104 95 - 110 mmol/L Final     CO2   Date Value Ref Range Status   01/13/2020 26 23 - 29 mmol/L Final     Glucose   Date Value Ref Range Status   01/13/2020 101 70 - 110 mg/dL Final     BUN, Bld   Date Value Ref Range Status   01/13/2020 38 (H) 8 - 23 mg/dL Final     BUN   Date Value Ref Range Status   04/16/2018 28 (H) 7 - 21 mg/dL Final     Creatinine   Date Value Ref Range Status   01/13/2020 1.6 (H) 0.5 - 1.4 mg/dL Final     Calcium   Date Value Ref Range Status   01/13/2020 9.0 8.7 - 10.5 mg/dL Final     Total Protein   Date Value Ref Range Status   01/10/2020 6.7 6.0 - 8.4 g/dL Final     Albumin   Date Value Ref Range Status   01/10/2020 3.0 (L) 3.5 - 5.2 g/dL Final     Total Bilirubin   Date Value Ref Range Status   01/10/2020 1.0 0.1 - 1.0 mg/dL Final     Comment:     For infants and newborns, interpretation of results should be based  on gestational age, weight and in agreement with clinical  observations.  Premature Infant recommended reference ranges:  Up to 24  "hours.............<8.0 mg/dL  Up to 48 hours............<12.0 mg/dL  3-5 days..................<15.0 mg/dL  6-29 days.................<15.0 mg/dL       Alkaline Phosphatase   Date Value Ref Range Status   01/10/2020 58 55 - 135 U/L Final     AST   Date Value Ref Range Status   01/10/2020 20 10 - 40 U/L Final     ALT   Date Value Ref Range Status   01/10/2020 18 10 - 44 U/L Final     Anion Gap   Date Value Ref Range Status   01/13/2020 10 8 - 16 mmol/L Final   04/16/2018 16 9 - 18 mEq/L Final     eGFR if    Date Value Ref Range Status   01/13/2020 43.5 (A) >60 mL/min/1.73 m^2 Final     eGFR if non    Date Value Ref Range Status   01/13/2020 37.6 (A) >60 mL/min/1.73 m^2 Final     Comment:     Calculation used to obtain the estimated glomerular filtration  rate (eGFR) is the CKD-EPI equation.        Lab Results   Component Value Date    IRON 96 04/30/2019    TIBC 313 04/30/2019    FERRITIN 49 04/30/2019     Lab Results   Component Value Date    [id number] 356 12/08/2015         Radiology Diagnostic Studies:           Assessment:     IMPRESSION:    (1) 89 y.o. male with diagnosis of chronic thrombocytopenia known to my hematology service. He presented to the ED at [facility] from his PCP's office with persistent cough and SOB. He has been admitted to the hospitalist service with working diagnosis of "possible" pulmonary emboli  - he had VQ scan with RUL and RML deficits  - I requested a pulmonary consult for evaluation and he has been seen by Dr Ortiz Senior  - Dr Senior reviewed the VQ scan and disagrees with the interpretation of the V/Q scan, stating "(the patient) does not have a pulmonary embolus.... He appears to have some component cardiogenic pulmonary edema versus residual abnormalities from his recent lower respiratory tract infection"  - patient is on antibiotics     Brief Hematology Summary:  - previously referred by Dr Payne for hematologic evaluation and recommendation.   - patient " was last previously seen in Dec 2015 after which he had changed to Dr PANKAJ Junior; he subsequently changed by to my care and last showed for a hematology clinic appointment in May 2019  - prior suspected platelet clumping issues        - latest platelets at 187,000 and stable if not better        (2) Chronic tobacco use     (3) Chronic anemia - NCNC parameters; mild; possible alpha-thalassemia  - history of gastric ulcers in past due to consumption of NSAIDs and subsequent GIB  - latest hgb at 11.0 and better     (4) Lower extremity/pedal neuropathy - previously followed by Dr Leiva with neurology and Dr Elizabeth with pain management     (5) Prior Garcia's esophagus - followed by Dr Hawkins with GI in past and now sees Dr Dale with GI     (6) Dementia     (7) CRI - stage III - followed by Dr Sanchez with neph     (8) Vit D deficiency - on supplements in past     (9) 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 Bronchospasm    2. Cough    3. Elevated d-dimer    4. Hypoxia    5. Acute respiratory failure with hypoxia and hypercarbia    6. Anemia, chronic disease    7. Angiectasis           Plan:     PLAN:          1. Appreciate pulmonary consult, evaluation and recs  2. lovenox prophylaxis  3. Appreciate GI evaluation   4.  Monitor labs  5. . Expected DC in near future; will sign off; please have him f/u in hematology clinic in 2-3 months                 Alvarez Augustine MD  Hematology/Oncology  [facility]

## 2020-01-13 NOTE — PROGRESS NOTES
[facility]  Progress Note  Patient Name: Halley Rodrigues  MRN: [id number]  Patient Class: IP- Inpatient   Admission Date: 1/8/2020  3:53 PM   Attending Physician:  Dr. Baltazar  Primary Care Provider: Scott Payne MD  Face-to-Face encounter date: 01/13/2020    HPI:   Halley Rodrigues is a 89 y.o. old  male who  has a past medical history of Anemia, Anemia due to chronic blood loss (7/25/2018), Anemia, chronic disease (7/25/2018), Dementia, GERD (gastroesophageal reflux disease), Heart disease, Hypertension, and Neuropathy.. The patient presented to [facility] on 1/8/2020 with a primary complaint of Cough (decreased appetite, recent pneumonia, sent from Dr. Payne office for low blood pressure, cough )     89-year-old  male presents emergency room with worsening shortness of breath and a productive cough. He was seen at Dr. Payne in his office his PCP and was instructed to come to the emergency room secondary to increased shortness of breath. This patient was recently discharged from our facility about 2 days ago and was treated for pneumonia.  He was following up with his primary care doctor today and was directed to come to the emergency room secondary to a persistent cough and worsening shortness of breath. The patient was not discharged home on oxygen.  In the PCPs office the patient was noted to have low oxygen saturations. Upon arrival in emergency room the patient was found to be satting in the 80s and was moderately tachypneic.  A D-dimer was performed that was positive and a subsequent V/Q scan was performed that showed a probability for a PE.    Ultrasounds of bilateral lower extremities was negative for DVT     Interval history:  Today the patient reports doing well with minimal shortness of breath, improved with therapy.  He tolerated endoscopy well today.  He denies abdominal pain, nausea, or vomiting.  No chest pain or  fever.    Physical exam:  Vitals Reviewed  General appearance: Well-developed, well-nourished male in no apparent distress.  HENT: Moist mucous membranes of oral cavity.  Eyes:  Anicteric sclerae, no conjunctival discharge  Lungs:  Diminished breath sounds diffusely, comfortable work of breathing  Cardiovascular:  RRR, 2+ radial pulses  Abdomen: Soft, non-distended, non-tender    Laboratory data:  Labs reviewed    Chest x-ray:  No focal consolidation or significant pulmonary edema    CTA Chest Non Coronary   Final Result      1. Negative for pulmonary thromboembolism.   2. Findings of bilateral multifocal bronchiolitis, most significantly in the left lower lobe, presumably of an acute infectious etiology.  Atypical mycobacterial infection could have this appearance.   3. Additional small consolidative opacities and geographic interstitial opacities in both lungs, nonspecific.  Multifocal atypical or viral pneumonia, and or interstitial lung disease of numerous potential etiologies could have this appearance.   4. Several prominent to mildly enlarged mediastinal lymph nodes, nonspecific but presumably reactive due to lung disease.   5. Extensive 3 vessel coronary arterial vascular calcifications, and moderate aortic vascular calcifications.   .         Electronically signed by: Vinay Christiansen MD   Date:    01/09/2020   Time:    10:40      NM Lung Scan Ventilation Perfusion   Final Result      US Lower Extremity Veins Bilateral   Final Result      X-Ray Chest AP Portable   Final Result      Interval improved aeration in the left lung, with nonspecific scattered interstitial opacities throughout both lungs.  No consolidated pneumonia.         Electronically signed by: Vinay Christiansen MD   Date:    01/08/2020   Time:    19:17        EGD  - Small hiatal hernia.                        - A single bleeding angioectasia in the stomach.                         Treated with argon plasma coagulation (APC).                        -  Normal examined duodenum.  High One 6 mm polyp at the ileocecal valve, removed                         with a cold snare. Resected and retrieved. Biopsied.                        - The examination was otherwise normal on direct                         and retroflexion views.    Twelve lead EKG: reveals a normal sinus rhythm with sinus arrhythmia a normal axis poor R-wave progression atrial enlargement ST flattening throughout most pronounced in the inferior lateral leads rate 77  milliseconds    Assessment and plan update today:  Continue care.  Appreciate consultants.  CTA negative for pulmonary embolus.  Procalcitonin negative.    Continue oral Lasix as able.  Monitor fluid status.   GI consultation.  C diff negative.  Endoscopy performed today as below.  Continue daily PPI  Continue medications for chronic issues as able  Repeat daily labs  Mobilize with PT/OT as able  Disposition:  nursing home placement   Moderate risk secondary to multiple medical comorbid conditions; prescription drug management; IV fluids with additives; planned endoscopy procedure    1.  Acute hypoxemic respiratory insufficiency due to Acute cardiogenic pulmonary edema, resolved  -supplemental O2  -Eliquis  -aerosol nebulizer treatments  -consult pulmonology    2.  High probability for PE per V/Q scan, CTA negative for PE  -will give oral Eliquis for now as the patient has thrombocytopenia  -consult heme oncology Dr. Santoro    3.  Chronic kidney disease stage 3    4.  Recent pneumonia  -still present but improved for x-rays    5.  Essential hypertension    6.  Hyperlipidemia    7.  Advanced age    8. Chronic Anemia  - appears stable    9.  QTC prolongation  -check magnesium level    10.  Peripheral neuropathy  11.  Dementia    Javon Baltazar  [facility] Hospitalist NP  01/10/2020

## 2020-01-13 NOTE — PROGRESS NOTES
Progress Note  Cardiology    Admit Date: 1/8/2020   LOS: 4 days     Follow-up For:  Cardiology    Scheduled Meds:   amLODIPine  5 mg Oral Daily    aspirin  81 mg Oral Daily    donepezil  10 mg Oral Daily    enoxaparin  30 mg Subcutaneous Daily    furosemide  40 mg Oral Daily    lisinopril  10 mg Oral Daily    metoprolol succinate  25 mg Oral Daily    pantoprazole  40 mg Oral Daily    pregabalin  150 mg Oral BID    sertraline  100 mg Oral QHS    simvastatin  40 mg Oral QHS     Continuous Infusions:  PRN Meds:ondansetron, sodium chloride 0.9%    Review of patient's allergies indicates:   Allergen Reactions    Eucalyptus containing products Palpitations       SUBJECTIVE:     Interval History: Patient returned from endoscopy.  No chest pain shortness of breath reported.      OBJECTIVE:     Vital Signs (Most Recent)  Temp: 97.8 °F (36.6 °C) (01/13/20 1100)  Pulse: 69 (01/13/20 1100)  Resp: 16 (01/13/20 1100)  BP: 135/60 (01/13/20 1100)  SpO2: 97 % (01/13/20 1100)    Vital Signs Range (Last 24H):  Temp:  [97.6 °F (36.4 °C)-98.1 °F (36.7 °C)]   Pulse:  [55-80]   Resp:  [13-26]   BP: (106-164)/(40-87)   SpO2:  [95 %-100 %]       Physical Exam:  Neck: no carotid bruit and no JVD  Lungs: diminished breath sounds bibasilar  Heart: regular rate and rhythm, S1, S2 normal, no murmur, click, rub or gallop and regular rate and rhythm  Extremities: Extremities normal, atraumatic, no cyanosis, clubbing, or edema    Recent Results (from the past 24 hour(s))   POCT TB Skin Test Read    Collection Time: 01/12/20  4:27 PM   Result Value Ref Range    TB Induration 48 - 72 hr read 0 mm   CBC Without Differential    Collection Time: 01/13/20  4:41 AM   Result Value Ref Range    WBC 8.62 3.90 - 12.70 K/uL    RBC 3.57 (L) 4.60 - 6.20 M/uL    Hemoglobin 11.0 (L) 14.0 - 18.0 g/dL    Hematocrit 33.6 (L) 40.0 - 54.0 %    Mean Corpuscular Volume 94 82 - 98 fL    Mean Corpuscular Hemoglobin 30.8 27.0 - 31.0 pg    Mean Corpuscular  Hemoglobin Conc 32.7 32.0 - 36.0 g/dL    RDW 13.5 11.5 - 14.5 %    Platelets 187 150 - 350 K/uL    MPV 12.5 9.2 - 12.9 fL   Basic metabolic panel    Collection Time: 01/13/20  4:41 AM   Result Value Ref Range    Sodium 140 136 - 145 mmol/L    Potassium 3.9 3.5 - 5.1 mmol/L    Chloride 104 95 - 110 mmol/L    CO2 26 23 - 29 mmol/L    Glucose 101 70 - 110 mg/dL    BUN, Bld 38 (H) 8 - 23 mg/dL    Creatinine 1.6 (H) 0.5 - 1.4 mg/dL    Calcium 9.0 8.7 - 10.5 mg/dL    Anion Gap 10 8 - 16 mmol/L    eGFR if African American 43.5 (A) >60 mL/min/1.73 m^2    eGFR if non  37.6 (A) >60 mL/min/1.73 m^2   Magnesium    Collection Time: 01/13/20  4:41 AM   Result Value Ref Range    Magnesium 2.2 1.6 - 2.6 mg/dL   Brain natriuretic peptide    Collection Time: 01/13/20  4:41 AM   Result Value Ref Range     (H) 0 - 99 pg/mL       Diagnostic Results:    ASSESSMENT/PLAN:     Endoscopy report pending.  CHF stabilized BUN 38 creatinine 1.6 he    Plan: Continue Current.

## 2020-01-13 NOTE — PROGRESS NOTES
[facility]  Progress Note  Patient Name: Halley Rodrigues  MRN: [id number]  Patient Class: IP- Inpatient   Admission Date: 1/8/2020  3:53 PM   Attending Physician:  Dr. Baltazar  Primary Care Provider: Scott Payne MD  Face-to-Face encounter date: 01/12/2020    HPI:   Halley Rodrigues is a 89 y.o. old  male who  has a past medical history of Anemia, Anemia due to chronic blood loss (7/25/2018), Anemia, chronic disease (7/25/2018), Dementia, GERD (gastroesophageal reflux disease), Heart disease, Hypertension, and Neuropathy.. The patient presented to [facility] on 1/8/2020 with a primary complaint of Cough (decreased appetite, recent pneumonia, sent from Dr. Payne office for low blood pressure, cough )     89-year-old  male presents emergency room with worsening shortness of breath and a productive cough. He was seen at Dr. Payne in his office his PCP and was instructed to come to the emergency room secondary to increased shortness of breath. This patient was recently discharged from our facility about 2 days ago and was treated for pneumonia.  He was following up with his primary care doctor today and was directed to come to the emergency room secondary to a persistent cough and worsening shortness of breath. The patient was not discharged home on oxygen.  In the PCPs office the patient was noted to have low oxygen saturations. Upon arrival in emergency room the patient was found to be satting in the 80s and was moderately tachypneic.  A D-dimer was performed that was positive and a subsequent V/Q scan was performed that showed a probability for a PE.    Ultrasounds of bilateral lower extremities was negative for DVT     Interval history:  Today the patient reports doing well with improving shortness of breath, mild intensity, improving with treatment.  No chest pain or fever.  No vomiting.  He is ready for endoscopy tomorrow.    Physical  exam:  Vitals Reviewed  General appearance: Well-developed, well-nourished male in no apparent distress.  HENT: Moist mucous membranes of oral cavity.  Eyes:  Anicteric sclerae, no conjunctival discharge  Lungs:  Diminished breath sounds diffusely, comfortable work of breathing  Cardiovascular:  RRR, 2+ radial pulses  Abdomen: Soft, non-distended, non-tender    Laboratory data:  Labs reviewed    Chest x-ray:  No focal consolidation or significant pulmonary edema    CTA Chest Non Coronary   Final Result      1. Negative for pulmonary thromboembolism.   2. Findings of bilateral multifocal bronchiolitis, most significantly in the left lower lobe, presumably of an acute infectious etiology.  Atypical mycobacterial infection could have this appearance.   3. Additional small consolidative opacities and geographic interstitial opacities in both lungs, nonspecific.  Multifocal atypical or viral pneumonia, and or interstitial lung disease of numerous potential etiologies could have this appearance.   4. Several prominent to mildly enlarged mediastinal lymph nodes, nonspecific but presumably reactive due to lung disease.   5. Extensive 3 vessel coronary arterial vascular calcifications, and moderate aortic vascular calcifications.   .         Electronically signed by: Vinay Christiansen MD   Date:    01/09/2020   Time:    10:40      NM Lung Scan Ventilation Perfusion   Final Result      US Lower Extremity Veins Bilateral   Final Result      X-Ray Chest AP Portable   Final Result      Interval improved aeration in the left lung, with nonspecific scattered interstitial opacities throughout both lungs.  No consolidated pneumonia.         Electronically signed by: Vinya Christiansen MD   Date:    01/08/2020   Time:    19:17        Twelve lead EKG: reveals a normal sinus rhythm with sinus arrhythmia a normal axis poor R-wave progression atrial enlargement ST flattening throughout most pronounced in the inferior lateral leads rate 77   milliseconds    Assessment and plan update today:  Continue care.  Appreciate consultants.  CTA negative for pulmonary embolus.  Procalcitonin negative.    Continue Lasix and transition to oral Lasix as able.  Monitor fluid status.   GI consultation.  C diff negative.  Endoscopy planned for tomorrow.  NPO after midnight.  Continue medications for chronic issues as able  Repeat daily labs  Mobilize with PT/OT as able  Disposition:  nursing home placement   Moderate risk secondary to multiple medical comorbid conditions; prescription drug management; IV fluids with additives; planned endoscopy procedure    1.  Acute hypoxemic respiratory insufficiency due to Acute cardiogenic pulmonary edema, resolved  -supplemental O2  -Eliquis  -aerosol nebulizer treatments  -consult pulmonology    2.  High probability for PE per V/Q scan, CTA negative for PE  -will give oral Eliquis for now as the patient has thrombocytopenia  -consult heme oncology Dr. Santoro    3.  Chronic kidney disease stage 3    4.  Recent pneumonia  -still present but improved for x-rays    5.  Essential hypertension    6.  Hyperlipidemia    7.  Advanced age    8. Chronic Anemia  - appears stable    9.  QTC prolongation  -check magnesium level    10.  Peripheral neuropathy  11.  Dementia    Javon Baltazar  [facility] Hospitalist NP  01/10/2020

## 2020-01-13 NOTE — ANESTHESIA PREPROCEDURE EVALUATION
01/13/2020  Halley Rodrigues is a 89 y.o., male.  Patient Active Problem List   Diagnosis    MCI (mild cognitive impairment) with memory loss    Thrombocytopenia, secondary    Anemia, chronic disease    Chronic combined systolic and diastolic heart failure    NSTEMI (non-ST elevated myocardial infarction)    Dementia    Advanced age    Thrombocytopenia    Nicotine abuse    Severe protein-energy malnutrition    Essential hypertension    Stage 3 chronic kidney disease    Thrombocytopenia, unspecified       Past Surgical History:   Procedure Laterality Date    CORONARY ARTERY BYPASS GRAFT          Tobacco Use:  The patient  reports that he has been smoking cigarettes and vaping with nicotine. He has never used smokeless tobacco.     Results for orders placed or performed during the hospital encounter of 01/08/20   EKG 12-lead    Collection Time: 01/08/20  6:07 PM    Narrative    Test Reason : R05,    Vent. Rate : 077 BPM     Atrial Rate : 077 BPM     P-R Int : 172 ms          QRS Dur : 094 ms      QT Int : 482 ms       P-R-T Axes : 043 033 013 degrees     QTc Int : 545 ms    Sinus rhythm with Premature atrial complexes  Nonspecific ST and T wave abnormality  Prolonged QT  Abnormal ECG  When compared with ECG of 04-JAN-2020 05:40,  Premature ventricular complexes are no longer Present  Non-specific change in ST segment in Inferior leads  Confirmed by Remy Kitchen MD ([id number]) on 1/9/2020 12:07:37 PM    Referred By: [email or web address]   SELF           Confirmed By:Remy Kitchen MD        Imaging Results          NM Lung Scan Ventilation Perfusion (Final result)  Result time 01/08/20 22:55:01    Final result by Kun Elise MD (01/08/20 22:55:01)                 Narrative:        EXAM: NUCLEAR MEDICINE V/Q SCAN.    CLINICAL DATA: Elevated d-dimer. Smoked for about 70 years, quit about 4  years  ago.    TECHNIQUE: Nuclear medicine VQ scan was performed with 11.1 mCi of Xe-133 for  the ventilation study and for a 5.5 millicuries of technetium 99m MAA for the  perfusion study.    PRIOR STUDIES: No prior studies submitted    FINDINGS:  PERFUSION IMAGES  Segmental defect in the right upper lobe is seen.  A second segmental defect in the lateral segment of the right middle lobe is  seen.    VENTILATION IMAGES  Ventilation images demonstrate no defect.    IMPRESSION:  1. Ventilation/perfusion mismatch is seen in the right upper lobe and right  middle lobe suggesting intermediate to high probability of pulmonary embolism.    RECOMMENDATION:  Follow up as clinically indicated.          Electronically Signed by GONZALEZ PERALTA MD at 1/9/2020 2:43:11 AM                             US Lower Extremity Veins Bilateral (Final result)  Result time 01/08/20 21:24:21    Final result by Raúl Perrin MD (01/08/20 21:24:21)                 Narrative:        Exam: BILATERAL LOWER EXTREMITY VENOUS DUPLEX DOPPLER    Clinical data: Elevated D-dimer.    Technique: Real time ultrasound and color Doppler imaging of the veins of the  bilateral lower extremities were performed using grayscale, color flow and  duplex Doppler. Vessels imaged: common femoral veins, superficial femoral veins,  popliteal veins.    Prior studies: No prior studies submitted.    Findings: The lower extremity veins demonstrate no evidence of intraluminal  echogenicity with normal compressibility, spontaneous blood flow, augmentation  and respiratory phasicity. No dilated veins or wall thickening seen. The  superficial veins visualized and soft tissues are unremarkable.    No thrombus.    IMPRESSION: No evidence of deep venous thrombosis in the bilateral lower  extremity veins.    Recommendation:   Follow up as clinically indicated.        Electronically Signed by RAÚL PERRIN MD at 1/8/2020 10:52:46 PM                             X-Ray Chest AP  Portable (Final result)  Result time 01/08/20 19:17:16   Procedure changed from X-Ray Chest 1 View     Final result by Vinay Christiansen MD (01/08/20 19:17:16)                 Impression:      Interval improved aeration in the left lung, with nonspecific scattered interstitial opacities throughout both lungs.  No consolidated pneumonia.      Electronically signed by: Vinay Christiansen MD  Date:    01/08/2020  Time:    19:17             Narrative:    EXAMINATION:  XR CHEST AP PORTABLE    CLINICAL HISTORY:  Decreased appetite, pneumonia, cough    FINDINGS:  Portable chest radiograph at 18:12 hours compared to multiple prior exams shows median sternotomy wires and mediastinal surgical clips from prior CABG.  The cardiomediastinal silhouette and pulmonary vasculature are stable and within normal limits, with aortic vascular calcifications.    The lungs are normally expanded, with interval improved aeration in the left lung, with mild scattered reticulonodular and ground-glass opacities in both lungs.  There is no consolidation, large pleural effusion, evidence of pulmonary edema, or pneumothorax.  No acute osseous abnormality.                                 Lab Results   Component Value Date    WBC 8.62 01/13/2020    HGB 11.0 (L) 01/13/2020    HCT 33.6 (L) 01/13/2020    MCV 94 01/13/2020     01/13/2020     BMP  Lab Results   Component Value Date     01/13/2020    K 3.9 01/13/2020     01/13/2020    CO2 26 01/13/2020    BUN 38 (H) 01/13/2020    CREATININE 1.6 (H) 01/13/2020    CALCIUM 9.0 01/13/2020    ANIONGAP 10 01/13/2020    ESTGFRAFRICA 43.5 (A) 01/13/2020    EGFRNONAA 37.6 (A) 01/13/2020       Dx: NSTEMI (non-ST elevated myocardial infarction) [I21.4 (ICD-10-CM)]   Comments:    Conclusion     · Moderate left ventricular enlargement.  · Left ventricular systolic dysfunction. The estimated ejection fraction is 34 -42%.  · Left ventricular diastolic dysfunction.  · Moderate-to-severe mitral  regurgitation.  · Grade 1 plaque present in the ascending aorta.  · Intermediate central venous pressure (8 mm Hg).  · The estimated PA systolic pressure is 65 mm Hg  · Pulmonary hypertension present.  · Moderate tricuspid regurgitation.  · Technically challenging study          Anesthesia Evaluation    I have reviewed the Patient Summary Reports.     I have reviewed the Medications.     Review of Systems  Anesthesia Hx:  No problems with previous Anesthesia  History of prior surgery of interest to airway management or planning: Denies Family Hx of Anesthesia complications.   Denies Personal Hx of Anesthesia complications.   Social:  Smoker, No Alcohol Use    Hematology/Oncology:         -- Anemia: Hematology Comments: Thrombocytopenia  Anemia of chronic Disease   Cardiovascular:   Hypertension Past MI CABG/stent     Renal/:   Chronic Renal Disease, CRI    Hepatic/GI:   GERD Patient denies active N/V or intestinal obstruction   Neurological:   Peripheral Neuropathy  Dementia    Psych:   Psychiatric History MCI with Memory Loss         Physical Exam  General:  Malnutrition    Airway/Jaw/Neck:  Airway Findings: Mouth Opening: Normal Tongue: Normal  General Airway Assessment: Adult  Mallampati: II  Improves to II with phonation.  TM Distance: < 4 cm  Jaw/Neck Findings:  Neck ROM: Normal ROM      Dental:  Dental Findings: Upper Dentures, Lower Dentures   Chest/Lungs:  Chest/Lungs Findings: Clear to auscultation, Normal Respiratory Rate     Heart/Vascular:  Heart Findings: Rate: Normal  Rhythm: Regular Rhythm  Sounds: Normal        Mental Status:  Mental Status Findings:  Cooperative, Alert and Oriented         Anesthesia Plan  Type of Anesthesia, risks & benefits discussed:  Anesthesia Type:  MAC  Patient's Preference: MAC  Intra-op Monitoring Plan: standard ASA monitors  Intra-op Monitoring Plan Comments:   Post Op Pain Control Plan: per primary service following discharge from PACU  Post Op Pain Control Plan  Comments:   Induction:    Beta Blocker:  Patient is not currently on a Beta-Blocker (No further documentation required).       Informed Consent: Patient understands risks and agrees with Anesthesia plan.  Questions answered. Anesthesia consent signed with patient.  ASA Score: 3     Day of Surgery Review of History & Physical:        Anesthesia Plan Notes: MAC   minimal IVF's ( Reduced EF )  No Versed         Ready For Surgery From Anesthesia Perspective.

## 2020-01-13 NOTE — PROVATION PATIENT INSTRUCTIONS
Discharge Summary/Instructions after an Endoscopic Procedure  Patient Name: Halley Rodrigues  Patient MRN: [id number]  Patient YOB: 1930 Monday, January 13, 2020  Be Woods MD  RESTRICTIONS:  During your procedure today, you received medications for sedation.  These   medications may affect your judgment, balance and coordination.  Therefore,   for 24 hours, you have the following restrictions:   - DO NOT drive a car, operate machinery, make legal/financial decisions,   sign important papers or drink alcohol.    ACTIVITY:  Today: no heavy lifting, straining or running due to procedural   sedation/anesthesia.  The following day: return to full activity including work.  DIET:  Eat and drink normally unless instructed otherwise.     TREATMENT FOR COMMON SIDE EFFECTS:  - Mild abdominal pain, nausea, belching, bloating or excessive gas:  rest,   eat lightly and use a heating pad.  - Sore Throat: treat with throat lozenges and/or gargle with warm salt   water.  - Because air was used during the procedure, expelling large amounts of air   from your rectum or belching is normal.  - If a bowel prep was taken, you may not have a bowel movement for 1-3 days.    This is normal.  SYMPTOMS TO WATCH FOR AND REPORT TO YOUR PHYSICIAN:  1. Abdominal pain or bloating, other than gas cramps.  2. Chest pain.  3. Back pain.  4. Signs of infection such as: chills or fever occurring within 24 hours   after the procedure.  5. Rectal bleeding, which would show as bright red, maroon, or black stools.   (A tablespoon of blood from the rectum is not serious, especially if   hemorrhoids are present.)  6. Vomiting.  7. Weakness or dizziness.  GO DIRECTLY TO THE NEAREST EMERGENCY ROOM IF YOU HAVE ANY OF THE FOLLOWING:      Difficulty breathing              Chills and/or fever over 101 F   Persistent vomiting and/or vomiting blood   Severe abdominal pain   Severe chest pain   Black, tarry stools   Bleeding- more than one  tablespoon   Any other symptom or condition that you feel may need urgent attention  Your doctor recommends these additional instructions:  If any biopsies were taken, your doctors clinic will contact you in 1 to 2   weeks with any results.  - Patient has a contact number available for emergencies.  The signs and   symptoms of potential delayed complications were discussed with the   patient.  Return to normal activities tomorrow.  Written discharge   instructions were provided to the patient.   - Resume previous diet.   - Continue present medications.   - PPI daily x 2 weeks  - Return patient to hospital [facility] for ongoing care.  For questions, problems or results please call your physician - Be Woods MD at Work:  [phone].  Formerly [facility], EMERGENCY ROOM PHONE NUMBER: [phone]  IF A COMPLICATION OR EMERGENCY SITUATION ARISES AND YOU ARE UNABLE TO REACH   YOUR PHYSICIAN - GO DIRECTLY TO THE EMERGENCY ROOM.  MD Be Prado MD  1/13/2020 8:40:40 AM  This report has been verified and signed electronically.  [employer]

## 2020-01-13 NOTE — ANESTHESIA POSTPROCEDURE EVALUATION
Anesthesia Post Evaluation    Patient: Halley Rodrigues    Procedure(s) Performed: Procedure(s) (LRB):  EGD (ESOPHAGOGASTRODUODENOSCOPY) (Left)  COLONOSCOPY (Left)    Final Anesthesia Type: MAC    Patient location during evaluation: GI PACU  Patient participation: Yes- Able to Participate  Level of consciousness: awake and alert, oriented and awake  Post-procedure vital signs: reviewed and stable  Pain management: adequate  Airway patency: patent    PONV status at discharge: No PONV  Anesthetic complications: no      Cardiovascular status: blood pressure returned to baseline, hemodynamically stable and stable  Respiratory status: unassisted, spontaneous ventilation and room air  Hydration status: euvolemic  Follow-up not needed.  Comments: Patient states that he was comfortable for procedure and experienced no recall          Vitals Value Taken Time   /43 1/13/2020  8:40 AM   Temp 36.7 °C (98.1 °F) 1/13/2020  7:40 AM   Pulse 70 1/13/2020  8:40 AM   Resp 15 1/13/2020  8:40 AM   SpO2 100 % 1/13/2020  8:40 AM         No case tracking events are documented in the log.      Pain/Grace Score: No data recorded

## 2020-01-13 NOTE — PLAN OF CARE
01/13/20 1218   Discharge Reassessment   Assessment Type Discharge Planning Reassessment   Anticipated Discharge Disposition [facility]   Winter with [facility] called to inform me that she spoke with the dtr in law , Erica. She was assessing financials to determine if he will qualify for [place] L[place] Medicaid. Erica informed her that the ptaient's name is on the property owned by her and his son in [place].. Per Winter, he will not qualify for [place] Medicaid with property in [place]. He will have to go to a NH in [place]. I attempted to call Erica at [phone] and was unable to leave a vm as the mailbox is nit set up. L called [phone] left a vm message asking her to call me back. I informed her that we need to start the process of placing him in [place] but if he does not qualify for LTC medicaid in [place], they will have to look at other means to care for him at home.

## 2020-01-14 PROBLEM — J98.01 BRONCHOSPASM: Status: ACTIVE | Noted: 2020-01-14

## 2020-01-14 LAB
ANION GAP SERPL CALC-SCNC: 10 MMOL/L (ref 8–16)
BACTERIA BLD CULT: NORMAL
BACTERIA BLD CULT: NORMAL
BUN SERPL-MCNC: 41 MG/DL (ref 8–23)
CALCIUM SERPL-MCNC: 8.6 MG/DL (ref 8.7–10.5)
CHLORIDE SERPL-SCNC: 102 MMOL/L (ref 95–110)
CO2 SERPL-SCNC: 25 MMOL/L (ref 23–29)
CREAT SERPL-MCNC: 1.7 MG/DL (ref 0.5–1.4)
ERYTHROCYTE [DISTWIDTH] IN BLOOD BY AUTOMATED COUNT: 13.5 % (ref 11.5–14.5)
EST. GFR  (AFRICAN AMERICAN): 40.4 ML/MIN/1.73 M^2
EST. GFR  (NON AFRICAN AMERICAN): 35 ML/MIN/1.73 M^2
GLUCOSE SERPL-MCNC: 98 MG/DL (ref 70–110)
HCT VFR BLD AUTO: 30.4 % (ref 40–54)
HGB BLD-MCNC: 9.9 G/DL (ref 14–18)
MAGNESIUM SERPL-MCNC: 2.2 MG/DL (ref 1.6–2.6)
MCH RBC QN AUTO: 30.7 PG (ref 27–31)
MCHC RBC AUTO-ENTMCNC: 32.6 G/DL (ref 32–36)
MCV RBC AUTO: 94 FL (ref 82–98)
PLATELET # BLD AUTO: 162 K/UL (ref 150–350)
PMV BLD AUTO: 12.4 FL (ref 9.2–12.9)
POTASSIUM SERPL-SCNC: 4.2 MMOL/L (ref 3.5–5.1)
RBC # BLD AUTO: 3.22 M/UL (ref 4.6–6.2)
SODIUM SERPL-SCNC: 137 MMOL/L (ref 136–145)
WBC # BLD AUTO: 7.85 K/UL (ref 3.9–12.7)

## 2020-01-14 PROCEDURE — 97166 OT EVAL MOD COMPLEX 45 MIN: CPT

## 2020-01-14 PROCEDURE — 63600175 PHARM REV CODE 636 W HCPCS: Performed by: INTERNAL MEDICINE

## 2020-01-14 PROCEDURE — 94761 N-INVAS EAR/PLS OXIMETRY MLT: CPT

## 2020-01-14 PROCEDURE — 25000003 PHARM REV CODE 250: Performed by: SPECIALIST

## 2020-01-14 PROCEDURE — 97161 PT EVAL LOW COMPLEX 20 MIN: CPT

## 2020-01-14 PROCEDURE — 80048 BASIC METABOLIC PNL TOTAL CA: CPT

## 2020-01-14 PROCEDURE — 25000003 PHARM REV CODE 250: Performed by: INTERNAL MEDICINE

## 2020-01-14 PROCEDURE — 97110 THERAPEUTIC EXERCISES: CPT

## 2020-01-14 PROCEDURE — 36415 COLL VENOUS BLD VENIPUNCTURE: CPT

## 2020-01-14 PROCEDURE — 25000003 PHARM REV CODE 250: Performed by: NURSE PRACTITIONER

## 2020-01-14 PROCEDURE — 99900035 HC TECH TIME PER 15 MIN (STAT)

## 2020-01-14 PROCEDURE — 85027 COMPLETE CBC AUTOMATED: CPT

## 2020-01-14 PROCEDURE — 21400001 HC TELEMETRY ROOM

## 2020-01-14 PROCEDURE — 83735 ASSAY OF MAGNESIUM: CPT

## 2020-01-14 PROCEDURE — 97535 SELF CARE MNGMENT TRAINING: CPT

## 2020-01-14 PROCEDURE — 97116 GAIT TRAINING THERAPY: CPT

## 2020-01-14 RX ORDER — LISINOPRIL 5 MG/1
5 TABLET ORAL DAILY
Status: DISCONTINUED | OUTPATIENT
Start: 2020-01-15 | End: 2020-01-15 | Stop reason: HOSPADM

## 2020-01-14 RX ADMIN — SERTRALINE HYDROCHLORIDE 100 MG: 50 TABLET ORAL at 08:01

## 2020-01-14 RX ADMIN — DONEPEZIL HYDROCHLORIDE 10 MG: 5 TABLET, FILM COATED ORAL at 09:01

## 2020-01-14 RX ADMIN — PREGABALIN 150 MG: 75 CAPSULE ORAL at 08:01

## 2020-01-14 RX ADMIN — SIMVASTATIN 40 MG: 40 TABLET, FILM COATED ORAL at 08:01

## 2020-01-14 RX ADMIN — FUROSEMIDE 40 MG: 40 TABLET ORAL at 09:01

## 2020-01-14 RX ADMIN — METOPROLOL SUCCINATE 25 MG: 25 TABLET, EXTENDED RELEASE ORAL at 09:01

## 2020-01-14 RX ADMIN — ASPIRIN 81 MG 81 MG: 81 TABLET ORAL at 09:01

## 2020-01-14 RX ADMIN — PREGABALIN 150 MG: 75 CAPSULE ORAL at 09:01

## 2020-01-14 RX ADMIN — ENOXAPARIN SODIUM 30 MG: 100 INJECTION SUBCUTANEOUS at 04:01

## 2020-01-14 RX ADMIN — LISINOPRIL 10 MG: 10 TABLET ORAL at 10:01

## 2020-01-14 NOTE — PT/OT/SLP EVAL
Physical Therapy Evaluation    Patient Name:  Halley Rodrigues   MRN:  [id number]    Recommendations:     Discharge Recommendations:  nursing facility, basic   Discharge Equipment Recommendations: oxygen       Assessment:     Halley Rodrigues is a 89 y.o. male admitted with a medical diagnosis of Acute respiratory failure with hypoxia and hypercarbia.  He presents with the following impairments/functional limitations:  weakness, impaired endurance, gait instability, impaired functional mobilty . Pt lives at home with his son and daughter-in-law and was reportedly independent with  ADL's and was ambulatory w/o an AD but does have RW, cane , and W/C.  Pt's son plans for  pt to be d/sobia to NH halfway care.      Rehab Prognosis: Good; patient would benefit from acute skilled PT services to address these deficits and reach maximum level of function.    Recent Surgery: Procedure(s) (LRB):  EGD (ESOPHAGOGASTRODUODENOSCOPY) (Left)  COLONOSCOPY (Left) 1 Day Post-Op    Plan:     During this hospitalization, patient to be seen 5 x/week to address the identified rehab impairments via gait training, therapeutic activities, therapeutic exercises and progress toward the following goals:    · Plan of Care Expires:  01/15/20    Subjective     Chief Complaint: Pt voiced no complaints  Patient/Family Comments/goals:  NH halfway care  Pain/Comfort:  ·      Patients cultural, spiritual, Holiness conflicts given the current situation:      Living Environment:  Pt lives with his son and daughter-in-law in a one story house with 1 step entry.  Prior to admission, patients level of function was Independent   Equipment used at home: walker, rolling, cane, straight, oxygen.  DME owned (not currently used): wheelchair.      Objective:     Communicated with nurse prior to session.  Patient found supine with blood pressure cuff, telemetry  upon PT entry to room.    General Precautions: Standard, fall   Orthopedic Precautions:     Braces:       Exams:  · Cognitive Exam:  Patient is oriented to Person, Place and Time  · RLE ROM: WFL  · RLE Strength: WFL  · LLE ROM: WFL  · LLE Strength: WFL    Functional Mobility:  · Bed Mobility:     · Supine to Sit: stand by assistance  · Transfers:     · Sit to Stand:  stand by assistance with rolling walker  · Gait: 75 ft RW and CGA      T    AM-PAC 6 CLICK MOBILITY  Total Score:17     Patient left supine with call button in reach and bed alarm on.    GOALS:   Multidisciplinary Problems     Physical Therapy Goals        Problem: Physical Therapy Goal    Goal Priority Disciplines Outcome Goal Variances Interventions   Physical Therapy Goal     PT, PT/OT      Description:  Goals to be met by: D/C    Patient will increase functional independence with mobility by performin. Sit to stand transfer with Stand-by Assistance  2. Gait  150-200 ft with RW and SBA  3.Bed to chair with SBA                      History:     Past Medical History:   Diagnosis Date    Anemia     Anemia due to chronic blood loss 2018    Anemia, chronic disease 2018    Dementia     GERD (gastroesophageal reflux disease)     Heart disease     Hypertension     Neuropathy        Past Surgical History:   Procedure Laterality Date    COLONOSCOPY Left 2020    Procedure: COLONOSCOPY;  Surgeon: Be Woods MD;  Location: [facility];  Service: Endoscopy;  Laterality: Left;    CORONARY ARTERY BYPASS GRAFT      ESOPHAGOGASTRODUODENOSCOPY Left 2020    Procedure: EGD (ESOPHAGOGASTRODUODENOSCOPY);  Surgeon: Be Woods MD;  Location: [facility];  Service: Endoscopy;  Laterality: Left;       Time Tracking:     PT Received On: 20  PT Start Time: 1130     PT Stop Time: 1150  PT Total Time (min): 20 min     Billable Minutes: Evaluation 12 minutes  and Gait Training 8 minutes      Gabriella Garcia, PT  2020

## 2020-01-14 NOTE — PLAN OF CARE
01/14/20 0917   Discharge Reassessment   Assessment Type Discharge Planning Reassessment   Anticipated Discharge Disposition Long Term   Discharge Plan A New Nursing Home placement - longterm care facility   Discharge Plan B Home Health;Home with family   DME Needed Upon Discharge  none   Post-Acute Status   Post-Acute Authorization Placement   Post-Acute Placement Status Referrals Sent  ([facility] and [facility])   I called the patient's son, Cali [phone] to discuss placement  locations in [place]. He told me that his wife will call me back.     Erica called back and we discussed NH placement in [place] as he [place] not qualify for [place] with property in [place]. I asked her if she wanted to pursue placement; she does. I informed her that there 2 nursing homes in [place] and [facility]. She was ok wth me sending out the referrals to these two.    I spoke with Alessandra at [facility] this morning, informed her about the referral and filled her in on the patient's situation. She will look at the chart and get back to me.    I also spoke with Jewell at [facility]; they do not have any male longterm beds at this time.    1009--Spoke with Erica Rodrigues, dtr in law [phone], informed her that [facility] does not have any male longterm beds. I also asked her if I could send the referral to a third NH in [facility]. She is fine with that. She also asked me about Rehab and hospice. She stated that the patient does not walk once he gets home; he just lies on the sofa. I  asked her if she wanted the patient to return home; she does not. I informed her that any options other that NH placement will result in the patient returning her home after. I also informed her that the patient can go home with her and the NH placement can be completed by them with the NH that are interested. I can give her all of the info to contact the NHs. This decision is up to the attending.  Patient choice form for HH with [facility] completed today  via this telephone conversation. Just in case dc home is necessary.    1103--Referral faxed to [facility] [place] [facility].    0619--Alessandra with [facility] present to assess the patient. She will call Erica for financial info.

## 2020-01-14 NOTE — CARE UPDATE
01/13/20 2204   Patient Assessment/Suction   Level of Consciousness (AVPU) alert   PRE-TX-O2   O2 Device (Oxygen Therapy) room air   SpO2 (!) 91 %   Pulse Oximetry Type Continuous   $ Pulse Oximetry - Multiple Charge Pulse Oximetry - Multiple   Pulse 73   Resp (!) 26

## 2020-01-14 NOTE — PROGRESS NOTES
[facility] Medicine  Progress Note    Dos: 01/14/2020    Patient Name: Halley Rodrigues  MRN: [id number]  Patient Class: IP- Inpatient   Admission Date: 1/8/2020  3:53 PM   Attending Physician:  Dr. Baltazar  Primary Care Provider: Scott Payne MD  Face-to-Face encounter date: 01/14/2020    HPI:   Halley Rodrigues is a 89 y.o. old  male who  has a past medical history of Anemia, Anemia due to chronic blood loss (7/25/2018), Anemia, chronic disease (7/25/2018), Dementia, GERD (gastroesophageal reflux disease), Heart disease, Hypertension, and Neuropathy.. The patient presented to [facility] on 1/8/2020 with a primary complaint of Cough (decreased appetite, recent pneumonia, sent from Dr. Payne office for low blood pressure, cough )     89-year-old  male presents emergency room with worsening shortness of breath and a productive cough. He was seen at Dr. Payne in his office his PCP and was instructed to come to the emergency room secondary to increased shortness of breath. This patient was recently discharged from our facility about 2 days ago and was treated for pneumonia.  He was following up with his primary care doctor today and was directed to come to the emergency room secondary to a persistent cough and worsening shortness of breath. The patient was not discharged home on oxygen.  In the PCPs office the patient was noted to have low oxygen saturations. Upon arrival in emergency room the patient was found to be satting in the 80s and was moderately tachypneic.  A D-dimer was performed that was positive and a subsequent V/Q scan was performed that showed a probability for a PE.    Ultrasounds of bilateral lower extremities was negative for DVT     Interval history:  Today the patient reports doing well with minimal shortness of breath, improved with therapy.  He tolerated endoscopy well today.  He denies abdominal pain, nausea, or vomiting.  No  chest pain or fever.    Physical exam:  Vitals Reviewed  General appearance: Well-developed, well-nourished male in no apparent distress.  HENT: Moist mucous membranes of oral cavity.  Eyes:  Anicteric sclerae, no conjunctival discharge  Lungs:  Diminished breath sounds diffusely, comfortable work of breathing  Cardiovascular:  RRR, 2+ radial pulses  Abdomen: Soft, non-distended, non-tender    Laboratory data:  Labs reviewed    No new imaging today    CTA Chest Non Coronary   Final Result      1. Negative for pulmonary thromboembolism.   2. Findings of bilateral multifocal bronchiolitis, most significantly in the left lower lobe, presumably of an acute infectious etiology.  Atypical mycobacterial infection could have this appearance.   3. Additional small consolidative opacities and geographic interstitial opacities in both lungs, nonspecific.  Multifocal atypical or viral pneumonia, and or interstitial lung disease of numerous potential etiologies could have this appearance.   4. Several prominent to mildly enlarged mediastinal lymph nodes, nonspecific but presumably reactive due to lung disease.   5. Extensive 3 vessel coronary arterial vascular calcifications, and moderate aortic vascular calcifications.   .         Electronically signed by: Vinay Christiansen MD   Date:    01/09/2020   Time:    10:40      NM Lung Scan Ventilation Perfusion   Final Result      US Lower Extremity Veins Bilateral   Final Result      X-Ray Chest AP Portable   Final Result      Interval improved aeration in the left lung, with nonspecific scattered interstitial opacities throughout both lungs.  No consolidated pneumonia.         Electronically signed by: Vinay Christiansen MD   Date:    01/08/2020   Time:    19:17        EGD  - Small hiatal hernia.                        - A single bleeding angioectasia in the stomach.                         Treated with argon plasma coagulation (APC).                        - Normal examined duodenum.  High One  6 mm polyp at the ileocecal valve, removed                         with a cold snare. Resected and retrieved. Biopsied.                        - The examination was otherwise normal on direct                         and retroflexion views.    Twelve lead EKG: reveals a normal sinus rhythm with sinus arrhythmia a normal axis poor R-wave progression atrial enlargement ST flattening throughout most pronounced in the inferior lateral leads rate 77  milliseconds    Assessment and plan update today:  Continue care.  Appreciate consultants.  CTA negative for pulmonary embolus.  Procalcitonin negative.    Continue oral Lasix as able.  Monitor fluid status.   GI consultation.  C diff negative.  Endoscopy performed yesterday and there was 1 bleeding gastric AVM which was cauterized.  Tolerating diet well now. Continue daily PPI.  It is unclear to me why patient is not on any anticoagulation for his Afib.  Would appreciate cardiology's input if patient can be put on Eliquis or Xarelto.  Continue medications for chronic issues as able  Repeat daily labs  Mobilize with PT/OT as able  Disposition:  nursing home placement   Moderate risk secondary to multiple medical comorbid conditions; prescription drug management; IV fluids with additives; planned endoscopy procedure    1.  Acute hypoxemic respiratory insufficiency due to Acute cardiogenic pulmonary edema, resolved  -supplemental O2  -aerosol nebulizer treatments  -consult pulmonology    2.  High probability for PE per V/Q scan, CTA negative for PE  -no need for anticoagulation as CTA chest ruled out pulmonary embolism  -consult heme oncology Dr. Santoro    3.  Chronic kidney disease stage 3    4.  Recent pneumonia    5.  Essential hypertension    6.  Hyperlipidemia    7.  Advanced age    8. Chronic Anemia    9.  QTC prolongation    10.  Peripheral neuropathy    11.  Dementia

## 2020-01-14 NOTE — PLAN OF CARE
Pt remains free from injury. Pt awaiting placement. No c/o pain. PO intake encouraged. Pt educated on plan of care and safety.

## 2020-01-14 NOTE — PLAN OF CARE
Problem: Occupational Therapy Goal  Goal: Occupational Therapy Goal  Description  Goals to be met by: discharge    Patient will increase functional independence with ADLs by performing:    Grooming while standing at sink with Stand-by Assistance.  Toileting from bedside commode with Stand-by Assistance for hygiene and clothing management.   Toilet transfer to bedside commode with Stand-by Assistance.     Outcome: Ongoing, Progressing   OT initial eval completed and treatment initiated.  Pt would benefit from continue OT.  Discharge recs likely NH.

## 2020-01-14 NOTE — PROGRESS NOTES
[facility]  Adult Nutrition   Progress Note (Follow-Up)    SUMMARY     Recommendations/Interventions:    Recommendation/Intervention: 1. Continue current diet as tolerated, encourage intake. 2.  to assist in meal choices daily.  Goals: Pt to continue to meet estimated needs via PO intake of meals and supplements.  Nutrition Goal Status: goal met    Dietitian Rounds Brief:  · Seen 2' f/u. Appetite and intake good. Eating % of meals and drinking ensure. No issues with N/V/d. Awaiting NH placement.    Reason for Assessment    Reason For Assessment: RD follow-up  Diagnosis: (SOB)  Relevant Medical History: Dementia, GERD, Heart disease, HTN  Interdisciplinary Rounds: attended    Nutrition Risk Screen    Nutrition Risk Screen: no indicators present     MST Score: 2  Have you recently lost weight without trying?: Unsure  Weight loss score: 2  Have you been eating poorly because of a decreased appetite?: No  Appetite score: 0     Nutrition/Diet History    Food Allergies: NKFA  Factors Affecting Nutritional Intake: None identified at this time    Anthropometrics    Temp: 98 °F (36.7 °C)  Height Method: Stated  Height: 6' (182.9 cm)  Height (inches): 72 in  Weight Method: Bed Scale  Weight: 64.9 kg (143 lb 1.3 oz)  Weight (lb): 143.08 lb  Ideal Body Weight (IBW), Male: 178 lb  % Ideal Body Weight, Male (lb): 84.27 %  BMI (Calculated): 19.4  BMI Grade: 18.5-24.9 - normal     Weight History:  Wt Readings from Last 10 Encounters:   01/14/20 64.9 kg (143 lb 1.3 oz)   01/05/20 65 kg (143 lb 4.8 oz)   01/03/20 69.6 kg (153 lb 7 oz)   12/03/19 63.9 kg (140 lb 14 oz)   10/21/19 73.5 kg (162 lb)   09/09/19 74.8 kg (165 lb)   08/26/19 73.5 kg (162 lb)   07/30/19 73.5 kg (162 lb)   05/02/19 73.8 kg (162 lb 9.6 oz)   04/25/19 70.8 kg (156 lb)     Lab/Procedures/Meds: Pertinent Labs Reviewed  Clinical Chemistry:  Recent Labs   Lab 01/08/20  1847 01/10/20  0333 01/14/20  0516    140 137   K 3.6 3.9 4.2     104 102   CO2 25 26 25   * 122* 98   BUN 28* 25* 41*   CREATININE 1.6* 1.5* 1.7*   CALCIUM 8.7 8.6* 8.6*   PROT 7.1 6.7  --    ALBUMIN 3.2* 3.0*  --    BILITOT 0.9 1.0  --    ALKPHOS 60 58  --    AST 30 20  --    ALT 24 18  --    ANIONGAP 11 10 10   ESTGFRAFRICA 43.5* 47.0* 40.4*   EGFRNONAA 37.6* 40.7* 35.0*   MG 1.9  --  2.2   AMYLASE 51  --   --    LIPASE 39  --   --     < > = values in this interval not displayed.     CBC:   Recent Labs   Lab 01/14/20  0516   WBC 7.85   RBC 3.22*   HGB 9.9*   HCT 30.4*      MCV 94   MCH 30.7   MCHC 32.6     Cardiac Profile:  Recent Labs   Lab 01/08/20  1847 01/09/20  0403 01/09/20  1011 01/10/20  0333 01/11/20  0442 01/12/20  0459 01/13/20  0441   *  --   --  689* 261* 137* 203*     --   --   --   --   --   --    CPKMB 2.1  --   --   --   --   --   --    TROPONINI 0.587* 0.483* 0.422* 0.365*  --   --   --      Thyroid & Parathyroid:  Recent Labs   Lab 01/08/20  1847   TSH 0.940       Medications: Pertinent Medications reviewed  Scheduled Meds:   amLODIPine  5 mg Oral Daily    aspirin  81 mg Oral Daily    donepezil  10 mg Oral Daily    enoxaparin  30 mg Subcutaneous Daily    furosemide  40 mg Oral Daily    lisinopril  10 mg Oral Daily    metoprolol succinate  25 mg Oral Daily    pantoprazole  40 mg Oral Daily    pregabalin  150 mg Oral BID    sertraline  100 mg Oral QHS    simvastatin  40 mg Oral QHS     Continuous Infusions:  PRN Meds:.ondansetron, sodium chloride 0.9%    Estimated/Assessed Needs    Weight Used For Calorie Calculations: 66.6 kg (146 lb 13.2 oz)  Energy Calorie Requirements (kcal): 1998-2331 kcals/day (30-35 kcals/kg)  Energy Need Method: Kcal/kg  Protein Requirements:  g/day (1.2-1.5 g/kg)  Weight Used For Protein Calculations: 66.6 kg (146 lb 13.2 oz)     Estimated Fluid Requirement Method: RDA Method    Nutrition Prescription Ordered    Current Diet Order: Cardiac Diet   Oral Nutrition Supplement: Ensure  Amaury HERNANDEZ     Evaluation of Received Nutrient/Fluid Intake    Energy Calories Required: meeting needs  Protein Required: meeting needs  Fluid Required: meeting needs  Tolerance: tolerating  % Intake of Estimated Energy Needs: 75 - 100 %  % Meal Intake: 75 - 100 %    Intake/Output Summary (Last 24 hours) at 1/14/2020 1232  Last data filed at 1/14/2020 1000  Gross per 24 hour   Intake 400 ml   Output 795 ml   Net -395 ml      Nutrition Risk    Level of Risk/Frequency of Follow-up: moderate     Monitor and Evaluation    Food and Nutrient Intake: energy intake, food and beverage intake  Food and Nutrient Adminstration: diet order  Physical Activity and Function: nutrition-related ADLs and IADLs  Anthropometric Measurements: weight, weight change  Biochemical Data, Medical Tests and Procedures: electrolyte and renal panel, lipid profile, gastrointestinal profile, glucose/endocrine profile, inflammatory profile  Nutrition-Focused Physical Findings: overall appearance     Nutrition Follow-Up    RD Follow-up?: Yes  Vy Genao RD 01/14/2020 12:35 PM

## 2020-01-14 NOTE — PT/OT/SLP EVAL
Occupational Therapy   Evaluation    Name: Halley Rodrigues  MRN: [id number]  Admitting Diagnosis:  Acute respiratory failure with hypoxia and hypercarbia 1 Day Post-Op    Recommendations:     Discharge Recommendations: nursing facility, basic  Discharge Equipment Recommendations:  none  Barriers to discharge:  Decreased caregiver support    Assessment:     Halley Rodrigues is a 89 y.o. male with a medical diagnosis of Acute respiratory failure with hypoxia and hypercarbia.  He presents with Performance deficits affecting function: weakness, impaired endurance, impaired self care skills, impaired balance, gait instability, impaired functional mobilty, decreased safety awareness, impaired cardiopulmonary response to activity.      Rehab Prognosis: Fair; patient would benefit from acute skilled OT services to address these deficits and reach maximum level of function.       Plan:     Patient to be seen 5 x/week to address the above listed problems via self-care/home management, therapeutic activities, therapeutic exercises  · Plan of Care Expires: 02/14/20  · Plan of Care Reviewed with: patient    Subjective     Chief Complaint: none  Patient/Family Comments/goals: none    Occupational Profile:  Living Environment: lives with son and daughter n law in [facility] with 1-2 steps with HR; tub/shower combo with shower chair and grab bars.  Previous level of function: pt reports he was indep- Radha with ADLS; ambulated independently (hx dementia , questionable historian).  Roles and Routines: sedentary  Equipment Used at Home:  walker, rolling, cane, straight, shower chair, oxygen, wheelchair, grab bar  Assistance upon Discharge: family wants NH placement p[er chart review.    Pain/Comfort:  · Pain Rating 1: 0/10  · Pain Rating Post-Intervention 1: 0/10    Patients cultural, spiritual, Orthodox conflicts given the current situation: no    Objective:     Communicated with: nurse prior to session.  Patient found HOB  elevated with telemetry, blood pressure cuff, pulse ox (continuous) upon OT entry to room.    General Precautions: Standard, fall   Orthopedic Precautions:N/A   Braces: N/A     Occupational Performance:    Bed Mobility:    · Patient completed Rolling/Turning to Right with supervision  · Patient completed Scooting/Bridging with supervision  · Patient completed Supine to Sit with supervision  · Patient completed Sit to Supine with supervision    Functional Mobility/Transfers:  · Patient completed Sit <> Stand Transfer with contact guard assistance  with  rolling walker   · Patient completed Toilet Transfer Step Transfer technique with minimum assistance with  rolling walker and bedside commode  · Functional Mobility: ambulated with min A using RW to sink, max vc for upright posture, forward flexed trunk, neck, knees buckling.    Activities of Daily Living:  · Feeding:  independence HOB elevated  · Grooming: minimum assistance max vc for upright posture, LOB leaning to right, knees buckling standing with RW  · Lower Body Dressing: supervision socks donned/doffed seated EOB  · Toileting: wears brief, used urinal setup in bed, decliend BSc use .    Cognitive/Visual Perceptual:  Cognitive/Psychosocial Skills:     -       Oriented to: Person, Place, Time and Situation   -       Follows Commands/attention:Follows one-step commands  -       Communication: clear/fluent  -       Memory: Poor immediate recall  -       Safety awareness/insight to disability: impaired   -       Mood/Affect/Coping skills/emotional control: Appropriate to situation  Visual/Perceptual:      -Intact .    Physical Exam:  Balance:    -       sitting:  good  dynamic:  good-   standing: fair -  dynamic;  poor plus  Postural examination/scapula alignment:    -       Rounded shoulders  -       Forward head  -       Posterior pelvic tilt  -       Abnormal trunk flexion  -       Kyphosis  Dominant hand:    -       right  Upper Extremity Range of Motion:      -       Right Upper Extremity: WFL  -       Left Upper Extremity: WFL  Upper Extremity Strength:    -       Right Upper Extremity: WFL  -       Left Upper Extremity: WFL   Strength:    -       Right Upper Extremity: WFL  -       Left Upper Extremity: WFL    AMPAC 6 Click ADL:  AMPAC Total Score: 19    Treatment & Education:  -purpose of OT visit and POC, verbalized  -standing safety with grooming task, RW , hand placement, postural cues for minimizing fall risk.  -seated ex:  Upper body postural ex with hands clasped bend head/back of neck for rhomboid strengthening 10 x 3 sets and chair push ups 10 x 3 sets with SBA/supervison.  Education:    Patient left HOB elevated with all lines intact, call button in reach and bed alarm on    GOALS:   Multidisciplinary Problems     Occupational Therapy Goals        Problem: Occupational Therapy Goal    Goal Priority Disciplines Outcome Interventions   Occupational Therapy Goal     OT, PT/OT Ongoing, Progressing    Description:  Goals to be met by: discharge    Patient will increase functional independence with ADLs by performing:    Grooming while standing at sink with Stand-by Assistance.  Toileting from bedside commode with Stand-by Assistance for hygiene and clothing management.   Toilet transfer to bedside commode with Stand-by Assistance.                      History:     Past Medical History:   Diagnosis Date    Anemia     Anemia due to chronic blood loss 7/25/2018    Anemia, chronic disease 7/25/2018    Dementia     GERD (gastroesophageal reflux disease)     Heart disease     Hypertension     Neuropathy        Past Surgical History:   Procedure Laterality Date    COLONOSCOPY Left 1/13/2020    Procedure: COLONOSCOPY;  Surgeon: Be Woods MD;  Location: [facility];  Service: Endoscopy;  Laterality: Left;    CORONARY ARTERY BYPASS GRAFT      ESOPHAGOGASTRODUODENOSCOPY Left 1/13/2020    Procedure: EGD (ESOPHAGOGASTRODUODENOSCOPY);  Surgeon: Be OLSON  MD Peggy;  Location: [facility];  Service: Endoscopy;  Laterality: Left;       Time Tracking:     OT Date of Treatment: 01/14/20  OT Start Time: 1243  OT Stop Time: 1321  OT Total Time (min): 38 min    Billable Minutes:Evaluation 15  Self Care/Home Management 8  Therapeutic Exercise 1`5  Total Time 38    Lori Saucedo OT  1/14/2020

## 2020-01-14 NOTE — PLAN OF CARE
01/14/20 0917   Final Note   Assessment Type Discharge Planning Reassessment   Anticipated Discharge Disposition Long Term   Post-Acute Status   Post-Acute Authorization Placement   Post-Acute Placement Status Referrals Sent  ([facility] and [facility])

## 2020-01-15 VITALS
TEMPERATURE: 98 F | BODY MASS INDEX: 20.19 KG/M2 | SYSTOLIC BLOOD PRESSURE: 115 MMHG | RESPIRATION RATE: 19 BRPM | HEART RATE: 66 BPM | WEIGHT: 149.06 LBS | HEIGHT: 72 IN | OXYGEN SATURATION: 94 % | DIASTOLIC BLOOD PRESSURE: 57 MMHG

## 2020-01-15 LAB
ANION GAP SERPL CALC-SCNC: 12 MMOL/L (ref 8–16)
BUN SERPL-MCNC: 48 MG/DL (ref 8–23)
CALCIUM SERPL-MCNC: 8.8 MG/DL (ref 8.7–10.5)
CHLORIDE SERPL-SCNC: 102 MMOL/L (ref 95–110)
CO2 SERPL-SCNC: 26 MMOL/L (ref 23–29)
CREAT SERPL-MCNC: 1.5 MG/DL (ref 0.5–1.4)
ERYTHROCYTE [DISTWIDTH] IN BLOOD BY AUTOMATED COUNT: 13.5 % (ref 11.5–14.5)
EST. GFR  (AFRICAN AMERICAN): 47 ML/MIN/1.73 M^2
EST. GFR  (NON AFRICAN AMERICAN): 40.7 ML/MIN/1.73 M^2
GLUCOSE SERPL-MCNC: 90 MG/DL (ref 70–110)
HCT VFR BLD AUTO: 30.5 % (ref 40–54)
HGB BLD-MCNC: 10.1 G/DL (ref 14–18)
MAGNESIUM SERPL-MCNC: 2.2 MG/DL (ref 1.6–2.6)
MCH RBC QN AUTO: 31.4 PG (ref 27–31)
MCHC RBC AUTO-ENTMCNC: 33.1 G/DL (ref 32–36)
MCV RBC AUTO: 95 FL (ref 82–98)
PLATELET # BLD AUTO: 166 K/UL (ref 150–350)
PMV BLD AUTO: 12.5 FL (ref 9.2–12.9)
POTASSIUM SERPL-SCNC: 4.3 MMOL/L (ref 3.5–5.1)
RBC # BLD AUTO: 3.22 M/UL (ref 4.6–6.2)
SODIUM SERPL-SCNC: 140 MMOL/L (ref 136–145)
WBC # BLD AUTO: 8.05 K/UL (ref 3.9–12.7)

## 2020-01-15 PROCEDURE — 80048 BASIC METABOLIC PNL TOTAL CA: CPT

## 2020-01-15 PROCEDURE — 85027 COMPLETE CBC AUTOMATED: CPT

## 2020-01-15 PROCEDURE — 94761 N-INVAS EAR/PLS OXIMETRY MLT: CPT

## 2020-01-15 PROCEDURE — 97116 GAIT TRAINING THERAPY: CPT | Mod: CQ

## 2020-01-15 PROCEDURE — 83735 ASSAY OF MAGNESIUM: CPT

## 2020-01-15 PROCEDURE — 25000003 PHARM REV CODE 250: Performed by: NURSE PRACTITIONER

## 2020-01-15 PROCEDURE — 36415 COLL VENOUS BLD VENIPUNCTURE: CPT

## 2020-01-15 PROCEDURE — 25000003 PHARM REV CODE 250: Performed by: SPECIALIST

## 2020-01-15 PROCEDURE — 97535 SELF CARE MNGMENT TRAINING: CPT

## 2020-01-15 PROCEDURE — 25000003 PHARM REV CODE 250: Performed by: INTERNAL MEDICINE

## 2020-01-15 RX ORDER — LISINOPRIL 5 MG/1
5 TABLET ORAL DAILY
Qty: 30 TABLET | Refills: 0 | Status: SHIPPED | OUTPATIENT
Start: 2020-01-16 | End: 2020-02-15

## 2020-01-15 RX ADMIN — DONEPEZIL HYDROCHLORIDE 10 MG: 5 TABLET, FILM COATED ORAL at 08:01

## 2020-01-15 RX ADMIN — LISINOPRIL 5 MG: 5 TABLET ORAL at 08:01

## 2020-01-15 RX ADMIN — ASPIRIN 81 MG 81 MG: 81 TABLET ORAL at 08:01

## 2020-01-15 RX ADMIN — METOPROLOL SUCCINATE 25 MG: 25 TABLET, EXTENDED RELEASE ORAL at 09:01

## 2020-01-15 RX ADMIN — FUROSEMIDE 40 MG: 40 TABLET ORAL at 08:01

## 2020-01-15 RX ADMIN — AMLODIPINE BESYLATE 5 MG: 5 TABLET ORAL at 08:01

## 2020-01-15 RX ADMIN — PREGABALIN 150 MG: 75 CAPSULE ORAL at 08:01

## 2020-01-15 NOTE — PLAN OF CARE
Pt remains free from injury. Instructed on medication changes. Pt awaiting placement. Pt educated on plan of care and safety.

## 2020-01-15 NOTE — PLAN OF CARE
Problem: Occupational Therapy Goal  Goal: Occupational Therapy Goal  Description  Goals to be met by: discharge    Patient will increase functional independence with ADLs by performing:    Grooming while standing at sink with Stand-by Assistance.  Toileting from bedside commode with Stand-by Assistance for hygiene and clothing management.   Toilet transfer to bedside commode with Stand-by Assistance.     Outcome: Ongoing, Progressing

## 2020-01-15 NOTE — PROGRESS NOTES
Progress Note  Cardiology    Admit Date: 1/8/2020   LOS: 5 days      Follow-up For:  Cardiology    Scheduled Meds:   amLODIPine  5 mg Oral Daily    aspirin  81 mg Oral Daily    donepezil  10 mg Oral Daily    enoxaparin  30 mg Subcutaneous Daily    furosemide  40 mg Oral Daily    [START ON 1/15/2020] lisinopril  5 mg Oral Daily    metoprolol succinate  25 mg Oral Daily    pantoprazole  40 mg Oral Daily    pregabalin  150 mg Oral BID    sertraline  100 mg Oral QHS    simvastatin  40 mg Oral QHS     Continuous Infusions:  PRN Meds:ondansetron, sodium chloride 0.9%    Review of patient's allergies indicates:   Allergen Reactions    Eucalyptus containing products Palpitations       SUBJECTIVE:     Interval History: Patient has no complaint of chest pain or shortness.          OBJECTIVE:     Vital Signs (Most Recent)  Temp: 97.6 °F (36.4 °C) (01/14/20 1907)  Pulse: 68 (01/14/20 1907)  Resp: 18 (01/14/20 1907)  BP: 113/73 (01/14/20 1907)  SpO2: 96 % (01/14/20 1907)    Vital Signs Range (Last 24H):  Temp:  [97.6 °F (36.4 °C)-98.9 °F (37.2 °C)]   Pulse:  [60-73]   Resp:  [12-26]   BP: (111-124)/(54-73)   SpO2:  [91 %-96 %]       Physical Exam:  Neck: no carotid bruit and no JVD  Lungs: diminished breath sounds bibasilar  Heart: regular rate and rhythm  Extremities: Negative for: edema    Recent Results (from the past 24 hour(s))   CBC Without Differential    Collection Time: 01/14/20  5:16 AM   Result Value Ref Range    WBC 7.85 3.90 - 12.70 K/uL    RBC 3.22 (L) 4.60 - 6.20 M/uL    Hemoglobin 9.9 (L) 14.0 - 18.0 g/dL    Hematocrit 30.4 (L) 40.0 - 54.0 %    Mean Corpuscular Volume 94 82 - 98 fL    Mean Corpuscular Hemoglobin 30.7 27.0 - 31.0 pg    Mean Corpuscular Hemoglobin Conc 32.6 32.0 - 36.0 g/dL    RDW 13.5 11.5 - 14.5 %    Platelets 162 150 - 350 K/uL    MPV 12.4 9.2 - 12.9 fL   Basic metabolic panel    Collection Time: 01/14/20  5:16 AM   Result Value Ref Range    Sodium 137 136 - 145 mmol/L    Potassium  4.2 3.5 - 5.1 mmol/L    Chloride 102 95 - 110 mmol/L    CO2 25 23 - 29 mmol/L    Glucose 98 70 - 110 mg/dL    BUN, Bld 41 (H) 8 - 23 mg/dL    Creatinine 1.7 (H) 0.5 - 1.4 mg/dL    Calcium 8.6 (L) 8.7 - 10.5 mg/dL    Anion Gap 10 8 - 16 mmol/L    eGFR if African American 40.4 (A) >60 mL/min/1.73 m^2    eGFR if non African American 35.0 (A) >60 mL/min/1.73 m^2   Magnesium    Collection Time: 01/14/20  5:16 AM   Result Value Ref Range    Magnesium 2.2 1.6 - 2.6 mg/dL       Diagnostic Results:  Labs: Reviewed    ASSESSMENT/PLAN:   Stable CHF compensated and understands possibly tells.  I had a long discussion with patient and his son about further evaluation with coronary angiography pros and cons explained.  Patient has opted defer the procedure at this time and understand risk of recurrence  .  There is a question about anticoagulation .  Apparently when patient was initially seen in the ER he had positive D-dimers the  subsequent CTA was negative for PE.  He was cleared by the by pulmonology.  He apparently received 1 dose of Eliquis in the ER before the workup was completed ,We  also have the patient sign and daughter-in-law check for any anticoagunt..  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  And no record of him taking Eliquis or Xarelto

## 2020-01-15 NOTE — CARE UPDATE
01/14/20 2138   Patient Assessment/Suction   Level of Consciousness (AVPU) alert   Respiratory Effort Unlabored   Expansion/Accessory Muscles/Retractions no use of accessory muscles   All Lung Fields Breath Sounds clear   Rhythm/Pattern, Respiratory unlabored   Cough Frequency no cough   PRE-TX-O2   O2 Device (Oxygen Therapy) room air   SpO2 98 %   Pulse Oximetry Type Continuous   $ Pulse Oximetry - Multiple Charge Pulse Oximetry - Multiple   Pulse 67   Resp (!) 21   Positioning   Head of Bed (HOB) HOB at 30-45 degrees   Respiratory Evaluation   $ Care Plan Tech Time 15 min

## 2020-01-15 NOTE — PT/OT/SLP PROGRESS
Occupational Therapy   Treatment    Name: Halley Rodrigues  MRN: [id number]  Admitting Diagnosis:  Acute respiratory failure with hypoxia and hypercarbia  2 Days Post-Op    Recommendations:     Discharge Recommendations: nursing facility, basic  Discharge Equipment Recommendations:  none  Barriers to discharge:  Decreased caregiver support    Assessment:     Halley Rodrigues is a 89 y.o. male with a medical diagnosis of Acute respiratory failure with hypoxia and hypercarbia.  He presents with the following performance deficits affecting function: weakness, impaired endurance, impaired self care skills, impaired functional mobilty, decreased safety awareness.     Rehab Prognosis:  Fair; patient would benefit from acute skilled OT services to address these deficits and reach maximum level of function.       Plan:     Patient to be seen 5 x/week to address the above listed problems via self-care/home management, therapeutic activities, therapeutic exercises  · Plan of Care Expires: 02/14/20  · Plan of Care Reviewed with: patient    Subjective     Pain/Comfort:  ·      Objective:     Communicated with: nurse prior to session.  Patient found HOB elevated with blood pressure cuff, telemetry, pulse ox (continuous) upon OT entry to room.    General Precautions: Standard, fall   Orthopedic Precautions:N/A   Braces: N/A     Occupational Performance:     Bed Mobility:    · Patient completed Rolling/Turning to Left with  supervision  · Patient completed Rolling/Turning to Right with supervision  · Patient completed Scooting/Bridging with supervision  · Patient completed Supine to Sit with supervision  · Patient completed Sit to Supine with supervision     Functional Mobility/Transfers:  · Patient completed Sit <> Stand Transfer with minimum assistance  with  rolling walker   · Functional Mobility: Pt ambulated within room with min A and RW.     Activities of Daily Living:  · Grooming: minimum assistance for balance  and safety while standing at sink to perform oral care and wash hands and face  · Lower Body Dressing: supervision seated EOB donning socks  · Toileting: minimum assistance for clothing management and thoroughness of hygiene    Treatment & Education:  OT role/POC  ADL training  Functional mobility training    Patient left HOB elevated with all lines intact, call button in reach and bed alarm onEducation:      GOALS:   Multidisciplinary Problems     Occupational Therapy Goals        Problem: Occupational Therapy Goal    Goal Priority Disciplines Outcome Interventions   Occupational Therapy Goal     OT, PT/OT Ongoing, Progressing    Description:  Goals to be met by: discharge    Patient will increase functional independence with ADLs by performing:    Grooming while standing at sink with Stand-by Assistance.  Toileting from bedside commode with Stand-by Assistance for hygiene and clothing management.*   *Edited goal 1/15/20: Toileting from toilet with SBA for hygiene and clothing management.  Toilet transfer to bedside commode with Stand-by Assistance.*   *Edited goal 1/15/20: Toilet transfer to toilet with SBA.                        Time Tracking:     OT Date of Treatment: 01/15/20  OT Start Time: 1402  OT Stop Time: 1442  OT Total Time (min): 40 min    Billable Minutes:Self Care/Home Management 40    Apolonia Holcomb OT  1/15/2020

## 2020-01-15 NOTE — PLAN OF CARE
Problem: Physical Therapy Goal  Goal: Physical Therapy Goal  Description  Goals to be met by: D/C    Patient will increase functional independence with mobility by performin. Sit to stand transfer with Stand-by Assistance  2. Gait  150-200 ft with RW and SBA  3.Bed to chair with SBA     Outcome: Ongoing, Progressing   Pt continues to progress towards goals.

## 2020-01-15 NOTE — PT/OT/SLP PROGRESS
Physical Therapy Treatment    Patient Name:  Halley Rodrigues   MRN:  [id number]    Recommendations:     Discharge Recommendations:  nursing facility, basic   Discharge Equipment Recommendations: oxygen   Barriers to discharge: None    Assessment:     Halley Rodrigues is a 89 y.o. male admitted with a medical diagnosis of Acute respiratory failure with hypoxia and hypercarbia.  He presents with the following impairments/functional limitations:  weakness, impaired endurance, gait instability, impaired functional mobilty. Pt progressing with gait and mobility today with no LOB or SOB noted. Pt ambulates with slight limp on R side due to pain in R heel with ambulation. Pt required min assist for RW management during gait training. Continue with PT and POC.    Rehab Prognosis: Good; patient would benefit from acute skilled PT services to address these deficits and reach maximum level of function.    Recent Surgery: Procedure(s) (LRB):  EGD (ESOPHAGOGASTRODUODENOSCOPY) (Left)  COLONOSCOPY (Left) 2 Days Post-Op    Plan:     During this hospitalization, patient to be seen 5 x/week to address the identified rehab impairments via gait training, therapeutic activities, therapeutic exercises and progress toward the following goals:    · Plan of Care Expires:  01/15/20    Subjective     Chief Complaint: R heel pain with ambulation  Patient/Family Comments/goals:   Pain/Comfort:  · Pain Rating 1: (Pt did not quantify but only endorses pain with ambulation)  · Location - Side 1: Right  · Location 1: heel  · Pain Addressed 1: Reposition, Distraction, Cessation of Activity      Objective:     Communicated with RN prior to session.  Patient found HOB elevated with blood pressure cuff, telemetry, pulse ox (continuous) upon PT entry to room.     General Precautions: Standard, fall   Orthopedic Precautions:    Braces:       Functional Mobility:  · Bed Mobility:     · Supine to Sit: stand by assistance  · Sit to Supine:  stand by assistance  · Transfers:     · Sit to Stand:  minimum assistance with rolling walker  · Gait: 150ft with RW and Min Assist for RW management      AM-PAC 6 CLICK MOBILITY          Therapeutic Activities and Exercises:   bed mobility; sitting EOB for trunk control and midline orientation; sit <> stands; transfer training; gait training    Patient left HOB elevated with all lines intact, call button in reach and bed alarm on..    GOALS:   Multidisciplinary Problems     Physical Therapy Goals        Problem: Physical Therapy Goal    Goal Priority Disciplines Outcome Goal Variances Interventions   Physical Therapy Goal     PT, PT/OT Ongoing, Progressing     Description:  Goals to be met by: D/C    Patient will increase functional independence with mobility by performin. Sit to stand transfer with Stand-by Assistance  2. Gait  150-200 ft with RW and SBA  3.Bed to chair with SBA                      Time Tracking:     PT Received On: 01/15/20  PT Start Time: 1058     PT Stop Time: 1108  PT Total Time (min): 10 min     Billable Minutes: Gait Training 10    Treatment Type: Treatment  PT/PTA: PTA     PTA Visit Number: Miki Gomez PTA  01/15/2020

## 2020-01-16 ENCOUNTER — TELEPHONE (OUTPATIENT)
Dept: PAIN MEDICINE | Facility: CLINIC | Age: 85
End: 2020-01-16

## 2020-01-16 NOTE — DISCHARGE SUMMARY
[facility] Medicine  Discharge Summary    DOS: 01/15/2020      Patient Name: Halley Rodrigues  MRN: [id number]  Admission Date: 1/8/2020  Hospital Length of Stay: 6 days  Discharge Date and Time:  01/15/2020 7:21 PM  Attending Physician: Bridgette att. providers found   Discharging Provider: Naveed Guillen MD  Primary Care Provider: Scott Payne MD      HPI:   Halley Rodrigues is a 89 y.o. old  male who  has a past medical history of Anemia, Anemia due to chronic blood loss (7/25/2018), Anemia, chronic disease (7/25/2018), Dementia, GERD (gastroesophageal reflux disease), Heart disease, Hypertension, and Neuropathy.. The patient presented to [facility] on 1/8/2020 with a primary complaint of Cough (decreased appetite, recent pneumonia, sent from Dr. Payne office for low blood pressure, cough )     89-year-old  male presents emergency room with worsening shortness of breath and a productive cough. He was seen at Dr. Payne in his office his PCP and was instructed to come to the emergency room secondary to increased shortness of breath. This patient was recently discharged from our facility about 2 days ago and was treated for pneumonia.  He was following up with his primary care doctor today and was directed to come to the emergency room secondary to a persistent cough and worsening shortness of breath. The patient was not discharged home on oxygen.  In the PCPs office the patient was noted to have low oxygen saturations. Upon arrival in emergency room the patient was found to be satting in the 80s and was moderately tachypneic.  A D-dimer was performed that was positive and a subsequent V/Q scan was performed that showed a probability for a PE.    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  Cardiology, and GI as well as pulmonary was consulted. Patient improved remarkably in last 48 hr and we were waiting for nursing home placement however somehow family was not forthcoming about all for adolfo is involved.  Today discussed with family that patient is medically cleared for discharge since yesterday and if they want to put him in a nursing home for long-term they will have to take him home and we can arrange home health.  Family and PCP will further arrange placement in nursing home.  All his medications were reconciled.  Today he denied any new symptoms.  No acute events overnight as per nursing staff.     Review of system:  Negative except generalized weakness    Physical exam:  Vitals Reviewed.  Nursing notes reviewed  General appearance: Well-developed, well-nourished male in no apparent distress.  HENT: Moist mucous membranes of oral cavity.  Eyes:  Anicteric sclerae, no conjunctival discharge  Lungs:  Diminished breath sounds diffusely, comfortable work of breathing  Cardiovascular:  RRR, 2+ radial pulses  Abdomen: Soft, non-distended, non-tender     Consults:   Consults (From admission, onward)        Status Ordering Provider     Inpatient consult to Cardiology  Once     Provider:  Mary Hall MD    Acknowledged GEORGIA MONTANO     Inpatient consult to Gastroenterology  Once     Provider:  Aleks Blackman III, MD    Completed PERLA YEPEZ     Inpatient consult to Hematology  Once     Provider:  Kendall Guo MD    Acknowledged NICK GAO     Inpatient consult to Hospitalist  Once     Provider:  Shan Wheeler MD    Acknowledged CLAUDE CHA     Inpatient consult to Pulmonology  Once     Provider:  Penny Zaldivar MD    Completed NICK GAO     Inpatient consult to  Pulmonology  Once     Provider:  Ortiz Senior MD    Completed PERLA YEPEZ          No new Assessment & Plan notes have been filed under this hospital service since the last note was generated.  Service: Hospital Medicine    Final Active Diagnoses:    Diagnosis Date Noted POA    Bronchospasm [J98.01] 01/14/2020 Yes    Stage 3 chronic kidney disease [N18.3] 01/01/2020 Yes    Essential hypertension [I10] 11/27/2019 Yes    Chronic combined systolic and diastolic heart failure [I50.42] 11/21/2019 Yes    Thrombocytopenia, secondary [D69.59] 12/14/2017 Yes      Problems Resolved During this Admission:    Diagnosis Date Noted Date Resolved POA    PRINCIPAL PROBLEM:  Acute respiratory failure with hypoxia  [J96.01, J96.02] 01/09/2020 01/09/2020 Yes       Discharged Condition: fair    Disposition: Home or Self Care    Follow Up:  Follow-up Information     Scott Payne MD In 1 week.    Specialty:  Internal Medicine  Contact information:  [address] [place] [address] [address]  [place] [address]  [phone]             Be Woods MD In 2 weeks.    Specialty:  Gastroenterology  Contact information:  [address]  [place] [address]  [phone]                 Patient Instructions:      Referral to Home health   Referral Priority: Routine Referral Type: Home Health Care   Referral Reason: Specialty Services Required   Requested Specialty: Home Health Services   Number of Visits Requested: 1     Diet renal     Notify your health care provider if you experience any of the following:   Order Comments: RECTAL BLEED     Activity as tolerated       Significant Diagnostic Studies: Labs:   BMP:   Recent Labs   Lab 01/14/20  0516 01/15/20  0443   GLU 98 90    140   K 4.2 4.3    102   CO2 25 26   BUN 41* 48*   CREATININE 1.7* 1.5*   CALCIUM 8.6* 8.8   MG 2.2 2.2       Pending Diagnostic Studies:     None         Medications:  Reconciled Home Medications:      Medication List      START taking these  medications    lisinopril 5 MG tablet  Commonly known as:  PRINIVIL,ZESTRIL  Take 1 tablet (5 mg total) by mouth once daily.  Start taking on:  January 16, 2020        CONTINUE taking these medications    albuterol-ipratropium 2.5 mg-0.5 mg/3 mL nebulizer solution  Commonly known as:  DUO-NEB  Take 3 mLs by nebulization every 6 (six) hours as needed for Wheezing or Shortness of Breath.     amLODIPine 10 MG tablet  Commonly known as:  NORVASC  Take 1 tablet (10 mg total) by mouth once daily.     aspirin 81 MG Chew  Take 81 mg by mouth once daily.     donepezil 10 MG tablet  Commonly known as:  ARICEPT  TAKE 1 TABLET BY MOUTH ONCE DAILY     ergocalciferol 50,000 unit Cap  Commonly known as:  ERGOCALCIFEROL  TAKE ONE CAPSULE BY MOUTH EVERY WEEK     furosemide 20 MG tablet  Commonly known as:  LASIX  Take 20 mg by mouth once daily.     HYDROcodone-acetaminophen  mg per tablet  Commonly known as:  NORCO  Take 1 tablet by mouth every 8 (eight) hours as needed (pain). Medically necessary for greater than 7 days for chronic pain     ipratropium 42 mcg (0.06 %) nasal spray  Commonly known as:  ATROVENT  1 spray by Nasal route 2 (two) times daily.     Lyrica 150 MG capsule  Generic drug:  pregabalin  Take 150 mg by mouth 2 (two) times daily.     metoprolol succinate 25 MG 24 hr tablet  Commonly known as:  TOPROL-XL  Take 1 tablet (25 mg total) by mouth once daily.     pantoprazole 40 MG tablet  Commonly known as:  PROTONIX  Take 40 mg by mouth once daily.     sertraline 100 MG tablet  Commonly known as:  ZOLOFT  Take 100 mg by mouth every evening.     simvastatin 40 MG tablet  Commonly known as:  ZOCOR  Take 40 mg by mouth every evening.            Indwelling Lines/Drains at time of discharge:   Lines/Drains/Airways     None                 Time spent on the discharge of patient: 28 minutes  Patient was seen and examined on the date of discharge and determined to be suitable for discharge.         Naveed Guillen,  MD  Department of Hospital Medicine  [facility]

## 2020-01-16 NOTE — TELEPHONE ENCOUNTER
----- Message from Mague Lino sent at 1/16/2020 12:08 PM CST -----  Contact: pt son  Pt needing a call back regarding scheduling an appt     Pt call back # [phone]

## 2020-01-16 NOTE — HPI
Halley Rodrigues is a 89 y.o. old  male who  has a past medical history of Anemia, Anemia due to chronic blood loss (7/25/2018), Anemia, chronic disease (7/25/2018), Dementia, GERD (gastroesophageal reflux disease), Heart disease, Hypertension, and Neuropathy.. The patient presented to [facility] on 1/8/2020 with a primary complaint of Cough (decreased appetite, recent pneumonia, sent from Dr. Payne office for low blood pressure, cough )     89-year-old  male presents emergency room with worsening shortness of breath and a productive cough. He was seen at Dr. Payne in his office his PCP and was instructed to come to the emergency room secondary to increased shortness of breath. This patient was recently discharged from our facility about 2 days ago and was treated for pneumonia.  He was following up with his primary care doctor today and was directed to come to the emergency room secondary to a persistent cough and worsening shortness of breath. The patient was not discharged home on oxygen.  In the PCPs office the patient was noted to have low oxygen saturations. Upon arrival in emergency room the patient was found to be satting in the 80s and was moderately tachypneic.  A D-dimer was performed that was positive and a subsequent V/Q scan was performed that showed a probability for a PE.    Ultrasounds of bilateral lower extremities was negative for DVT

## 2020-01-16 NOTE — PLAN OF CARE
01/16/20 0849   Final Note   Assessment Type Final Discharge Note   Anticipated Discharge Disposition Home-Health   DC orders to resume HH sent to [facility] and [facility] via epicfax and rightfax.  Patient was discharged to home on 01/15/2020 after CM hours. Process for NH [facility] has been [facility] and family will need to continue the process to get him placed. Dtr in law, Erica met with Alessandra and business office staff yesterday and completed the [place] francia for [place] Medicaid.  Per Alessandra, Erica was told to take the francia to the medicaid office; approval is questionable, as the patient sold property in October 2015 and the 5 years look back will, technically, be in effect until October 2020. They will not take the patient until he is LT medicaid pending. [facility] medicaid is required for prison NH placement if the patient is not private pay.

## 2020-01-16 NOTE — PT/OT/SLP DISCHARGE
Occupational Therapy Discharge Summary    Halley Rodrigues  MRN: [id number]   Principal Problem: Acute respiratory failure with hypoxia and hypercarbia      Patient Discharged from acute Occupational Therapy on 1/15/2020.  Please refer to prior OT note dated 1/15/2020 for functional status.    Assessment:      Patient has not met goals.    Objective:     GOALS:   Multidisciplinary Problems     Occupational Therapy Goals     Not on file          Multidisciplinary Problems (Resolved)        Problem: Occupational Therapy Goal    Goal Priority Disciplines Outcome Interventions   Occupational Therapy Goal   (Resolved)     OT, PT/OT Met    Description:  Goals to be met by: discharge    Patient will increase functional independence with ADLs by performing:    Grooming while standing at sink with Stand-by Assistance.  Toileting from bedside commode with Stand-by Assistance for hygiene and clothing management.*   *Edited goal 1/15/20: Toileting from toilet with SBA for hygiene and clothing management.  Toilet transfer to bedside commode with Stand-by Assistance.*   *Edited goal 1/15/20: Toilet transfer to toilet with SBA.                        Reasons for Discontinuation of Therapy Services  Patient d/c home      Plan:     Patient Discharged to: Home with Home Health Service    Abhi Benites OT  1/16/2020

## 2020-01-16 NOTE — NURSING
Pt d/c to home with home health while awaiting placement to snf in [place] per family. Medication and d/c instructions given to pt and son at bedside. Diet education given. Pt and son verbalized understanding. Left unit via w/c to son's personal vehicle outside of hospital.

## 2020-01-16 NOTE — HOSPITAL COURSE
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  Cardiology, and GI as well as pulmonary was consulted. Patient improved remarkably in last 48 hr and we were waiting for nursing home placement however somehow family was not forthcoming about all for adolfo is involved.  Today discussed with family that patient is medically cleared for discharge since yesterday and if they want to put him in a nursing home for long-term they will have to take him home and we can arrange home health.  Family and PCP will further arrange placement in nursing home.  All his medications were reconciled.  Today he denied any new symptoms.  No acute events overnight as per nursing staff.     Review of system:  Negative except generalized weakness    Physical exam:  Vitals Reviewed.  Nursing notes reviewed  General appearance: Well-developed, well-nourished male in no apparent distress.  HENT: Moist mucous membranes of oral cavity.  Eyes:  Anicteric sclerae, no conjunctival discharge  Lungs:  Diminished breath sounds diffusely, comfortable work of breathing  Cardiovascular:  RRR, 2+ radial pulses  Abdomen: Soft, non-distended, non-tender

## 2020-01-17 LAB — O+P STL TRI STN: NORMAL

## 2020-01-20 DIAGNOSIS — M47.896 OTHER SPONDYLOSIS, LUMBAR REGION: ICD-10-CM

## 2020-01-20 DIAGNOSIS — G60.9 IDIOPATHIC PERIPHERAL NEUROPATHY: ICD-10-CM

## 2020-01-20 DIAGNOSIS — G89.4 CHRONIC PAIN DISORDER: Primary | ICD-10-CM

## 2020-01-20 DIAGNOSIS — M51.36 DDD (DEGENERATIVE DISC DISEASE), LUMBAR: ICD-10-CM

## 2020-01-20 RX ORDER — HYDROCODONE BITARTRATE AND ACETAMINOPHEN 10; 325 MG/1; MG/1
1 TABLET ORAL EVERY 8 HOURS PRN
Qty: 90 TABLET | Refills: 0 | Status: SHIPPED | OUTPATIENT
Start: 2020-03-20 | End: 2020-04-19

## 2020-01-20 RX ORDER — HYDROCODONE BITARTRATE AND ACETAMINOPHEN 10; 325 MG/1; MG/1
1 TABLET ORAL EVERY 8 HOURS PRN
Qty: 90 TABLET | Refills: 0 | Status: SHIPPED | OUTPATIENT
Start: 2020-01-20 | End: 2020-02-19

## 2020-01-20 RX ORDER — HYDROCODONE BITARTRATE AND ACETAMINOPHEN 10; 325 MG/1; MG/1
1 TABLET ORAL EVERY 8 HOURS PRN
Qty: 90 TABLET | Refills: 0 | Status: SHIPPED | OUTPATIENT
Start: 2020-02-19 | End: 2020-03-20

## 2020-01-21 ENCOUNTER — OFFICE VISIT (OUTPATIENT)
Dept: PAIN MEDICINE | Facility: CLINIC | Age: 85
End: 2020-01-21
Payer: MEDICARE

## 2020-01-21 VITALS
DIASTOLIC BLOOD PRESSURE: 55 MMHG | SYSTOLIC BLOOD PRESSURE: 106 MMHG | HEART RATE: 52 BPM | HEIGHT: 73 IN | WEIGHT: 149 LBS | BODY MASS INDEX: 19.75 KG/M2

## 2020-01-21 DIAGNOSIS — G60.9 IDIOPATHIC PERIPHERAL NEUROPATHY: ICD-10-CM

## 2020-01-21 DIAGNOSIS — M51.36 DDD (DEGENERATIVE DISC DISEASE), LUMBAR: ICD-10-CM

## 2020-01-21 DIAGNOSIS — M47.896 OTHER SPONDYLOSIS, LUMBAR REGION: ICD-10-CM

## 2020-01-21 DIAGNOSIS — F11.90 CHRONIC, CONTINUOUS USE OF OPIOIDS: Primary | ICD-10-CM

## 2020-01-21 PROCEDURE — 80307 DRUG TEST PRSMV CHEM ANLYZR: CPT

## 2020-01-21 PROCEDURE — 1125F AMNT PAIN NOTED PAIN PRSNT: CPT | Mod: S$GLB,,, | Performed by: PHYSICIAN ASSISTANT

## 2020-01-21 PROCEDURE — 1159F MED LIST DOCD IN RCRD: CPT | Mod: S$GLB,,, | Performed by: PHYSICIAN ASSISTANT

## 2020-01-21 PROCEDURE — 1101F PR PT FALLS ASSESS DOC 0-1 FALLS W/OUT INJ PAST YR: ICD-10-PCS | Mod: CPTII,S$GLB,, | Performed by: PHYSICIAN ASSISTANT

## 2020-01-21 PROCEDURE — 99999 PR PBB SHADOW E&M-EST. PATIENT-LVL III: ICD-10-PCS | Mod: PBBFAC,,, | Performed by: PHYSICIAN ASSISTANT

## 2020-01-21 PROCEDURE — 99214 PR OFFICE/OUTPT VISIT, EST, LEVL IV, 30-39 MIN: ICD-10-PCS | Mod: S$GLB,,, | Performed by: PHYSICIAN ASSISTANT

## 2020-01-21 PROCEDURE — 1101F PT FALLS ASSESS-DOCD LE1/YR: CPT | Mod: CPTII,S$GLB,, | Performed by: PHYSICIAN ASSISTANT

## 2020-01-21 PROCEDURE — 1125F PR PAIN SEVERITY QUANTIFIED, PAIN PRESENT: ICD-10-PCS | Mod: S$GLB,,, | Performed by: PHYSICIAN ASSISTANT

## 2020-01-21 PROCEDURE — 99999 PR PBB SHADOW E&M-EST. PATIENT-LVL III: CPT | Mod: PBBFAC,,, | Performed by: PHYSICIAN ASSISTANT

## 2020-01-21 PROCEDURE — 99214 OFFICE O/P EST MOD 30 MIN: CPT | Mod: S$GLB,,, | Performed by: PHYSICIAN ASSISTANT

## 2020-01-21 PROCEDURE — 1159F PR MEDICATION LIST DOCUMENTED IN MEDICAL RECORD: ICD-10-PCS | Mod: S$GLB,,, | Performed by: PHYSICIAN ASSISTANT

## 2020-01-21 RX ORDER — CYANOCOBALAMIN (VITAMIN B-12) 500 MCG
5 TABLET ORAL
COMMUNITY
Start: 2019-12-26

## 2020-01-21 RX ORDER — SIMVASTATIN 40 MG/1
40 TABLET, FILM COATED ORAL
COMMUNITY
Start: 2019-12-26

## 2020-01-21 NOTE — PROGRESS NOTES
"Referring Physician: No ref. provider found    PCP: Scott Payne MD      CC: bilateral foot pain and lower back pain    Interval History:  Mr. Rodrigues is 89 y.o. male with continued bilateral peripheral neuropathy.  Sympathetic blocks previously did not improve his pain.  Ketamine compounding cream was not helpful.  His back pain is not currently of major concern.  Hydrocodone  mg q 6 h provides some benefit.  His lyrica regimen remains unchanged.  He has noticed decrease in activity becoming more sedentary recently.  Does not desire spinal cord stimulation this time. Denies any worsening weakness.  No bowel bladder changes. He was hospitalized 3 times since LCV, most recently on 1/8 for broncospasm associated with pneumonia. He is feeling much better. Home health comes 2xs weekly. Pain today is rated 8/10.      Prior HPI:   Halley Rodrigues is a 89 y.o.  male with PMHx significant for HLD, HTN, CAD on Plavix, peripheral neuropathy who presents with CC: painful burning sensation in B feet x 5y+ duration. The pain is constant and awakes him in his sleep and makes the evenings the most difficult, pain- wise. It is worse with lying flat and improved with walking. He has taken Lyrica x 5y without any appreciable pain relief. He has tried 2%lidocaine cream, which completely "numbs his feet" and inhibits him from ambulation. He has taken Hydrocodone 7.5 mg 2-3 times QD which helps with his pain significantly. He also reports cLBP without preceding injury/trauma, specifically in the area of B SI joints (R worse than L), aching and stabbing in nature, without radiation. His back pain is worsened with prolonged standing and walking, which limits his activity and ultimately makes his foot pain worse 2/2 being sedentary. He has been having intermittent falls, which he attributes to generalized weakness versus foot numbness or back pain. He denies saddle anesthesia or B/B changes. "     ROS:  CONSTITUTIONAL: No fevers, chills, night sweats, wt. loss, appetite changes  SKIN: no rashes or itching  ENT: No headaches, head trauma, vision changes, or eye pain  LYMPH NODES: None noticed   CV: No chest pain, palpitations.   RESP: No shortness of breath, dyspnea on exertion, cough, wheezing, or hemoptysis  GI: No nausea, emesis, diarrhea, constipation, melena, hematochezia, pain.    : No dysuria, hematuria, urgency, or frequency   HEME: No easy bruising, bleeding problems  PSYCHIATRIC: No depression, anxiety, psychosis, hallucinations.  NEURO: No seizures, memory loss, dizziness or difficulty sleeping  MSK back pain, foot pain      Past Medical History:   Diagnosis Date    Anemia     Anemia due to chronic blood loss 7/25/2018    Anemia, chronic disease 7/25/2018    Dementia     GERD (gastroesophageal reflux disease)     Heart disease     Hypertension     Neuropathy      Past Surgical History:   Procedure Laterality Date    COLONOSCOPY Left 1/13/2020    Procedure: COLONOSCOPY;  Surgeon: Be Woods MD;  Location: [facility];  Service: Endoscopy;  Laterality: Left;    CORONARY ARTERY BYPASS GRAFT      ESOPHAGOGASTRODUODENOSCOPY Left 1/13/2020    Procedure: EGD (ESOPHAGOGASTRODUODENOSCOPY);  Surgeon: Be Woods MD;  Location: [facility];  Service: Endoscopy;  Laterality: Left;     Family History   Problem Relation Age of Onset    Dementia Father      Social History     Socioeconomic History    Marital status:      Spouse name: Not on file    Number of children: Not on file    Years of education: Not on file    Highest education level: Not on file   Occupational History    Not on file   Social Needs    Financial resource strain: Not on file    Food insecurity:     Worry: Not on file     Inability: Not on file    Transportation needs:     Medical: Not on file     Non-medical: Not on file   Tobacco Use    Smoking status: Current Every Day Smoker     Types: Cigarettes,  "Vaping with nicotine    Smokeless tobacco: Never Used    Tobacco comment: e cigarettes   Substance and Sexual Activity    Alcohol use: No    Drug use: No    Sexual activity: Not on file   Lifestyle    Physical activity:     Days per week: Not on file     Minutes per session: Not on file    Stress: Not on file   Relationships    Social connections:     Talks on phone: Not on file     Gets together: Not on file     Attends Alevism service: Not on file     Active member of club or organization: Not on file     Attends meetings of clubs or organizations: Not on file     Relationship status: Not on file   Other Topics Concern    Not on file   Social History Narrative    Not on file         Medications/Allergies: See med card    Vitals:    01/21/20 0846   BP: (!) 106/55   Pulse: (!) 52   Weight: 67.6 kg (149 lb)   Height: 6' 1" (1.854 m)   PainSc:   8   PainLoc: Back         Physical exam:    GENERAL: A and O x3, the patient appears well groomed and is in no acute distress.  Skin: No rashes or obvious lesions  HEENT: normocephalic, atraumatic  CARDIOVASCULAR:  Bradycardia  LUNGS: non labored breathing  ABDOMEN: soft, nontender   UPPER EXTREMITIES: Normal alignment, normal range of motion, no atrophy, no skin changes,  hair growth and nail growth normal and equal bilaterally. No swelling, no tenderness.    LOWER EXTREMITIES:  Normal alignment, normal range of motion, no atrophy, dry skin, onchyomycosis in B feet. No swelling, no tenderness.    LUMBAR SPINE  Lumbar spine: ROM is moderately decreased l with flexion extension and oblique extension   Michel's test causes increased pain on both sides (R>>L)   Supine straight leg raise is negative bilaterally.    Internal and external rotation of the hip causes no increased pain on either side.  Myofascial exam:SI joint tenderness B      MENTAL STATUS: normal orientation, speech, language, and fund of knowledge for social situation.  Emotional state " appropriate.    CRANIAL NERVES:  II:  PERRL bilaterally,   III,IV,VI: EOMI.    V:  Facial sensation equal bilaterally  VII:  Facial motor function normal.  VIII:  Hearing equal to finger rub bilaterally  IX/X: Gag normal, palate symmetric  XI:  Shoulder shrug equal, head turn equal  XII:  Tongue midline without fasciculations    MOTOR: Tone and bulk: normal bilateral upper and lower Strength: normal   Delt Bi Tri WE WF     R 5 5 5 5 5 5   L 5 5 5 5 5 5     IP ADD ABD Quad TA Gas HAM  R 5 5 5 5 5 5 5  L 5 5 5 5 5 5 5    SENSATION: Light touch and pinprick intact bilaterally  REFLEXES: normal, symmetric, nonbrisk.  Toes down, no clonus. No hoffmans.  GAIT: normal rise, base, steps, and arm swing.      Assessment   Mr. Rodrigues is a 89 y.o. male with   1. Chronic, continuous use of opioids    2. Idiopathic peripheral neuropathy    3. DDD (degenerative disc disease), lumbar    4. Other spondylosis, lumbar region      Plan:  1.  I have stressed the importance of physical activity and exercise to improve overall health. Continue PT exercises  2.  Hydrocodone 10 mg q.8 hours. Decreased LCV due to decrease in activity and concern with over medication.  Patient expressed understanding.  Script provided for 3 months.  UDS today.  3.  May consider SCS trial, but patient is currently hesitant to proceed    4.  Continue lyrica as prescribed  5.  Follow up in 3 months  All medication management was performed by Dr. Shawn Elizabeth

## 2020-01-25 LAB

## 2022-01-29 NOTE — PLAN OF CARE
11/29/19 1118   Discharge Reassessment   Assessment Type Discharge Planning Reassessment   Anticipated Discharge Disposition SNF     CHAPARRO spoke with pt's daughter-in-law, Erica [phone], who stated that referrals could be sent to [facility] and to [facility]. SW faxed referrals to those facilities. SW to continue to f/u for d/c planning.     CHAPARRO [id number]: CHAPARRO spoke with Erica who stated that their number one choice for placement would be [facility]. Erica also inquired to the 5 Wishes document. She stated that she will try and meet with the SW/CM on Monday as she is unable to make it to the hospital today. SW to continue to f/u for d/c planning.   Patient arrived with dizziness after bath

## (undated) DEVICE — NDL SAFETY 25G X 1.5 ECLIPSE

## (undated) DEVICE — TUBING MINIBORE EXTENSION

## (undated) DEVICE — SYR 10CC LUER LOCK

## (undated) DEVICE — APPLICATOR CHLORAPREP CLR 10.5

## (undated) DEVICE — SYR DISP LL 5CC

## (undated) DEVICE — SYS LABEL CORRECT MED

## (undated) DEVICE — NDL HYPO REG 25G X 1 1/2

## (undated) DEVICE — ITEM INACTIVATED - ERP

## (undated) DEVICE — NDL HYPODERMIC BLUNT 18G 1.5IN

## (undated) DEVICE — GLOVE SURG ULTRA TOUCH 7.5

## (undated) DEVICE — MARKER SKIN STND TIP BLUE BARR